# Patient Record
Sex: FEMALE | URBAN - METROPOLITAN AREA
[De-identification: names, ages, dates, MRNs, and addresses within clinical notes are randomized per-mention and may not be internally consistent; named-entity substitution may affect disease eponyms.]

---

## 2018-10-17 ENCOUNTER — APPOINTMENT (RX ONLY)
Dept: URBAN - METROPOLITAN AREA CLINIC 4 | Facility: CLINIC | Age: 78
Setting detail: DERMATOLOGY
End: 2018-10-17

## 2018-10-17 DIAGNOSIS — I87.2 VENOUS INSUFFICIENCY (CHRONIC) (PERIPHERAL): ICD-10-CM

## 2018-10-17 DIAGNOSIS — L81.4 OTHER MELANIN HYPERPIGMENTATION: ICD-10-CM

## 2018-10-17 DIAGNOSIS — Z71.89 OTHER SPECIFIED COUNSELING: ICD-10-CM

## 2018-10-17 DIAGNOSIS — L57.0 ACTINIC KERATOSIS: ICD-10-CM

## 2018-10-17 DIAGNOSIS — L82.1 OTHER SEBORRHEIC KERATOSIS: ICD-10-CM

## 2018-10-17 DIAGNOSIS — L30.0 NUMMULAR DERMATITIS: ICD-10-CM

## 2018-10-17 DIAGNOSIS — D18.0 HEMANGIOMA: ICD-10-CM

## 2018-10-17 DIAGNOSIS — D22 MELANOCYTIC NEVI: ICD-10-CM

## 2018-10-17 DIAGNOSIS — M79.3 PANNICULITIS, UNSPECIFIED: ICD-10-CM

## 2018-10-17 PROBLEM — D18.01 HEMANGIOMA OF SKIN AND SUBCUTANEOUS TISSUE: Status: ACTIVE | Noted: 2018-10-17

## 2018-10-17 PROBLEM — D48.5 NEOPLASM OF UNCERTAIN BEHAVIOR OF SKIN: Status: ACTIVE | Noted: 2018-10-17

## 2018-10-17 PROBLEM — E78.5 HYPERLIPIDEMIA, UNSPECIFIED: Status: ACTIVE | Noted: 2018-10-17

## 2018-10-17 PROBLEM — I83.10 VARICOSE VEINS OF UNSPECIFIED LOWER EXTREMITY WITH INFLAMMATION: Status: ACTIVE | Noted: 2018-10-17

## 2018-10-17 PROCEDURE — ? LIQUID NITROGEN

## 2018-10-17 PROCEDURE — 99203 OFFICE O/P NEW LOW 30 MIN: CPT | Mod: 25

## 2018-10-17 PROCEDURE — ? COUNSELING

## 2018-10-17 PROCEDURE — ? BIOPSY BY SHAVE METHOD

## 2018-10-17 PROCEDURE — 17000 DESTRUCT PREMALG LESION: CPT

## 2018-10-17 PROCEDURE — ? PRESCRIPTION

## 2018-10-17 PROCEDURE — 11100: CPT | Mod: 59

## 2018-10-17 RX ORDER — CLOBETASOL PROPIONATE 0.5 MG/G
OINTMENT TOPICAL
Qty: 1 | Refills: 0 | Status: ERX | COMMUNITY
Start: 2018-10-17

## 2018-10-17 RX ORDER — PENTOXIFYLLINE 400 MG/1
TABLET, FILM COATED, EXTENDED RELEASE ORAL
Qty: 100 | Refills: 0 | Status: ERX | COMMUNITY
Start: 2018-10-17

## 2018-10-17 RX ADMIN — CLOBETASOL PROPIONATE: 0.5 OINTMENT TOPICAL at 21:47

## 2018-10-17 RX ADMIN — PENTOXIFYLLINE: 400 TABLET, FILM COATED, EXTENDED RELEASE ORAL at 21:49

## 2018-10-17 ASSESSMENT — LOCATION DETAILED DESCRIPTION DERM
LOCATION DETAILED: LEFT RIB CAGE
LOCATION DETAILED: RIGHT DISTAL POSTERIOR THIGH
LOCATION DETAILED: RIGHT ANTERIOR DISTAL THIGH
LOCATION DETAILED: LEFT MEDIAL UPPER BACK
LOCATION DETAILED: LEFT INFERIOR UPPER BACK
LOCATION DETAILED: RIGHT DISTAL PRETIBIAL REGION
LOCATION DETAILED: NASAL DORSUM
LOCATION DETAILED: LEFT ANTERIOR DISTAL THIGH
LOCATION DETAILED: INFERIOR LUMBAR SPINE
LOCATION DETAILED: LEFT PROXIMAL PRETIBIAL REGION
LOCATION DETAILED: LEFT POPLITEAL SKIN
LOCATION DETAILED: LEFT SUPERIOR UPPER BACK

## 2018-10-17 ASSESSMENT — LOCATION SIMPLE DESCRIPTION DERM
LOCATION SIMPLE: LOWER BACK
LOCATION SIMPLE: RIGHT POSTERIOR THIGH
LOCATION SIMPLE: LEFT POPLITEAL SKIN
LOCATION SIMPLE: RIGHT PRETIBIAL REGION
LOCATION SIMPLE: LEFT PRETIBIAL REGION
LOCATION SIMPLE: RIGHT THIGH
LOCATION SIMPLE: LEFT UPPER BACK
LOCATION SIMPLE: LEFT THIGH
LOCATION SIMPLE: NOSE
LOCATION SIMPLE: ABDOMEN

## 2018-10-17 ASSESSMENT — LOCATION ZONE DERM
LOCATION ZONE: TRUNK
LOCATION ZONE: NOSE
LOCATION ZONE: LEG

## 2018-10-17 NOTE — PROCEDURE: BIOPSY BY SHAVE METHOD
Curettage Text: The wound bed was treated with curettage after the biopsy was performed.
Biopsy Type: H and E
Cryotherapy Text: The wound bed was treated with cryotherapy after the biopsy was performed.
Wound Care: Petrolatum
Was A Bandage Applied: Yes
Notification Instructions: Patient will be notified of biopsy results. However, patient instructed to call the office if not contacted within 2 weeks.
Depth Of Biopsy: dermis
Electrodesiccation And Curettage Text: The wound bed was treated with electrodesiccation and curettage after the biopsy was performed.
Hemostasis: Drysol
Type Of Destruction Used: Curettage
Additional Anesthesia Volume In Cc (Will Not Render If 0): 0
Destruction After The Procedure: No
Lab: [id number]
Post-Care Instructions: I reviewed with the patient in detail post-care instructions. Patient is to keep the biopsy site dry overnight. Gentle cleansing daily.  Apply petroleum ointment daily until healed. Patient may apply hydrogen peroxide soaks to remove any crusting.
Electrodesiccation Text: The wound bed was treated with electrodesiccation after the biopsy was performed.
Anesthesia Type: 1% lidocaine with 1:100,000 epinephrine and a 1:10 solution of 8.4% sodium bicarbonate
Dressing: pressure dressing with telfa
Consent: Written consent was obtained and risks were reviewed including but not limited to scarring, infection, bleeding, scabbing, incomplete removal, nerve damage and allergy to anesthesia.
Silver Nitrate Text: The wound bed was treated with silver nitrate after the biopsy was performed.
Anesthesia Volume In Cc: 2
Lab Facility: 
Biopsy Method: Personna blade
Detail Level: Detailed
Billing Type: Third-Party Bill

## 2018-10-17 NOTE — PROCEDURE: COUNSELING
Detail Level: Zone
Detail Level: Detailed
Patient Specific Counseling (Will Not Stick From Patient To Patient): Spoke with hematologist and can start pentoxyphyline
Detail Level: Simple
Patient Specific Counseling (Will Not Stick From Patient To Patient): Moisturize daily \\nClobetasol bid 2 weeks with flares

## 2018-11-29 ENCOUNTER — APPOINTMENT (RX ONLY)
Dept: URBAN - METROPOLITAN AREA CLINIC 22 | Facility: CLINIC | Age: 78
Setting detail: DERMATOLOGY
End: 2018-11-29

## 2018-11-29 DIAGNOSIS — I87.2 VENOUS INSUFFICIENCY (CHRONIC) (PERIPHERAL): ICD-10-CM | Status: IMPROVED

## 2018-11-29 DIAGNOSIS — M79.3 PANNICULITIS, UNSPECIFIED: ICD-10-CM | Status: STABLE

## 2018-11-29 DIAGNOSIS — L82.1 OTHER SEBORRHEIC KERATOSIS: ICD-10-CM

## 2018-11-29 PROBLEM — I83.10 VARICOSE VEINS OF UNSPECIFIED LOWER EXTREMITY WITH INFLAMMATION: Status: ACTIVE | Noted: 2018-11-29

## 2018-11-29 PROCEDURE — 99213 OFFICE O/P EST LOW 20 MIN: CPT

## 2018-11-29 PROCEDURE — ? LIQUID NITROGEN (COSMETIC)

## 2018-11-29 PROCEDURE — ? PRESCRIPTION

## 2018-11-29 PROCEDURE — ? ADDITIONAL NOTES

## 2018-11-29 PROCEDURE — ? COUNSELING

## 2018-11-29 RX ORDER — PENTOXIFYLLINE 400 MG/1
TABLET, FILM COATED, EXTENDED RELEASE ORAL
Qty: 100 | Refills: 0 | Status: ERX

## 2018-11-29 RX ORDER — CLOBETASOL PROPIONATE 0.5 MG/G
OINTMENT TOPICAL
Qty: 1 | Refills: 0 | Status: ERX

## 2018-11-29 ASSESSMENT — LOCATION DETAILED DESCRIPTION DERM
LOCATION DETAILED: LEFT PROXIMAL PRETIBIAL REGION
LOCATION DETAILED: RIGHT CLAVICULAR SKIN
LOCATION DETAILED: RIGHT DISTAL PRETIBIAL REGION

## 2018-11-29 ASSESSMENT — LOCATION SIMPLE DESCRIPTION DERM
LOCATION SIMPLE: RIGHT PRETIBIAL REGION
LOCATION SIMPLE: RIGHT CLAVICULAR SKIN
LOCATION SIMPLE: LEFT PRETIBIAL REGION

## 2018-11-29 ASSESSMENT — LOCATION ZONE DERM
LOCATION ZONE: TRUNK
LOCATION ZONE: LEG

## 2018-11-29 NOTE — PROCEDURE: COUNSELING
Detail Level: Zone
Patient Specific Counseling (Will Not Stick From Patient To Patient): Advised to keep her legs elevate and walk as much as possible.
Detail Level: Detailed

## 2018-11-29 NOTE — PROCEDURE: ADDITIONAL NOTES
Additional Notes: Patient would like to come back for destruction of several sks around the neck. Discussed cost. Will likely treat with light electrocautery
Detail Level: Simple

## 2018-11-29 NOTE — PROCEDURE: LIQUID NITROGEN (COSMETIC)
Price (Use Numbers Only, No Special Characters Or $): 0
Detail Level: Detailed
Post-Care Instructions: I reviewed with the patient in detail post-care instructions. Patient is to wear sunprotection, and avoid picking at any of the treated lesions. Pt may apply Vaseline to crusted or scabbing areas.
Render Post Care In The Note?: yes
Total Number Of Lesions Treated: 1
Consent: The patient's consent was obtained including but not limited to risks of crusting, scabbing, blistering, scarring, darker or lighter pigmentary change, recurrence, incomplete removal and infection.

## 2019-01-17 ENCOUNTER — APPOINTMENT (RX ONLY)
Dept: URBAN - METROPOLITAN AREA CLINIC 22 | Facility: CLINIC | Age: 79
Setting detail: DERMATOLOGY
End: 2019-01-17

## 2019-01-17 DIAGNOSIS — L82.1 OTHER SEBORRHEIC KERATOSIS: ICD-10-CM

## 2019-01-17 PROCEDURE — ? COUNSELING

## 2019-01-17 PROCEDURE — ? LIQUID NITROGEN (COSMETIC)

## 2019-01-17 ASSESSMENT — LOCATION DETAILED DESCRIPTION DERM
LOCATION DETAILED: LEFT CLAVICULAR NECK
LOCATION DETAILED: LEFT ANTERIOR SHOULDER
LOCATION DETAILED: LEFT MEDIAL ZYGOMA
LOCATION DETAILED: LEFT POSTERIOR SHOULDER
LOCATION DETAILED: LEFT LATERAL TRAPEZIAL NECK
LOCATION DETAILED: LEFT INFERIOR LATERAL NECK

## 2019-01-17 ASSESSMENT — LOCATION SIMPLE DESCRIPTION DERM
LOCATION SIMPLE: LEFT ANTERIOR NECK
LOCATION SIMPLE: POSTERIOR NECK
LOCATION SIMPLE: LEFT ZYGOMA
LOCATION SIMPLE: LEFT SHOULDER

## 2019-01-17 ASSESSMENT — LOCATION ZONE DERM
LOCATION ZONE: NECK
LOCATION ZONE: ARM
LOCATION ZONE: FACE

## 2019-01-17 NOTE — PROCEDURE: LIQUID NITROGEN (COSMETIC)
Total Number Of Lesions Treated: 15
Price (Use Numbers Only, No Special Characters Or $): 125.00
Duration Of Freeze Thaw-Cycle (Seconds): 0
Post-Care Instructions: I reviewed with the patient in detail post-care instructions. Patient is to wear sunprotection, and avoid picking at any of the treated lesions. Pt may apply Vaseline to crusted or scabbing areas.
Detail Level: Detailed
Pared With?: curette
Render Post Care In The Note?: yes
Consent: The patient's consent was obtained including but not limited to risks of crusting, scabbing, blistering, scarring, darker or lighter pigmentary change, recurrence, incomplete removal and infection.

## 2019-01-17 NOTE — HPI: SKIN LESION (SEBORRHEIC KERATOSES)
How Severe Are They?: moderate
Is This A New Presentation, Or A Follow-Up?: Skin Lesions
Additional History: Patient is here for cosmetic SK removal

## 2019-03-21 DIAGNOSIS — Z01.810 PRE-OPERATIVE CARDIOVASCULAR EXAMINATION: ICD-10-CM

## 2019-03-21 DIAGNOSIS — Z01.812 PRE-OPERATIVE LABORATORY EXAMINATION: ICD-10-CM

## 2019-03-21 LAB
ABO GROUP BLD: NORMAL
ABO GROUP BLD: NORMAL
ANION GAP SERPL CALC-SCNC: 3 MMOL/L (ref 0–11.9)
ANISOCYTOSIS BLD QL SMEAR: ABNORMAL
APPEARANCE UR: ABNORMAL
BACTERIA #/AREA URNS HPF: ABNORMAL /HPF
BASOPHILS # BLD AUTO: 1.8 % (ref 0–1.8)
BASOPHILS # BLD: 0.06 K/UL (ref 0–0.12)
BILIRUB UR QL STRIP.AUTO: NEGATIVE
BLD GP AB SCN SERPL QL: NORMAL
BUN SERPL-MCNC: 17 MG/DL (ref 8–22)
CALCIUM SERPL-MCNC: 9.3 MG/DL (ref 8.5–10.5)
CHLORIDE SERPL-SCNC: 106 MMOL/L (ref 96–112)
CO2 SERPL-SCNC: 29 MMOL/L (ref 20–33)
COLOR UR: YELLOW
CREAT SERPL-MCNC: 0.78 MG/DL (ref 0.5–1.4)
EKG IMPRESSION: NORMAL
EOSINOPHIL # BLD AUTO: 0.03 K/UL (ref 0–0.51)
EOSINOPHIL NFR BLD: 0.9 % (ref 0–6.9)
EPI CELLS #/AREA URNS HPF: ABNORMAL /HPF
ERYTHROCYTE [DISTWIDTH] IN BLOOD BY AUTOMATED COUNT: 64.4 FL (ref 35.9–50)
GLUCOSE SERPL-MCNC: 94 MG/DL (ref 65–99)
GLUCOSE UR STRIP.AUTO-MCNC: NEGATIVE MG/DL
HCT VFR BLD AUTO: 38.2 % (ref 37–47)
HGB BLD-MCNC: 12.1 G/DL (ref 12–16)
HYALINE CASTS #/AREA URNS LPF: ABNORMAL /LPF
KETONES UR STRIP.AUTO-MCNC: ABNORMAL MG/DL
LEUKOCYTE ESTERASE UR QL STRIP.AUTO: ABNORMAL
LG PLATELETS BLD QL SMEAR: NORMAL
LYMPHOCYTES # BLD AUTO: 0.87 K/UL (ref 1–4.8)
LYMPHOCYTES NFR BLD: 27.2 % (ref 22–41)
MACROCYTES BLD QL SMEAR: ABNORMAL
MANUAL DIFF BLD: NORMAL
MCH RBC QN AUTO: 36.2 PG (ref 27–33)
MCHC RBC AUTO-ENTMCNC: 31.7 G/DL (ref 33.6–35)
MCV RBC AUTO: 114.4 FL (ref 81.4–97.8)
MICRO URNS: ABNORMAL
MONOCYTES # BLD AUTO: 0.34 K/UL (ref 0–0.85)
MONOCYTES NFR BLD AUTO: 10.5 % (ref 0–13.4)
MORPHOLOGY BLD-IMP: NORMAL
NEUTROPHILS # BLD AUTO: 1.91 K/UL (ref 2–7.15)
NEUTROPHILS NFR BLD: 57.9 % (ref 44–72)
NEUTS BAND NFR BLD MANUAL: 1.8 % (ref 0–10)
NITRITE UR QL STRIP.AUTO: NEGATIVE
NRBC # BLD AUTO: 0 K/UL
NRBC BLD-RTO: 0 /100 WBC
OVALOCYTES BLD QL SMEAR: NORMAL
PH UR STRIP.AUTO: 6.5 [PH]
PLATELET # BLD AUTO: 586 K/UL (ref 164–446)
PLATELET BLD QL SMEAR: NORMAL
PMV BLD AUTO: 9.8 FL (ref 9–12.9)
POIKILOCYTOSIS BLD QL SMEAR: NORMAL
POTASSIUM SERPL-SCNC: 4.3 MMOL/L (ref 3.6–5.5)
PROT UR QL STRIP: NEGATIVE MG/DL
RBC # BLD AUTO: 3.34 M/UL (ref 4.2–5.4)
RBC # URNS HPF: ABNORMAL /HPF
RBC BLD AUTO: PRESENT
RBC UR QL AUTO: NEGATIVE
RH BLD: NORMAL
RH BLD: NORMAL
SCCMEC + MECA PNL NOSE NAA+PROBE: NEGATIVE
SCCMEC + MECA PNL NOSE NAA+PROBE: NEGATIVE
SODIUM SERPL-SCNC: 138 MMOL/L (ref 135–145)
SP GR UR STRIP.AUTO: 1.02
UROBILINOGEN UR STRIP.AUTO-MCNC: 0.2 MG/DL
WBC # BLD AUTO: 3.2 K/UL (ref 4.8–10.8)
WBC #/AREA URNS HPF: ABNORMAL /HPF

## 2019-03-21 PROCEDURE — 93010 ELECTROCARDIOGRAM REPORT: CPT | Performed by: INTERNAL MEDICINE

## 2019-03-21 PROCEDURE — 86900 BLOOD TYPING SEROLOGIC ABO: CPT

## 2019-03-21 PROCEDURE — 36415 COLL VENOUS BLD VENIPUNCTURE: CPT

## 2019-03-21 PROCEDURE — 80048 BASIC METABOLIC PNL TOTAL CA: CPT

## 2019-03-21 PROCEDURE — 86850 RBC ANTIBODY SCREEN: CPT

## 2019-03-21 PROCEDURE — 93005 ELECTROCARDIOGRAM TRACING: CPT

## 2019-03-21 PROCEDURE — 87640 STAPH A DNA AMP PROBE: CPT

## 2019-03-21 PROCEDURE — 81001 URINALYSIS AUTO W/SCOPE: CPT

## 2019-03-21 PROCEDURE — 85007 BL SMEAR W/DIFF WBC COUNT: CPT

## 2019-03-21 PROCEDURE — 86901 BLOOD TYPING SEROLOGIC RH(D): CPT

## 2019-03-21 PROCEDURE — 87641 MR-STAPH DNA AMP PROBE: CPT

## 2019-03-21 PROCEDURE — 85027 COMPLETE CBC AUTOMATED: CPT

## 2019-03-21 NOTE — OR NURSING
Patient here for pre-admit appointment. Labs/testing done as ordered. MRSA nasal swab completed; patient educated as to what to expect if result is positive.

## 2019-03-21 NOTE — DISCHARGE PLANNING
DISCHARGE PLANNING NOTE - TOTAL JOINT     Procedure: Procedure(s):  KNEE ARTHROPLASTY TOTAL  Procedure Date: 4/3/2019  Insurance:  Payor: MEDICARE / Plan: MEDICARE PART A & B  Equipment currently available at home? None  Steps into the home? 1  Steps within the home? 13 with railing  Toilet height? ADA  Type of shower? walk-in shower  Who will be with you during your recovery? friend  Is Outpatient Physical Therapy set up after surgery? Yes  Did you take the Total Joint Class and where? Yes, at [facility]     Plan: Reviewed Equipment Resource list and provided a copy to the patient. Recommended a raised toilet seat and shower chair. She will need a FWW and signed the choice form for [facility]. Please obtain an order for the walker. She discussed going home the same day as surgery if all goes well.

## 2019-04-03 ENCOUNTER — ANESTHESIA EVENT (OUTPATIENT)
Dept: SURGERY | Facility: MEDICAL CENTER | Age: 79
End: 2019-04-03
Payer: MEDICARE

## 2019-04-03 ENCOUNTER — ANESTHESIA (OUTPATIENT)
Dept: SURGERY | Facility: MEDICAL CENTER | Age: 79
End: 2019-04-03
Payer: MEDICARE

## 2019-04-03 ENCOUNTER — HOSPITAL ENCOUNTER (OUTPATIENT)
Facility: MEDICAL CENTER | Age: 79
End: 2019-04-04
Attending: ORTHOPAEDIC SURGERY | Admitting: ORTHOPAEDIC SURGERY
Payer: MEDICARE

## 2019-04-03 ENCOUNTER — APPOINTMENT (OUTPATIENT)
Dept: RADIOLOGY | Facility: MEDICAL CENTER | Age: 79
End: 2019-04-03
Attending: STUDENT IN AN ORGANIZED HEALTH CARE EDUCATION/TRAINING PROGRAM
Payer: MEDICARE

## 2019-04-03 DIAGNOSIS — Z96.652 STATUS POST LEFT KNEE REPLACEMENT: ICD-10-CM

## 2019-04-03 DIAGNOSIS — G89.18 ACUTE POST-OPERATIVE PAIN: ICD-10-CM

## 2019-04-03 DIAGNOSIS — M17.12 ARTHRITIS OF LEFT KNEE: ICD-10-CM

## 2019-04-03 PROCEDURE — 700101 HCHG RX REV CODE 250: Performed by: STUDENT IN AN ORGANIZED HEALTH CARE EDUCATION/TRAINING PROGRAM

## 2019-04-03 PROCEDURE — 160041 HCHG SURGERY MINUTES - EA ADDL 1 MIN LEVEL 4: Performed by: ORTHOPAEDIC SURGERY

## 2019-04-03 PROCEDURE — 700112 HCHG RX REV CODE 229: Performed by: STUDENT IN AN ORGANIZED HEALTH CARE EDUCATION/TRAINING PROGRAM

## 2019-04-03 PROCEDURE — G0378 HOSPITAL OBSERVATION PER HR: HCPCS

## 2019-04-03 PROCEDURE — 160036 HCHG PACU - EA ADDL 30 MINS PHASE I: Performed by: ORTHOPAEDIC SURGERY

## 2019-04-03 PROCEDURE — 73560 X-RAY EXAM OF KNEE 1 OR 2: CPT | Mod: LT

## 2019-04-03 PROCEDURE — 160035 HCHG PACU - 1ST 60 MINS PHASE I: Performed by: ORTHOPAEDIC SURGERY

## 2019-04-03 PROCEDURE — 700111 HCHG RX REV CODE 636 W/ 250 OVERRIDE (IP)

## 2019-04-03 PROCEDURE — 700105 HCHG RX REV CODE 258: Performed by: STUDENT IN AN ORGANIZED HEALTH CARE EDUCATION/TRAINING PROGRAM

## 2019-04-03 PROCEDURE — 502000 HCHG MISC OR IMPLANTS RC 0278: Performed by: ORTHOPAEDIC SURGERY

## 2019-04-03 PROCEDURE — A9270 NON-COVERED ITEM OR SERVICE: HCPCS | Performed by: ANESTHESIOLOGY

## 2019-04-03 PROCEDURE — 700102 HCHG RX REV CODE 250 W/ 637 OVERRIDE(OP): Performed by: ANESTHESIOLOGY

## 2019-04-03 PROCEDURE — 700111 HCHG RX REV CODE 636 W/ 250 OVERRIDE (IP): Performed by: ANESTHESIOLOGY

## 2019-04-03 PROCEDURE — 502579 HCHG PACK, TOTAL KNEE: Performed by: ORTHOPAEDIC SURGERY

## 2019-04-03 PROCEDURE — 160009 HCHG ANES TIME/MIN: Performed by: ORTHOPAEDIC SURGERY

## 2019-04-03 PROCEDURE — 96365 THER/PROPH/DIAG IV INF INIT: CPT | Mod: XU

## 2019-04-03 PROCEDURE — 700101 HCHG RX REV CODE 250: Performed by: ANESTHESIOLOGY

## 2019-04-03 PROCEDURE — 700101 HCHG RX REV CODE 250

## 2019-04-03 PROCEDURE — 160022 HCHG BLOCK: Performed by: ORTHOPAEDIC SURGERY

## 2019-04-03 PROCEDURE — 700102 HCHG RX REV CODE 250 W/ 637 OVERRIDE(OP): Performed by: STUDENT IN AN ORGANIZED HEALTH CARE EDUCATION/TRAINING PROGRAM

## 2019-04-03 PROCEDURE — 160048 HCHG OR STATISTICAL LEVEL 1-5: Performed by: ORTHOPAEDIC SURGERY

## 2019-04-03 PROCEDURE — 160002 HCHG RECOVERY MINUTES (STAT): Performed by: ORTHOPAEDIC SURGERY

## 2019-04-03 PROCEDURE — 501838 HCHG SUTURE GENERAL: Performed by: ORTHOPAEDIC SURGERY

## 2019-04-03 PROCEDURE — A9270 NON-COVERED ITEM OR SERVICE: HCPCS | Performed by: STUDENT IN AN ORGANIZED HEALTH CARE EDUCATION/TRAINING PROGRAM

## 2019-04-03 PROCEDURE — L8699 PROSTHETIC IMPLANT NOS: HCPCS | Performed by: ORTHOPAEDIC SURGERY

## 2019-04-03 PROCEDURE — 700111 HCHG RX REV CODE 636 W/ 250 OVERRIDE (IP): Performed by: STUDENT IN AN ORGANIZED HEALTH CARE EDUCATION/TRAINING PROGRAM

## 2019-04-03 PROCEDURE — 160029 HCHG SURGERY MINUTES - 1ST 30 MINS LEVEL 4: Performed by: ORTHOPAEDIC SURGERY

## 2019-04-03 PROCEDURE — 94760 N-INVAS EAR/PLS OXIMETRY 1: CPT

## 2019-04-03 DEVICE — IMPLANTABLE DEVICE: Type: IMPLANTABLE DEVICE | Site: KNEE | Status: FUNCTIONAL

## 2019-04-03 DEVICE — IMPLANT GNS II CMT TIB SIZE 5 LEFT: Type: IMPLANTABLE DEVICE | Site: KNEE | Status: FUNCTIONAL

## 2019-04-03 DEVICE — IMPLANT GII C/R ART INS SZ 5-6 9MM: Type: IMPLANTABLE DEVICE | Site: KNEE | Status: FUNCTIONAL

## 2019-04-03 DEVICE — BONE CEMENT SIMPLEX FULL DOSE - (10EA/PK): Type: IMPLANTABLE DEVICE | Site: KNEE | Status: FUNCTIONAL

## 2019-04-03 DEVICE — IMPLANT LEGION CR NP FEM SZ 6 LT: Type: IMPLANTABLE DEVICE | Site: KNEE | Status: FUNCTIONAL

## 2019-04-03 RX ORDER — CHLORPROMAZINE HYDROCHLORIDE 25 MG/ML
25 INJECTION INTRAMUSCULAR EVERY 6 HOURS PRN
Status: DISCONTINUED | OUTPATIENT
Start: 2019-04-03 | End: 2019-04-04 | Stop reason: HOSPADM

## 2019-04-03 RX ORDER — HYDRALAZINE HYDROCHLORIDE 20 MG/ML
5 INJECTION INTRAMUSCULAR; INTRAVENOUS
Status: DISCONTINUED | OUTPATIENT
Start: 2019-04-03 | End: 2019-04-03 | Stop reason: HOSPADM

## 2019-04-03 RX ORDER — ENEMA 19; 7 G/133ML; G/133ML
1 ENEMA RECTAL
Status: DISCONTINUED | OUTPATIENT
Start: 2019-04-03 | End: 2019-04-04 | Stop reason: HOSPADM

## 2019-04-03 RX ORDER — OXYCODONE HCL 5 MG/5 ML
5 SOLUTION, ORAL ORAL
Status: DISCONTINUED | OUTPATIENT
Start: 2019-04-03 | End: 2019-04-03 | Stop reason: HOSPADM

## 2019-04-03 RX ORDER — MAGNESIUM HYDROXIDE 1200 MG/15ML
LIQUID ORAL
Status: COMPLETED | OUTPATIENT
Start: 2019-04-03 | End: 2019-04-03

## 2019-04-03 RX ORDER — DIPHENHYDRAMINE HCL 25 MG
25 TABLET ORAL EVERY 6 HOURS PRN
Status: DISCONTINUED | OUTPATIENT
Start: 2019-04-03 | End: 2019-04-04 | Stop reason: HOSPADM

## 2019-04-03 RX ORDER — DEXTROSE, SODIUM CHLORIDE, SODIUM LACTATE, POTASSIUM CHLORIDE, AND CALCIUM CHLORIDE 5; .6; .31; .03; .02 G/100ML; G/100ML; G/100ML; G/100ML; G/100ML
INJECTION, SOLUTION INTRAVENOUS CONTINUOUS
Status: DISPENSED | OUTPATIENT
Start: 2019-04-03 | End: 2019-04-03

## 2019-04-03 RX ORDER — AMOXICILLIN 250 MG
1 CAPSULE ORAL
Status: DISCONTINUED | OUTPATIENT
Start: 2019-04-03 | End: 2019-04-04 | Stop reason: HOSPADM

## 2019-04-03 RX ORDER — TAMSULOSIN HYDROCHLORIDE 0.4 MG/1
0.4 CAPSULE ORAL
Status: DISCONTINUED | OUTPATIENT
Start: 2019-04-04 | End: 2019-04-04 | Stop reason: HOSPADM

## 2019-04-03 RX ORDER — ONDANSETRON 2 MG/ML
4 INJECTION INTRAMUSCULAR; INTRAVENOUS EVERY 4 HOURS PRN
Status: DISCONTINUED | OUTPATIENT
Start: 2019-04-03 | End: 2019-04-04 | Stop reason: HOSPADM

## 2019-04-03 RX ORDER — KETOROLAC TROMETHAMINE 30 MG/ML
15 INJECTION, SOLUTION INTRAMUSCULAR; INTRAVENOUS EVERY 6 HOURS
Status: DISPENSED | OUTPATIENT
Start: 2019-04-03 | End: 2019-04-04

## 2019-04-03 RX ORDER — MIDAZOLAM HYDROCHLORIDE 1 MG/ML
1 INJECTION INTRAMUSCULAR; INTRAVENOUS
Status: DISCONTINUED | OUTPATIENT
Start: 2019-04-03 | End: 2019-04-03 | Stop reason: HOSPADM

## 2019-04-03 RX ORDER — OXYCODONE HCL 5 MG/5 ML
10 SOLUTION, ORAL ORAL
Status: DISCONTINUED | OUTPATIENT
Start: 2019-04-03 | End: 2019-04-03 | Stop reason: HOSPADM

## 2019-04-03 RX ORDER — DIAZEPAM 5 MG/1
5 TABLET ORAL EVERY 6 HOURS PRN
Status: DISCONTINUED | OUTPATIENT
Start: 2019-04-03 | End: 2019-04-04 | Stop reason: HOSPADM

## 2019-04-03 RX ORDER — MEPERIDINE HYDROCHLORIDE 25 MG/ML
6.25 INJECTION INTRAMUSCULAR; INTRAVENOUS; SUBCUTANEOUS
Status: DISCONTINUED | OUTPATIENT
Start: 2019-04-03 | End: 2019-04-03 | Stop reason: HOSPADM

## 2019-04-03 RX ORDER — DEXAMETHASONE SODIUM PHOSPHATE 4 MG/ML
INJECTION, SOLUTION INTRA-ARTICULAR; INTRALESIONAL; INTRAMUSCULAR; INTRAVENOUS; SOFT TISSUE PRN
Status: DISCONTINUED | OUTPATIENT
Start: 2019-04-03 | End: 2019-04-03 | Stop reason: SURG

## 2019-04-03 RX ORDER — ZOLPIDEM TARTRATE 5 MG/1
5 TABLET ORAL NIGHTLY PRN
Status: DISCONTINUED | OUTPATIENT
Start: 2019-04-04 | End: 2019-04-04 | Stop reason: HOSPADM

## 2019-04-03 RX ORDER — SCOLOPAMINE TRANSDERMAL SYSTEM 1 MG/1
1 PATCH, EXTENDED RELEASE TRANSDERMAL
Status: DISCONTINUED | OUTPATIENT
Start: 2019-04-03 | End: 2019-04-04 | Stop reason: HOSPADM

## 2019-04-03 RX ORDER — BUPIVACAINE HYDROCHLORIDE AND EPINEPHRINE 2.5; 5 MG/ML; UG/ML
INJECTION, SOLUTION EPIDURAL; INFILTRATION; INTRACAUDAL; PERINEURAL PRN
Status: DISCONTINUED | OUTPATIENT
Start: 2019-04-03 | End: 2019-04-03 | Stop reason: SURG

## 2019-04-03 RX ORDER — HYDROXYUREA 500 MG/1
1000 CAPSULE ORAL DAILY
Status: DISCONTINUED | OUTPATIENT
Start: 2019-04-03 | End: 2019-04-03

## 2019-04-03 RX ORDER — CELECOXIB 200 MG/1
200 CAPSULE ORAL DAILY
Status: DISCONTINUED | OUTPATIENT
Start: 2019-04-05 | End: 2019-04-04 | Stop reason: HOSPADM

## 2019-04-03 RX ORDER — DOCUSATE SODIUM 100 MG/1
100 CAPSULE, LIQUID FILLED ORAL 2 TIMES DAILY
Status: DISCONTINUED | OUTPATIENT
Start: 2019-04-03 | End: 2019-04-04 | Stop reason: HOSPADM

## 2019-04-03 RX ORDER — OXYCODONE HCL 10 MG/1
10 TABLET, FILM COATED, EXTENDED RELEASE ORAL ONCE
Status: COMPLETED | OUTPATIENT
Start: 2019-04-03 | End: 2019-04-03

## 2019-04-03 RX ORDER — ONDANSETRON 2 MG/ML
4 INJECTION INTRAMUSCULAR; INTRAVENOUS
Status: DISCONTINUED | OUTPATIENT
Start: 2019-04-03 | End: 2019-04-03 | Stop reason: HOSPADM

## 2019-04-03 RX ORDER — CEFAZOLIN SODIUM 2 G/100ML
2 INJECTION, SOLUTION INTRAVENOUS EVERY 8 HOURS
Status: COMPLETED | OUTPATIENT
Start: 2019-04-03 | End: 2019-04-04

## 2019-04-03 RX ORDER — ACETAMINOPHEN 500 MG
1000 TABLET ORAL EVERY 6 HOURS
Status: DISCONTINUED | OUTPATIENT
Start: 2019-04-03 | End: 2019-04-04 | Stop reason: HOSPADM

## 2019-04-03 RX ORDER — TRANEXAMIC ACID 100 MG/ML
INJECTION, SOLUTION INTRAVENOUS PRN
Status: DISCONTINUED | OUTPATIENT
Start: 2019-04-03 | End: 2019-04-03 | Stop reason: SURG

## 2019-04-03 RX ORDER — OXYCODONE HYDROCHLORIDE 5 MG/1
5 TABLET ORAL EVERY 4 HOURS PRN
Status: DISCONTINUED | OUTPATIENT
Start: 2019-04-03 | End: 2019-04-04 | Stop reason: HOSPADM

## 2019-04-03 RX ORDER — GABAPENTIN 300 MG/1
300 CAPSULE ORAL ONCE
Status: COMPLETED | OUTPATIENT
Start: 2019-04-03 | End: 2019-04-03

## 2019-04-03 RX ORDER — DEXAMETHASONE SODIUM PHOSPHATE 4 MG/ML
4 INJECTION, SOLUTION INTRA-ARTICULAR; INTRALESIONAL; INTRAMUSCULAR; INTRAVENOUS; SOFT TISSUE ONCE
Status: COMPLETED | OUTPATIENT
Start: 2019-04-04 | End: 2019-04-04

## 2019-04-03 RX ORDER — BUPIVACAINE HYDROCHLORIDE AND EPINEPHRINE 2.5; 5 MG/ML; UG/ML
INJECTION, SOLUTION EPIDURAL; INFILTRATION; INTRACAUDAL; PERINEURAL
Status: DISCONTINUED | OUTPATIENT
Start: 2019-04-03 | End: 2019-04-03 | Stop reason: HOSPADM

## 2019-04-03 RX ORDER — BISACODYL 10 MG
10 SUPPOSITORY, RECTAL RECTAL
Status: DISCONTINUED | OUTPATIENT
Start: 2019-04-03 | End: 2019-04-04 | Stop reason: HOSPADM

## 2019-04-03 RX ORDER — ONDANSETRON 8 MG/1
8 TABLET, ORALLY DISINTEGRATING ORAL EVERY 8 HOURS PRN
Status: DISCONTINUED | OUTPATIENT
Start: 2019-04-03 | End: 2019-04-04 | Stop reason: HOSPADM

## 2019-04-03 RX ORDER — DEXAMETHASONE SODIUM PHOSPHATE 4 MG/ML
4 INJECTION, SOLUTION INTRA-ARTICULAR; INTRALESIONAL; INTRAMUSCULAR; INTRAVENOUS; SOFT TISSUE
Status: DISCONTINUED | OUTPATIENT
Start: 2019-04-03 | End: 2019-04-04 | Stop reason: HOSPADM

## 2019-04-03 RX ORDER — CEFAZOLIN SODIUM 2 G/100ML
2 INJECTION, SOLUTION INTRAVENOUS
Status: COMPLETED | OUTPATIENT
Start: 2019-04-03 | End: 2019-04-03

## 2019-04-03 RX ORDER — DIPHENHYDRAMINE HYDROCHLORIDE 50 MG/ML
25 INJECTION INTRAMUSCULAR; INTRAVENOUS EVERY 6 HOURS PRN
Status: DISCONTINUED | OUTPATIENT
Start: 2019-04-03 | End: 2019-04-04 | Stop reason: HOSPADM

## 2019-04-03 RX ORDER — SODIUM CHLORIDE, SODIUM LACTATE, POTASSIUM CHLORIDE, CALCIUM CHLORIDE 600; 310; 30; 20 MG/100ML; MG/100ML; MG/100ML; MG/100ML
INJECTION, SOLUTION INTRAVENOUS CONTINUOUS
Status: DISCONTINUED | OUTPATIENT
Start: 2019-04-03 | End: 2019-04-03 | Stop reason: HOSPADM

## 2019-04-03 RX ORDER — BUPIVACAINE HYDROCHLORIDE 7.5 MG/ML
INJECTION, SOLUTION INTRASPINAL PRN
Status: DISCONTINUED | OUTPATIENT
Start: 2019-04-03 | End: 2019-04-03 | Stop reason: SURG

## 2019-04-03 RX ORDER — ONDANSETRON 2 MG/ML
INJECTION INTRAMUSCULAR; INTRAVENOUS PRN
Status: DISCONTINUED | OUTPATIENT
Start: 2019-04-03 | End: 2019-04-03 | Stop reason: SURG

## 2019-04-03 RX ORDER — DIPHENHYDRAMINE HYDROCHLORIDE 50 MG/ML
12.5 INJECTION INTRAMUSCULAR; INTRAVENOUS
Status: DISCONTINUED | OUTPATIENT
Start: 2019-04-03 | End: 2019-04-03 | Stop reason: HOSPADM

## 2019-04-03 RX ORDER — ACETAMINOPHEN 500 MG
1000 TABLET ORAL ONCE
Status: COMPLETED | OUTPATIENT
Start: 2019-04-03 | End: 2019-04-03

## 2019-04-03 RX ORDER — AMOXICILLIN 250 MG
1 CAPSULE ORAL NIGHTLY
Status: DISCONTINUED | OUTPATIENT
Start: 2019-04-03 | End: 2019-04-04 | Stop reason: HOSPADM

## 2019-04-03 RX ORDER — HYDROMORPHONE HYDROCHLORIDE 1 MG/ML
0.5 INJECTION, SOLUTION INTRAMUSCULAR; INTRAVENOUS; SUBCUTANEOUS
Status: DISCONTINUED | OUTPATIENT
Start: 2019-04-03 | End: 2019-04-04 | Stop reason: HOSPADM

## 2019-04-03 RX ORDER — CELECOXIB 200 MG/1
400 CAPSULE ORAL ONCE
Status: COMPLETED | OUTPATIENT
Start: 2019-04-03 | End: 2019-04-03

## 2019-04-03 RX ORDER — HALOPERIDOL 5 MG/ML
1 INJECTION INTRAMUSCULAR
Status: DISCONTINUED | OUTPATIENT
Start: 2019-04-03 | End: 2019-04-03 | Stop reason: HOSPADM

## 2019-04-03 RX ORDER — CHLORPROMAZINE HYDROCHLORIDE 25 MG/1
25 TABLET, FILM COATED ORAL EVERY 6 HOURS PRN
Status: DISCONTINUED | OUTPATIENT
Start: 2019-04-03 | End: 2019-04-04 | Stop reason: HOSPADM

## 2019-04-03 RX ORDER — TRAMADOL HYDROCHLORIDE 50 MG/1
50 TABLET ORAL EVERY 4 HOURS PRN
Status: DISCONTINUED | OUTPATIENT
Start: 2019-04-03 | End: 2019-04-04 | Stop reason: HOSPADM

## 2019-04-03 RX ORDER — POLYETHYLENE GLYCOL 3350 17 G/17G
1 POWDER, FOR SOLUTION ORAL 2 TIMES DAILY PRN
Status: DISCONTINUED | OUTPATIENT
Start: 2019-04-03 | End: 2019-04-04 | Stop reason: HOSPADM

## 2019-04-03 RX ORDER — OXYCODONE HYDROCHLORIDE 5 MG/1
10 TABLET ORAL EVERY 4 HOURS PRN
Status: DISCONTINUED | OUTPATIENT
Start: 2019-04-03 | End: 2019-04-04 | Stop reason: HOSPADM

## 2019-04-03 RX ORDER — KETOROLAC TROMETHAMINE 30 MG/ML
INJECTION, SOLUTION INTRAMUSCULAR; INTRAVENOUS
Status: DISCONTINUED | OUTPATIENT
Start: 2019-04-03 | End: 2019-04-03 | Stop reason: HOSPADM

## 2019-04-03 RX ORDER — SODIUM CHLORIDE, SODIUM LACTATE, POTASSIUM CHLORIDE, CALCIUM CHLORIDE 600; 310; 30; 20 MG/100ML; MG/100ML; MG/100ML; MG/100ML
INJECTION, SOLUTION INTRAVENOUS CONTINUOUS
Status: ACTIVE | OUTPATIENT
Start: 2019-04-03 | End: 2019-04-03

## 2019-04-03 RX ORDER — HALOPERIDOL 5 MG/ML
1 INJECTION INTRAMUSCULAR EVERY 6 HOURS PRN
Status: DISCONTINUED | OUTPATIENT
Start: 2019-04-03 | End: 2019-04-04 | Stop reason: HOSPADM

## 2019-04-03 RX ADMIN — TRANEXAMIC ACID 1000 MG: 100 INJECTION, SOLUTION INTRAVENOUS at 13:22

## 2019-04-03 RX ADMIN — ACETAMINOPHEN 1000 MG: 500 TABLET, FILM COATED ORAL at 08:50

## 2019-04-03 RX ADMIN — OXYCODONE HYDROCHLORIDE 10 MG: 10 TABLET, FILM COATED, EXTENDED RELEASE ORAL at 08:50

## 2019-04-03 RX ADMIN — BUPIVACAINE HYDROCHLORIDE AND EPINEPHRINE 20 ML: 2.5; 5 INJECTION, SOLUTION EPIDURAL; INFILTRATION; INTRACAUDAL; PERINEURAL at 11:15

## 2019-04-03 RX ADMIN — DOCUSATE SODIUM 100 MG: 100 CAPSULE, LIQUID FILLED ORAL at 17:38

## 2019-04-03 RX ADMIN — ACETAMINOPHEN 1000 MG: 500 TABLET ORAL at 17:38

## 2019-04-03 RX ADMIN — SODIUM CHLORIDE, SODIUM LACTATE, POTASSIUM CHLORIDE, CALCIUM CHLORIDE: 600; 310; 30; 20 INJECTION, SOLUTION INTRAVENOUS at 09:17

## 2019-04-03 RX ADMIN — CEFAZOLIN SODIUM 2 G: 2 INJECTION, SOLUTION INTRAVENOUS at 17:38

## 2019-04-03 RX ADMIN — CELECOXIB 400 MG: 200 CAPSULE ORAL at 08:50

## 2019-04-03 RX ADMIN — SENNOSIDES, DOCUSATE SODIUM 1 TABLET: 50; 8.6 TABLET, FILM COATED ORAL at 23:35

## 2019-04-03 RX ADMIN — SODIUM CHLORIDE, SODIUM LACTATE, POTASSIUM CHLORIDE, CALCIUM CHLORIDE: 600; 310; 30; 20 INJECTION, SOLUTION INTRAVENOUS at 13:21

## 2019-04-03 RX ADMIN — GABAPENTIN 300 MG: 300 CAPSULE ORAL at 08:50

## 2019-04-03 RX ADMIN — ACETAMINOPHEN 1000 MG: 500 TABLET ORAL at 23:35

## 2019-04-03 RX ADMIN — DEXAMETHASONE SODIUM PHOSPHATE 4 MG: 4 INJECTION, SOLUTION INTRAMUSCULAR; INTRAVENOUS at 11:28

## 2019-04-03 RX ADMIN — TRANEXAMIC ACID 1 G: 100 INJECTION, SOLUTION INTRAVENOUS at 11:15

## 2019-04-03 RX ADMIN — ONDANSETRON 4 MG: 2 INJECTION INTRAMUSCULAR; INTRAVENOUS at 11:28

## 2019-04-03 RX ADMIN — BUPIVACAINE HYDROCHLORIDE IN DEXTROSE 1.4 ML: 7.5 INJECTION, SOLUTION SUBARACHNOID at 11:18

## 2019-04-03 RX ADMIN — SODIUM CHLORIDE, SODIUM LACTATE, POTASSIUM CHLORIDE, CALCIUM CHLORIDE AND DEXTROSE MONOHYDRATE: 5; 600; 310; 30; 20 INJECTION, SOLUTION INTRAVENOUS at 15:37

## 2019-04-03 RX ADMIN — CEFAZOLIN SODIUM 2 G: 2 INJECTION, SOLUTION INTRAVENOUS at 11:14

## 2019-04-03 RX ADMIN — FENTANYL CITRATE 25 MCG: 50 INJECTION, SOLUTION INTRAMUSCULAR; INTRAVENOUS at 11:18

## 2019-04-03 RX ADMIN — ASPIRIN 81 MG: 81 TABLET, DELAYED RELEASE ORAL at 23:35

## 2019-04-03 RX ADMIN — SODIUM CHLORIDE, SODIUM LACTATE, POTASSIUM CHLORIDE, CALCIUM CHLORIDE: 600; 310; 30; 20 INJECTION, SOLUTION INTRAVENOUS at 11:28

## 2019-04-03 RX ADMIN — EPHEDRINE SULFATE 10 MG: 50 INJECTION INTRAMUSCULAR; INTRAVENOUS; SUBCUTANEOUS at 11:55

## 2019-04-03 RX ADMIN — MIDAZOLAM 5 MG: 1 INJECTION INTRAMUSCULAR; INTRAVENOUS at 11:14

## 2019-04-03 ASSESSMENT — COGNITIVE AND FUNCTIONAL STATUS - GENERAL
DRESSING REGULAR UPPER BODY CLOTHING: A LITTLE
TURNING FROM BACK TO SIDE WHILE IN FLAT BAD: A LITTLE
MOVING FROM LYING ON BACK TO SITTING ON SIDE OF FLAT BED: A LITTLE
MOBILITY SCORE: 18
SUGGESTED CMS G CODE MODIFIER DAILY ACTIVITY: CJ
DRESSING REGULAR LOWER BODY CLOTHING: A LITTLE
STANDING UP FROM CHAIR USING ARMS: A LITTLE
SUGGESTED CMS G CODE MODIFIER MOBILITY: CK
CLIMB 3 TO 5 STEPS WITH RAILING: A LITTLE
MOVING TO AND FROM BED TO CHAIR: A LITTLE
DAILY ACTIVITIY SCORE: 22
WALKING IN HOSPITAL ROOM: A LITTLE

## 2019-04-03 ASSESSMENT — LIFESTYLE VARIABLES
EVER_SMOKED: NEVER
EVER_SMOKED: NEVER
ALCOHOL_USE: NO

## 2019-04-03 ASSESSMENT — PAIN SCALES - GENERAL: PAIN_LEVEL: 0

## 2019-04-03 ASSESSMENT — PATIENT HEALTH QUESTIONNAIRE - PHQ9
2. FEELING DOWN, DEPRESSED, IRRITABLE, OR HOPELESS: NOT AT ALL
SUM OF ALL RESPONSES TO PHQ9 QUESTIONS 1 AND 2: 0
1. LITTLE INTEREST OR PLEASURE IN DOING THINGS: NOT AT ALL

## 2019-04-03 ASSESSMENT — COPD QUESTIONNAIRES
DURING THE PAST 4 WEEKS HOW MUCH DID YOU FEEL SHORT OF BREATH: NONE/LITTLE OF THE TIME
HAVE YOU SMOKED AT LEAST 100 CIGARETTES IN YOUR ENTIRE LIFE: NO/DON'T KNOW
DO YOU EVER COUGH UP ANY MUCUS OR PHLEGM?: NO/ONLY WITH OCCASIONAL COLDS OR INFECTIONS
COPD SCREENING SCORE: 2

## 2019-04-03 NOTE — OR NURSING
Patient allergies and NPO status verified. Belongings secured. Patient verbalizes understanding of pain scale, expected course of stay and plan of care. Surgical site verified with patient. IV access established. Call light within reach. No further needs at this time. Hourly rounding in place.

## 2019-04-03 NOTE — ANESTHESIA QCDR
2019 [facility] Clinical Data Registry (for Quality Improvement)     Postoperative nausea/vomiting risk protocol (Adult = 18 yrs and Pediatric 3-17 yrs)- (430 and 463)  General inhalation anesthetic (NOT TIVA) with PONV risk factors: No  Provision of anti-emetic therapy with at least 2 different classes of agents: N/A  Patient DID NOT receive anti-emetic therapy and reason is documented in Medical Record: N/A    Multimodal Pain Management- (AQI59)  Patient undergoing Elective Surgery (i.e. Outpatient, or ASC, or Prescheduled Surgery prior to Hospital Admission): Yes  Use of Multimodal Pain Management, two or more drugs and/or interventions, NOT including systemic opioids: Yes   Exception: Documented allergy to multiple classes of analgesics:  N/A    PACU assessment of acute postoperative pain prior to Anesthesia Care End- Applies to Patients Age = 18- (ABG7)  Initial PACU pain score is which of the following: < 7/10  Patient unable to report pain score: N/A    Post-anesthetic transfer of care checklist/protocol to PACU/ICU- (426 and 427)  Upon conclusion of case, patient transferred to which of the following locations: PACU/Non-ICU  Use of transfer checklist/protocol: Yes  Exclusion: Service Performed in Patient Hospital Room (and thus did not require transfer): N/A    PACU Reintubation- (AQI31)  General anesthesia requiring endotracheal intubation (ETT) along with subsequent extubation in OR or PACU: No  Required reintubation in the PACU: N/A  Extubation was a planned trial documented in the medical record prior to removal of the original airway device: N/A    Unplanned admission to ICU related to anesthesia service up through end of PACU care- (MD51)  Unplanned admission to ICU (not initially anticipated at anesthesia start time): No

## 2019-04-03 NOTE — OR SURGEON
Immediate Post OP Note    PreOp Diagnosis: DJD L knee    PostOp Diagnosis: same    Procedure(s):  Left KNEE ARTHROPLASTY TOTAL - Wound Class: Clean    Surgeon(s):  RANGEL Lerma M.D.    Anesthesiologist/Type of Anesthesia:  Anesthesiologist: Gilbert Hernandez M.D./Spinal    Surgical Staff:  Circulator: Snehal Strong R.N.  Limb Jiménez: Nayeli George  Relief Circulator: Aisha Tejada R.N.  Relief Scrub: Phi Jones R.N.  Scrub Person: Leeann Johnson; Jeri Jay R.N.    Specimens removed if any:  * No specimens in log *    Estimated Blood Loss: 50     Findings: severe medial and PF disease    Complications: none        4/3/2019 2:16 PM Robert Vazquez M.D.

## 2019-04-03 NOTE — ANESTHESIA PREPROCEDURE EVALUATION
H/o severe hypothermia with prior surgery under GA. Pt prefers to try SAB. Patient advised of similar risk of hypothermia and active warming measures are always implemented. Consented for SAB, possible GA and PNB for post-op analgesia     Relevant Problems   (+) Neoplasm of bladder   (+) Thrombocytosis (HCC)   (+) Vitamin d deficiency       Physical Exam    Airway   Mallampati: II  TM distance: >3 FB  Neck ROM: full       Cardiovascular - normal exam  Rhythm: regular  Rate: normal  (-) murmur     Dental - normal exam         Pulmonary - normal exam  Breath sounds clear to auscultation     Abdominal   (+) obese  Abdomen: soft  Bowel sounds: normal   Neurological - normal exam                 Anesthesia Plan    ASA 2       Plan - spinal and peripheral nerve block     Peripheral nerve block will be post-op pain control            Postoperative Plan: Postoperative administration of opioids is intended.        Informed Consent:    Anesthetic plan and risks discussed with patient.    Use of blood products discussed with: patient whom.

## 2019-04-03 NOTE — OR NURSING
1242 Patient arrived to unit, awake and alert.  Dressing in place to left knee.  Patient denies pain or nausea at this time.  Updated on plan of care, verbalized understanding.  1330 Patient tolerating oral intake denies pain.  1350 Report called to Tamiko HOUSER.  1400 Patient transferred to [facility] via bed with transport.

## 2019-04-03 NOTE — ANESTHESIA TIME REPORT
Anesthesia Start and Stop Event Times     Date Time Event    4/3/2019 1114 Anesthesia Start     1242 Anesthesia Stop        Responsible Staff  04/03/19    Name Role Begin End    Gilbert Hernandez M.D. Anesth 1114 1242        Preop Diagnosis (Free Text):  Pre-op Diagnosis     UNILATERAL PRIMARY OSTEOARTHRITIS LEFT KNEE        Preop Diagnosis (Codes):  Diagnosis Information     Diagnosis Code(s):         Post op Diagnosis  Osteoarthritis of left knee, unspecified osteoarthritis type      Premium Reason  Non-Premium    Comments:

## 2019-04-03 NOTE — ANESTHESIA PROCEDURE NOTES
Peripheral Block  Performed by: KIMBERLEY LAFLEUR  Authorized by: KIMBERLEY LAFLEUR     Patient Location:  OR  Start Time:  4/3/2019 11:25 AM  End Time:  4/3/2019 11:27 AM  Reason for Block: at surgeon's request and post-op pain management    patient identified, IV checked, site marked, risks and benefits discussed, surgical consent, monitors and equipment checked, pre-op evaluation and timeout performed    Patient Position:  Supine  Prep: ChloraPrep    Monitoring:  Heart rate, cardiac monitor and continuous pulse ox  Block Region:  Lower Extremity  Lower Extremity - Block Type:  Adductor canal block  Laterality:  Left  Procedures: ultrasound guided  Image captured, interpreted and electronically stored.    Block Type:  Single-shot  Needle Localization:  Ultrasound guidance  Injection Assessment:  Negative aspiration for heme, no paresthesia on injection, incremental injection and local visualized surrounding nerve on ultrasound  Evidence of intravascular injection: No     US Guided Selective Femoral Nerve Block at Adductor Canal:   US probe placed at mid-thigh level on externally rotated leg and femur identified.  Probe directed medially until Sartorius Muscle (SM), Femoral Artery (FA) and Saphenous Nerve (SN) identified in Adductor Canal (AC).  Needle inserted anterolateral to probe in an in plane approach into a subsartorial perivascular perineural position.  After negative aspiration LA injected with ease and visualized spreading within the AC.

## 2019-04-03 NOTE — OP REPORT
DATE OF SERVICE:  04/03/2019    PREOPERATIVE DIAGNOSIS:  Degenerative arthritis, left knee.    POSTOPERATIVE DIAGNOSIS:  Degenerative arthritis, left knee.    PROCEDURE:  Left total knee arthroplasty.    SURGEON:  Robert Vazquez MD    ANESTHESIA:  Spinal with adductor canal block.    ANESTHESIOLOGIST:  Gilbert Hernandez MD    ASSISTANT:  Tomas Calderón MD    ESTIMATED BLOOD LOSS:  50 mL    COMPONENTS PLACED:  Cemented [employer] size 6 cobalt chrome   cruciate retaining femur, size 5 tibia with a 9 mm insert and 29 mm thin   patella.    FINDINGS:  Severe patellofemoral disease and severe medial compartment   disease.    COMPLICATIONS:  None.    INDICATIONS FOR SURGERY:  The patient is a 78-year-old female with   progressively worsening pain and debility secondary to degenerative arthritis   of the left knee.  She has tried to manage this with exercises, activity   modification, and anti-inflammatory medications as just now having significant   inability to get around day-to-day.  Her exam shows an antalgic limp with a   varus deformity of the left knee.  She has got tenderness medially and a   positive patellar grind test.  Her x-ray, standing views of the left knee,   show bone-on-bone medial disease and severe patellofemoral disease with   subchondral sclerosis and peripheral osteophytes.  She was therefore a   candidate for left total knee replacement.    SUMMARY:  Patient was brought to the operating room and anesthesia was   administered.  Antibiotics and tranexamic acid were given IV and left leg   prepped and draped in usual sterile manner.  Timeout was called.  Leg was   exsanguinated and tourniquet inflated.  I made an anterior incision centered   over the medial patella.  The standard medial parapatellar arthrotomy was   made, medial release was done distally.  The medial meniscus was excised.    Patella was everted.  It was quite thin.  It only measured about 20 mm, took   it down to  uniform 23 mm.  A 29 had good coverage.  We prepared it and   resected the excess bone.  The knee was flexed up.  Tourniquet was released.    We excised the ACL and menisci and used a 5-degree jig on the distal femur.    We took the standard amount off because there was no significant contracture.    It measured to a size 6.  We set rotation of Whitesides line and made all the   cuts for a size 6 cruciate retaining implant, peripheral osteophytes were   removed.    Tibia was then exposed, extramedullary jig utilized.  We aligned it off the   medial third tubercle, centered the ankle with very slight posterior slope and   we took the standard amount of the less involved lateral side.  We removed   medial and posteromedial osteophytes.  Posterior osteophytes were removed from   the femur and the posterior crucial ligament was visibly and palpably intact.    The size 5 tibia had the best coverage and in appropriate rotation, we   pinned it and trialed it.  We had nice full extension with the 9 and good   symmetrical mediolateral stability.  No further releases needed to be done.    The posterior cruciate was working well and the patella tracked nicely, so we   were comfortable at this point with our sizing, balancing, and tracking.    We made the appropriate punches.  The real components were cemented into   place.  Cement debris was removed.  Wound was soaked with dilute Betadine for   a few minutes and flushed with saline.  We did inject the posterior capsule   with a solution of analgesic and anesthetic.  The final 9 was then locked into   position.  The knee was placed in flexion and tendon closed with a running #2   Quill.  We then injected the knee intraarticularly with solution of analgesic   with remainder of the anesthetic and the skin was closed with layers of   Vicryl and running 3-0 Monocryl.  A silver impregnated dressing was placed   followed by a compression wrap and PolarCare unit.  Patient was stable  during   the procedure and went to recovery room in good condition.       ____________________________________     MD JONATAN CHASE / BETH    DD:  04/03/2019 14:22:03  DT:  04/03/2019 14:54:17    D#:  [id number]  Job#:  [id number]

## 2019-04-03 NOTE — ANESTHESIA POSTPROCEDURE EVALUATION
Patient: Dedra Lobo    Procedure Summary     Date:  04/03/19 Room / Location:  [place] [address] / SURGERY [facility]    Anesthesia Start:  1114 Anesthesia Stop:  1242    Procedure:  KNEE ARTHROPLASTY TOTAL (Left Knee) Diagnosis:  (UNILATERAL PRIMARY OSTEOARTHRITIS LEFT KNEE)    Surgeon:  Robert Vazquez M.D. Responsible Provider:  Gilbert Hernandez M.D.    Anesthesia Type:  spinal, peripheral nerve block ASA Status:  2          Final Anesthesia Type: spinal, peripheral nerve block  Last vitals  BP   Blood Pressure : 137/78    Temp   36.8 °C (98.2 °F)    Pulse   Heart Rate (Monitored): 86   Resp   18    SpO2   93 %      Anesthesia Post Evaluation    Patient location during evaluation: PACU  Patient participation: complete - patient participated  Level of consciousness: awake and alert  Pain score: 0    Airway patency: patent  Anesthetic complications: no  Cardiovascular status: hemodynamically stable  Respiratory status: acceptable  Hydration status: euvolemic    PONV: none    patient able to participate, but full recovery from regional anesthesia has not occurred and is not expected within the stipulated timeframe for the completion of the evaluation       Nurse Pain Score: 2 (NPRS)

## 2019-04-03 NOTE — ANESTHESIA PROCEDURE NOTES
Spinal Block  Performed by: KIMBERLEY LAFLEUR  Authorized by: KIMBERLEY LAFLEUR     Patient Location:  OR  Start Time:  4/3/2019 11:18 AM  Reason for Block: primary anesthetic    patient identified, IV checked, site marked, risks and benefits discussed, surgical consent, monitors and equipment checked, pre-op evaluation and timeout performed    Patient Position:  Sitting  Prep: Betadine    Monitoring:  Blood pressure, continuous pulse oximetry and heart rate  Approach:  Midline  Location:  L4-5  Injection Technique:  Single-shot  Skin infiltration:  Lidocaine  Strength:  1%  Dose:  3ml  Needle Type:  Pencan  Needle Gauge:  25 G  CSF flowing pre/post injection:  Yes  Sensory Level:  T6   Clear fluid pre-post injection all 4 quadrants. Turbidity on aspiration x2.

## 2019-04-04 VITALS
OXYGEN SATURATION: 95 % | DIASTOLIC BLOOD PRESSURE: 65 MMHG | SYSTOLIC BLOOD PRESSURE: 115 MMHG | HEART RATE: 76 BPM | RESPIRATION RATE: 18 BRPM | WEIGHT: 208.11 LBS | TEMPERATURE: 97.9 F | BODY MASS INDEX: 30.82 KG/M2 | HEIGHT: 69 IN

## 2019-04-04 LAB
ERYTHROCYTE [DISTWIDTH] IN BLOOD BY AUTOMATED COUNT: 63.6 FL (ref 35.9–50)
HCT VFR BLD AUTO: 33.5 % (ref 37–47)
HGB BLD-MCNC: 10.7 G/DL (ref 12–16)
MCH RBC QN AUTO: 36.1 PG (ref 27–33)
MCHC RBC AUTO-ENTMCNC: 31.9 G/DL (ref 33.6–35)
MCV RBC AUTO: 113.2 FL (ref 81.4–97.8)
PLATELET # BLD AUTO: 467 K/UL (ref 164–446)
PMV BLD AUTO: 10 FL (ref 9–12.9)
RBC # BLD AUTO: 2.96 M/UL (ref 4.2–5.4)
WBC # BLD AUTO: 8.9 K/UL (ref 4.8–10.8)

## 2019-04-04 PROCEDURE — 85027 COMPLETE CBC AUTOMATED: CPT

## 2019-04-04 PROCEDURE — 97161 PT EVAL LOW COMPLEX 20 MIN: CPT

## 2019-04-04 PROCEDURE — 700111 HCHG RX REV CODE 636 W/ 250 OVERRIDE (IP): Performed by: STUDENT IN AN ORGANIZED HEALTH CARE EDUCATION/TRAINING PROGRAM

## 2019-04-04 PROCEDURE — 700112 HCHG RX REV CODE 229: Performed by: STUDENT IN AN ORGANIZED HEALTH CARE EDUCATION/TRAINING PROGRAM

## 2019-04-04 PROCEDURE — 700102 HCHG RX REV CODE 250 W/ 637 OVERRIDE(OP): Performed by: STUDENT IN AN ORGANIZED HEALTH CARE EDUCATION/TRAINING PROGRAM

## 2019-04-04 PROCEDURE — G0378 HOSPITAL OBSERVATION PER HR: HCPCS

## 2019-04-04 PROCEDURE — 96376 TX/PRO/DX INJ SAME DRUG ADON: CPT

## 2019-04-04 PROCEDURE — 97165 OT EVAL LOW COMPLEX 30 MIN: CPT

## 2019-04-04 PROCEDURE — 36415 COLL VENOUS BLD VENIPUNCTURE: CPT

## 2019-04-04 PROCEDURE — A9270 NON-COVERED ITEM OR SERVICE: HCPCS | Performed by: STUDENT IN AN ORGANIZED HEALTH CARE EDUCATION/TRAINING PROGRAM

## 2019-04-04 PROCEDURE — 96375 TX/PRO/DX INJ NEW DRUG ADDON: CPT

## 2019-04-04 RX ORDER — ASPIRIN 81 MG/1
81 TABLET ORAL 2 TIMES DAILY
Qty: 90 TAB | Status: ON HOLD | COMMUNITY
Start: 2019-04-04 | End: 2022-10-04

## 2019-04-04 RX ORDER — ACETAMINOPHEN 500 MG
1000 TABLET ORAL EVERY 8 HOURS
COMMUNITY
Start: 2019-04-04 | End: 2021-10-22

## 2019-04-04 RX ORDER — OXYCODONE HYDROCHLORIDE 5 MG/1
5-10 TABLET ORAL EVERY 4 HOURS PRN
Qty: 50 TAB | Refills: 0 | Status: SHIPPED | OUTPATIENT
Start: 2019-04-04 | End: 2019-04-11

## 2019-04-04 RX ADMIN — TAMSULOSIN HYDROCHLORIDE 0.4 MG: 0.4 CAPSULE ORAL at 09:13

## 2019-04-04 RX ADMIN — ACETAMINOPHEN 1000 MG: 500 TABLET ORAL at 06:28

## 2019-04-04 RX ADMIN — DEXAMETHASONE SODIUM PHOSPHATE 4 MG: 4 INJECTION, SOLUTION INTRA-ARTICULAR; INTRALESIONAL; INTRAMUSCULAR; INTRAVENOUS; SOFT TISSUE at 06:28

## 2019-04-04 RX ADMIN — ASPIRIN 81 MG: 81 TABLET, DELAYED RELEASE ORAL at 06:28

## 2019-04-04 RX ADMIN — KETOROLAC TROMETHAMINE 15 MG: 30 INJECTION, SOLUTION INTRAMUSCULAR; INTRAVENOUS at 06:28

## 2019-04-04 RX ADMIN — ACETAMINOPHEN 1000 MG: 500 TABLET ORAL at 11:36

## 2019-04-04 RX ADMIN — OXYCODONE HYDROCHLORIDE 5 MG: 5 TABLET ORAL at 11:36

## 2019-04-04 RX ADMIN — DOCUSATE SODIUM 100 MG: 100 CAPSULE, LIQUID FILLED ORAL at 06:28

## 2019-04-04 RX ADMIN — CEFAZOLIN SODIUM 2 G: 2 INJECTION, SOLUTION INTRAVENOUS at 03:49

## 2019-04-04 ASSESSMENT — COGNITIVE AND FUNCTIONAL STATUS - GENERAL
STANDING UP FROM CHAIR USING ARMS: A LITTLE
DRESSING REGULAR LOWER BODY CLOTHING: A LITTLE
HELP NEEDED FOR BATHING: A LITTLE
SUGGESTED CMS G CODE MODIFIER MOBILITY: CK
MOBILITY SCORE: 18
DRESSING REGULAR UPPER BODY CLOTHING: A LITTLE
MOVING FROM LYING ON BACK TO SITTING ON SIDE OF FLAT BED: A LITTLE
TURNING FROM BACK TO SIDE WHILE IN FLAT BAD: A LITTLE
WALKING IN HOSPITAL ROOM: A LITTLE
TOILETING: A LITTLE
MOVING TO AND FROM BED TO CHAIR: A LITTLE
DAILY ACTIVITIY SCORE: 20
CLIMB 3 TO 5 STEPS WITH RAILING: A LITTLE
SUGGESTED CMS G CODE MODIFIER DAILY ACTIVITY: CJ

## 2019-04-04 ASSESSMENT — GAIT ASSESSMENTS
GAIT LEVEL OF ASSIST: SUPERVISED
DEVIATION: DECREASED HEEL STRIKE;DECREASED TOE OFF
DISTANCE (FEET): 135
ASSISTIVE DEVICE: FRONT WHEEL WALKER

## 2019-04-04 ASSESSMENT — ACTIVITIES OF DAILY LIVING (ADL): TOILETING: INDEPENDENT

## 2019-04-04 NOTE — DISCHARGE SUMMARY
Dedra Lobo was admitted on 4/3/2019 for UNILATERAL PRIMARY OSTEOARTHRITIS LEFT KNEE  Degenerative arthritis of left knee  Degenerative arthritis of left knee  Patient was diagnosed with severe degenerative arthritis in the left knee and underwent a left total knee arthroplasty by Dr. Robert Vazquez on the date of admission.    Hospital course:     The patient has done well, with no complications.  Patient denies chest pain, calf pain or shortness of breath.   Pain is well-controlled at present.  Patient is ambulating well with the use of an assistive device, and progressing in physical therapy.   Patient is neurologically and vascularly intact with palpable pedal pulses bilaterally.      Discharge date: 4/4/19    Patient is being discharged to home.     Allergies:  Patient has no known allergies.       Medication List      START taking these medications      Instructions   acetaminophen 500 MG Tabs  Commonly known as:  TYLENOL   Take 2 Tabs by mouth every 8 hours.  Dose:  1000 mg     oxyCODONE immediate-release 5 MG Tabs  Commonly known as:  ROXICODONE   Take 1-2 Tabs by mouth every four hours as needed for Severe Pain for up to 7 days.  Dose:  5-10 mg        CHANGE how you take these medications      Instructions   aspirin 81 MG EC tablet  What changed:  when to take this   Take 1 Tab by mouth 2 times a day.  Dose:  81 mg        CONTINUE taking these medications      Instructions   CALCIUM PO   Take 1 Tab by mouth every evening.  Dose:  1 Tab     hydroxyurea 500 MG Caps  Commonly known as:  HYDREA   Take 1,000 mg by mouth every day. Takes 2 caps po daily  Dose:  1000 mg     MULTIPLE VITAMINS PO   Take 1 Tab by mouth every day at 6 PM.  Dose:  1 Tab     vitamin D 1000 UNIT Tabs  Commonly known as:  cholecalciferol   Take 1,000 Units by mouth every day.  Dose:  1000 Units            Discharge Instructions:     Patient is instructed to ambulate and weight bear as tolerated with the use of an assistive device, and  to continue physical therapy exercises given during this hospital stay.   Patient is to ice and elevate the surgical leg regularly, with pillows under the ankle, nothing is to be placed under the knee.   Patient was given detailed wound care instructions, and will leave the silver dressing on until first post-op visit.   Aspirin twice daily for DVT prophylaxis.  Patient is to follow up with Dr. Vazquez's office in 1-2 weeks.

## 2019-04-04 NOTE — THERAPY
"Occupational Therapy Evaluation completed.   Functional Status:  Seated in chair on arrival. FB dressing with min A for bra and socks/shoes, reports will have assist at home. Functional ambulation with SPV and FWW. Toilet txf with CGA and GBs, reports has RTS. Oral care with SPV. Back to chair with SPV.  Plan of Care: Patient with no further skilled OT needs in the acute care setting at this time  Discharge Recommendations:  Equipment: Front-Wheel Walker. Post-acute therapy Currently anticipate no further skilled therapy needs once patient is discharged from the inpatient setting.    See \"Rehab Therapy-Acute\" Patient Summary Report for complete documentation.    Pt is a 77 yo female s/p L TKA with WBAT. Completed ADLs with min A - SPV, and pt reports has safe home setup and 2 friends who live with her and are able to assist PRN. Educated on safe home setup/equipment and adaptive techniques for ADLs. Recommend no further acute OT, nor OT transitional services prior/after d/c home       "

## 2019-04-04 NOTE — CARE PLAN
Problem: Safety  Goal: Free from accidental injury    Intervention: Initiate Fall Precautions  Call light and belongings within reach, bed down and lcoked, hourly rounding in progress. Treaded socks in use, no DME at bedside. Pt calls appropriately.         Problem: Pain  Goal: Alleviation of Pain or a reduction in pain to the patient's comfort goal    Intervention: Pain Management-Medications  Pt states pain 2/10 at this time, declines intervention. Polar ice in use.       Problem: Risk for Deep Vein Thrombosis/Venous Thromboembolism  Goal: DVT/VTE Prevention Measures in Place    Intervention: Intermittent sequential compression device/foot pumps/JEAN PIERRE hose  SCD's in use, pt performs ROM independently. Aspirin ordered.

## 2019-04-04 NOTE — CARE PLAN
Problem: Risk for Infection, Impaired Wound Healing  Goal: Remain free from signs and symptoms of infection  Outcome: MET Date Met: 04/04/19  Education provided regarding signs and symptoms of infection and when to notify MD post-discharge. Pt and friend verbalized understanding. All questions answered. No further questions or concerns.

## 2019-04-04 NOTE — CARE PLAN
Problem: Knowledge Deficit  Goal: Patient/Significant other demonstrates understanding of disease process, treatment plan, medications and discharge instructions  Outcome: MET Date Met: 04/04/19  Discharge instructions and home meds discussed with patient and friend present at the bedside. Patient verbalized understanding. Questions answered. No further questions or concerns.

## 2019-04-04 NOTE — DISCHARGE INSTRUCTIONS
Discharge Instructions    Discharged to home by car with friend. Discharged via wheelchair, hospital escort: Yes.  Special equipment needed: Walker    Be sure to schedule a follow-up appointment with your primary care doctor or any specialists as instructed.     Discharge Plan:   Diet Plan: Discussed  Activity Level: Discussed  Confirmed Follow up Appointment: Patient to Call and Schedule Appointment  Confirmed Symptoms Management: Discussed  Medication Reconciliation Updated: Yes  Influenza Vaccine Indication: Patient Refuses    I understand that a diet low in cholesterol, fat, and sodium is recommended for good health. Unless I have been given specific instructions below for another diet, I accept this instruction as my diet prescription.   Other diet: Regular    Special Instructions: Discharge instructions for the Orthopedic Patient    Follow up with Primary Care Physician within 2 weeks of discharge to home, regarding:  Review of medications and diagnostic testing.  Surveillance for medical complications.  Workup and treatment of osteoporosis, if appropriate.     -Is this a Joint Replacement patient? Yes Total Joint Knee Replacement Discharge Instructions    Pain  - The goal is to slowly wean off the prescription pain medicine.  - Ice can be used for pain control.  20 minutes at a time is recommended, and never directly against your skin or incision.  - Most patients are off the pain pills by 3 weeks; others may require a low level of pain medications for many months.  If your pain continues to be severe, follow up with your physician.  Infection    Knee joint infections; occur in fewer than 2% of patients. The most common causes of infection following total knee replacement surgery are from bacteria that enter the bloodstream during dental procedures, urinary tract infections, or skin infections. These bacteria can lodge around your knee replacement and cause an infection.  - Keep the incision as clean and dry  as possible.  - Always wash your hands before touching your incision.  - Skin infections tend to develop around 7-10 days after surgery; most can be treated with oral antibiotics.  - Dental Care should be delayed for 3 months after surgery, your surgeon recommends taking a dose of antibiotics 1 hour prior to any dental procedure. After 2 years, most surgeons recommend antibiotics only before an extensive procedure.  Ask your surgeon what he recommends.  - Signs and symptoms of infection can include:  low grade fever, redness, pain, swelling and drainage from your incision.  Notify your surgeon immediately if you develop any of these symptoms.  Other instructions  - Bowel habits - constipation is extremely common and is caused by a combination of anesthesia, lack of mobility and pain medicine.  Use stool softeners or laxatives if necessary. It is important not to ignore this problem, as bowel obstructions can be a serious complication after joint replacement surgery.  - Mood/Energy Level - Many patients experience a lack of energy and endurance for up to 2-3 months after surgery.  Some may also feel down and can even become depressed.  This is likely due to the postoperative anemia, change in activity level, lack of sleep, pain medicine and just the emotional reaction to the surgery itself that is a big disruption in a person’s life.  This usually passes.  If symptoms persist, follow up with your primary physician.  - Returning to work - Your surgeon will give you more specific instructions. Depending on the type of activities you perform, it may be 6 to 8 weeks before you return to work.   Generally, if you work a sedentary job requiring little standing or walking, most patients may return within 2-6 weeks.  Manual labor jobs involving walking, lifting and standing may take longer. Your surgeon’s office can provide a release to part-time or light duty work early on in your recovery and progress you to full duty as  able.    - Driving - If your left knee was replaced and you have an automatic transmission, you may be able to begin driving in a week or so, provided you are no longer taking narcotic pain medication. If your right knee was replaced, avoid driving for 6 to 8 weeks. Remember that your reflexes may not be as sharp as before your surgery. Ask your surgeon for specific instructions.   - Avoiding falls - A fall during the first few weeks after surgery can damage your new knee and may result in a need for further surgery.   throw rugs and tack down loose carpeting.  Be aware of floor hazards such as pets, small objects or uneven surfaces.    - [employer] Metal Detectors - The sensitivity of metal detectors varies and it is likely that your prosthesis will cause an alarm.  Inform the  of your artificial joint.  Diet  - Resume your normal diet as tolerated.  - It is important to achieve a healthy nutritional status by eating a well balanced diet on a regular basis.  - Your physician may recommend that you take iron and vitamin supplements.   - Continue to drink plenty of fluids.  Shower/Bathing  - You may shower as soon as you get home from the hospital unless otherwise instructed.  - Keep your incision out of water.  To keep the incision dry when showering, cover it with a plastic bag or plastic wrap.  - Pat incision dry if it gets wet.  Don’t rub.  - Do not submerge in a bath until staples are out and the incision is completely healed. (Approximately 6-8 weeks)  Dressing Change:  Procedure (if recommended by your physician)  - Wash hands.  - Open all new dressing change materials.  - Remove old dressing and discard.  - Inspect incision for redness, increase in clear drainage, yellow/green drainage, odor and surrounding skin hot to touch.  -  ABD (large gauze) pad or “island dressing” by one corner and lay over the incision.  Be careful not to touch the inside of the dressing that will lay over  the incision.  - Secure in place as instructed (Ace wrap or tape).    Swelling/Bruising    - Swelling can last from 3-6 months.  - Elevate your leg higher than your heart while reclining.   The first week you are home you should elevate your leg an equal amount of time, as you are active.    - Anti-inflammatory pills can be taken once you have stopped the blood thinners.  - The swelling is usually worse after you go home since you are upright for longer periods of time.  - Bruising is common and can involve the entire leg including the thigh, calf and even foot. Bruising often does not appear until after you arrive home and it can be quite dramatic- purple, black, and green.  The bruising you can see is not usually concerning and will subside without any treatment.      Blood Clot Prevention  Blood clots in the legs and the less common, but frightening, clots that travel to the lungs are a real focus of our preventative. Most patients are at standard risk for them, but those patients who are at higher risk include people who have had previous clots, a family history of clotting, smoking, diabetes, obesity, advanced age, use of estrogen and a sedentary lifestyle.    - Signs of blood clots in legs - Swelling in thigh, calf or ankle that does not go down with elevation.  Pain, heat and tenderness in calf, back of calf or groin area.  NOTE: blood clots can occur in either leg.  - You have been receiving anticoagulant therapy (blood thinners) in the hospital and you may be instructed to continue at home depending on your risk factors.  - Your risk for developing a clot continues for up to 2-3 months after surgery.  You should avoid prolonged sitting and dehydration during that time (long air trips and car trips).  If you do take a trip during this time, please get up and move around every 1- 1.5 hours.  - If you are prescribed blood-thinning medication for home, follow instructions as directed. (Handouts provided if  applicable).      Activity  Once home, you should continue to stay active. The key is to remember not to overdo it! While you can expect some good days and some bad days, you should notice a gradual improvement and a gradual increase in your endurance over the next 6 to 12 months. Exercise is a critical component of home care, particularly during the first few weeks after surgery.     - Normal activities of daily living You should be able to resume most within 3 to 6 weeks following surgery. Some pain with activity and at night is common for several weeks after surgery  -  Physical Therapy Exercises - Continue to do the exercises prescribed for at least two months after surgery. Riding a stationary bicycle can help maintain muscle tone and keep your knee flexible. Try to achieve the maximum degree of bending and extension possible. (handout provided by Therapist).  - Sexual Activity -. Your surgeon can tell you when it safe to resume sexual activity.    - Sleeping Positions - You can safely sleep on your back, on either side, or on your stomach.   - Other Activities - Walk as much as you like, but remember that walking is no substitute for the exercises your doctor and physical therapist will prescribe. Lower impact activities are preferred.  If you have specific questions, consult your Surgeon.    When to Call the Doctor   Call the physician if:   - Fever over 100.5? F  - Increased pain, drainage, redness, odor or heat around the incision area  - Shaking chills  - Increased knee pain with activity and rest  - Increased pain in calf, tenderness or redness above or below the knee  - Increased swelling of calf, ankle, foot  - Sudden increased shortness of breath, sudden onset of chest pain, localized chest pain with coughing  - Incision opening  Or, if there are any questions or concerns about medications or care.       -Is this patient being discharged with medication to prevent blood clots?  Yes, Aspirin Aspirin,  ASA oral tablets  What is this medicine?  ASPIRIN (AS pir in) is a pain reliever. It is used to treat mild pain and fever. This medicine is also used as directed by a doctor to prevent and to treat heart attacks, to prevent strokes, and to treat arthritis or inflammation.  This medicine may be used for other purposes; ask your health care provider or pharmacist if you have questions.  COMMON BRAND NAME(S): Aspir-Low, Aspir-Bebe, Aspirtab, Gabrielle Advanced Aspirin, Gabrielle Aspirin, Gabrielle Aspirin Extra Strength, Gabrielle Aspirin Plus, Gabrielle Extra Strength, Gabrielle Extra Strength Plus, Gabrielle Genuine Aspirin, Gabrielle Womens Aspirin, Bufferin, Bufferin Extra Strength, Bufferin Low Dose  What should I tell my health care provider before I take this medicine?  They need to know if you have any of these conditions:  -anemia  -asthma  -bleeding problems  -child with chickenpox, the flu, or other viral infection  -diabetes  -gout  -if you frequently drink alcohol containing drinks  -kidney disease  -liver disease  -low level of vitamin K  -lupus  -smoke tobacco  -stomach ulcers or other problems  -an unusual or allergic reaction to aspirin, tartrazine dye, other medicines, dyes, or preservatives  -pregnant or trying to get pregnant  -breast-feeding  How should I use this medicine?  Take this medicine by mouth with a glass of water. Follow the directions on the package or prescription label. You can take this medicine with or without food. If it upsets your stomach, take it with food. Do not take your medicine more often than directed.  Talk to your pediatrician regarding the use of this medicine in children. While this drug may be prescribed for children as young as 12 years of age for selected conditions, precautions do apply. Children and teenagers should not use this medicine to treat chicken pox or flu symptoms unless directed by a doctor.  Patients over 65 years old may have a stronger reaction and need a smaller dose.  Overdosage:  If you think you have taken too much of this medicine contact a poison control center or emergency room at once.  NOTE: This medicine is only for you. Do not share this medicine with others.  What if I miss a dose?  If you are taking this medicine on a regular schedule and miss a dose, take it as soon as you can. If it is almost time for your next dose, take only that dose. Do not take double or extra doses.  What may interact with this medicine?  Do not take this medicine with any of the following medications:  -cidofovir  -ketorolac  -probenecid  This medicine may also interact with the following medications:  -alcohol  -alendronate  -bismuth subsalicylate  -flavocoxid  -herbal supplements like feverfew, garlic, tomas, ginkgo biloba, horse chestnut  -medicines for diabetes or glaucoma like acetazolamide, methazolamide  -medicines for gout  -medicines that treat or prevent blood clots like enoxaparin, heparin, ticlopidine, warfarin  -other aspirin and aspirin-like medicines  -NSAIDs, medicines for pain and inflammation, like ibuprofen or naproxen  -pemetrexed  -sulfinpyrazone  -varicella live vaccine  This list may not describe all possible interactions. Give your health care provider a list of all the medicines, herbs, non-prescription drugs, or dietary supplements you use. Also tell them if you smoke, drink alcohol, or use illegal drugs. Some items may interact with your medicine.  What should I watch for while using this medicine?  If you are treating yourself for pain, tell your doctor or health care professional if the pain lasts more than 10 days, if it gets worse, or if there is a new or different kind of pain. Tell your doctor if you see redness or swelling. Also, check with your doctor if you have a fever that lasts for more than 3 days. Only take this medicine to prevent heart attacks or blood clotting if prescribed by your doctor or health care professional.  Do not take aspirin or aspirin-like  medicines with this medicine. Too much aspirin can be dangerous. Always read the labels carefully.  This medicine can irritate your stomach or cause bleeding problems. Do not smoke cigarettes or drink alcohol while taking this medicine. Do not lie down for 30 minutes after taking this medicine to prevent irritation to your throat.  If you are scheduled for any medical or dental procedure, tell your healthcare provider that you are taking this medicine. You may need to stop taking this medicine before the procedure.  This medicine may be used to treat migraines. If you take migraine medicines for 10 or more days a month, your migraines may get worse. Keep a diary of headache days and medicine use. Contact your healthcare professional if your migraine attacks occur more frequently.  What side effects may I notice from receiving this medicine?  Side effects that you should report to your doctor or health care professional as soon as possible:  -allergic reactions like skin rash, itching or hives, swelling of the face, lips, or tongue  -breathing problems  -changes in hearing, ringing in the ears  -confusion  -general ill feeling or flu-like symptoms  -pain on swallowing  -redness, blistering, peeling or loosening of the skin, including inside the mouth or nose  -signs and symptoms of bleeding such as bloody or black, tarry stools; red or dark-brown urine; spitting up blood or brown material that looks like coffee grounds; red spots on the skin; unusual bruising or bleeding from the eye, gums, or nose  -trouble passing urine or change in the amount of urine  -unusually weak or tired  -yellowing of the eyes or skin  Side effects that usually do not require medical attention (report to your doctor or health care professional if they continue or are bothersome):  -diarrhea or constipation  -headache  -nausea, vomiting  -stomach gas, heartburn  This list may not describe all possible side effects. Call your doctor for  medical advice about side effects. You may report side effects to FDA at 8-493-MRM-2885.  Where should I keep my medicine?  Keep out of the reach of children.  Store at room temperature between 15 and 30 degrees C (59 and 86 degrees F). Protect from heat and moisture. Do not use this medicine if it has a strong vinegar smell. Throw away any unused medicine after the expiration date.  NOTE: This sheet is a summary. It may not cover all possible information. If you have questions about this medicine, talk to your doctor, pharmacist, or health care provider.  © 2018 [employer] (2014-08-19 11:30:31)      · Is patient discharged on Warfarin / Coumadin?   No     Depression / Suicide Risk    As you are discharged from this [facility] facility, it is important to learn how to keep safe from harming yourself.    Recognize the warning signs:  · Abrupt changes in personality, positive or negative- including increase in energy   · Giving away possessions  · Change in eating patterns- significant weight changes-  positive or negative  · Change in sleeping patterns- unable to sleep or sleeping all the time   · Unwillingness or inability to communicate  · Depression  · Unusual sadness, discouragement and loneliness  · Talk of wanting to die  · Neglect of personal appearance   · Rebelliousness- reckless behavior  · Withdrawal from people/activities they love  · Confusion- inability to concentrate     If you or a loved one observes any of these behaviors or has concerns about self-harm, here's what you can do:  · Talk about it- your feelings and reasons for harming yourself  · Remove any means that you might use to hurt yourself (examples: pills, rope, extension cords, firearm)  · Get professional help from the community (Mental Health, Substance Abuse, psychological counseling)  · Do not be alone:Call your Safe Contact- someone whom you trust who will be there for you.  · Call your local CRISIS HOTLINE 556-1067 or  806.568.6284  · Call your local Children's Mobile Crisis Response Team [place] (701) 454-9986 or www.[employer]ak  · Call the toll free National Suicide Prevention Hotlines   · National Suicide Prevention Lifeline 193-108-ZPSR (9757)  · National Hope Line Network 800-SUICIDE (855-9457)        ACTIVITY: Rest and take it easy for the first 24 hours.  A responsible adult is recommended to remain with you during that time.  It is normal to feel sleepy.  We encourage you to not do anything that requires balance, judgment or coordination.    MILD FLU-LIKE SYMPTOMS ARE NORMAL. YOU MAY EXPERIENCE GENERALIZED MUSCLE ACHES, THROAT IRRITATION, HEADACHE AND/OR SOME NAUSEA.    FOR 24 HOURS DO NOT:  Drive, operate machinery or run household appliances.  Drink beer or alcoholic beverages.   Make important decisions or sign legal documents.    SPECIAL INSTRUCTIONS:     *Follow up with Dr. Vazquez at scheduled appointment  *Weight bearing as tolerated                      *Activity as tolerated  *Use assistive device for all activity  *Continue exercises provided by physical therapy and range of motion  *Elevate leg as needed; Ok to have pillow under the ankle NO pillow under knee  *Ice as needed (20 minutes every 1-2 hours)  *Keep silver dressing in place until follow up with doctor, ok to remove ace wrap  *Ok to shower with waterproof dressing in place  *No soaking of the incision; no baths, hot tubs, or swimming until cleared by doctor  * Aspirin 81 mg twice a day for blood clot prevention           *Take medications as prescribed by doctor  *Call doctor’s office with any questions or concerns     DIET: To avoid nausea, slowly advance diet as tolerated, avoiding spicy or greasy foods for the first day.  Add more substantial food to your diet according to your physician's instructions.  Babies can be fed formula or breast milk as soon as they are hungry.  INCREASE FLUIDS AND FIBER TO AVOID CONSTIPATION.        FOLLOW-UP  APPOINTMENT:  A follow-up appointment should be arranged with your doctor in 2 weeks; call to schedule.    You should CALL YOUR PHYSICIAN if you develop:  Fever greater than 101 degrees F.  Pain not relieved by medication, or persistent nausea or vomiting.  Excessive bleeding (blood soaking through dressing) or unexpected drainage from the wound.  Extreme redness or swelling around the incision site, drainage of pus or foul smelling drainage.  Inability to urinate or empty your bladder within 8 hours.  Problems with breathing or chest pain.    You should call 911 if you develop problems with breathing or chest pain.  If you are unable to contact your doctor or surgical center, you should go to the nearest emergency room or urgent care center.     If any questions arise, call your doctor. You can also call the [employer] HOTLINE open 24 hours/day, 7 days/week and speak to a nurse at [phone], or toll free at [phone].    A registered nurse may call you a few days after your surgery to see how you are doing after your procedure.    MEDICATIONS: Resume taking daily medication.  Take prescribed pain medication with food.  If no medication is prescribed, you may take non-aspirin pain medication if needed.  PAIN MEDICATION CAN BE VERY CONSTIPATING.  Take a stool softener or laxative such as senokot, pericolace, or milk of magnesia if needed.    Prescription given for Oxycodone, 5 mg 1-2 tab every 4 hours.    If your physician has prescribed pain medication that includes Acetaminophen (Tylenol), do not take additional Acetaminophen (Tylenol) while taking the prescribed medication.

## 2019-04-04 NOTE — CARE PLAN
Problem: Risk for Infection, Impaired Wound Healing  Goal: Promotion of Wound Healing    Intervention: Incision site care  Incision site care discussed with patient and friend present at bedside prior to discharge. All questions answered. Patient verbalized understanding. No further questions or concerns.

## 2019-04-04 NOTE — CARE PLAN
Problem: Communication  Goal: The ability to communicate needs accurately and effectively will improve  Outcome: PROGRESSING AS EXPECTED  Updated pt. About plan of care, allowed time for questions pt verbalized understanding.      Problem: Safety  Goal: Will remain free from falls  Outcome: PROGRESSING AS EXPECTED  Provided education about use of call light when needing to get OOB. Pt verbalized understanding. Bed locked and in lowest position, pt wearing threaded footwear.

## 2019-04-04 NOTE — THERAPY
"Physical Therapy Evaluation completed.   Bed Mobility:  Supine to Sit:  (up in chair)  Transfers: Sit to Stand: Supervised  Gait: Level Of Assist: Supervised with Front-Wheel Walker       Plan of Care: Patient with no further skilled PT needs in the acute care setting at this time  Discharge Recommendations: Equipment: Front-Wheel Walker. Post-acute therapy Recommend outpatient services for continued physical therapy services.    Ms. Lobo is a 77 y/o female who presents to acute secondary to L TKA. She presents with mild L knee ROM and strength deficits that result in altered gait pattern and dynamic balance deficits. FWW remedied balance deficits. Cues for heel toe steps normalized gait pattern. Stair sequencing training provided. Pt able to perform gait, transfers, and stairs without physical assist. Provided with TKA therex handout and reviewed, pt demonstrated good understanding. No additional acute physical therapy needs. Recommend outpatient PT to address ROM and strength deficits.     See \"Rehab Therapy-Acute\" Patient Summary Report for complete documentation.     "

## 2020-07-16 ENCOUNTER — HOSPITAL ENCOUNTER (OUTPATIENT)
Dept: LAB | Facility: MEDICAL CENTER | Age: 80
End: 2020-07-16
Attending: INTERNAL MEDICINE
Payer: MEDICARE

## 2020-07-16 LAB
ALBUMIN SERPL BCP-MCNC: 3.9 G/DL (ref 3.2–4.9)
ALBUMIN/GLOB SERPL: 1.6 G/DL
ALP SERPL-CCNC: 54 U/L (ref 30–99)
ALT SERPL-CCNC: 29 U/L (ref 2–50)
ANION GAP SERPL CALC-SCNC: 12 MMOL/L (ref 7–16)
ANISOCYTOSIS BLD QL SMEAR: ABNORMAL
AST SERPL-CCNC: 28 U/L (ref 12–45)
BASOPHILS # BLD AUTO: 0 % (ref 0–1.8)
BASOPHILS # BLD: 0 K/UL (ref 0–0.12)
BILIRUB SERPL-MCNC: 0.4 MG/DL (ref 0.1–1.5)
BUN SERPL-MCNC: 17 MG/DL (ref 8–22)
CALCIUM SERPL-MCNC: 9.3 MG/DL (ref 8.5–10.5)
CHLORIDE SERPL-SCNC: 105 MMOL/L (ref 96–112)
CO2 SERPL-SCNC: 24 MMOL/L (ref 20–33)
CREAT SERPL-MCNC: 0.83 MG/DL (ref 0.5–1.4)
EOSINOPHIL # BLD AUTO: 0 K/UL (ref 0–0.51)
EOSINOPHIL NFR BLD: 0 % (ref 0–6.9)
ERYTHROCYTE [DISTWIDTH] IN BLOOD BY AUTOMATED COUNT: 71.8 FL (ref 35.9–50)
FASTING STATUS PATIENT QL REPORTED: NORMAL
GLOBULIN SER CALC-MCNC: 2.4 G/DL (ref 1.9–3.5)
GLUCOSE SERPL-MCNC: 78 MG/DL (ref 65–99)
HCT VFR BLD AUTO: 37.3 % (ref 37–47)
HGB BLD-MCNC: 11.8 G/DL (ref 12–16)
LG PLATELETS BLD QL SMEAR: NORMAL
LYMPHOCYTES # BLD AUTO: 0.73 K/UL (ref 1–4.8)
LYMPHOCYTES NFR BLD: 30.4 % (ref 22–41)
MACROCYTES BLD QL SMEAR: ABNORMAL
MANUAL DIFF BLD: NORMAL
MCH RBC QN AUTO: 37.1 PG (ref 27–33)
MCHC RBC AUTO-ENTMCNC: 31.6 G/DL (ref 33.6–35)
MCV RBC AUTO: 117.3 FL (ref 81.4–97.8)
MONOCYTES # BLD AUTO: 0.65 K/UL (ref 0–0.85)
MONOCYTES NFR BLD AUTO: 27 % (ref 0–13.4)
MORPHOLOGY BLD-IMP: NORMAL
NEUTROPHILS # BLD AUTO: 1.02 K/UL (ref 2–7.15)
NEUTROPHILS NFR BLD: 42.6 % (ref 44–72)
NRBC # BLD AUTO: 0 K/UL
NRBC BLD-RTO: 0 /100 WBC
PLATELET # BLD AUTO: 619 K/UL (ref 164–446)
PLATELET BLD QL SMEAR: NORMAL
PMV BLD AUTO: 10.8 FL (ref 9–12.9)
POIKILOCYTOSIS BLD QL SMEAR: NORMAL
POTASSIUM SERPL-SCNC: 4.4 MMOL/L (ref 3.6–5.5)
PROT SERPL-MCNC: 6.3 G/DL (ref 6–8.2)
RBC # BLD AUTO: 3.18 M/UL (ref 4.2–5.4)
RBC BLD AUTO: PRESENT
SODIUM SERPL-SCNC: 141 MMOL/L (ref 135–145)
WBC # BLD AUTO: 2.4 K/UL (ref 4.8–10.8)

## 2020-07-16 PROCEDURE — 85027 COMPLETE CBC AUTOMATED: CPT

## 2020-07-16 PROCEDURE — 80053 COMPREHEN METABOLIC PANEL: CPT

## 2020-07-16 PROCEDURE — 36415 COLL VENOUS BLD VENIPUNCTURE: CPT

## 2020-07-16 PROCEDURE — 85007 BL SMEAR W/DIFF WBC COUNT: CPT

## 2020-08-14 ENCOUNTER — HOSPITAL ENCOUNTER (OUTPATIENT)
Dept: LAB | Facility: MEDICAL CENTER | Age: 80
End: 2020-08-14
Attending: INTERNAL MEDICINE
Payer: MEDICARE

## 2020-08-14 LAB
ALBUMIN SERPL BCP-MCNC: 3.9 G/DL (ref 3.2–4.9)
ALBUMIN/GLOB SERPL: 1.6 G/DL
ALP SERPL-CCNC: 63 U/L (ref 30–99)
ALT SERPL-CCNC: 18 U/L (ref 2–50)
ANION GAP SERPL CALC-SCNC: 9 MMOL/L (ref 7–16)
ANISOCYTOSIS BLD QL SMEAR: ABNORMAL
AST SERPL-CCNC: 24 U/L (ref 12–45)
BASOPHILS # BLD AUTO: 0 % (ref 0–1.8)
BASOPHILS # BLD: 0 K/UL (ref 0–0.12)
BILIRUB SERPL-MCNC: 0.6 MG/DL (ref 0.1–1.5)
BUN SERPL-MCNC: 17 MG/DL (ref 8–22)
CALCIUM SERPL-MCNC: 9.7 MG/DL (ref 8.5–10.5)
CHLORIDE SERPL-SCNC: 105 MMOL/L (ref 96–112)
CO2 SERPL-SCNC: 27 MMOL/L (ref 20–33)
CREAT SERPL-MCNC: 0.83 MG/DL (ref 0.5–1.4)
DACRYOCYTES BLD QL SMEAR: NORMAL
EOSINOPHIL # BLD AUTO: 0.03 K/UL (ref 0–0.51)
EOSINOPHIL NFR BLD: 0.9 % (ref 0–6.9)
ERYTHROCYTE [DISTWIDTH] IN BLOOD BY AUTOMATED COUNT: 73.1 FL (ref 35.9–50)
FASTING STATUS PATIENT QL REPORTED: NORMAL
GIANT PLATELETS BLD QL SMEAR: NORMAL
GLOBULIN SER CALC-MCNC: 2.4 G/DL (ref 1.9–3.5)
GLUCOSE SERPL-MCNC: 95 MG/DL (ref 65–99)
HCT VFR BLD AUTO: 36.6 % (ref 37–47)
HGB BLD-MCNC: 11.3 G/DL (ref 12–16)
LG PLATELETS BLD QL SMEAR: NORMAL
LYMPHOCYTES # BLD AUTO: 0.62 K/UL (ref 1–4.8)
LYMPHOCYTES NFR BLD: 18.3 % (ref 22–41)
MACROCYTES BLD QL SMEAR: ABNORMAL
MANUAL DIFF BLD: NORMAL
MCH RBC QN AUTO: 36.7 PG (ref 27–33)
MCHC RBC AUTO-ENTMCNC: 30.9 G/DL (ref 33.6–35)
MCV RBC AUTO: 118.8 FL (ref 81.4–97.8)
MICROCYTES BLD QL SMEAR: ABNORMAL
MONOCYTES # BLD AUTO: 0.53 K/UL (ref 0–0.85)
MONOCYTES NFR BLD AUTO: 15.7 % (ref 0–13.4)
MORPHOLOGY BLD-IMP: NORMAL
NEUTROPHILS # BLD AUTO: 2.22 K/UL (ref 2–7.15)
NEUTROPHILS NFR BLD: 60 % (ref 44–72)
NEUTS BAND NFR BLD MANUAL: 5.2 % (ref 0–10)
NRBC # BLD AUTO: 0 K/UL
NRBC BLD-RTO: 0 /100 WBC
OVALOCYTES BLD QL SMEAR: NORMAL
PLATELET # BLD AUTO: 463 K/UL (ref 164–446)
PLATELET BLD QL SMEAR: NORMAL
PMV BLD AUTO: 10.9 FL (ref 9–12.9)
POIKILOCYTOSIS BLD QL SMEAR: NORMAL
POLYCHROMASIA BLD QL SMEAR: NORMAL
POTASSIUM SERPL-SCNC: 4.6 MMOL/L (ref 3.6–5.5)
PROT SERPL-MCNC: 6.3 G/DL (ref 6–8.2)
RBC # BLD AUTO: 3.08 M/UL (ref 4.2–5.4)
RBC BLD AUTO: PRESENT
SODIUM SERPL-SCNC: 141 MMOL/L (ref 135–145)
WBC # BLD AUTO: 3.4 K/UL (ref 4.8–10.8)

## 2020-08-14 PROCEDURE — 85007 BL SMEAR W/DIFF WBC COUNT: CPT

## 2020-08-14 PROCEDURE — 85027 COMPLETE CBC AUTOMATED: CPT

## 2020-08-14 PROCEDURE — 36415 COLL VENOUS BLD VENIPUNCTURE: CPT

## 2020-08-14 PROCEDURE — 80053 COMPREHEN METABOLIC PANEL: CPT

## 2020-08-31 ENCOUNTER — HOSPITAL ENCOUNTER (OUTPATIENT)
Facility: MEDICAL CENTER | Age: 80
End: 2020-08-31
Attending: UROLOGY
Payer: MEDICARE

## 2020-08-31 LAB — PATHOLOGY CONSULT NOTE: NORMAL

## 2020-08-31 PROCEDURE — 88305 TISSUE EXAM BY PATHOLOGIST: CPT

## 2020-09-16 ENCOUNTER — HOSPITAL ENCOUNTER (OUTPATIENT)
Dept: LAB | Facility: MEDICAL CENTER | Age: 80
End: 2020-09-16
Attending: INTERNAL MEDICINE
Payer: MEDICARE

## 2020-09-16 LAB
ALBUMIN SERPL BCP-MCNC: 3.9 G/DL (ref 3.2–4.9)
ALBUMIN/GLOB SERPL: 1.7 G/DL
ALP SERPL-CCNC: 70 U/L (ref 30–99)
ALT SERPL-CCNC: 17 U/L (ref 2–50)
ANION GAP SERPL CALC-SCNC: 11 MMOL/L (ref 7–16)
ANISOCYTOSIS BLD QL SMEAR: ABNORMAL
AST SERPL-CCNC: 20 U/L (ref 12–45)
BASOPHILS # BLD AUTO: 1.7 % (ref 0–1.8)
BASOPHILS # BLD: 0.06 K/UL (ref 0–0.12)
BILIRUB SERPL-MCNC: 0.5 MG/DL (ref 0.1–1.5)
BUN SERPL-MCNC: 14 MG/DL (ref 8–22)
CALCIUM SERPL-MCNC: 9.4 MG/DL (ref 8.5–10.5)
CHLORIDE SERPL-SCNC: 105 MMOL/L (ref 96–112)
CO2 SERPL-SCNC: 26 MMOL/L (ref 20–33)
CREAT SERPL-MCNC: 0.7 MG/DL (ref 0.5–1.4)
DACRYOCYTES BLD QL SMEAR: NORMAL
EOSINOPHIL # BLD AUTO: 0 K/UL (ref 0–0.51)
EOSINOPHIL NFR BLD: 0 % (ref 0–6.9)
ERYTHROCYTE [DISTWIDTH] IN BLOOD BY AUTOMATED COUNT: 78 FL (ref 35.9–50)
GLOBULIN SER CALC-MCNC: 2.3 G/DL (ref 1.9–3.5)
GLUCOSE SERPL-MCNC: 77 MG/DL (ref 65–99)
HCT VFR BLD AUTO: 33.6 % (ref 37–47)
HGB BLD-MCNC: 10.7 G/DL (ref 12–16)
LYMPHOCYTES # BLD AUTO: 0.98 K/UL (ref 1–4.8)
LYMPHOCYTES NFR BLD: 29.6 % (ref 22–41)
MACROCYTES BLD QL SMEAR: ABNORMAL
MANUAL DIFF BLD: NORMAL
MCH RBC QN AUTO: 37.8 PG (ref 27–33)
MCHC RBC AUTO-ENTMCNC: 31.8 G/DL (ref 33.6–35)
MCV RBC AUTO: 118.7 FL (ref 81.4–97.8)
MONOCYTES # BLD AUTO: 0.4 K/UL (ref 0–0.85)
MONOCYTES NFR BLD AUTO: 12.2 % (ref 0–13.4)
MORPHOLOGY BLD-IMP: NORMAL
NEUTROPHILS # BLD AUTO: 1.86 K/UL (ref 2–7.15)
NEUTROPHILS NFR BLD: 56.5 % (ref 44–72)
NRBC # BLD AUTO: 0 K/UL
NRBC BLD-RTO: 0 /100 WBC
OVALOCYTES BLD QL SMEAR: NORMAL
PLATELET # BLD AUTO: 601 K/UL (ref 164–446)
PLATELET BLD QL SMEAR: NORMAL
PMV BLD AUTO: 10.6 FL (ref 9–12.9)
POIKILOCYTOSIS BLD QL SMEAR: NORMAL
POLYCHROMASIA BLD QL SMEAR: NORMAL
POTASSIUM SERPL-SCNC: 4.5 MMOL/L (ref 3.6–5.5)
PROT SERPL-MCNC: 6.2 G/DL (ref 6–8.2)
RBC # BLD AUTO: 2.83 M/UL (ref 4.2–5.4)
RBC BLD AUTO: PRESENT
SODIUM SERPL-SCNC: 142 MMOL/L (ref 135–145)
TARGETS BLD QL SMEAR: NORMAL
WBC # BLD AUTO: 3.3 K/UL (ref 4.8–10.8)

## 2020-09-16 PROCEDURE — 85007 BL SMEAR W/DIFF WBC COUNT: CPT

## 2020-09-16 PROCEDURE — 80053 COMPREHEN METABOLIC PANEL: CPT

## 2020-09-16 PROCEDURE — 85027 COMPLETE CBC AUTOMATED: CPT

## 2020-09-16 PROCEDURE — 36415 COLL VENOUS BLD VENIPUNCTURE: CPT

## 2020-10-16 ENCOUNTER — HOSPITAL ENCOUNTER (OUTPATIENT)
Dept: LAB | Facility: MEDICAL CENTER | Age: 80
End: 2020-10-16
Attending: INTERNAL MEDICINE
Payer: MEDICARE

## 2020-10-16 LAB
ALBUMIN SERPL BCP-MCNC: 3.8 G/DL (ref 3.2–4.9)
ALBUMIN/GLOB SERPL: 1.5 G/DL
ALP SERPL-CCNC: 67 U/L (ref 30–99)
ALT SERPL-CCNC: 21 U/L (ref 2–50)
ANION GAP SERPL CALC-SCNC: 6 MMOL/L (ref 7–16)
ANISOCYTOSIS BLD QL SMEAR: ABNORMAL
AST SERPL-CCNC: 18 U/L (ref 12–45)
BASOPHILS # BLD AUTO: 0 % (ref 0–1.8)
BASOPHILS # BLD: 0 K/UL (ref 0–0.12)
BILIRUB SERPL-MCNC: 0.5 MG/DL (ref 0.1–1.5)
BUN SERPL-MCNC: 17 MG/DL (ref 8–22)
CALCIUM SERPL-MCNC: 9.2 MG/DL (ref 8.5–10.5)
CHLORIDE SERPL-SCNC: 106 MMOL/L (ref 96–112)
CO2 SERPL-SCNC: 29 MMOL/L (ref 20–33)
CREAT SERPL-MCNC: 0.81 MG/DL (ref 0.5–1.4)
DACRYOCYTES BLD QL SMEAR: NORMAL
EOSINOPHIL # BLD AUTO: 0 K/UL (ref 0–0.51)
EOSINOPHIL NFR BLD: 0 % (ref 0–6.9)
ERYTHROCYTE [DISTWIDTH] IN BLOOD BY AUTOMATED COUNT: 79.7 FL (ref 35.9–50)
GLOBULIN SER CALC-MCNC: 2.6 G/DL (ref 1.9–3.5)
GLUCOSE SERPL-MCNC: 94 MG/DL (ref 65–99)
HCT VFR BLD AUTO: 35.7 % (ref 37–47)
HGB BLD-MCNC: 11.2 G/DL (ref 12–16)
HGB RETIC QN AUTO: 29.3 PG/CELL (ref 29–35)
IMM RETICS NFR: 24.6 % (ref 9.3–17.4)
LYMPHOCYTES # BLD AUTO: 0.7 K/UL (ref 1–4.8)
LYMPHOCYTES NFR BLD: 19.5 % (ref 22–41)
MACROCYTES BLD QL SMEAR: ABNORMAL
MANUAL DIFF BLD: NORMAL
MCH RBC QN AUTO: 37.6 PG (ref 27–33)
MCHC RBC AUTO-ENTMCNC: 31.4 G/DL (ref 33.6–35)
MCV RBC AUTO: 119.8 FL (ref 81.4–97.8)
MONOCYTES # BLD AUTO: 0.52 K/UL (ref 0–0.85)
MONOCYTES NFR BLD AUTO: 14.4 % (ref 0–13.4)
MORPHOLOGY BLD-IMP: NORMAL
NEUTROPHILS # BLD AUTO: 2.38 K/UL (ref 2–7.15)
NEUTROPHILS NFR BLD: 61.9 % (ref 44–72)
NEUTS BAND NFR BLD MANUAL: 4.2 % (ref 0–10)
NRBC # BLD AUTO: 0 K/UL
NRBC BLD-RTO: 0 /100 WBC
OVALOCYTES BLD QL SMEAR: NORMAL
PLATELET # BLD AUTO: 586 K/UL (ref 164–446)
PLATELET BLD QL SMEAR: NORMAL
PMV BLD AUTO: 10.8 FL (ref 9–12.9)
POIKILOCYTOSIS BLD QL SMEAR: NORMAL
POLYCHROMASIA BLD QL SMEAR: NORMAL
POTASSIUM SERPL-SCNC: 4.2 MMOL/L (ref 3.6–5.5)
PROT SERPL-MCNC: 6.4 G/DL (ref 6–8.2)
RBC # BLD AUTO: 2.98 M/UL (ref 4.2–5.4)
RBC BLD AUTO: PRESENT
RETICS # AUTO: 0.08 M/UL (ref 0.04–0.06)
RETICS/RBC NFR: 2.6 % (ref 0.8–2.1)
SODIUM SERPL-SCNC: 141 MMOL/L (ref 135–145)
WBC # BLD AUTO: 3.6 K/UL (ref 4.8–10.8)

## 2020-10-16 PROCEDURE — 85027 COMPLETE CBC AUTOMATED: CPT

## 2020-10-16 PROCEDURE — 36415 COLL VENOUS BLD VENIPUNCTURE: CPT

## 2020-10-16 PROCEDURE — 85007 BL SMEAR W/DIFF WBC COUNT: CPT

## 2020-10-16 PROCEDURE — 82728 ASSAY OF FERRITIN: CPT

## 2020-10-16 PROCEDURE — 85046 RETICYTE/HGB CONCENTRATE: CPT

## 2020-10-16 PROCEDURE — 80053 COMPREHEN METABOLIC PANEL: CPT

## 2020-10-17 LAB — FERRITIN SERPL-MCNC: 201 NG/ML (ref 10–291)

## 2020-10-27 ENCOUNTER — HOSPITAL ENCOUNTER (OUTPATIENT)
Facility: MEDICAL CENTER | Age: 80
End: 2020-10-27
Attending: INTERNAL MEDICINE
Payer: MEDICARE

## 2020-10-27 LAB — HEMOCCULT STL QL: NEGATIVE

## 2020-10-27 PROCEDURE — 82272 OCCULT BLD FECES 1-3 TESTS: CPT

## 2020-11-17 ENCOUNTER — HOSPITAL ENCOUNTER (OUTPATIENT)
Dept: LAB | Facility: MEDICAL CENTER | Age: 80
End: 2020-11-17
Attending: INTERNAL MEDICINE
Payer: MEDICARE

## 2020-11-17 LAB
ALBUMIN SERPL BCP-MCNC: 3.9 G/DL (ref 3.2–4.9)
ALBUMIN/GLOB SERPL: 1.6 G/DL
ALP SERPL-CCNC: 64 U/L (ref 30–99)
ALT SERPL-CCNC: 14 U/L (ref 2–50)
ANION GAP SERPL CALC-SCNC: 7 MMOL/L (ref 7–16)
ANISOCYTOSIS BLD QL SMEAR: ABNORMAL
AST SERPL-CCNC: 25 U/L (ref 12–45)
BASOPHILS # BLD AUTO: 0 % (ref 0–1.8)
BASOPHILS # BLD: 0 K/UL (ref 0–0.12)
BILIRUB SERPL-MCNC: 0.4 MG/DL (ref 0.1–1.5)
BUN SERPL-MCNC: 19 MG/DL (ref 8–22)
CALCIUM SERPL-MCNC: 9.3 MG/DL (ref 8.5–10.5)
CHLORIDE SERPL-SCNC: 106 MMOL/L (ref 96–112)
CO2 SERPL-SCNC: 27 MMOL/L (ref 20–33)
CREAT SERPL-MCNC: 0.81 MG/DL (ref 0.5–1.4)
EOSINOPHIL # BLD AUTO: 0.03 K/UL (ref 0–0.51)
EOSINOPHIL NFR BLD: 0.9 % (ref 0–6.9)
ERYTHROCYTE [DISTWIDTH] IN BLOOD BY AUTOMATED COUNT: 74.6 FL (ref 35.9–50)
GIANT PLATELETS BLD QL SMEAR: NORMAL
GLOBULIN SER CALC-MCNC: 2.4 G/DL (ref 1.9–3.5)
GLUCOSE SERPL-MCNC: 86 MG/DL (ref 65–99)
HCT VFR BLD AUTO: 34.8 % (ref 37–47)
HGB BLD-MCNC: 11.1 G/DL (ref 12–16)
LG PLATELETS BLD QL SMEAR: NORMAL
LYMPHOCYTES # BLD AUTO: 0.77 K/UL (ref 1–4.8)
LYMPHOCYTES NFR BLD: 20.9 % (ref 22–41)
MACROCYTES BLD QL SMEAR: ABNORMAL
MANUAL DIFF BLD: NORMAL
MCH RBC QN AUTO: 37.9 PG (ref 27–33)
MCHC RBC AUTO-ENTMCNC: 31.9 G/DL (ref 33.6–35)
MCV RBC AUTO: 118.8 FL (ref 81.4–97.8)
MICROCYTES BLD QL SMEAR: ABNORMAL
MONOCYTES # BLD AUTO: 0.42 K/UL (ref 0–0.85)
MONOCYTES NFR BLD AUTO: 11.3 % (ref 0–13.4)
MORPHOLOGY BLD-IMP: NORMAL
NEUTROPHILS # BLD AUTO: 2.48 K/UL (ref 2–7.15)
NEUTROPHILS NFR BLD: 67 % (ref 44–72)
NRBC # BLD AUTO: 0 K/UL
NRBC BLD-RTO: 0 /100 WBC
OVALOCYTES BLD QL SMEAR: NORMAL
PLATELET # BLD AUTO: 560 K/UL (ref 164–446)
PLATELET BLD QL SMEAR: NORMAL
PMV BLD AUTO: 10.6 FL (ref 9–12.9)
POIKILOCYTOSIS BLD QL SMEAR: NORMAL
POLYCHROMASIA BLD QL SMEAR: NORMAL
POTASSIUM SERPL-SCNC: 4.7 MMOL/L (ref 3.6–5.5)
PROT SERPL-MCNC: 6.3 G/DL (ref 6–8.2)
RBC # BLD AUTO: 2.93 M/UL (ref 4.2–5.4)
RBC BLD AUTO: PRESENT
SODIUM SERPL-SCNC: 140 MMOL/L (ref 135–145)
VARIANT LYMPHS BLD QL SMEAR: NORMAL
WBC # BLD AUTO: 3.7 K/UL (ref 4.8–10.8)

## 2020-11-17 PROCEDURE — 80053 COMPREHEN METABOLIC PANEL: CPT

## 2020-11-17 PROCEDURE — 85007 BL SMEAR W/DIFF WBC COUNT: CPT

## 2020-11-17 PROCEDURE — 85027 COMPLETE CBC AUTOMATED: CPT

## 2020-11-17 PROCEDURE — 36415 COLL VENOUS BLD VENIPUNCTURE: CPT

## 2021-01-14 DIAGNOSIS — Z23 NEED FOR VACCINATION: ICD-10-CM

## 2021-01-26 ENCOUNTER — HOSPITAL ENCOUNTER (OUTPATIENT)
Dept: LAB | Facility: MEDICAL CENTER | Age: 81
End: 2021-01-26
Attending: INTERNAL MEDICINE
Payer: MEDICARE

## 2021-01-26 LAB
ALBUMIN SERPL BCP-MCNC: 3.9 G/DL (ref 3.2–4.9)
ALBUMIN/GLOB SERPL: 1.3 G/DL
ALP SERPL-CCNC: 72 U/L (ref 30–99)
ALT SERPL-CCNC: 16 U/L (ref 2–50)
ANION GAP SERPL CALC-SCNC: 8 MMOL/L (ref 7–16)
ANISOCYTOSIS BLD QL SMEAR: ABNORMAL
AST SERPL-CCNC: 19 U/L (ref 12–45)
BASOPHILS # BLD AUTO: 0.2 % (ref 0–1.8)
BASOPHILS # BLD: 0.01 K/UL (ref 0–0.12)
BILIRUB SERPL-MCNC: 0.5 MG/DL (ref 0.1–1.5)
BUN SERPL-MCNC: 18 MG/DL (ref 8–22)
CALCIUM SERPL-MCNC: 9.8 MG/DL (ref 8.5–10.5)
CHLORIDE SERPL-SCNC: 104 MMOL/L (ref 96–112)
CO2 SERPL-SCNC: 27 MMOL/L (ref 20–33)
COMMENT 1642: NORMAL
CREAT SERPL-MCNC: 0.82 MG/DL (ref 0.5–1.4)
DACRYOCYTES BLD QL SMEAR: NORMAL
EOSINOPHIL # BLD AUTO: 0.01 K/UL (ref 0–0.51)
EOSINOPHIL NFR BLD: 0.2 % (ref 0–6.9)
ERYTHROCYTE [DISTWIDTH] IN BLOOD BY AUTOMATED COUNT: 71.8 FL (ref 35.9–50)
GLOBULIN SER CALC-MCNC: 2.9 G/DL (ref 1.9–3.5)
GLUCOSE SERPL-MCNC: 87 MG/DL (ref 65–99)
HCT VFR BLD AUTO: 35.3 % (ref 37–47)
HGB BLD-MCNC: 11.2 G/DL (ref 12–16)
IMM GRANULOCYTES # BLD AUTO: 0.04 K/UL (ref 0–0.11)
IMM GRANULOCYTES NFR BLD AUTO: 0.8 % (ref 0–0.9)
LG PLATELETS BLD QL SMEAR: NORMAL
LYMPHOCYTES # BLD AUTO: 0.86 K/UL (ref 1–4.8)
LYMPHOCYTES NFR BLD: 17.5 % (ref 22–41)
MACROCYTES BLD QL SMEAR: ABNORMAL
MCH RBC QN AUTO: 36.7 PG (ref 27–33)
MCHC RBC AUTO-ENTMCNC: 31.7 G/DL (ref 33.6–35)
MCV RBC AUTO: 115.7 FL (ref 81.4–97.8)
MICROCYTES BLD QL SMEAR: ABNORMAL
MONOCYTES # BLD AUTO: 0.93 K/UL (ref 0–0.85)
MONOCYTES NFR BLD AUTO: 18.9 % (ref 0–13.4)
MORPHOLOGY BLD-IMP: NORMAL
NEUTROPHILS # BLD AUTO: 3.06 K/UL (ref 2–7.15)
NEUTROPHILS NFR BLD: 62.4 % (ref 44–72)
NRBC # BLD AUTO: 0 K/UL
NRBC BLD-RTO: 0 /100 WBC
OVALOCYTES BLD QL SMEAR: NORMAL
PLATELET # BLD AUTO: 550 K/UL (ref 164–446)
PLATELET BLD QL SMEAR: NORMAL
PMV BLD AUTO: 11.1 FL (ref 9–12.9)
POIKILOCYTOSIS BLD QL SMEAR: NORMAL
POLYCHROMASIA BLD QL SMEAR: NORMAL
POTASSIUM SERPL-SCNC: 4.6 MMOL/L (ref 3.6–5.5)
PROT SERPL-MCNC: 6.8 G/DL (ref 6–8.2)
RBC # BLD AUTO: 3.05 M/UL (ref 4.2–5.4)
RBC BLD AUTO: PRESENT
SODIUM SERPL-SCNC: 139 MMOL/L (ref 135–145)
WBC # BLD AUTO: 4.9 K/UL (ref 4.8–10.8)

## 2021-01-26 PROCEDURE — 85025 COMPLETE CBC W/AUTO DIFF WBC: CPT

## 2021-01-26 PROCEDURE — 80053 COMPREHEN METABOLIC PANEL: CPT

## 2021-01-26 PROCEDURE — 36415 COLL VENOUS BLD VENIPUNCTURE: CPT

## 2021-03-18 ENCOUNTER — HOSPITAL ENCOUNTER (OUTPATIENT)
Dept: LAB | Facility: MEDICAL CENTER | Age: 81
End: 2021-03-18
Attending: INTERNAL MEDICINE
Payer: MEDICARE

## 2021-03-18 LAB
ALBUMIN SERPL BCP-MCNC: 3.9 G/DL (ref 3.2–4.9)
ALBUMIN/GLOB SERPL: 1.4 G/DL
ALP SERPL-CCNC: 74 U/L (ref 30–99)
ALT SERPL-CCNC: 18 U/L (ref 2–50)
ANION GAP SERPL CALC-SCNC: 9 MMOL/L (ref 7–16)
ANISOCYTOSIS BLD QL SMEAR: ABNORMAL
AST SERPL-CCNC: 20 U/L (ref 12–45)
BASOPHILS # BLD AUTO: 0.9 % (ref 0–1.8)
BASOPHILS # BLD: 0.03 K/UL (ref 0–0.12)
BILIRUB SERPL-MCNC: 0.5 MG/DL (ref 0.1–1.5)
BUN SERPL-MCNC: 15 MG/DL (ref 8–22)
BURR CELLS BLD QL SMEAR: NORMAL
CALCIUM SERPL-MCNC: 9.6 MG/DL (ref 8.5–10.5)
CHLORIDE SERPL-SCNC: 106 MMOL/L (ref 96–112)
CO2 SERPL-SCNC: 26 MMOL/L (ref 20–33)
CREAT SERPL-MCNC: 0.74 MG/DL (ref 0.5–1.4)
DACRYOCYTES BLD QL SMEAR: NORMAL
EOSINOPHIL # BLD AUTO: 0.03 K/UL (ref 0–0.51)
EOSINOPHIL NFR BLD: 0.9 % (ref 0–6.9)
ERYTHROCYTE [DISTWIDTH] IN BLOOD BY AUTOMATED COUNT: 80 FL (ref 35.9–50)
FASTING STATUS PATIENT QL REPORTED: NORMAL
GLOBULIN SER CALC-MCNC: 2.8 G/DL (ref 1.9–3.5)
GLUCOSE SERPL-MCNC: 78 MG/DL (ref 65–99)
HCT VFR BLD AUTO: 36.9 % (ref 37–47)
HGB BLD-MCNC: 11.4 G/DL (ref 12–16)
LYMPHOCYTES # BLD AUTO: 0.68 K/UL (ref 1–4.8)
LYMPHOCYTES NFR BLD: 18.4 % (ref 22–41)
MACROCYTES BLD QL SMEAR: ABNORMAL
MANUAL DIFF BLD: NORMAL
MCH RBC QN AUTO: 36.5 PG (ref 27–33)
MCHC RBC AUTO-ENTMCNC: 30.9 G/DL (ref 33.6–35)
MCV RBC AUTO: 118.3 FL (ref 81.4–97.8)
MICROCYTES BLD QL SMEAR: ABNORMAL
MONOCYTES # BLD AUTO: 0.78 K/UL (ref 0–0.85)
MONOCYTES NFR BLD AUTO: 21 % (ref 0–13.4)
MORPHOLOGY BLD-IMP: NORMAL
NEUTROPHILS # BLD AUTO: 2.18 K/UL (ref 2–7.15)
NEUTROPHILS NFR BLD: 58.8 % (ref 44–72)
NRBC # BLD AUTO: 0 K/UL
NRBC BLD-RTO: 0 /100 WBC
OVALOCYTES BLD QL SMEAR: NORMAL
PLATELET # BLD AUTO: 689 K/UL (ref 164–446)
PLATELET BLD QL SMEAR: NORMAL
PMV BLD AUTO: 10.9 FL (ref 9–12.9)
POIKILOCYTOSIS BLD QL SMEAR: NORMAL
POLYCHROMASIA BLD QL SMEAR: NORMAL
POTASSIUM SERPL-SCNC: 4.3 MMOL/L (ref 3.6–5.5)
PROT SERPL-MCNC: 6.7 G/DL (ref 6–8.2)
RBC # BLD AUTO: 3.12 M/UL (ref 4.2–5.4)
RBC BLD AUTO: PRESENT
SODIUM SERPL-SCNC: 141 MMOL/L (ref 135–145)
WBC # BLD AUTO: 3.7 K/UL (ref 4.8–10.8)

## 2021-03-18 PROCEDURE — 85007 BL SMEAR W/DIFF WBC COUNT: CPT

## 2021-03-18 PROCEDURE — 85027 COMPLETE CBC AUTOMATED: CPT

## 2021-03-18 PROCEDURE — 80053 COMPREHEN METABOLIC PANEL: CPT

## 2021-03-18 PROCEDURE — 36415 COLL VENOUS BLD VENIPUNCTURE: CPT

## 2021-04-29 ENCOUNTER — HOSPITAL ENCOUNTER (OUTPATIENT)
Dept: LAB | Facility: MEDICAL CENTER | Age: 81
End: 2021-04-29
Attending: INTERNAL MEDICINE
Payer: MEDICARE

## 2021-04-29 LAB
ALBUMIN SERPL BCP-MCNC: 3.8 G/DL (ref 3.2–4.9)
ALBUMIN/GLOB SERPL: 1.4 G/DL
ALP SERPL-CCNC: 78 U/L (ref 30–99)
ALT SERPL-CCNC: 17 U/L (ref 2–50)
ANION GAP SERPL CALC-SCNC: 7 MMOL/L (ref 7–16)
AST SERPL-CCNC: 19 U/L (ref 12–45)
BILIRUB SERPL-MCNC: 0.4 MG/DL (ref 0.1–1.5)
BUN SERPL-MCNC: 18 MG/DL (ref 8–22)
CALCIUM SERPL-MCNC: 8.8 MG/DL (ref 8.5–10.5)
CHLORIDE SERPL-SCNC: 108 MMOL/L (ref 96–112)
CO2 SERPL-SCNC: 27 MMOL/L (ref 20–33)
CREAT SERPL-MCNC: 1.05 MG/DL (ref 0.5–1.4)
GLOBULIN SER CALC-MCNC: 2.8 G/DL (ref 1.9–3.5)
GLUCOSE SERPL-MCNC: 102 MG/DL (ref 65–99)
POTASSIUM SERPL-SCNC: 4.2 MMOL/L (ref 3.6–5.5)
PROT SERPL-MCNC: 6.6 G/DL (ref 6–8.2)
SODIUM SERPL-SCNC: 142 MMOL/L (ref 135–145)

## 2021-04-29 PROCEDURE — 36415 COLL VENOUS BLD VENIPUNCTURE: CPT

## 2021-04-29 PROCEDURE — 80053 COMPREHEN METABOLIC PANEL: CPT

## 2021-06-08 ENCOUNTER — HOSPITAL ENCOUNTER (OUTPATIENT)
Dept: LAB | Facility: MEDICAL CENTER | Age: 81
End: 2021-06-08
Attending: INTERNAL MEDICINE
Payer: MEDICARE

## 2021-06-08 LAB
ANISOCYTOSIS BLD QL SMEAR: ABNORMAL
BASOPHILS # BLD AUTO: 0 % (ref 0–1.8)
BASOPHILS # BLD: 0 K/UL (ref 0–0.12)
BURR CELLS BLD QL SMEAR: NORMAL
EOSINOPHIL # BLD AUTO: 0.02 K/UL (ref 0–0.51)
EOSINOPHIL NFR BLD: 0.8 % (ref 0–6.9)
ERYTHROCYTE [DISTWIDTH] IN BLOOD BY AUTOMATED COUNT: 78 FL (ref 35.9–50)
HCT VFR BLD AUTO: 34.4 % (ref 37–47)
HGB BLD-MCNC: 10.5 G/DL (ref 12–16)
LYMPHOCYTES # BLD AUTO: 0.58 K/UL (ref 1–4.8)
LYMPHOCYTES NFR BLD: 20.7 % (ref 22–41)
MACROCYTES BLD QL SMEAR: ABNORMAL
MANUAL DIFF BLD: NORMAL
MCH RBC QN AUTO: 36.1 PG (ref 27–33)
MCHC RBC AUTO-ENTMCNC: 30.5 G/DL (ref 33.6–35)
MCV RBC AUTO: 118.2 FL (ref 81.4–97.8)
MICROCYTES BLD QL SMEAR: ABNORMAL
MONOCYTES # BLD AUTO: 0.39 K/UL (ref 0–0.85)
MONOCYTES NFR BLD AUTO: 13.8 % (ref 0–13.4)
MORPHOLOGY BLD-IMP: NORMAL
NEUTROPHILS # BLD AUTO: 1.81 K/UL (ref 2–7.15)
NEUTROPHILS NFR BLD: 59.5 % (ref 44–72)
NEUTS BAND NFR BLD MANUAL: 5.2 % (ref 0–10)
NRBC # BLD AUTO: 0 K/UL
NRBC BLD-RTO: 0 /100 WBC
PLATELET # BLD AUTO: 530 K/UL (ref 164–446)
PLATELET BLD QL SMEAR: NORMAL
PMV BLD AUTO: 11.5 FL (ref 9–12.9)
POIKILOCYTOSIS BLD QL SMEAR: NORMAL
POLYCHROMASIA BLD QL SMEAR: NORMAL
RBC # BLD AUTO: 2.91 M/UL (ref 4.2–5.4)
RBC BLD AUTO: PRESENT
WBC # BLD AUTO: 2.8 K/UL (ref 4.8–10.8)

## 2021-06-08 PROCEDURE — 85007 BL SMEAR W/DIFF WBC COUNT: CPT

## 2021-06-08 PROCEDURE — 80053 COMPREHEN METABOLIC PANEL: CPT

## 2021-06-08 PROCEDURE — 36415 COLL VENOUS BLD VENIPUNCTURE: CPT

## 2021-06-08 PROCEDURE — 85027 COMPLETE CBC AUTOMATED: CPT

## 2021-06-09 LAB
ALBUMIN SERPL BCP-MCNC: 3.8 G/DL (ref 3.2–4.9)
ALBUMIN/GLOB SERPL: 1.5 G/DL
ALP SERPL-CCNC: 68 U/L (ref 30–99)
ALT SERPL-CCNC: 22 U/L (ref 2–50)
ANION GAP SERPL CALC-SCNC: 9 MMOL/L (ref 7–16)
AST SERPL-CCNC: 22 U/L (ref 12–45)
BILIRUB SERPL-MCNC: 0.5 MG/DL (ref 0.1–1.5)
BUN SERPL-MCNC: 18 MG/DL (ref 8–22)
CALCIUM SERPL-MCNC: 9 MG/DL (ref 8.5–10.5)
CHLORIDE SERPL-SCNC: 107 MMOL/L (ref 96–112)
CO2 SERPL-SCNC: 25 MMOL/L (ref 20–33)
CREAT SERPL-MCNC: 0.82 MG/DL (ref 0.5–1.4)
GLOBULIN SER CALC-MCNC: 2.5 G/DL (ref 1.9–3.5)
GLUCOSE SERPL-MCNC: 90 MG/DL (ref 65–99)
POTASSIUM SERPL-SCNC: 4.3 MMOL/L (ref 3.6–5.5)
PROT SERPL-MCNC: 6.3 G/DL (ref 6–8.2)
SODIUM SERPL-SCNC: 141 MMOL/L (ref 135–145)

## 2021-06-18 ENCOUNTER — HOSPITAL ENCOUNTER (OUTPATIENT)
Dept: LAB | Facility: MEDICAL CENTER | Age: 81
End: 2021-06-18
Attending: INTERNAL MEDICINE
Payer: MEDICARE

## 2021-06-18 LAB
ALBUMIN SERPL BCP-MCNC: 3.9 G/DL (ref 3.2–4.9)
ALBUMIN/GLOB SERPL: 1.6 G/DL
ALP SERPL-CCNC: 73 U/L (ref 30–99)
ALT SERPL-CCNC: 14 U/L (ref 2–50)
ANION GAP SERPL CALC-SCNC: 9 MMOL/L (ref 7–16)
ANISOCYTOSIS BLD QL SMEAR: ABNORMAL
AST SERPL-CCNC: 24 U/L (ref 12–45)
BASOPHILS # BLD AUTO: 0 % (ref 0–1.8)
BASOPHILS # BLD: 0 K/UL (ref 0–0.12)
BILIRUB SERPL-MCNC: 0.7 MG/DL (ref 0.1–1.5)
BUN SERPL-MCNC: 16 MG/DL (ref 8–22)
CALCIUM SERPL-MCNC: 9 MG/DL (ref 8.5–10.5)
CHLORIDE SERPL-SCNC: 107 MMOL/L (ref 96–112)
CO2 SERPL-SCNC: 28 MMOL/L (ref 20–33)
CREAT SERPL-MCNC: 0.85 MG/DL (ref 0.5–1.4)
DACRYOCYTES BLD QL SMEAR: NORMAL
EOSINOPHIL # BLD AUTO: 0 K/UL (ref 0–0.51)
EOSINOPHIL NFR BLD: 0 % (ref 0–6.9)
ERYTHROCYTE [DISTWIDTH] IN BLOOD BY AUTOMATED COUNT: 77 FL (ref 35.9–50)
FERRITIN SERPL-MCNC: 193 NG/ML (ref 10–291)
FOLATE SERPL-MCNC: 22 NG/ML
GIANT PLATELETS BLD QL SMEAR: NORMAL
GLOBULIN SER CALC-MCNC: 2.5 G/DL (ref 1.9–3.5)
GLUCOSE SERPL-MCNC: 102 MG/DL (ref 65–99)
HCT VFR BLD AUTO: 32.6 % (ref 37–47)
HGB BLD-MCNC: 10.3 G/DL (ref 12–16)
HGB RETIC QN AUTO: 33 PG/CELL (ref 29–35)
IMM RETICS NFR: 27.2 % (ref 9.3–17.4)
LDH SERPL L TO P-CCNC: 253 U/L (ref 107–266)
LG PLATELETS BLD QL SMEAR: NORMAL
LYMPHOCYTES # BLD AUTO: 0.49 K/UL (ref 1–4.8)
LYMPHOCYTES NFR BLD: 16.8 % (ref 22–41)
MACROCYTES BLD QL SMEAR: ABNORMAL
MANUAL DIFF BLD: NORMAL
MCH RBC QN AUTO: 36.8 PG (ref 27–33)
MCHC RBC AUTO-ENTMCNC: 31.6 G/DL (ref 33.6–35)
MCV RBC AUTO: 116.4 FL (ref 81.4–97.8)
MONOCYTES # BLD AUTO: 0.31 K/UL (ref 0–0.85)
MONOCYTES NFR BLD AUTO: 10.6 % (ref 0–13.4)
MORPHOLOGY BLD-IMP: NORMAL
NEUTROPHILS # BLD AUTO: 2.11 K/UL (ref 2–7.15)
NEUTROPHILS NFR BLD: 72.6 % (ref 44–72)
NRBC # BLD AUTO: 0 K/UL
NRBC BLD-RTO: 0 /100 WBC
OVALOCYTES BLD QL SMEAR: NORMAL
PLATELET # BLD AUTO: 521 K/UL (ref 164–446)
PLATELET BLD QL SMEAR: NORMAL
PMV BLD AUTO: 11.4 FL (ref 9–12.9)
POIKILOCYTOSIS BLD QL SMEAR: NORMAL
POTASSIUM SERPL-SCNC: 4.1 MMOL/L (ref 3.6–5.5)
PROT SERPL-MCNC: 6.4 G/DL (ref 6–8.2)
RBC # BLD AUTO: 2.8 M/UL (ref 4.2–5.4)
RBC BLD AUTO: PRESENT
RETICS # AUTO: 0.07 M/UL (ref 0.04–0.06)
RETICS/RBC NFR: 2.5 % (ref 0.8–2.1)
SODIUM SERPL-SCNC: 144 MMOL/L (ref 135–145)
VIT B12 SERPL-MCNC: 455 PG/ML (ref 211–911)
WBC # BLD AUTO: 2.9 K/UL (ref 4.8–10.8)

## 2021-06-18 PROCEDURE — 85027 COMPLETE CBC AUTOMATED: CPT

## 2021-06-18 PROCEDURE — 82746 ASSAY OF FOLIC ACID SERUM: CPT

## 2021-06-18 PROCEDURE — 82728 ASSAY OF FERRITIN: CPT

## 2021-06-18 PROCEDURE — 36415 COLL VENOUS BLD VENIPUNCTURE: CPT

## 2021-06-18 PROCEDURE — 83615 LACTATE (LD) (LDH) ENZYME: CPT

## 2021-06-18 PROCEDURE — 85046 RETICYTE/HGB CONCENTRATE: CPT

## 2021-06-18 PROCEDURE — 85007 BL SMEAR W/DIFF WBC COUNT: CPT

## 2021-06-18 PROCEDURE — 82607 VITAMIN B-12: CPT

## 2021-06-18 PROCEDURE — 80053 COMPREHEN METABOLIC PANEL: CPT

## 2021-08-05 ENCOUNTER — HOSPITAL ENCOUNTER (OUTPATIENT)
Dept: LAB | Facility: MEDICAL CENTER | Age: 81
End: 2021-08-05
Attending: INTERNAL MEDICINE
Payer: MEDICARE

## 2021-08-05 LAB
25(OH)D3 SERPL-MCNC: 38 NG/ML (ref 30–100)
ALBUMIN SERPL BCP-MCNC: 4.2 G/DL (ref 3.2–4.9)
ALBUMIN/GLOB SERPL: 1.6 G/DL
ALP SERPL-CCNC: 70 U/L (ref 30–99)
ALT SERPL-CCNC: 13 U/L (ref 2–50)
ANION GAP SERPL CALC-SCNC: 12 MMOL/L (ref 7–16)
ANISOCYTOSIS BLD QL SMEAR: ABNORMAL
AST SERPL-CCNC: 18 U/L (ref 12–45)
BASOPHILS # BLD AUTO: 0 % (ref 0–1.8)
BASOPHILS # BLD: 0 K/UL (ref 0–0.12)
BILIRUB SERPL-MCNC: 0.7 MG/DL (ref 0.1–1.5)
BUN SERPL-MCNC: 17 MG/DL (ref 8–22)
CALCIUM SERPL-MCNC: 9.7 MG/DL (ref 8.5–10.5)
CHLORIDE SERPL-SCNC: 104 MMOL/L (ref 96–112)
CO2 SERPL-SCNC: 25 MMOL/L (ref 20–33)
CREAT SERPL-MCNC: 0.72 MG/DL (ref 0.5–1.4)
EOSINOPHIL # BLD AUTO: 0.03 K/UL (ref 0–0.51)
EOSINOPHIL NFR BLD: 0.9 % (ref 0–6.9)
ERYTHROCYTE [DISTWIDTH] IN BLOOD BY AUTOMATED COUNT: 84.4 FL (ref 35.9–50)
GIANT PLATELETS BLD QL SMEAR: NORMAL
GLOBULIN SER CALC-MCNC: 2.6 G/DL (ref 1.9–3.5)
GLUCOSE SERPL-MCNC: 73 MG/DL (ref 65–99)
HCT VFR BLD AUTO: 34 % (ref 37–47)
HGB BLD-MCNC: 10.3 G/DL (ref 12–16)
LG PLATELETS BLD QL SMEAR: NORMAL
LYMPHOCYTES # BLD AUTO: 0.59 K/UL (ref 1–4.8)
LYMPHOCYTES NFR BLD: 20.2 % (ref 22–41)
MACROCYTES BLD QL SMEAR: ABNORMAL
MANUAL DIFF BLD: NORMAL
MCH RBC QN AUTO: 36.7 PG (ref 27–33)
MCHC RBC AUTO-ENTMCNC: 30.3 G/DL (ref 33.6–35)
MCV RBC AUTO: 121 FL (ref 81.4–97.8)
MICROCYTES BLD QL SMEAR: ABNORMAL
MONOCYTES # BLD AUTO: 0.33 K/UL (ref 0–0.85)
MONOCYTES NFR BLD AUTO: 11.4 % (ref 0–13.4)
MORPHOLOGY BLD-IMP: NORMAL
NEUTROPHILS # BLD AUTO: 1.96 K/UL (ref 2–7.15)
NEUTROPHILS NFR BLD: 67.5 % (ref 44–72)
NRBC # BLD AUTO: 0 K/UL
NRBC BLD-RTO: 0 /100 WBC
OVALOCYTES BLD QL SMEAR: NORMAL
PLATELET # BLD AUTO: 541 K/UL (ref 164–446)
PLATELET BLD QL SMEAR: NORMAL
PMV BLD AUTO: 11.2 FL (ref 9–12.9)
POIKILOCYTOSIS BLD QL SMEAR: NORMAL
POTASSIUM SERPL-SCNC: 4.3 MMOL/L (ref 3.6–5.5)
PROT SERPL-MCNC: 6.8 G/DL (ref 6–8.2)
RBC # BLD AUTO: 2.81 M/UL (ref 4.2–5.4)
RBC BLD AUTO: PRESENT
SODIUM SERPL-SCNC: 141 MMOL/L (ref 135–145)
WBC # BLD AUTO: 2.9 K/UL (ref 4.8–10.8)

## 2021-08-05 PROCEDURE — 85027 COMPLETE CBC AUTOMATED: CPT

## 2021-08-05 PROCEDURE — 80053 COMPREHEN METABOLIC PANEL: CPT

## 2021-08-05 PROCEDURE — 85007 BL SMEAR W/DIFF WBC COUNT: CPT

## 2021-08-05 PROCEDURE — 82306 VITAMIN D 25 HYDROXY: CPT

## 2021-08-05 PROCEDURE — 36415 COLL VENOUS BLD VENIPUNCTURE: CPT

## 2021-09-09 ENCOUNTER — HOSPITAL ENCOUNTER (OUTPATIENT)
Dept: LAB | Facility: MEDICAL CENTER | Age: 81
End: 2021-09-09
Attending: INTERNAL MEDICINE
Payer: MEDICARE

## 2021-09-09 LAB
ALBUMIN SERPL BCP-MCNC: 4 G/DL (ref 3.2–4.9)
ALBUMIN/GLOB SERPL: 1.7 G/DL
ALP SERPL-CCNC: 73 U/L (ref 30–99)
ALT SERPL-CCNC: 15 U/L (ref 2–50)
ANION GAP SERPL CALC-SCNC: 10 MMOL/L (ref 7–16)
ANISOCYTOSIS BLD QL SMEAR: ABNORMAL
AST SERPL-CCNC: 21 U/L (ref 12–45)
BASOPHILS # BLD AUTO: 0.4 % (ref 0–1.8)
BASOPHILS # BLD: 0.01 K/UL (ref 0–0.12)
BILIRUB SERPL-MCNC: 0.5 MG/DL (ref 0.1–1.5)
BUN SERPL-MCNC: 18 MG/DL (ref 8–22)
CALCIUM SERPL-MCNC: 9.4 MG/DL (ref 8.5–10.5)
CHLORIDE SERPL-SCNC: 104 MMOL/L (ref 96–112)
CO2 SERPL-SCNC: 27 MMOL/L (ref 20–33)
COMMENT 1642: NORMAL
CREAT SERPL-MCNC: 0.79 MG/DL (ref 0.5–1.4)
EOSINOPHIL # BLD AUTO: 0.01 K/UL (ref 0–0.51)
EOSINOPHIL NFR BLD: 0.4 % (ref 0–6.9)
ERYTHROCYTE [DISTWIDTH] IN BLOOD BY AUTOMATED COUNT: 83.8 FL (ref 35.9–50)
GLOBULIN SER CALC-MCNC: 2.4 G/DL (ref 1.9–3.5)
GLUCOSE SERPL-MCNC: 91 MG/DL (ref 65–99)
HCT VFR BLD AUTO: 31.6 % (ref 37–47)
HGB BLD-MCNC: 9.9 G/DL (ref 12–16)
IMM GRANULOCYTES # BLD AUTO: 0.03 K/UL (ref 0–0.11)
IMM GRANULOCYTES NFR BLD AUTO: 1.1 % (ref 0–0.9)
LYMPHOCYTES # BLD AUTO: 0.57 K/UL (ref 1–4.8)
LYMPHOCYTES NFR BLD: 21.3 % (ref 22–41)
MACROCYTES BLD QL SMEAR: ABNORMAL
MCH RBC QN AUTO: 37.2 PG (ref 27–33)
MCHC RBC AUTO-ENTMCNC: 31.3 G/DL (ref 33.6–35)
MCV RBC AUTO: 118.8 FL (ref 81.4–97.8)
MONOCYTES # BLD AUTO: 0.57 K/UL (ref 0–0.85)
MONOCYTES NFR BLD AUTO: 21.3 % (ref 0–13.4)
MORPHOLOGY BLD-IMP: NORMAL
NEUTROPHILS # BLD AUTO: 1.49 K/UL (ref 2–7.15)
NEUTROPHILS NFR BLD: 55.5 % (ref 44–72)
NRBC # BLD AUTO: 0 K/UL
NRBC BLD-RTO: 0 /100 WBC
OVALOCYTES BLD QL SMEAR: NORMAL
PLATELET # BLD AUTO: 484 K/UL (ref 164–446)
PLATELET BLD QL SMEAR: NORMAL
PMV BLD AUTO: 11.4 FL (ref 9–12.9)
POIKILOCYTOSIS BLD QL SMEAR: NORMAL
POTASSIUM SERPL-SCNC: 4.4 MMOL/L (ref 3.6–5.5)
PROT SERPL-MCNC: 6.4 G/DL (ref 6–8.2)
RBC # BLD AUTO: 2.66 M/UL (ref 4.2–5.4)
RBC BLD AUTO: PRESENT
SODIUM SERPL-SCNC: 141 MMOL/L (ref 135–145)
WBC # BLD AUTO: 2.7 K/UL (ref 4.8–10.8)

## 2021-09-09 PROCEDURE — 85025 COMPLETE CBC W/AUTO DIFF WBC: CPT

## 2021-09-09 PROCEDURE — 80053 COMPREHEN METABOLIC PANEL: CPT

## 2021-09-09 PROCEDURE — 36415 COLL VENOUS BLD VENIPUNCTURE: CPT

## 2021-10-22 ENCOUNTER — OFFICE VISIT (OUTPATIENT)
Dept: URGENT CARE | Facility: PHYSICIAN GROUP | Age: 81
End: 2021-10-22
Payer: MEDICARE

## 2021-10-22 ENCOUNTER — APPOINTMENT (OUTPATIENT)
Dept: RADIOLOGY | Facility: IMAGING CENTER | Age: 81
End: 2021-10-22
Attending: NURSE PRACTITIONER
Payer: MEDICARE

## 2021-10-22 VITALS
BODY MASS INDEX: 29.18 KG/M2 | SYSTOLIC BLOOD PRESSURE: 138 MMHG | HEIGHT: 69 IN | RESPIRATION RATE: 16 BRPM | DIASTOLIC BLOOD PRESSURE: 78 MMHG | WEIGHT: 197 LBS | TEMPERATURE: 98.3 F | OXYGEN SATURATION: 94 % | HEART RATE: 84 BPM

## 2021-10-22 DIAGNOSIS — R10.84 GENERALIZED ABDOMINAL PAIN: Primary | ICD-10-CM

## 2021-10-22 DIAGNOSIS — R10.84 GENERALIZED ABDOMINAL PAIN: ICD-10-CM

## 2021-10-22 DIAGNOSIS — K59.00 CONSTIPATION, UNSPECIFIED CONSTIPATION TYPE: ICD-10-CM

## 2021-10-22 PROCEDURE — 74022 RADEX COMPL AQT ABD SERIES: CPT | Mod: TC | Performed by: NURSE PRACTITIONER

## 2021-10-22 PROCEDURE — 99203 OFFICE O/P NEW LOW 30 MIN: CPT | Performed by: NURSE PRACTITIONER

## 2021-10-22 RX ORDER — OFLOXACIN 3 MG/ML
SOLUTION/ DROPS OPHTHALMIC
COMMUNITY
Start: 2021-09-27 | End: 2021-10-22

## 2021-10-22 RX ORDER — BROMFENAC 0.76 MG/ML
SOLUTION/ DROPS OPHTHALMIC
COMMUNITY
Start: 2021-09-27 | End: 2022-08-23

## 2021-10-22 RX ORDER — LOTEPREDNOL ETABONATE 10 MG/ML
SUSPENSION TOPICAL
COMMUNITY
Start: 2021-09-27 | End: 2022-09-30

## 2021-10-22 ASSESSMENT — ENCOUNTER SYMPTOMS
HEARTBURN: 0
CONSTIPATION: 1
DIARRHEA: 0
VOMITING: 0
NAUSEA: 1
ROS GI COMMENTS: 1
ABDOMINAL PAIN: 1
BLOOD IN STOOL: 0
CHILLS: 0
FEVER: 0

## 2021-10-22 ASSESSMENT — FIBROSIS 4 INDEX: FIB4 SCORE: 0.89

## 2021-10-22 NOTE — PROGRESS NOTES
"Subjective:     Dedra Lobo is a 81 y.o. female who presents for GI Problem (started monday consitpatated , took miralax and nothing is working , unable to sleep and unable to eat )      GI Problem  Associated symptoms include abdominal pain and nausea. Pertinent negatives include no chills, fever or vomiting.     Pt presents for evaluation of a new problem.  Dedra is a pleasant 81-year-old female presents to urgent care today with complaints of abdominal pain, distention, decreased bowel movements and constipation.  Her symptoms began 3 days ago and have progressively worsened.  She now has pain radiating up her chest and into her back.  She notes 1-2 small bowel movements within the last couple days which were only small hard lori.  She does use fiber in her diet frequently to prevent constipation and since her symptoms began she has also been using MiraLAX and Colace.  She notes this is not improving her symptoms.  She is not afraid to eat as she is concerned that this will worsen her symptoms.  Her pain is rated as an 8/10.    Review of Systems   Constitutional: Negative for chills and fever.   Gastrointestinal: Positive for abdominal pain, constipation and nausea. Negative for blood in stool, diarrhea, heartburn, melena and vomiting.        1       PMH:   Past Medical History:   Diagnosis Date   • Adverse effect of anesthesia     core body temp drops per hx.   • Anemia    • Anesthesia     \"core temperature drops\"   • Arrhythmia    • Arthritis     oa, neck and hips   • Cancer (HCC) 2015    bladder   • Cataract 03/21/2019    bilateral no surgery   • GERD (gastroesophageal reflux disease)    • Glaucoma 2/2015    \"beginning\"   • Osteoporosis, unspecified    • Pain     neck   • Pneumonia 10/2001   • Thrombocytosis 9/29/2010   • Ulcer     gastric ulcers   • Unspecified disorder of thyroid     LOW THYROID LEVELS- TOOK  MEDS   • Urinary bladder disorder 2015    bladder cancer   • Urinary incontinence " 03/21/2019   • Vascular insufficiency of limb 2005    right lower extrematy   • Vitamin d deficiency 9/29/2010     ALLERGIES: No Known Allergies  SURGHX:   Past Surgical History:   Procedure Laterality Date   • PB TOTAL KNEE ARTHROPLASTY Left 4/3/2019    Procedure: KNEE ARTHROPLASTY TOTAL;  Surgeon: Robert Vazquez M.D.;  Location: SURGERY [facility];  Service: Orthopedics   • CERVICAL DISK AND FUSION ANTERIOR  2/9/2016    Procedure: CERVICAL DISK AND FUSION ANTERIOR  C3-7;  Surgeon: Dusty Rao M.D.;  Location: SURGERY [facility];  Service:    • CYSTOSCOPY  3/3/2015    Performed by Walt Olson M.D. at SURGERY [facility]   • TRANS URETHRAL RESECTION BLADDER  3/3/2015    Performed by Walt Olson M.D. at [facility]   • HIP REPLACEMENT, TOTAL  2006    Dr. Vazquez   • CHOLECYSTECTOMY  2003   • LUMPECTOMY  1982    cyst   • ARTHROPLASTY      bi lateral-Dr. Vazquez     SOCHX:   Social History     Socioeconomic History   • Marital status: Single     Spouse name: Not on file   • Number of children: Not on file   • Years of education: Not on file   • Highest education level: Not on file   Occupational History   • Not on file   Tobacco Use   • Smoking status: Never Smoker   • Smokeless tobacco: Never Used   Vaping Use   • Vaping Use: Never used   Substance and Sexual Activity   • Alcohol use: No     Alcohol/week: 0.0 oz   • Drug use: No   • Sexual activity: Never   Other Topics Concern   • Not on file   Social History Narrative   • Not on file     Social Determinants of Health     Financial Resource Strain:    • Difficulty of Paying Living Expenses:    Food Insecurity:    • Worried About Running Out of Food in the Last Year:    • Ran Out of Food in the Last Year:    Transportation Needs:    • Lack of Transportation (Medical):    • Lack of Transportation (Non-Medical):    Physical Activity:    • Days of Exercise per Week:    • Minutes of Exercise per Session:    Stress:    • Feeling of Stress  ":    Social Connections:    • Frequency of Communication with Friends and Family:    • Frequency of Social Gatherings with Friends and Family:    • Attends Hindu Services:    • Active Member of Clubs or Organizations:    • Attends Club or Organization Meetings:    • Marital Status:    Intimate Partner Violence:    • Fear of Current or Ex-Partner:    • Emotionally Abused:    • Physically Abused:    • Sexually Abused:      FH:   Family History   Problem Relation Age of Onset   • Cancer Mother         brain   • Lung Disease Mother         smoker   • Alcohol/Drug Father         etoh   • Lung Disease Sister         COPD smokers   • Alcohol/Drug Sister         etoh   • Lung Disease Sister         smoker-copd   • Alcohol/Drug Sister         etoh         Objective:   /78 (BP Location: Right arm, Patient Position: Sitting, BP Cuff Size: Adult)   Pulse 84   Temp 36.8 °C (98.3 °F) (Temporal)   Resp 16   Ht 1.753 m (5' 9\")   Wt 89.4 kg (197 lb)   SpO2 94%   BMI 29.09 kg/m²     Physical Exam  Vitals and nursing note reviewed.   Constitutional:       General: She is not in acute distress.     Appearance: Normal appearance. She is not ill-appearing.   HENT:      Head: Normocephalic and atraumatic.      Right Ear: External ear normal.      Left Ear: External ear normal.      Nose: No congestion or rhinorrhea.      Mouth/Throat:      Mouth: Mucous membranes are moist.   Eyes:      Extraocular Movements: Extraocular movements intact.      Pupils: Pupils are equal, round, and reactive to light.   Cardiovascular:      Rate and Rhythm: Normal rate and regular rhythm.      Pulses: Normal pulses.      Heart sounds: Normal heart sounds.   Pulmonary:      Effort: Pulmonary effort is normal.      Breath sounds: Normal breath sounds.   Abdominal:      General: Abdomen is flat. Bowel sounds are normal. There is distension.      Palpations: Abdomen is soft. There is no mass.      Tenderness: There is abdominal tenderness. There " is no right CVA tenderness, left CVA tenderness, guarding or rebound.      Hernia: No hernia is present.       Musculoskeletal:         General: Normal range of motion.      Cervical back: Normal range of motion and neck supple.   Skin:     General: Skin is warm and dry.      Capillary Refill: Capillary refill takes less than 2 seconds.   Neurological:      General: No focal deficit present.      Mental Status: She is alert and oriented to person, place, and time. Mental status is at baseline.   Psychiatric:         Mood and Affect: Mood normal.         Behavior: Behavior normal.         Thought Content: Thought content normal.         Judgment: Judgment normal.       DX abd: IMPRESSION:     1.  No pneumonia or pneumothorax.  2.  Moderate increased colonic stool, possibly indicating constipation.  3.  No evidence of bowel obstruction or perforation.  Assessment/Plan:   Assessment      1. Generalized abdominal pain  DX-ABDOMEN COMPLETE WITH AP OR PA CXR   2. Constipation, unspecified constipation type  DX-ABDOMEN COMPLETE WITH AP OR PA CXR   X-ray results were discussed with patient.  She was encouraged to add to Dulcolax as well as glycerin suppository to stimulate bowel movement.  Encouraged to exercise, increase fiber in diet and increase fluid intake.  Follow-up in clinic for worsening or persistent symptoms.  She verbalized understanding and agrees to plan of care.  AVS handout given and reviewed with patient. Pt educated on red flags and when to seek treatment back in ER or UC.

## 2021-12-07 ENCOUNTER — HOSPITAL ENCOUNTER (OUTPATIENT)
Dept: LAB | Facility: MEDICAL CENTER | Age: 81
End: 2021-12-07
Attending: INTERNAL MEDICINE
Payer: MEDICARE

## 2021-12-07 LAB
APPEARANCE UR: CLEAR
BACTERIA #/AREA URNS HPF: ABNORMAL /HPF
BILIRUB UR QL STRIP.AUTO: NEGATIVE
COLOR UR: YELLOW
EPI CELLS #/AREA URNS HPF: ABNORMAL /HPF
FERRITIN SERPL-MCNC: 74.8 NG/ML (ref 10–291)
FOLATE SERPL-MCNC: 19.7 NG/ML
GLUCOSE UR STRIP.AUTO-MCNC: NEGATIVE MG/DL
HGB RETIC QN AUTO: 28.5 PG/CELL (ref 29–35)
HYALINE CASTS #/AREA URNS LPF: ABNORMAL /LPF
IMM RETICS NFR: 31.8 % (ref 9.3–17.4)
IRON SATN MFR SERPL: 15 % (ref 15–55)
IRON SERPL-MCNC: 45 UG/DL (ref 40–170)
KETONES UR STRIP.AUTO-MCNC: NEGATIVE MG/DL
LEUKOCYTE ESTERASE UR QL STRIP.AUTO: ABNORMAL
MICRO URNS: ABNORMAL
NITRITE UR QL STRIP.AUTO: NEGATIVE
PH UR STRIP.AUTO: 6 [PH] (ref 5–8)
PROT UR QL STRIP: NEGATIVE MG/DL
RBC # URNS HPF: ABNORMAL /HPF
RBC UR QL AUTO: NEGATIVE
RETICS # AUTO: 0.07 M/UL (ref 0.04–0.06)
RETICS/RBC NFR: 3.2 % (ref 0.8–2.1)
SP GR UR STRIP.AUTO: 1.02
TIBC SERPL-MCNC: 297 UG/DL (ref 250–450)
UIBC SERPL-MCNC: 252 UG/DL (ref 110–370)
UROBILINOGEN UR STRIP.AUTO-MCNC: 0.2 MG/DL
WBC #/AREA URNS HPF: ABNORMAL /HPF

## 2021-12-07 PROCEDURE — 82746 ASSAY OF FOLIC ACID SERUM: CPT

## 2021-12-07 PROCEDURE — 36415 COLL VENOUS BLD VENIPUNCTURE: CPT

## 2021-12-07 PROCEDURE — 85046 RETICYTE/HGB CONCENTRATE: CPT

## 2021-12-07 PROCEDURE — 83550 IRON BINDING TEST: CPT

## 2021-12-07 PROCEDURE — 82728 ASSAY OF FERRITIN: CPT

## 2021-12-07 PROCEDURE — 83540 ASSAY OF IRON: CPT

## 2021-12-07 PROCEDURE — 81001 URINALYSIS AUTO W/SCOPE: CPT

## 2021-12-07 PROCEDURE — 87086 URINE CULTURE/COLONY COUNT: CPT

## 2021-12-10 LAB
BACTERIA UR CULT: NORMAL
SIGNIFICANT IND 70042: NORMAL
SITE SITE: NORMAL
SOURCE SOURCE: NORMAL

## 2021-12-17 ENCOUNTER — HOSPITAL ENCOUNTER (OUTPATIENT)
Facility: MEDICAL CENTER | Age: 81
End: 2021-12-17
Attending: INTERNAL MEDICINE
Payer: MEDICARE

## 2021-12-17 LAB — HEMOCCULT STL QL: NEGATIVE

## 2021-12-17 PROCEDURE — 82272 OCCULT BLD FECES 1-3 TESTS: CPT

## 2022-01-13 ENCOUNTER — HOSPITAL ENCOUNTER (OUTPATIENT)
Dept: LAB | Facility: MEDICAL CENTER | Age: 82
End: 2022-01-13
Attending: INTERNAL MEDICINE
Payer: MEDICARE

## 2022-01-13 LAB
ALBUMIN SERPL BCP-MCNC: 4.1 G/DL (ref 3.2–4.9)
ALBUMIN/GLOB SERPL: 1.7 G/DL
ALP SERPL-CCNC: 85 U/L (ref 30–99)
ALT SERPL-CCNC: 11 U/L (ref 2–50)
ANION GAP SERPL CALC-SCNC: 11 MMOL/L (ref 7–16)
ANISOCYTOSIS BLD QL SMEAR: ABNORMAL
AST SERPL-CCNC: 13 U/L (ref 12–45)
BASOPHILS # BLD AUTO: 0 % (ref 0–1.8)
BASOPHILS # BLD: 0 K/UL (ref 0–0.12)
BILIRUB SERPL-MCNC: 0.5 MG/DL (ref 0.1–1.5)
BUN SERPL-MCNC: 13 MG/DL (ref 8–22)
BURR CELLS BLD QL SMEAR: NORMAL
CALCIUM SERPL-MCNC: 9.4 MG/DL (ref 8.5–10.5)
CHLORIDE SERPL-SCNC: 106 MMOL/L (ref 96–112)
CO2 SERPL-SCNC: 23 MMOL/L (ref 20–33)
CREAT SERPL-MCNC: 0.68 MG/DL (ref 0.5–1.4)
DACRYOCYTES BLD QL SMEAR: NORMAL
DAT C3D-SP REAG RBC QL: NORMAL
DAT IGG-SP REAG RBC QL: NORMAL
EOSINOPHIL # BLD AUTO: 0 K/UL (ref 0–0.51)
EOSINOPHIL NFR BLD: 0 % (ref 0–6.9)
ERYTHROCYTE [DISTWIDTH] IN BLOOD BY AUTOMATED COUNT: 78.7 FL (ref 35.9–50)
GIANT PLATELETS BLD QL SMEAR: NORMAL
GLOBULIN SER CALC-MCNC: 2.4 G/DL (ref 1.9–3.5)
GLUCOSE SERPL-MCNC: 80 MG/DL (ref 65–99)
HCT VFR BLD AUTO: 32.3 % (ref 37–47)
HGB BLD-MCNC: 9.6 G/DL (ref 12–16)
HYPOCHROMIA BLD QL SMEAR: ABNORMAL
LDH SERPL L TO P-CCNC: 316 U/L (ref 107–266)
LG PLATELETS BLD QL SMEAR: NORMAL
LYMPHOCYTES # BLD AUTO: 0.71 K/UL (ref 1–4.8)
LYMPHOCYTES NFR BLD: 19.8 % (ref 22–41)
MACROCYTES BLD QL SMEAR: ABNORMAL
MANUAL DIFF BLD: NORMAL
MCH RBC QN AUTO: 36.2 PG (ref 27–33)
MCHC RBC AUTO-ENTMCNC: 29.7 G/DL (ref 33.6–35)
MCV RBC AUTO: 121.9 FL (ref 81.4–97.8)
MICROCYTES BLD QL SMEAR: ABNORMAL
MONOCYTES # BLD AUTO: 0.26 K/UL (ref 0–0.85)
MONOCYTES NFR BLD AUTO: 7.2 % (ref 0–13.4)
MORPHOLOGY BLD-IMP: NORMAL
NEUTROPHILS # BLD AUTO: 2.63 K/UL (ref 2–7.15)
NEUTROPHILS NFR BLD: 73 % (ref 44–72)
NRBC # BLD AUTO: 0.02 K/UL
NRBC BLD-RTO: 0.6 /100 WBC
OVALOCYTES BLD QL SMEAR: NORMAL
PLATELET # BLD AUTO: 552 K/UL (ref 164–446)
PMV BLD AUTO: 11.6 FL (ref 9–12.9)
POIKILOCYTOSIS BLD QL SMEAR: NORMAL
POLYCHROMASIA BLD QL SMEAR: NORMAL
POTASSIUM SERPL-SCNC: 4.2 MMOL/L (ref 3.6–5.5)
PROT SERPL-MCNC: 6.5 G/DL (ref 6–8.2)
RBC # BLD AUTO: 2.65 M/UL (ref 4.2–5.4)
RBC BLD AUTO: PRESENT
SCHISTOCYTES BLD QL SMEAR: NORMAL
SODIUM SERPL-SCNC: 140 MMOL/L (ref 135–145)
VARIANT LYMPHS BLD QL SMEAR: NORMAL
WBC # BLD AUTO: 3.6 K/UL (ref 4.8–10.8)

## 2022-01-13 PROCEDURE — 85027 COMPLETE CBC AUTOMATED: CPT

## 2022-01-13 PROCEDURE — 86880 COOMBS TEST DIRECT: CPT

## 2022-01-13 PROCEDURE — 83010 ASSAY OF HAPTOGLOBIN QUANT: CPT

## 2022-01-13 PROCEDURE — 85007 BL SMEAR W/DIFF WBC COUNT: CPT

## 2022-01-13 PROCEDURE — 80053 COMPREHEN METABOLIC PANEL: CPT

## 2022-01-13 PROCEDURE — 83615 LACTATE (LD) (LDH) ENZYME: CPT

## 2022-01-13 PROCEDURE — 36415 COLL VENOUS BLD VENIPUNCTURE: CPT

## 2022-01-16 LAB — HAPTOGLOB SERPL-MCNC: 28 MG/DL (ref 30–200)

## 2022-02-22 ENCOUNTER — HOSPITAL ENCOUNTER (OUTPATIENT)
Dept: LAB | Facility: MEDICAL CENTER | Age: 82
End: 2022-02-22
Attending: INTERNAL MEDICINE
Payer: MEDICARE

## 2022-02-22 LAB
25(OH)D3 SERPL-MCNC: 27 NG/ML (ref 30–100)
ALBUMIN SERPL BCP-MCNC: 4.2 G/DL (ref 3.2–4.9)
ALBUMIN/GLOB SERPL: 1.6 G/DL
ALP SERPL-CCNC: 97 U/L (ref 30–99)
ALT SERPL-CCNC: 15 U/L (ref 2–50)
ANION GAP SERPL CALC-SCNC: 10 MMOL/L (ref 7–16)
ANISOCYTOSIS BLD QL SMEAR: ABNORMAL
AST SERPL-CCNC: 18 U/L (ref 12–45)
BASOPHILS # BLD AUTO: 0.8 % (ref 0–1.8)
BASOPHILS # BLD: 0.03 K/UL (ref 0–0.12)
BILIRUB SERPL-MCNC: 0.5 MG/DL (ref 0.1–1.5)
BUN SERPL-MCNC: 17 MG/DL (ref 8–22)
CALCIUM SERPL-MCNC: 9.6 MG/DL (ref 8.5–10.5)
CHLORIDE SERPL-SCNC: 106 MMOL/L (ref 96–112)
CO2 SERPL-SCNC: 28 MMOL/L (ref 20–33)
COMMENT 1642: NORMAL
CREAT SERPL-MCNC: 0.67 MG/DL (ref 0.5–1.4)
EOSINOPHIL # BLD AUTO: 0.01 K/UL (ref 0–0.51)
EOSINOPHIL NFR BLD: 0.3 % (ref 0–6.9)
ERYTHROCYTE [DISTWIDTH] IN BLOOD BY AUTOMATED COUNT: 76.2 FL (ref 35.9–50)
GLOBULIN SER CALC-MCNC: 2.6 G/DL (ref 1.9–3.5)
GLUCOSE SERPL-MCNC: 90 MG/DL (ref 65–99)
HCT VFR BLD AUTO: 33.3 % (ref 37–47)
HGB BLD-MCNC: 10.2 G/DL (ref 12–16)
HYPOCHROMIA BLD QL SMEAR: ABNORMAL
IMM GRANULOCYTES # BLD AUTO: 0.08 K/UL (ref 0–0.11)
IMM GRANULOCYTES NFR BLD AUTO: 2.2 % (ref 0–0.9)
LYMPHOCYTES # BLD AUTO: 0.51 K/UL (ref 1–4.8)
LYMPHOCYTES NFR BLD: 13.8 % (ref 22–41)
MACROCYTES BLD QL SMEAR: ABNORMAL
MCH RBC QN AUTO: 34.5 PG (ref 27–33)
MCHC RBC AUTO-ENTMCNC: 30.6 G/DL (ref 33.6–35)
MCV RBC AUTO: 112.5 FL (ref 81.4–97.8)
MICROCYTES BLD QL SMEAR: ABNORMAL
MONOCYTES # BLD AUTO: 0.68 K/UL (ref 0–0.85)
MONOCYTES NFR BLD AUTO: 18.4 % (ref 0–13.4)
MORPHOLOGY BLD-IMP: NORMAL
NEUTROPHILS # BLD AUTO: 2.39 K/UL (ref 2–7.15)
NEUTROPHILS NFR BLD: 64.5 % (ref 44–72)
NRBC # BLD AUTO: 0.05 K/UL
NRBC BLD-RTO: 1.4 /100 WBC
OVALOCYTES BLD QL SMEAR: NORMAL
PLATELET # BLD AUTO: 594 K/UL (ref 164–446)
PLATELET BLD QL SMEAR: NORMAL
PMV BLD AUTO: 12.2 FL (ref 9–12.9)
POIKILOCYTOSIS BLD QL SMEAR: NORMAL
POTASSIUM SERPL-SCNC: 4.1 MMOL/L (ref 3.6–5.5)
PROT SERPL-MCNC: 6.8 G/DL (ref 6–8.2)
RBC # BLD AUTO: 2.96 M/UL (ref 4.2–5.4)
RBC BLD AUTO: PRESENT
SODIUM SERPL-SCNC: 144 MMOL/L (ref 135–145)
WBC # BLD AUTO: 3.7 K/UL (ref 4.8–10.8)

## 2022-02-22 PROCEDURE — 85025 COMPLETE CBC W/AUTO DIFF WBC: CPT

## 2022-02-22 PROCEDURE — 36415 COLL VENOUS BLD VENIPUNCTURE: CPT

## 2022-02-22 PROCEDURE — 82306 VITAMIN D 25 HYDROXY: CPT

## 2022-02-22 PROCEDURE — 80053 COMPREHEN METABOLIC PANEL: CPT

## 2022-03-22 ENCOUNTER — HOSPITAL ENCOUNTER (OUTPATIENT)
Dept: LAB | Facility: MEDICAL CENTER | Age: 82
End: 2022-03-22
Attending: INTERNAL MEDICINE
Payer: MEDICARE

## 2022-03-22 LAB
ALBUMIN SERPL BCP-MCNC: 3.9 G/DL (ref 3.2–4.9)
ALBUMIN/GLOB SERPL: 2 G/DL
ALP SERPL-CCNC: 92 U/L (ref 30–99)
ALT SERPL-CCNC: 11 U/L (ref 2–50)
ANION GAP SERPL CALC-SCNC: 8 MMOL/L (ref 7–16)
ANISOCYTOSIS BLD QL SMEAR: ABNORMAL
AST SERPL-CCNC: 12 U/L (ref 12–45)
BASOPHILS # BLD AUTO: 0.2 % (ref 0–1.8)
BASOPHILS # BLD: 0.01 K/UL (ref 0–0.12)
BILIRUB SERPL-MCNC: 0.5 MG/DL (ref 0.1–1.5)
BUN SERPL-MCNC: 15 MG/DL (ref 8–22)
CALCIUM SERPL-MCNC: 9.1 MG/DL (ref 8.5–10.5)
CHLORIDE SERPL-SCNC: 107 MMOL/L (ref 96–112)
CO2 SERPL-SCNC: 26 MMOL/L (ref 20–33)
COMMENT 1642: NORMAL
CREAT SERPL-MCNC: 0.64 MG/DL (ref 0.5–1.4)
DACRYOCYTES BLD QL SMEAR: NORMAL
EOSINOPHIL # BLD AUTO: 0.01 K/UL (ref 0–0.51)
EOSINOPHIL NFR BLD: 0.2 % (ref 0–6.9)
ERYTHROCYTE [DISTWIDTH] IN BLOOD BY AUTOMATED COUNT: 81.9 FL (ref 35.9–50)
GFR SERPLBLD CREATININE-BSD FMLA CKD-EPI: 89 ML/MIN/1.73 M 2
GLOBULIN SER CALC-MCNC: 2 G/DL (ref 1.9–3.5)
GLUCOSE SERPL-MCNC: 91 MG/DL (ref 65–99)
HCT VFR BLD AUTO: 31.2 % (ref 37–47)
HGB BLD-MCNC: 9.4 G/DL (ref 12–16)
HGB RETIC QN AUTO: 28.5 PG/CELL (ref 29–35)
IMM GRANULOCYTES # BLD AUTO: 0.19 K/UL (ref 0–0.11)
IMM GRANULOCYTES NFR BLD AUTO: 4.6 % (ref 0–0.9)
IMM RETICS NFR: 27.2 % (ref 9.3–17.4)
LDH SERPL L TO P-CCNC: 273 U/L (ref 107–266)
LG PLATELETS BLD QL SMEAR: NORMAL
LYMPHOCYTES # BLD AUTO: 0.59 K/UL (ref 1–4.8)
LYMPHOCYTES NFR BLD: 14.3 % (ref 22–41)
MACROCYTES BLD QL SMEAR: ABNORMAL
MCH RBC QN AUTO: 33.9 PG (ref 27–33)
MCHC RBC AUTO-ENTMCNC: 30.1 G/DL (ref 33.6–35)
MCV RBC AUTO: 112.6 FL (ref 81.4–97.8)
MICROCYTES BLD QL SMEAR: ABNORMAL
MONOCYTES # BLD AUTO: 0.89 K/UL (ref 0–0.85)
MONOCYTES NFR BLD AUTO: 21.5 % (ref 0–13.4)
MORPHOLOGY BLD-IMP: NORMAL
NEUTROPHILS # BLD AUTO: 2.45 K/UL (ref 2–7.15)
NEUTROPHILS NFR BLD: 59.2 % (ref 44–72)
NRBC # BLD AUTO: 0.03 K/UL
NRBC BLD-RTO: 0.7 /100 WBC
OVALOCYTES BLD QL SMEAR: NORMAL
PLATELET # BLD AUTO: 603 K/UL (ref 164–446)
PLATELET BLD QL SMEAR: NORMAL
PMV BLD AUTO: 11.6 FL (ref 9–12.9)
POIKILOCYTOSIS BLD QL SMEAR: NORMAL
POLYCHROMASIA BLD QL SMEAR: NORMAL
POTASSIUM SERPL-SCNC: 4.4 MMOL/L (ref 3.6–5.5)
PROT SERPL-MCNC: 5.9 G/DL (ref 6–8.2)
RBC # BLD AUTO: 2.77 M/UL (ref 4.2–5.4)
RBC BLD AUTO: PRESENT
RETICS # AUTO: 0.07 M/UL (ref 0.04–0.06)
RETICS/RBC NFR: 2.5 % (ref 0.8–2.1)
SCHISTOCYTES BLD QL SMEAR: NORMAL
SODIUM SERPL-SCNC: 141 MMOL/L (ref 135–145)
WBC # BLD AUTO: 4.1 K/UL (ref 4.8–10.8)

## 2022-03-22 PROCEDURE — 85046 RETICYTE/HGB CONCENTRATE: CPT

## 2022-03-22 PROCEDURE — 36415 COLL VENOUS BLD VENIPUNCTURE: CPT

## 2022-03-22 PROCEDURE — 83010 ASSAY OF HAPTOGLOBIN QUANT: CPT

## 2022-03-22 PROCEDURE — 83615 LACTATE (LD) (LDH) ENZYME: CPT

## 2022-03-22 PROCEDURE — 85025 COMPLETE CBC W/AUTO DIFF WBC: CPT

## 2022-03-22 PROCEDURE — 80053 COMPREHEN METABOLIC PANEL: CPT

## 2022-03-24 LAB — HAPTOGLOB SERPL-MCNC: 27 MG/DL (ref 30–200)

## 2022-04-19 ENCOUNTER — HOSPITAL ENCOUNTER (OUTPATIENT)
Dept: LAB | Facility: MEDICAL CENTER | Age: 82
End: 2022-04-19
Attending: INTERNAL MEDICINE
Payer: MEDICARE

## 2022-04-19 PROCEDURE — 86880 COOMBS TEST DIRECT: CPT

## 2022-04-20 LAB
DAT C3D-SP REAG RBC QL: NORMAL
DAT IGG-SP REAG RBC QL: NORMAL

## 2022-05-10 ENCOUNTER — HOSPITAL ENCOUNTER (OUTPATIENT)
Facility: MEDICAL CENTER | Age: 82
End: 2022-05-10
Attending: INTERNAL MEDICINE
Payer: MEDICARE

## 2022-05-10 PROCEDURE — 86880 COOMBS TEST DIRECT: CPT

## 2022-05-11 LAB
DAT C3D-SP REAG RBC QL: NORMAL
DAT IGG-SP REAG RBC QL: NORMAL

## 2022-06-08 DIAGNOSIS — C80.1 ANEMIA ASSOCIATED WITH MALIGNANT NEOPLASTIC DISEASE (HCC): ICD-10-CM

## 2022-06-08 DIAGNOSIS — D63.0 ANEMIA ASSOCIATED WITH MALIGNANT NEOPLASTIC DISEASE (HCC): ICD-10-CM

## 2022-06-08 RX ORDER — ACETAMINOPHEN 325 MG/1
650 TABLET ORAL PRN
Status: CANCELLED | OUTPATIENT
Start: 2022-06-08

## 2022-06-08 RX ORDER — ACETAMINOPHEN 325 MG/1
650 TABLET ORAL ONCE
Status: CANCELLED | OUTPATIENT
Start: 2022-06-08

## 2022-06-08 RX ORDER — DIPHENHYDRAMINE HYDROCHLORIDE 50 MG/ML
25 INJECTION INTRAMUSCULAR; INTRAVENOUS PRN
Status: CANCELLED | OUTPATIENT
Start: 2022-06-08

## 2022-06-08 RX ORDER — DIPHENHYDRAMINE HCL 25 MG
25 TABLET ORAL ONCE
Status: CANCELLED | OUTPATIENT
Start: 2022-06-08 | End: 2022-06-08

## 2022-06-09 ENCOUNTER — HOSPITAL ENCOUNTER (OUTPATIENT)
Dept: LAB | Facility: MEDICAL CENTER | Age: 82
End: 2022-06-09
Attending: INTERNAL MEDICINE
Payer: MEDICARE

## 2022-06-09 LAB
ALBUMIN SERPL BCP-MCNC: 3.8 G/DL (ref 3.2–4.9)
ALBUMIN/GLOB SERPL: 1.5 G/DL
ALP SERPL-CCNC: 98 U/L (ref 30–99)
ALT SERPL-CCNC: 14 U/L (ref 2–50)
ANION GAP SERPL CALC-SCNC: 10 MMOL/L (ref 7–16)
ANISOCYTOSIS BLD QL SMEAR: ABNORMAL
APPEARANCE UR: ABNORMAL
AST SERPL-CCNC: 18 U/L (ref 12–45)
BACTERIA #/AREA URNS HPF: ABNORMAL /HPF
BASOPHILS # BLD AUTO: 0 % (ref 0–1.8)
BASOPHILS # BLD: 0 K/UL (ref 0–0.12)
BILIRUB SERPL-MCNC: 0.8 MG/DL (ref 0.1–1.5)
BILIRUB UR QL STRIP.AUTO: NEGATIVE
BUN SERPL-MCNC: 14 MG/DL (ref 8–22)
CALCIUM SERPL-MCNC: 9 MG/DL (ref 8.5–10.5)
CHLORIDE SERPL-SCNC: 107 MMOL/L (ref 96–112)
CO2 SERPL-SCNC: 25 MMOL/L (ref 20–33)
COLOR UR: YELLOW
CREAT SERPL-MCNC: 0.85 MG/DL (ref 0.5–1.4)
DACRYOCYTES BLD QL SMEAR: NORMAL
EOSINOPHIL # BLD AUTO: 0 K/UL (ref 0–0.51)
EOSINOPHIL NFR BLD: 0 % (ref 0–6.9)
EPI CELLS #/AREA URNS HPF: ABNORMAL /HPF
ERYTHROCYTE [DISTWIDTH] IN BLOOD BY AUTOMATED COUNT: 80.2 FL (ref 35.9–50)
FERRITIN SERPL-MCNC: 144 NG/ML (ref 10–291)
GFR SERPLBLD CREATININE-BSD FMLA CKD-EPI: 69 ML/MIN/1.73 M 2
GIANT PLATELETS BLD QL SMEAR: NORMAL
GLOBULIN SER CALC-MCNC: 2.5 G/DL (ref 1.9–3.5)
GLUCOSE SERPL-MCNC: 99 MG/DL (ref 65–99)
GLUCOSE UR STRIP.AUTO-MCNC: NEGATIVE MG/DL
HCT VFR BLD AUTO: 26.1 % (ref 37–47)
HGB BLD-MCNC: 8 G/DL (ref 12–16)
HGB RETIC QN AUTO: 27.2 PG/CELL (ref 29–35)
HYALINE CASTS #/AREA URNS LPF: ABNORMAL /LPF
IMM RETICS NFR: 17.9 % (ref 9.3–17.4)
IRON SATN MFR SERPL: 18 % (ref 15–55)
IRON SERPL-MCNC: 45 UG/DL (ref 40–170)
KETONES UR STRIP.AUTO-MCNC: ABNORMAL MG/DL
LDH SERPL L TO P-CCNC: 350 U/L (ref 107–266)
LEUKOCYTE ESTERASE UR QL STRIP.AUTO: ABNORMAL
LYMPHOCYTES # BLD AUTO: 0.56 K/UL (ref 1–4.8)
LYMPHOCYTES NFR BLD: 14.4 % (ref 22–41)
MACROCYTES BLD QL SMEAR: ABNORMAL
MANUAL DIFF BLD: NORMAL
MCH RBC QN AUTO: 32.5 PG (ref 27–33)
MCHC RBC AUTO-ENTMCNC: 30.7 G/DL (ref 33.6–35)
MCV RBC AUTO: 106.1 FL (ref 81.4–97.8)
METAMYELOCYTES NFR BLD MANUAL: 1.8 %
MICRO URNS: ABNORMAL
MICROCYTES BLD QL SMEAR: ABNORMAL
MONOCYTES # BLD AUTO: 0.35 K/UL (ref 0–0.85)
MONOCYTES NFR BLD AUTO: 9 % (ref 0–13.4)
MORPHOLOGY BLD-IMP: NORMAL
MYELOCYTES NFR BLD MANUAL: 0.9 %
NEUTROPHILS # BLD AUTO: 2.88 K/UL (ref 2–7.15)
NEUTROPHILS NFR BLD: 73.9 % (ref 44–72)
NITRITE UR QL STRIP.AUTO: NEGATIVE
NRBC # BLD AUTO: 0.04 K/UL
NRBC BLD-RTO: 1 /100 WBC
OVALOCYTES BLD QL SMEAR: NORMAL
PH UR STRIP.AUTO: 7 [PH] (ref 5–8)
PLATELET # BLD AUTO: 298 K/UL (ref 164–446)
PLATELET BLD QL SMEAR: NORMAL
PMV BLD AUTO: 13.1 FL (ref 9–12.9)
POIKILOCYTOSIS BLD QL SMEAR: NORMAL
POTASSIUM SERPL-SCNC: 4.3 MMOL/L (ref 3.6–5.5)
PROT SERPL-MCNC: 6.3 G/DL (ref 6–8.2)
PROT UR QL STRIP: 30 MG/DL
RBC # BLD AUTO: 2.46 M/UL (ref 4.2–5.4)
RBC # URNS HPF: ABNORMAL /HPF
RBC BLD AUTO: PRESENT
RBC UR QL AUTO: NEGATIVE
RETICS # AUTO: 0.04 M/UL (ref 0.04–0.06)
RETICS/RBC NFR: 1.6 % (ref 0.8–2.1)
SCHISTOCYTES BLD QL SMEAR: NORMAL
SODIUM SERPL-SCNC: 142 MMOL/L (ref 135–145)
SP GR UR STRIP.AUTO: 1.02
TIBC SERPL-MCNC: 251 UG/DL (ref 250–450)
UIBC SERPL-MCNC: 206 UG/DL (ref 110–370)
UROBILINOGEN UR STRIP.AUTO-MCNC: 1 MG/DL
WBC # BLD AUTO: 3.9 K/UL (ref 4.8–10.8)
WBC #/AREA URNS HPF: ABNORMAL /HPF

## 2022-06-09 PROCEDURE — 83550 IRON BINDING TEST: CPT

## 2022-06-09 PROCEDURE — 36415 COLL VENOUS BLD VENIPUNCTURE: CPT

## 2022-06-09 PROCEDURE — 83540 ASSAY OF IRON: CPT

## 2022-06-09 PROCEDURE — 82728 ASSAY OF FERRITIN: CPT

## 2022-06-09 PROCEDURE — 85007 BL SMEAR W/DIFF WBC COUNT: CPT

## 2022-06-09 PROCEDURE — 83010 ASSAY OF HAPTOGLOBIN QUANT: CPT

## 2022-06-09 PROCEDURE — 80053 COMPREHEN METABOLIC PANEL: CPT

## 2022-06-09 PROCEDURE — 81001 URINALYSIS AUTO W/SCOPE: CPT

## 2022-06-09 PROCEDURE — 87086 URINE CULTURE/COLONY COUNT: CPT

## 2022-06-09 PROCEDURE — 83615 LACTATE (LD) (LDH) ENZYME: CPT

## 2022-06-09 PROCEDURE — 85025 COMPLETE CBC W/AUTO DIFF WBC: CPT

## 2022-06-09 PROCEDURE — 85046 RETICYTE/HGB CONCENTRATE: CPT

## 2022-06-11 LAB
BACTERIA UR CULT: NORMAL
HAPTOGLOB SERPL-MCNC: 48 MG/DL (ref 30–200)
SIGNIFICANT IND 70042: NORMAL
SITE SITE: NORMAL
SOURCE SOURCE: NORMAL

## 2022-06-12 ENCOUNTER — OUTPATIENT INFUSION SERVICES (OUTPATIENT)
Dept: ONCOLOGY | Facility: MEDICAL CENTER | Age: 82
End: 2022-06-12
Attending: INTERNAL MEDICINE
Payer: MEDICARE

## 2022-06-12 VITALS
SYSTOLIC BLOOD PRESSURE: 132 MMHG | OXYGEN SATURATION: 97 % | WEIGHT: 194.45 LBS | BODY MASS INDEX: 27.84 KG/M2 | DIASTOLIC BLOOD PRESSURE: 66 MMHG | TEMPERATURE: 97.2 F | RESPIRATION RATE: 18 BRPM | HEART RATE: 80 BPM | HEIGHT: 70 IN

## 2022-06-12 DIAGNOSIS — D63.0 ANEMIA ASSOCIATED WITH MALIGNANT NEOPLASTIC DISEASE (HCC): ICD-10-CM

## 2022-06-12 DIAGNOSIS — C80.1 ANEMIA ASSOCIATED WITH MALIGNANT NEOPLASTIC DISEASE (HCC): ICD-10-CM

## 2022-06-12 DIAGNOSIS — D49.4 NEOPLASM OF BLADDER: ICD-10-CM

## 2022-06-12 LAB
ABO GROUP BLD: NORMAL
BARCODED ABORH UBTYP: 6200
BARCODED PRD CODE UBPRD: NORMAL
BARCODED UNIT NUM UBUNT: NORMAL
BLD GP AB SCN SERPL QL: NORMAL
COMPONENT R 8504R: NORMAL
PRODUCT TYPE UPROD: NORMAL
RH BLD: NORMAL
UNIT STATUS USTAT: NORMAL

## 2022-06-12 PROCEDURE — 86850 RBC ANTIBODY SCREEN: CPT

## 2022-06-12 PROCEDURE — 700102 HCHG RX REV CODE 250 W/ 637 OVERRIDE(OP): Performed by: NURSE PRACTITIONER

## 2022-06-12 PROCEDURE — 36430 TRANSFUSION BLD/BLD COMPNT: CPT

## 2022-06-12 PROCEDURE — 86923 COMPATIBILITY TEST ELECTRIC: CPT

## 2022-06-12 PROCEDURE — 306780 HCHG STAT FOR TRANSFUSION PER CASE

## 2022-06-12 PROCEDURE — A9270 NON-COVERED ITEM OR SERVICE: HCPCS | Performed by: NURSE PRACTITIONER

## 2022-06-12 PROCEDURE — 36415 COLL VENOUS BLD VENIPUNCTURE: CPT

## 2022-06-12 PROCEDURE — 86900 BLOOD TYPING SEROLOGIC ABO: CPT

## 2022-06-12 PROCEDURE — 86901 BLOOD TYPING SEROLOGIC RH(D): CPT

## 2022-06-12 PROCEDURE — P9016 RBC LEUKOCYTES REDUCED: HCPCS

## 2022-06-12 RX ORDER — ACETAMINOPHEN 325 MG/1
650 TABLET ORAL PRN
Status: CANCELLED | OUTPATIENT
Start: 2022-06-12

## 2022-06-12 RX ORDER — DIPHENHYDRAMINE HCL 25 MG
25 TABLET ORAL ONCE
Status: CANCELLED | OUTPATIENT
Start: 2022-06-12 | End: 2022-06-12

## 2022-06-12 RX ORDER — ACETAMINOPHEN 325 MG/1
650 TABLET ORAL ONCE
Status: COMPLETED | OUTPATIENT
Start: 2022-06-12 | End: 2022-06-12

## 2022-06-12 RX ORDER — DIPHENHYDRAMINE HCL 25 MG
25 TABLET ORAL ONCE
Status: COMPLETED | OUTPATIENT
Start: 2022-06-12 | End: 2022-06-12

## 2022-06-12 RX ORDER — ACETAMINOPHEN 325 MG/1
650 TABLET ORAL ONCE
Status: CANCELLED | OUTPATIENT
Start: 2022-06-12

## 2022-06-12 RX ORDER — DIPHENHYDRAMINE HYDROCHLORIDE 50 MG/ML
25 INJECTION INTRAMUSCULAR; INTRAVENOUS PRN
Status: CANCELLED | OUTPATIENT
Start: 2022-06-12

## 2022-06-12 RX ADMIN — DIPHENHYDRAMINE HCL 25 MG: 25 TABLET ORAL at 10:56

## 2022-06-12 RX ADMIN — ACETAMINOPHEN 650 MG: 325 TABLET ORAL at 10:56

## 2022-06-12 ASSESSMENT — FIBROSIS 4 INDEX: FIB4 SCORE: 1.28

## 2022-06-12 NOTE — PROGRESS NOTES
Ms Lobo is here today for one unit of PRBC's. Consents signed today. She voices no new concerns or complaints. PIV 20 g to right wrist. Labs were drawn per orders. Pre meds were give per MAR. One unit of PRBC's A+ transfused without complication. IV was flushed and removed intact. Ms Lobo was discharged home in stable condition, will see MD next week.

## 2022-06-14 ENCOUNTER — HOSPITAL ENCOUNTER (OUTPATIENT)
Facility: MEDICAL CENTER | Age: 82
End: 2022-06-14
Attending: INTERNAL MEDICINE
Payer: MEDICARE

## 2022-06-14 LAB — HEMOCCULT STL QL: NEGATIVE

## 2022-06-14 PROCEDURE — 82272 OCCULT BLD FECES 1-3 TESTS: CPT

## 2022-07-06 NOTE — PROGRESS NOTES
"S: Doing well    O: Walking well, bending knee well    Blood pressure 120/62, pulse 70, temperature 36.3 °C (97.3 °F), temperature source Temporal, resp. rate 17, height 1.753 m (5' 9\"), weight 94.4 kg (208 lb 1.8 oz), SpO2 94 %, not currently breastfeeding.    Recent Labs      04/04/19   0422   WBC  8.9   RBC  2.96*   HEMOGLOBIN  10.7*   HEMATOCRIT  33.5*   MCV  113.2*   MCH  36.1*   MCHC  31.9*   RDW  63.6*   PLATELETCT  467*   MPV  10.0         Intake/Output Summary (Last 24 hours) at 04/04/19 0646  Last data filed at 04/03/19 1242   Gross per 24 hour   Intake              800 ml   Output               30 ml   Net              770 ml         A:  Patient Active Problem List    Diagnosis Date Noted   • DDD (degenerative disc disease), cervical 02/09/2016   • Neoplasm of bladder 03/03/2015   • Thrombocytosis (HCC) 09/29/2010   • Vitamin d deficiency 09/29/2010       Stable after tKA    PLAN: Home today  "
Fww was delivered and adjusted to pt height.   
Med rec updated and complete  Allergies reviewed  Interviewed pt with friend at bedside with permission from pt.  Pt reports no antibiotics in the last 30 days.        
Patient discharged to home. Reviewed discharge instructions and prescriptions. Patient verbalized understanding. Escorted by MARILYN Pradhan via wheelchair.  
Pt arrived on unit via bed. Assumed pt care at 1412. Vital signs stable, pt denies pain at the moment. Hourly rounds in place.   
Pt is AO x 4  Came up to the floor with 2L of O2 via NC.   
Received bedside shift report at 1900.     Pt up in the chair at this time.  Dressing in place to left knee, CDI. Pedal pulse 2+, cap refill <3 sec.   Polar ice in place.     Call light and belongings within reach, bed down and locked, hourly rounding in progress.     
· 2 RN skin check complete with MIK Kebede.  · Devices in place SCD's. Polar ice.  · Skin assessed under devices intact.   · Surgical incision to left knee, dressing in place ANNIKA. No other skin issues.   · The following interventions in place pt turns side to side, no tubing underneath pt, moisturizer.     
10:21

## 2022-07-21 ENCOUNTER — HOSPITAL ENCOUNTER (OUTPATIENT)
Dept: RADIOLOGY | Facility: MEDICAL CENTER | Age: 82
End: 2022-07-21
Attending: INTERNAL MEDICINE
Payer: MEDICARE

## 2022-07-21 DIAGNOSIS — D47.3 THROMBOCYTHEMIA, ESSENTIAL (HCC): ICD-10-CM

## 2022-07-21 PROCEDURE — 76700 US EXAM ABDOM COMPLETE: CPT

## 2022-07-28 ENCOUNTER — HOSPITAL ENCOUNTER (OUTPATIENT)
Facility: MEDICAL CENTER | Age: 82
End: 2022-07-28
Attending: INTERNAL MEDICINE
Payer: MEDICARE

## 2022-07-28 PROCEDURE — 83615 LACTATE (LD) (LDH) ENZYME: CPT

## 2022-07-28 PROCEDURE — 82728 ASSAY OF FERRITIN: CPT

## 2022-07-28 PROCEDURE — 80053 COMPREHEN METABOLIC PANEL: CPT

## 2022-07-28 PROCEDURE — 82668 ASSAY OF ERYTHROPOIETIN: CPT

## 2022-07-29 LAB
ALBUMIN SERPL BCP-MCNC: 3.8 G/DL (ref 3.2–4.9)
ALBUMIN/GLOB SERPL: 1.8 G/DL
ALP SERPL-CCNC: 90 U/L (ref 30–99)
ALT SERPL-CCNC: 13 U/L (ref 2–50)
ANION GAP SERPL CALC-SCNC: 10 MMOL/L (ref 7–16)
AST SERPL-CCNC: 14 U/L (ref 12–45)
BILIRUB SERPL-MCNC: 0.9 MG/DL (ref 0.1–1.5)
BUN SERPL-MCNC: 13 MG/DL (ref 8–22)
CALCIUM SERPL-MCNC: 8.9 MG/DL (ref 8.5–10.5)
CHLORIDE SERPL-SCNC: 107 MMOL/L (ref 96–112)
CO2 SERPL-SCNC: 24 MMOL/L (ref 20–33)
CREAT SERPL-MCNC: 0.69 MG/DL (ref 0.5–1.4)
FERRITIN SERPL-MCNC: 173 NG/ML (ref 10–291)
GFR SERPLBLD CREATININE-BSD FMLA CKD-EPI: 88 ML/MIN/1.73 M 2
GLOBULIN SER CALC-MCNC: 2.1 G/DL (ref 1.9–3.5)
GLUCOSE SERPL-MCNC: 97 MG/DL (ref 65–99)
LDH SERPL L TO P-CCNC: 372 U/L (ref 107–266)
POTASSIUM SERPL-SCNC: 4.2 MMOL/L (ref 3.6–5.5)
PROT SERPL-MCNC: 5.9 G/DL (ref 6–8.2)
SODIUM SERPL-SCNC: 141 MMOL/L (ref 135–145)

## 2022-07-29 RX ORDER — DIPHENHYDRAMINE HYDROCHLORIDE 50 MG/ML
25 INJECTION INTRAMUSCULAR; INTRAVENOUS PRN
Status: CANCELLED | OUTPATIENT
Start: 2022-07-30

## 2022-07-29 RX ORDER — SODIUM CHLORIDE 9 MG/ML
INJECTION, SOLUTION INTRAVENOUS CONTINUOUS
Status: CANCELLED | OUTPATIENT
Start: 2022-07-30

## 2022-07-29 RX ORDER — ACETAMINOPHEN 325 MG/1
650 TABLET ORAL PRN
Status: CANCELLED | OUTPATIENT
Start: 2022-07-30

## 2022-07-29 RX ORDER — 0.9 % SODIUM CHLORIDE 0.9 %
10 VIAL (ML) INJECTION PRN
Status: CANCELLED | OUTPATIENT
Start: 2022-07-30

## 2022-07-29 RX ORDER — DIPHENHYDRAMINE HYDROCHLORIDE 50 MG/ML
25 INJECTION INTRAMUSCULAR; INTRAVENOUS PRN
Status: CANCELLED | OUTPATIENT
Start: 2022-07-29

## 2022-07-29 RX ORDER — 0.9 % SODIUM CHLORIDE 0.9 %
VIAL (ML) INJECTION PRN
Status: CANCELLED | OUTPATIENT
Start: 2022-07-30

## 2022-07-29 RX ORDER — ACETAMINOPHEN 325 MG/1
650 TABLET ORAL ONCE
Status: CANCELLED | OUTPATIENT
Start: 2022-07-30

## 2022-07-29 RX ORDER — HEPARIN SODIUM (PORCINE) LOCK FLUSH IV SOLN 100 UNIT/ML 100 UNIT/ML
500 SOLUTION INTRAVENOUS PRN
Status: CANCELLED | OUTPATIENT
Start: 2022-07-30

## 2022-07-29 RX ORDER — 0.9 % SODIUM CHLORIDE 0.9 %
3 VIAL (ML) INJECTION PRN
Status: CANCELLED | OUTPATIENT
Start: 2022-07-30

## 2022-07-29 RX ORDER — DIPHENHYDRAMINE HCL 25 MG
25 TABLET ORAL ONCE
Status: CANCELLED | OUTPATIENT
Start: 2022-07-30 | End: 2022-07-30

## 2022-07-29 RX ORDER — ACETAMINOPHEN 325 MG/1
650 TABLET ORAL PRN
Status: CANCELLED | OUTPATIENT
Start: 2022-07-29

## 2022-07-30 ENCOUNTER — OUTPATIENT INFUSION SERVICES (OUTPATIENT)
Dept: ONCOLOGY | Facility: MEDICAL CENTER | Age: 82
End: 2022-07-30
Attending: INTERNAL MEDICINE
Payer: MEDICARE

## 2022-07-30 VITALS
HEART RATE: 88 BPM | RESPIRATION RATE: 18 BRPM | DIASTOLIC BLOOD PRESSURE: 77 MMHG | SYSTOLIC BLOOD PRESSURE: 146 MMHG | OXYGEN SATURATION: 97 % | TEMPERATURE: 97.7 F

## 2022-07-30 DIAGNOSIS — D49.4 NEOPLASM OF BLADDER: ICD-10-CM

## 2022-07-30 DIAGNOSIS — D63.0 ANEMIA ASSOCIATED WITH MALIGNANT NEOPLASTIC DISEASE (HCC): ICD-10-CM

## 2022-07-30 DIAGNOSIS — C80.1 ANEMIA ASSOCIATED WITH MALIGNANT NEOPLASTIC DISEASE (HCC): ICD-10-CM

## 2022-07-30 LAB
ABO GROUP BLD: NORMAL
BARCODED ABORH UBTYP: 6200
BARCODED PRD CODE UBPRD: NORMAL
BARCODED UNIT NUM UBUNT: NORMAL
BLD GP AB SCN SERPL QL: NORMAL
COMPONENT R 8504R: NORMAL
EPO SERPL-ACNC: 494 MU/ML (ref 4–27)
PRODUCT TYPE UPROD: NORMAL
RH BLD: NORMAL
UNIT STATUS USTAT: NORMAL

## 2022-07-30 PROCEDURE — A9270 NON-COVERED ITEM OR SERVICE: HCPCS | Performed by: INTERNAL MEDICINE

## 2022-07-30 PROCEDURE — 36415 COLL VENOUS BLD VENIPUNCTURE: CPT

## 2022-07-30 PROCEDURE — 700102 HCHG RX REV CODE 250 W/ 637 OVERRIDE(OP): Performed by: INTERNAL MEDICINE

## 2022-07-30 PROCEDURE — 86901 BLOOD TYPING SEROLOGIC RH(D): CPT

## 2022-07-30 PROCEDURE — 36430 TRANSFUSION BLD/BLD COMPNT: CPT

## 2022-07-30 PROCEDURE — 306780 HCHG STAT FOR TRANSFUSION PER CASE

## 2022-07-30 PROCEDURE — 86850 RBC ANTIBODY SCREEN: CPT

## 2022-07-30 PROCEDURE — P9016 RBC LEUKOCYTES REDUCED: HCPCS

## 2022-07-30 PROCEDURE — 86900 BLOOD TYPING SEROLOGIC ABO: CPT

## 2022-07-30 PROCEDURE — 86923 COMPATIBILITY TEST ELECTRIC: CPT

## 2022-07-30 RX ORDER — 0.9 % SODIUM CHLORIDE 0.9 %
3 VIAL (ML) INJECTION PRN
Status: CANCELLED | OUTPATIENT
Start: 2022-07-30

## 2022-07-30 RX ORDER — HEPARIN SODIUM (PORCINE) LOCK FLUSH IV SOLN 100 UNIT/ML 100 UNIT/ML
500 SOLUTION INTRAVENOUS PRN
Status: CANCELLED | OUTPATIENT
Start: 2022-07-30

## 2022-07-30 RX ORDER — 0.9 % SODIUM CHLORIDE 0.9 %
VIAL (ML) INJECTION PRN
Status: CANCELLED | OUTPATIENT
Start: 2022-07-30

## 2022-07-30 RX ORDER — DIPHENHYDRAMINE HYDROCHLORIDE 50 MG/ML
25 INJECTION INTRAMUSCULAR; INTRAVENOUS PRN
Status: CANCELLED | OUTPATIENT
Start: 2022-07-30

## 2022-07-30 RX ORDER — SODIUM CHLORIDE 9 MG/ML
INJECTION, SOLUTION INTRAVENOUS CONTINUOUS
Status: CANCELLED | OUTPATIENT
Start: 2022-07-30

## 2022-07-30 RX ORDER — ACETAMINOPHEN 325 MG/1
650 TABLET ORAL ONCE
Status: COMPLETED | OUTPATIENT
Start: 2022-07-30 | End: 2022-07-30

## 2022-07-30 RX ORDER — ACETAMINOPHEN 325 MG/1
650 TABLET ORAL PRN
Status: CANCELLED | OUTPATIENT
Start: 2022-07-30

## 2022-07-30 RX ORDER — DIPHENHYDRAMINE HCL 25 MG
25 TABLET ORAL ONCE
Status: COMPLETED | OUTPATIENT
Start: 2022-07-30 | End: 2022-07-30

## 2022-07-30 RX ORDER — ACETAMINOPHEN 325 MG/1
650 TABLET ORAL ONCE
Status: CANCELLED | OUTPATIENT
Start: 2022-07-30

## 2022-07-30 RX ORDER — DIPHENHYDRAMINE HCL 25 MG
25 TABLET ORAL ONCE
Status: CANCELLED | OUTPATIENT
Start: 2022-07-30 | End: 2022-07-30

## 2022-07-30 RX ORDER — 0.9 % SODIUM CHLORIDE 0.9 %
10 VIAL (ML) INJECTION PRN
Status: CANCELLED | OUTPATIENT
Start: 2022-07-30

## 2022-07-30 RX ADMIN — ACETAMINOPHEN 650 MG: 325 TABLET ORAL at 13:11

## 2022-07-30 RX ADMIN — DIPHENHYDRAMINE HYDROCHLORIDE 25 MG: 25 TABLET ORAL at 13:11

## 2022-07-30 NOTE — PROGRESS NOTES
Patient to [facility] for 1 unit of PRBC's. PIV inserted into left wrist, flushed with + blood return, labs drawn. PIV infiltrated. PIV removed and a new one was placed in the left AC, flushed with + blood return. Premeds given. 1 unit of PRBC's infused with no s/s of infusion reaction . VS taken per protocol. BP (!) 146/77   Pulse 88   Temp 36.5 °C (97.7 °F) (Temporal)   Resp 18   SpO2 97%   PIV flushed with + blood return and removed. Gauze and coban applied as a dressing. Patient does not need appointment at this time. Patient to home in care of self.

## 2022-08-22 DIAGNOSIS — D75.81 MYELOFIBROSIS (HCC): ICD-10-CM

## 2022-08-22 RX ORDER — 0.9 % SODIUM CHLORIDE 0.9 %
3 VIAL (ML) INJECTION PRN
Status: CANCELLED | OUTPATIENT
Start: 2022-08-22

## 2022-08-22 RX ORDER — 0.9 % SODIUM CHLORIDE 0.9 %
VIAL (ML) INJECTION PRN
Status: CANCELLED | OUTPATIENT
Start: 2022-08-22

## 2022-08-22 RX ORDER — HEPARIN SODIUM (PORCINE) LOCK FLUSH IV SOLN 100 UNIT/ML 100 UNIT/ML
500 SOLUTION INTRAVENOUS PRN
Status: CANCELLED | OUTPATIENT
Start: 2022-08-22

## 2022-08-22 RX ORDER — 0.9 % SODIUM CHLORIDE 0.9 %
10 VIAL (ML) INJECTION PRN
Status: CANCELLED | OUTPATIENT
Start: 2022-08-22

## 2022-08-22 RX ORDER — 0.9 % SODIUM CHLORIDE 0.9 %
10 VIAL (ML) INJECTION PRN
Status: CANCELLED | OUTPATIENT
Start: 2022-08-30

## 2022-08-22 RX ORDER — 0.9 % SODIUM CHLORIDE 0.9 %
3 VIAL (ML) INJECTION PRN
Status: CANCELLED | OUTPATIENT
Start: 2022-08-30

## 2022-08-22 RX ORDER — HEPARIN SODIUM (PORCINE) LOCK FLUSH IV SOLN 100 UNIT/ML 100 UNIT/ML
500 SOLUTION INTRAVENOUS PRN
Status: CANCELLED | OUTPATIENT
Start: 2022-08-30

## 2022-08-22 RX ORDER — 0.9 % SODIUM CHLORIDE 0.9 %
VIAL (ML) INJECTION PRN
Status: CANCELLED | OUTPATIENT
Start: 2022-08-30

## 2022-08-23 ENCOUNTER — OUTPATIENT INFUSION SERVICES (OUTPATIENT)
Dept: ONCOLOGY | Facility: MEDICAL CENTER | Age: 82
End: 2022-08-23
Attending: INTERNAL MEDICINE
Payer: MEDICARE

## 2022-08-23 VITALS
SYSTOLIC BLOOD PRESSURE: 153 MMHG | HEART RATE: 102 BPM | OXYGEN SATURATION: 91 % | BODY MASS INDEX: 27.27 KG/M2 | WEIGHT: 190.48 LBS | HEIGHT: 70 IN | DIASTOLIC BLOOD PRESSURE: 73 MMHG | RESPIRATION RATE: 18 BRPM | TEMPERATURE: 98.6 F

## 2022-08-23 DIAGNOSIS — C80.1 ANEMIA ASSOCIATED WITH MALIGNANT NEOPLASTIC DISEASE (HCC): ICD-10-CM

## 2022-08-23 DIAGNOSIS — D63.0 ANEMIA ASSOCIATED WITH MALIGNANT NEOPLASTIC DISEASE (HCC): ICD-10-CM

## 2022-08-23 DIAGNOSIS — D75.81 MYELOFIBROSIS (HCC): ICD-10-CM

## 2022-08-23 LAB
HCT VFR BLD AUTO: 25.4 % (ref 37–47)
HGB BLD-MCNC: 7.9 G/DL (ref 12–16)

## 2022-08-23 PROCEDURE — 36415 COLL VENOUS BLD VENIPUNCTURE: CPT

## 2022-08-23 PROCEDURE — 85018 HEMOGLOBIN: CPT

## 2022-08-23 PROCEDURE — 96372 THER/PROPH/DIAG INJ SC/IM: CPT

## 2022-08-23 PROCEDURE — 700111 HCHG RX REV CODE 636 W/ 250 OVERRIDE (IP): Mod: JG | Performed by: INTERNAL MEDICINE

## 2022-08-23 PROCEDURE — 85014 HEMATOCRIT: CPT

## 2022-08-23 RX ORDER — ANAGRELIDE 0.5 MG/1
0.5 CAPSULE ORAL 2 TIMES DAILY
COMMUNITY
Start: 2022-06-30

## 2022-08-23 RX ADMIN — ERYTHROPOIETIN 40000 UNITS: 40000 INJECTION, SOLUTION INTRAVENOUS; SUBCUTANEOUS at 17:45

## 2022-08-23 ASSESSMENT — FIBROSIS 4 INDEX: FIB4 SCORE: 1.04

## 2022-08-24 NOTE — PROGRESS NOTES
Pt arrives to [facility] for first dose of Retacrit injection.  Discussed plan of care and Retacrit medication guide was given to pt.  Labs drawn via 23g butterfly needle to R-AC.  Hemoglobin is 7.9 today.  Results meet MD parameters for Retacrit.  Retacrit given SC to JELANIE.  Pt was observed for 15 minutes post Retacrit without adverse reaction noted.  Gave pt a copy of schedule.  Pt dc home to self care.

## 2022-08-30 ENCOUNTER — OUTPATIENT INFUSION SERVICES (OUTPATIENT)
Dept: ONCOLOGY | Facility: MEDICAL CENTER | Age: 82
End: 2022-08-30
Attending: INTERNAL MEDICINE
Payer: MEDICARE

## 2022-08-30 VITALS
HEIGHT: 70 IN | HEART RATE: 101 BPM | BODY MASS INDEX: 27.43 KG/M2 | RESPIRATION RATE: 18 BRPM | SYSTOLIC BLOOD PRESSURE: 139 MMHG | OXYGEN SATURATION: 91 % | WEIGHT: 191.58 LBS | TEMPERATURE: 98 F | DIASTOLIC BLOOD PRESSURE: 73 MMHG

## 2022-08-30 DIAGNOSIS — D63.0 ANEMIA ASSOCIATED WITH MALIGNANT NEOPLASTIC DISEASE (HCC): ICD-10-CM

## 2022-08-30 DIAGNOSIS — C80.1 ANEMIA ASSOCIATED WITH MALIGNANT NEOPLASTIC DISEASE (HCC): ICD-10-CM

## 2022-08-30 DIAGNOSIS — D75.81 MYELOFIBROSIS (HCC): ICD-10-CM

## 2022-08-30 LAB
HCT VFR BLD AUTO: 25.4 % (ref 37–47)
HGB BLD-MCNC: 7.7 G/DL (ref 12–16)

## 2022-08-30 PROCEDURE — 36415 COLL VENOUS BLD VENIPUNCTURE: CPT

## 2022-08-30 PROCEDURE — 85014 HEMATOCRIT: CPT

## 2022-08-30 PROCEDURE — 85018 HEMOGLOBIN: CPT

## 2022-08-30 PROCEDURE — 700111 HCHG RX REV CODE 636 W/ 250 OVERRIDE (IP): Mod: JG | Performed by: INTERNAL MEDICINE

## 2022-08-30 PROCEDURE — 96372 THER/PROPH/DIAG INJ SC/IM: CPT

## 2022-08-30 RX ORDER — 0.9 % SODIUM CHLORIDE 0.9 %
VIAL (ML) INJECTION PRN
Status: CANCELLED | OUTPATIENT
Start: 2022-09-13

## 2022-08-30 RX ORDER — HEPARIN SODIUM (PORCINE) LOCK FLUSH IV SOLN 100 UNIT/ML 100 UNIT/ML
500 SOLUTION INTRAVENOUS PRN
Status: CANCELLED | OUTPATIENT
Start: 2022-09-06

## 2022-08-30 RX ORDER — 0.9 % SODIUM CHLORIDE 0.9 %
VIAL (ML) INJECTION PRN
Status: CANCELLED | OUTPATIENT
Start: 2022-09-27

## 2022-08-30 RX ORDER — 0.9 % SODIUM CHLORIDE 0.9 %
3 VIAL (ML) INJECTION PRN
Status: CANCELLED | OUTPATIENT
Start: 2022-10-18

## 2022-08-30 RX ORDER — 0.9 % SODIUM CHLORIDE 0.9 %
VIAL (ML) INJECTION PRN
Status: CANCELLED | OUTPATIENT
Start: 2022-10-25

## 2022-08-30 RX ORDER — 0.9 % SODIUM CHLORIDE 0.9 %
10 VIAL (ML) INJECTION PRN
Status: CANCELLED | OUTPATIENT
Start: 2022-09-20

## 2022-08-30 RX ORDER — 0.9 % SODIUM CHLORIDE 0.9 %
3 VIAL (ML) INJECTION PRN
Status: CANCELLED | OUTPATIENT
Start: 2022-09-06

## 2022-08-30 RX ORDER — 0.9 % SODIUM CHLORIDE 0.9 %
3 VIAL (ML) INJECTION PRN
Status: CANCELLED | OUTPATIENT
Start: 2022-09-20

## 2022-08-30 RX ORDER — HEPARIN SODIUM (PORCINE) LOCK FLUSH IV SOLN 100 UNIT/ML 100 UNIT/ML
500 SOLUTION INTRAVENOUS PRN
Status: CANCELLED | OUTPATIENT
Start: 2022-09-13

## 2022-08-30 RX ORDER — 0.9 % SODIUM CHLORIDE 0.9 %
10 VIAL (ML) INJECTION PRN
Status: CANCELLED | OUTPATIENT
Start: 2022-10-25

## 2022-08-30 RX ORDER — 0.9 % SODIUM CHLORIDE 0.9 %
3 VIAL (ML) INJECTION PRN
Status: CANCELLED | OUTPATIENT
Start: 2022-10-25

## 2022-08-30 RX ORDER — 0.9 % SODIUM CHLORIDE 0.9 %
10 VIAL (ML) INJECTION PRN
Status: CANCELLED | OUTPATIENT
Start: 2022-09-13

## 2022-08-30 RX ORDER — 0.9 % SODIUM CHLORIDE 0.9 %
10 VIAL (ML) INJECTION PRN
Status: CANCELLED | OUTPATIENT
Start: 2022-10-18

## 2022-08-30 RX ORDER — 0.9 % SODIUM CHLORIDE 0.9 %
10 VIAL (ML) INJECTION PRN
Status: CANCELLED | OUTPATIENT
Start: 2022-09-27

## 2022-08-30 RX ORDER — HEPARIN SODIUM (PORCINE) LOCK FLUSH IV SOLN 100 UNIT/ML 100 UNIT/ML
500 SOLUTION INTRAVENOUS PRN
Status: CANCELLED | OUTPATIENT
Start: 2022-10-18

## 2022-08-30 RX ORDER — 0.9 % SODIUM CHLORIDE 0.9 %
10 VIAL (ML) INJECTION PRN
Status: CANCELLED | OUTPATIENT
Start: 2022-09-06

## 2022-08-30 RX ORDER — 0.9 % SODIUM CHLORIDE 0.9 %
3 VIAL (ML) INJECTION PRN
Status: CANCELLED | OUTPATIENT
Start: 2022-09-13

## 2022-08-30 RX ORDER — 0.9 % SODIUM CHLORIDE 0.9 %
VIAL (ML) INJECTION PRN
Status: CANCELLED | OUTPATIENT
Start: 2022-09-20

## 2022-08-30 RX ORDER — 0.9 % SODIUM CHLORIDE 0.9 %
VIAL (ML) INJECTION PRN
Status: CANCELLED | OUTPATIENT
Start: 2022-09-06

## 2022-08-30 RX ORDER — 0.9 % SODIUM CHLORIDE 0.9 %
VIAL (ML) INJECTION PRN
Status: CANCELLED | OUTPATIENT
Start: 2022-10-18

## 2022-08-30 RX ORDER — HEPARIN SODIUM (PORCINE) LOCK FLUSH IV SOLN 100 UNIT/ML 100 UNIT/ML
500 SOLUTION INTRAVENOUS PRN
Status: CANCELLED | OUTPATIENT
Start: 2022-10-25

## 2022-08-30 RX ORDER — HEPARIN SODIUM (PORCINE) LOCK FLUSH IV SOLN 100 UNIT/ML 100 UNIT/ML
500 SOLUTION INTRAVENOUS PRN
Status: CANCELLED | OUTPATIENT
Start: 2022-09-20

## 2022-08-30 RX ORDER — HEPARIN SODIUM (PORCINE) LOCK FLUSH IV SOLN 100 UNIT/ML 100 UNIT/ML
500 SOLUTION INTRAVENOUS PRN
Status: CANCELLED | OUTPATIENT
Start: 2022-09-27

## 2022-08-30 RX ORDER — 0.9 % SODIUM CHLORIDE 0.9 %
3 VIAL (ML) INJECTION PRN
Status: CANCELLED | OUTPATIENT
Start: 2022-09-27

## 2022-08-30 RX ADMIN — ERYTHROPOIETIN 40000 UNITS: 40000 INJECTION, SOLUTION INTRAVENOUS; SUBCUTANEOUS at 15:52

## 2022-08-30 ASSESSMENT — FIBROSIS 4 INDEX: FIB4 SCORE: 1.04

## 2022-08-30 NOTE — PROGRESS NOTES
Pt presents to [facility] for epoetin. Butterfly needle used in LAC; brisk blood return observed and pt tolerated well. Labs drawn and within treatable parameters. Epoetin injected into L back arm with no s/s of adverse reactions. Next appointment confirmed and education provided. Pt discharged to self care with all personal belongings and in NAD.

## 2022-09-06 ENCOUNTER — OUTPATIENT INFUSION SERVICES (OUTPATIENT)
Dept: ONCOLOGY | Facility: MEDICAL CENTER | Age: 82
End: 2022-09-06
Attending: INTERNAL MEDICINE
Payer: MEDICARE

## 2022-09-06 VITALS
RESPIRATION RATE: 18 BRPM | DIASTOLIC BLOOD PRESSURE: 78 MMHG | HEART RATE: 94 BPM | OXYGEN SATURATION: 92 % | SYSTOLIC BLOOD PRESSURE: 154 MMHG | HEIGHT: 70 IN | TEMPERATURE: 98 F | BODY MASS INDEX: 27.14 KG/M2 | WEIGHT: 189.6 LBS

## 2022-09-06 DIAGNOSIS — C80.1 ANEMIA ASSOCIATED WITH MALIGNANT NEOPLASTIC DISEASE (HCC): ICD-10-CM

## 2022-09-06 DIAGNOSIS — D63.0 ANEMIA ASSOCIATED WITH MALIGNANT NEOPLASTIC DISEASE (HCC): ICD-10-CM

## 2022-09-06 DIAGNOSIS — D75.81 MYELOFIBROSIS (HCC): ICD-10-CM

## 2022-09-06 LAB
HCT VFR BLD AUTO: 26.6 % (ref 37–47)
HGB BLD-MCNC: 8.2 G/DL (ref 12–16)

## 2022-09-06 PROCEDURE — 85018 HEMOGLOBIN: CPT

## 2022-09-06 PROCEDURE — 36415 COLL VENOUS BLD VENIPUNCTURE: CPT

## 2022-09-06 PROCEDURE — 85014 HEMATOCRIT: CPT

## 2022-09-06 PROCEDURE — 96372 THER/PROPH/DIAG INJ SC/IM: CPT

## 2022-09-06 PROCEDURE — 700111 HCHG RX REV CODE 636 W/ 250 OVERRIDE (IP): Mod: JG | Performed by: INTERNAL MEDICINE

## 2022-09-06 RX ADMIN — ERYTHROPOIETIN 40000 UNITS: 40000 INJECTION, SOLUTION INTRAVENOUS; SUBCUTANEOUS at 14:39

## 2022-09-06 ASSESSMENT — FIBROSIS 4 INDEX: FIB4 SCORE: 1.04

## 2022-09-06 NOTE — PROGRESS NOTES
Pt arrives to IS for first dose of Retacrit injection.  Discussed plan of care and Retacrit medication guide was given to pt.  Labs drawn via 23g butterfly needle to R-AC.  Hemoglobin is 8.2 today.  Results meet MD parameters for Retacrit.  Retacrit given SC to back of [facility].  Confirmed next appt time with pt.   Pt dc home to self care.

## 2022-09-13 ENCOUNTER — OUTPATIENT INFUSION SERVICES (OUTPATIENT)
Dept: ONCOLOGY | Facility: MEDICAL CENTER | Age: 82
End: 2022-09-13
Attending: INTERNAL MEDICINE
Payer: MEDICARE

## 2022-09-13 VITALS
SYSTOLIC BLOOD PRESSURE: 143 MMHG | BODY MASS INDEX: 26.42 KG/M2 | RESPIRATION RATE: 18 BRPM | OXYGEN SATURATION: 98 % | DIASTOLIC BLOOD PRESSURE: 77 MMHG | HEART RATE: 93 BPM | TEMPERATURE: 97 F | HEIGHT: 70 IN | WEIGHT: 184.53 LBS

## 2022-09-13 DIAGNOSIS — D63.0 ANEMIA ASSOCIATED WITH MALIGNANT NEOPLASTIC DISEASE (HCC): ICD-10-CM

## 2022-09-13 DIAGNOSIS — C80.1 ANEMIA ASSOCIATED WITH MALIGNANT NEOPLASTIC DISEASE (HCC): ICD-10-CM

## 2022-09-13 DIAGNOSIS — D75.81 MYELOFIBROSIS (HCC): ICD-10-CM

## 2022-09-13 LAB
HCT VFR BLD AUTO: 27.8 % (ref 37–47)
HGB BLD-MCNC: 8.6 G/DL (ref 12–16)

## 2022-09-13 PROCEDURE — 700111 HCHG RX REV CODE 636 W/ 250 OVERRIDE (IP): Mod: JG | Performed by: INTERNAL MEDICINE

## 2022-09-13 PROCEDURE — 85014 HEMATOCRIT: CPT

## 2022-09-13 PROCEDURE — 36415 COLL VENOUS BLD VENIPUNCTURE: CPT

## 2022-09-13 PROCEDURE — 96372 THER/PROPH/DIAG INJ SC/IM: CPT

## 2022-09-13 PROCEDURE — 85018 HEMOGLOBIN: CPT

## 2022-09-13 RX ADMIN — ERYTHROPOIETIN 40000 UNITS: 40000 INJECTION, SOLUTION INTRAVENOUS; SUBCUTANEOUS at 15:16

## 2022-09-13 ASSESSMENT — FIBROSIS 4 INDEX: FIB4 SCORE: 1.04

## 2022-09-13 NOTE — PROGRESS NOTES
Ms Lobo is here today for Retacrit injection. She has no new concerns to report. Venipuncture to the left arm using a butterfly. Labs drawn per orders. HGB 8.6, she is within parameters for treatment today. Retacrit injection given to the back of her left arm. Tolerated well. Discharged home in stable condition. Confirmed next appointment.

## 2022-09-14 ENCOUNTER — HOSPITAL ENCOUNTER (OUTPATIENT)
Facility: MEDICAL CENTER | Age: 82
End: 2022-09-14
Attending: INTERNAL MEDICINE
Payer: MEDICARE

## 2022-09-14 PROCEDURE — 83615 LACTATE (LD) (LDH) ENZYME: CPT

## 2022-09-15 LAB — LDH SERPL L TO P-CCNC: 377 U/L (ref 107–266)

## 2022-09-20 ENCOUNTER — OUTPATIENT INFUSION SERVICES (OUTPATIENT)
Dept: ONCOLOGY | Facility: MEDICAL CENTER | Age: 82
End: 2022-09-20
Attending: INTERNAL MEDICINE
Payer: MEDICARE

## 2022-09-20 VITALS
OXYGEN SATURATION: 93 % | SYSTOLIC BLOOD PRESSURE: 136 MMHG | DIASTOLIC BLOOD PRESSURE: 63 MMHG | HEART RATE: 100 BPM | HEIGHT: 70 IN | RESPIRATION RATE: 18 BRPM | BODY MASS INDEX: 26.57 KG/M2 | WEIGHT: 185.63 LBS | TEMPERATURE: 97.4 F

## 2022-09-20 DIAGNOSIS — D75.81 MYELOFIBROSIS (HCC): ICD-10-CM

## 2022-09-20 DIAGNOSIS — C80.1 ANEMIA ASSOCIATED WITH MALIGNANT NEOPLASTIC DISEASE (HCC): ICD-10-CM

## 2022-09-20 DIAGNOSIS — D63.0 ANEMIA ASSOCIATED WITH MALIGNANT NEOPLASTIC DISEASE (HCC): ICD-10-CM

## 2022-09-20 LAB
HCT VFR BLD AUTO: 29.4 % (ref 37–47)
HGB BLD-MCNC: 8.9 G/DL (ref 12–16)

## 2022-09-20 PROCEDURE — 85018 HEMOGLOBIN: CPT

## 2022-09-20 PROCEDURE — 96372 THER/PROPH/DIAG INJ SC/IM: CPT

## 2022-09-20 PROCEDURE — 36415 COLL VENOUS BLD VENIPUNCTURE: CPT

## 2022-09-20 PROCEDURE — 85014 HEMATOCRIT: CPT

## 2022-09-20 PROCEDURE — 700111 HCHG RX REV CODE 636 W/ 250 OVERRIDE (IP): Mod: JG | Performed by: INTERNAL MEDICINE

## 2022-09-20 RX ADMIN — ERYTHROPOIETIN 40000 UNITS: 40000 INJECTION, SOLUTION INTRAVENOUS; SUBCUTANEOUS at 15:15

## 2022-09-20 ASSESSMENT — FIBROSIS 4 INDEX: FIB4 SCORE: 1.04

## 2022-09-20 NOTE — PROGRESS NOTES
Pt arrived to IS, ambulatory, for epoetin cosmo. Pt voices no complaints. Labs drawn and reviewed, pt within parameters to treat today. Epoetin cosmo injected into the R-back arm with no complications. Pt left IS with no s/sx of distress. Follow up appointment confirmed.

## 2022-09-27 ENCOUNTER — OUTPATIENT INFUSION SERVICES (OUTPATIENT)
Dept: ONCOLOGY | Facility: MEDICAL CENTER | Age: 82
End: 2022-09-27
Attending: INTERNAL MEDICINE
Payer: MEDICARE

## 2022-09-27 VITALS
TEMPERATURE: 97.7 F | SYSTOLIC BLOOD PRESSURE: 136 MMHG | HEART RATE: 99 BPM | BODY MASS INDEX: 26.04 KG/M2 | DIASTOLIC BLOOD PRESSURE: 60 MMHG | HEIGHT: 70 IN | RESPIRATION RATE: 18 BRPM | WEIGHT: 181.88 LBS | OXYGEN SATURATION: 93 %

## 2022-09-27 DIAGNOSIS — C80.1 ANEMIA ASSOCIATED WITH MALIGNANT NEOPLASTIC DISEASE (HCC): ICD-10-CM

## 2022-09-27 DIAGNOSIS — D75.81 MYELOFIBROSIS (HCC): ICD-10-CM

## 2022-09-27 DIAGNOSIS — D63.0 ANEMIA ASSOCIATED WITH MALIGNANT NEOPLASTIC DISEASE (HCC): ICD-10-CM

## 2022-09-27 LAB
HCT VFR BLD AUTO: 29.5 % (ref 37–47)
HGB BLD-MCNC: 8.9 G/DL (ref 12–16)

## 2022-09-27 PROCEDURE — 85014 HEMATOCRIT: CPT

## 2022-09-27 PROCEDURE — 96372 THER/PROPH/DIAG INJ SC/IM: CPT

## 2022-09-27 PROCEDURE — 700111 HCHG RX REV CODE 636 W/ 250 OVERRIDE (IP): Mod: JG

## 2022-09-27 PROCEDURE — 700111 HCHG RX REV CODE 636 W/ 250 OVERRIDE (IP): Mod: JG | Performed by: INTERNAL MEDICINE

## 2022-09-27 PROCEDURE — 85018 HEMOGLOBIN: CPT

## 2022-09-27 PROCEDURE — 36415 COLL VENOUS BLD VENIPUNCTURE: CPT

## 2022-09-27 RX ORDER — AMOXICILLIN 500 MG/1
TABLET, FILM COATED ORAL PRN
COMMUNITY
End: 2022-09-30

## 2022-09-27 RX ADMIN — ERYTHROPOIETIN 40000 UNITS: 40000 INJECTION, SOLUTION INTRAVENOUS; SUBCUTANEOUS at 14:33

## 2022-09-27 ASSESSMENT — FIBROSIS 4 INDEX: FIB4 SCORE: 1.04

## 2022-09-27 NOTE — PROGRESS NOTES
Pt ambulatory to [facility] for Q week H&H/possible Retacrit injection. Pt has no s/s of infection or complaints at this time. Blood drawn from L AC using 22G butterfly, gauze and coban dressing applied.  Pt hgb of 8.9 meets parameters for tx today.  Retacrit injected into back of L arm, band-aid applied. Pt left on foot in NAD.  Confirmed pt's next appt.

## 2022-09-30 ENCOUNTER — HOSPITAL ENCOUNTER (INPATIENT)
Facility: MEDICAL CENTER | Age: 82
LOS: 4 days | DRG: 378 | End: 2022-10-04
Attending: EMERGENCY MEDICINE | Admitting: STUDENT IN AN ORGANIZED HEALTH CARE EDUCATION/TRAINING PROGRAM
Payer: MEDICARE

## 2022-09-30 ENCOUNTER — APPOINTMENT (OUTPATIENT)
Dept: RADIOLOGY | Facility: MEDICAL CENTER | Age: 82
DRG: 378 | End: 2022-09-30
Attending: EMERGENCY MEDICINE
Payer: MEDICARE

## 2022-09-30 DIAGNOSIS — K92.0 HEMATEMESIS WITH NAUSEA: Primary | ICD-10-CM

## 2022-09-30 DIAGNOSIS — K92.2 GASTROINTESTINAL HEMORRHAGE, UNSPECIFIED GASTROINTESTINAL HEMORRHAGE TYPE: ICD-10-CM

## 2022-09-30 LAB
ABO GROUP BLD: NORMAL
ALBUMIN SERPL BCP-MCNC: 3.3 G/DL (ref 3.2–4.9)
ALBUMIN/GLOB SERPL: 1.7 G/DL
ALP SERPL-CCNC: 97 U/L (ref 30–99)
ALT SERPL-CCNC: 10 U/L (ref 2–50)
ANION GAP SERPL CALC-SCNC: 10 MMOL/L (ref 7–16)
ANISOCYTOSIS BLD QL SMEAR: ABNORMAL
AST SERPL-CCNC: 13 U/L (ref 12–45)
BARCODED ABORH UBTYP: 6200
BARCODED ABORH UBTYP: 6200
BARCODED PRD CODE UBPRD: NORMAL
BARCODED PRD CODE UBPRD: NORMAL
BARCODED UNIT NUM UBUNT: NORMAL
BARCODED UNIT NUM UBUNT: NORMAL
BASOPHILS # BLD AUTO: 0 % (ref 0–1.8)
BASOPHILS # BLD: 0 K/UL (ref 0–0.12)
BILIRUB SERPL-MCNC: 1 MG/DL (ref 0.1–1.5)
BLD GP AB SCN SERPL QL: NORMAL
BUN SERPL-MCNC: 45 MG/DL (ref 8–22)
CALCIUM SERPL-MCNC: 8.7 MG/DL (ref 8.5–10.5)
CHLORIDE SERPL-SCNC: 110 MMOL/L (ref 96–112)
CO2 SERPL-SCNC: 23 MMOL/L (ref 20–33)
COMPONENT R 8504R: NORMAL
COMPONENT R 8504R: NORMAL
CREAT SERPL-MCNC: 0.63 MG/DL (ref 0.5–1.4)
EKG IMPRESSION: NORMAL
EOSINOPHIL # BLD AUTO: 0 K/UL (ref 0–0.51)
EOSINOPHIL NFR BLD: 0 % (ref 0–6.9)
ERYTHROCYTE [DISTWIDTH] IN BLOOD BY AUTOMATED COUNT: 107.4 FL (ref 35.9–50)
ERYTHROCYTE [DISTWIDTH] IN BLOOD BY AUTOMATED COUNT: 108 FL (ref 35.9–50)
GFR SERPLBLD CREATININE-BSD FMLA CKD-EPI: 89 ML/MIN/1.73 M 2
GLOBULIN SER CALC-MCNC: 1.9 G/DL (ref 1.9–3.5)
GLUCOSE SERPL-MCNC: 125 MG/DL (ref 65–99)
HCT VFR BLD AUTO: 22 % (ref 37–47)
HCT VFR BLD AUTO: 22.2 % (ref 37–47)
HGB BLD-MCNC: 6.6 G/DL (ref 12–16)
HGB BLD-MCNC: 6.6 G/DL (ref 12–16)
HYPOCHROMIA BLD QL SMEAR: ABNORMAL
INR PPP: 1.47 (ref 0.87–1.13)
LIPASE SERPL-CCNC: 13 U/L (ref 11–82)
LYMPHOCYTES # BLD AUTO: 0.75 K/UL (ref 1–4.8)
LYMPHOCYTES NFR BLD: 11.2 % (ref 22–41)
MACROCYTES BLD QL SMEAR: ABNORMAL
MANUAL DIFF BLD: NORMAL
MCH RBC QN AUTO: 30.6 PG (ref 27–33)
MCH RBC QN AUTO: 31.1 PG (ref 27–33)
MCHC RBC AUTO-ENTMCNC: 29.7 G/DL (ref 33.6–35)
MCHC RBC AUTO-ENTMCNC: 30 G/DL (ref 33.6–35)
MCV RBC AUTO: 102.8 FL (ref 81.4–97.8)
MCV RBC AUTO: 103.8 FL (ref 81.4–97.8)
METAMYELOCYTES NFR BLD MANUAL: 1.7 %
MONOCYTES # BLD AUTO: 0.52 K/UL (ref 0–0.85)
MONOCYTES NFR BLD AUTO: 7.8 % (ref 0–13.4)
MORPHOLOGY BLD-IMP: NORMAL
MYELOCYTES NFR BLD MANUAL: 1.7 %
NEUTROPHILS # BLD AUTO: 5.2 K/UL (ref 2–7.15)
NEUTROPHILS NFR BLD: 77.6 % (ref 44–72)
NRBC # BLD AUTO: 0.11 K/UL
NRBC BLD-RTO: 1.7 /100 WBC
OVALOCYTES BLD QL SMEAR: NORMAL
PLATELET # BLD AUTO: 298 K/UL (ref 164–446)
PLATELET # BLD AUTO: 340 K/UL (ref 164–446)
PLATELET BLD QL SMEAR: NORMAL
PMV BLD AUTO: 12.7 FL (ref 9–12.9)
POIKILOCYTOSIS BLD QL SMEAR: NORMAL
POLYCHROMASIA BLD QL SMEAR: NORMAL
POTASSIUM SERPL-SCNC: 4.2 MMOL/L (ref 3.6–5.5)
PRODUCT TYPE UPROD: NORMAL
PRODUCT TYPE UPROD: NORMAL
PROT SERPL-MCNC: 5.2 G/DL (ref 6–8.2)
PROTHROMBIN TIME: 17.5 SEC (ref 12–14.6)
RBC # BLD AUTO: 2.12 M/UL (ref 4.2–5.4)
RBC # BLD AUTO: 2.16 M/UL (ref 4.2–5.4)
RBC BLD AUTO: PRESENT
RH BLD: NORMAL
SODIUM SERPL-SCNC: 143 MMOL/L (ref 135–145)
TROPONIN T SERPL-MCNC: 28 NG/L (ref 6–19)
UNIT STATUS USTAT: NORMAL
UNIT STATUS USTAT: NORMAL
WBC # BLD AUTO: 6.3 K/UL (ref 4.8–10.8)
WBC # BLD AUTO: 6.7 K/UL (ref 4.8–10.8)

## 2022-09-30 PROCEDURE — 85610 PROTHROMBIN TIME: CPT

## 2022-09-30 PROCEDURE — 770001 HCHG ROOM/CARE - MED/SURG/GYN PRIV*

## 2022-09-30 PROCEDURE — 36430 TRANSFUSION BLD/BLD COMPNT: CPT

## 2022-09-30 PROCEDURE — 86850 RBC ANTIBODY SCREEN: CPT

## 2022-09-30 PROCEDURE — 83690 ASSAY OF LIPASE: CPT

## 2022-09-30 PROCEDURE — 71046 X-RAY EXAM CHEST 2 VIEWS: CPT

## 2022-09-30 PROCEDURE — 86923 COMPATIBILITY TEST ELECTRIC: CPT

## 2022-09-30 PROCEDURE — 84484 ASSAY OF TROPONIN QUANT: CPT

## 2022-09-30 PROCEDURE — C9113 INJ PANTOPRAZOLE SODIUM, VIA: HCPCS | Performed by: EMERGENCY MEDICINE

## 2022-09-30 PROCEDURE — 700111 HCHG RX REV CODE 636 W/ 250 OVERRIDE (IP): Performed by: EMERGENCY MEDICINE

## 2022-09-30 PROCEDURE — 96375 TX/PRO/DX INJ NEW DRUG ADDON: CPT

## 2022-09-30 PROCEDURE — 86900 BLOOD TYPING SEROLOGIC ABO: CPT

## 2022-09-30 PROCEDURE — 96366 THER/PROPH/DIAG IV INF ADDON: CPT

## 2022-09-30 PROCEDURE — 80053 COMPREHEN METABOLIC PANEL: CPT

## 2022-09-30 PROCEDURE — 96365 THER/PROPH/DIAG IV INF INIT: CPT

## 2022-09-30 PROCEDURE — 700105 HCHG RX REV CODE 258: Performed by: EMERGENCY MEDICINE

## 2022-09-30 PROCEDURE — 83735 ASSAY OF MAGNESIUM: CPT

## 2022-09-30 PROCEDURE — 85007 BL SMEAR W/DIFF WBC COUNT: CPT

## 2022-09-30 PROCEDURE — 86901 BLOOD TYPING SEROLOGIC RH(D): CPT

## 2022-09-30 PROCEDURE — 85025 COMPLETE CBC W/AUTO DIFF WBC: CPT

## 2022-09-30 PROCEDURE — 36415 COLL VENOUS BLD VENIPUNCTURE: CPT

## 2022-09-30 PROCEDURE — 85027 COMPLETE CBC AUTOMATED: CPT

## 2022-09-30 PROCEDURE — 30233N1 TRANSFUSION OF NONAUTOLOGOUS RED BLOOD CELLS INTO PERIPHERAL VEIN, PERCUTANEOUS APPROACH: ICD-10-PCS | Performed by: EMERGENCY MEDICINE

## 2022-09-30 PROCEDURE — 99285 EMERGENCY DEPT VISIT HI MDM: CPT

## 2022-09-30 PROCEDURE — 99223 1ST HOSP IP/OBS HIGH 75: CPT | Mod: AI,GC | Performed by: STUDENT IN AN ORGANIZED HEALTH CARE EDUCATION/TRAINING PROGRAM

## 2022-09-30 PROCEDURE — P9016 RBC LEUKOCYTES REDUCED: HCPCS

## 2022-09-30 PROCEDURE — 93005 ELECTROCARDIOGRAM TRACING: CPT | Performed by: EMERGENCY MEDICINE

## 2022-09-30 RX ORDER — ACETAMINOPHEN 325 MG/1
650 TABLET ORAL EVERY 6 HOURS PRN
Status: DISCONTINUED | OUTPATIENT
Start: 2022-09-30 | End: 2022-10-04 | Stop reason: HOSPADM

## 2022-09-30 RX ORDER — BISACODYL 10 MG
10 SUPPOSITORY, RECTAL RECTAL
Status: DISCONTINUED | OUTPATIENT
Start: 2022-09-30 | End: 2022-10-04 | Stop reason: HOSPADM

## 2022-09-30 RX ORDER — ONDANSETRON 4 MG/1
4 TABLET, ORALLY DISINTEGRATING ORAL EVERY 4 HOURS PRN
Status: DISCONTINUED | OUTPATIENT
Start: 2022-09-30 | End: 2022-10-04 | Stop reason: HOSPADM

## 2022-09-30 RX ORDER — POLYETHYLENE GLYCOL 3350 17 G/17G
1 POWDER, FOR SOLUTION ORAL
Status: DISCONTINUED | OUTPATIENT
Start: 2022-09-30 | End: 2022-10-04 | Stop reason: HOSPADM

## 2022-09-30 RX ORDER — AMOXICILLIN 250 MG
2 CAPSULE ORAL 2 TIMES DAILY
Status: DISCONTINUED | OUTPATIENT
Start: 2022-09-30 | End: 2022-10-04 | Stop reason: HOSPADM

## 2022-09-30 RX ORDER — PANTOPRAZOLE SODIUM 40 MG/10ML
80 INJECTION, POWDER, LYOPHILIZED, FOR SOLUTION INTRAVENOUS ONCE
Status: COMPLETED | OUTPATIENT
Start: 2022-09-30 | End: 2022-09-30

## 2022-09-30 RX ORDER — CARBOXYMETHYLCELLULOSE SODIUM 5 MG/ML
1 SOLUTION/ DROPS OPHTHALMIC EVERY EVENING
COMMUNITY

## 2022-09-30 RX ORDER — ONDANSETRON 2 MG/ML
4 INJECTION INTRAMUSCULAR; INTRAVENOUS ONCE
Status: COMPLETED | OUTPATIENT
Start: 2022-09-30 | End: 2022-09-30

## 2022-09-30 RX ORDER — SODIUM CHLORIDE 9 MG/ML
INJECTION, SOLUTION INTRAVENOUS CONTINUOUS
Status: ACTIVE | OUTPATIENT
Start: 2022-09-30 | End: 2022-10-01

## 2022-09-30 RX ORDER — ACETAMINOPHEN 500 MG
500-1000 TABLET ORAL
COMMUNITY

## 2022-09-30 RX ORDER — SODIUM CHLORIDE, SODIUM LACTATE, POTASSIUM CHLORIDE, CALCIUM CHLORIDE 600; 310; 30; 20 MG/100ML; MG/100ML; MG/100ML; MG/100ML
INJECTION, SOLUTION INTRAVENOUS CONTINUOUS
Status: DISCONTINUED | OUTPATIENT
Start: 2022-09-30 | End: 2022-10-03

## 2022-09-30 RX ORDER — COVID-19 ANTIGEN TEST
440 KIT MISCELLANEOUS
Status: ON HOLD | COMMUNITY
End: 2022-10-04

## 2022-09-30 RX ORDER — LABETALOL HYDROCHLORIDE 5 MG/ML
10 INJECTION, SOLUTION INTRAVENOUS EVERY 4 HOURS PRN
Status: DISCONTINUED | OUTPATIENT
Start: 2022-09-30 | End: 2022-10-04 | Stop reason: HOSPADM

## 2022-09-30 RX ORDER — ONDANSETRON 2 MG/ML
4 INJECTION INTRAMUSCULAR; INTRAVENOUS EVERY 4 HOURS PRN
Status: DISCONTINUED | OUTPATIENT
Start: 2022-09-30 | End: 2022-10-04 | Stop reason: HOSPADM

## 2022-09-30 RX ADMIN — ONDANSETRON 4 MG: 2 INJECTION INTRAMUSCULAR; INTRAVENOUS at 20:38

## 2022-09-30 RX ADMIN — PANTOPRAZOLE SODIUM 8 MG/HR: 40 INJECTION, POWDER, FOR SOLUTION INTRAVENOUS at 22:29

## 2022-09-30 RX ADMIN — PANTOPRAZOLE SODIUM 80 MG: 40 INJECTION, POWDER, LYOPHILIZED, FOR SOLUTION INTRAVENOUS at 20:38

## 2022-09-30 RX ADMIN — SODIUM CHLORIDE: 9 INJECTION, SOLUTION INTRAVENOUS at 23:10

## 2022-09-30 ASSESSMENT — ENCOUNTER SYMPTOMS
VOMITING: 1
ABDOMINAL PAIN: 0

## 2022-09-30 ASSESSMENT — FIBROSIS 4 INDEX: FIB4 SCORE: 1.04

## 2022-10-01 ENCOUNTER — ANESTHESIA (OUTPATIENT)
Dept: SURGERY | Facility: MEDICAL CENTER | Age: 82
DRG: 378 | End: 2022-10-01
Payer: MEDICARE

## 2022-10-01 ENCOUNTER — ANESTHESIA EVENT (OUTPATIENT)
Dept: SURGERY | Facility: MEDICAL CENTER | Age: 82
DRG: 378 | End: 2022-10-01
Payer: MEDICARE

## 2022-10-01 LAB
ERYTHROCYTE [DISTWIDTH] IN BLOOD BY AUTOMATED COUNT: 104.6 FL (ref 35.9–50)
ERYTHROCYTE [DISTWIDTH] IN BLOOD BY AUTOMATED COUNT: 104.7 FL (ref 35.9–50)
HCT VFR BLD AUTO: 22.2 % (ref 37–47)
HCT VFR BLD AUTO: 23.7 % (ref 37–47)
HGB BLD-MCNC: 6.8 G/DL (ref 12–16)
HGB BLD-MCNC: 7.3 G/DL (ref 12–16)
MAGNESIUM SERPL-MCNC: 1.9 MG/DL (ref 1.5–2.5)
MAGNESIUM SERPL-MCNC: 1.9 MG/DL (ref 1.5–2.5)
MCH RBC QN AUTO: 30.7 PG (ref 27–33)
MCH RBC QN AUTO: 30.8 PG (ref 27–33)
MCHC RBC AUTO-ENTMCNC: 30.6 G/DL (ref 33.6–35)
MCHC RBC AUTO-ENTMCNC: 30.8 G/DL (ref 33.6–35)
MCV RBC AUTO: 100.5 FL (ref 81.4–97.8)
MCV RBC AUTO: 99.6 FL (ref 81.4–97.8)
MORPHOLOGY BLD-IMP: NORMAL
PLATELET # BLD AUTO: 331 K/UL (ref 164–446)
PLATELET # BLD AUTO: 360 K/UL (ref 164–446)
PMV BLD AUTO: 12.3 FL (ref 9–12.9)
PMV BLD AUTO: 12.4 FL (ref 9–12.9)
RBC # BLD AUTO: 2.21 M/UL (ref 4.2–5.4)
RBC # BLD AUTO: 2.38 M/UL (ref 4.2–5.4)
WBC # BLD AUTO: 4.8 K/UL (ref 4.8–10.8)
WBC # BLD AUTO: 6.3 K/UL (ref 4.8–10.8)

## 2022-10-01 PROCEDURE — A9270 NON-COVERED ITEM OR SERVICE: HCPCS | Performed by: STUDENT IN AN ORGANIZED HEALTH CARE EDUCATION/TRAINING PROGRAM

## 2022-10-01 PROCEDURE — 700111 HCHG RX REV CODE 636 W/ 250 OVERRIDE (IP): Performed by: EMERGENCY MEDICINE

## 2022-10-01 PROCEDURE — C9113 INJ PANTOPRAZOLE SODIUM, VIA: HCPCS | Performed by: EMERGENCY MEDICINE

## 2022-10-01 PROCEDURE — 160035 HCHG PACU - 1ST 60 MINS PHASE I: Performed by: INTERNAL MEDICINE

## 2022-10-01 PROCEDURE — 0W3P8ZZ CONTROL BLEEDING IN GASTROINTESTINAL TRACT, VIA NATURAL OR ARTIFICIAL OPENING ENDOSCOPIC: ICD-10-PCS | Performed by: INTERNAL MEDICINE

## 2022-10-01 PROCEDURE — 700111 HCHG RX REV CODE 636 W/ 250 OVERRIDE (IP): Performed by: ANESTHESIOLOGY

## 2022-10-01 PROCEDURE — 700105 HCHG RX REV CODE 258: Performed by: EMERGENCY MEDICINE

## 2022-10-01 PROCEDURE — 160202 HCHG ENDO MINUTES - 1ST 30 MINS LEVEL 3: Performed by: INTERNAL MEDICINE

## 2022-10-01 PROCEDURE — 770001 HCHG ROOM/CARE - MED/SURG/GYN PRIV*

## 2022-10-01 PROCEDURE — 83735 ASSAY OF MAGNESIUM: CPT

## 2022-10-01 PROCEDURE — C1889 IMPLANT/INSERT DEVICE, NOC: HCPCS | Performed by: INTERNAL MEDICINE

## 2022-10-01 PROCEDURE — 00731 ANES UPR GI NDSC PX NOS: CPT | Performed by: ANESTHESIOLOGY

## 2022-10-01 PROCEDURE — 99233 SBSQ HOSP IP/OBS HIGH 50: CPT | Performed by: INTERNAL MEDICINE

## 2022-10-01 PROCEDURE — 160048 HCHG OR STATISTICAL LEVEL 1-5: Performed by: INTERNAL MEDICINE

## 2022-10-01 PROCEDURE — 85027 COMPLETE CBC AUTOMATED: CPT

## 2022-10-01 PROCEDURE — 36415 COLL VENOUS BLD VENIPUNCTURE: CPT

## 2022-10-01 PROCEDURE — 160002 HCHG RECOVERY MINUTES (STAT): Performed by: INTERNAL MEDICINE

## 2022-10-01 PROCEDURE — 700111 HCHG RX REV CODE 636 W/ 250 OVERRIDE (IP): Performed by: INTERNAL MEDICINE

## 2022-10-01 PROCEDURE — 700105 HCHG RX REV CODE 258: Performed by: STUDENT IN AN ORGANIZED HEALTH CARE EDUCATION/TRAINING PROGRAM

## 2022-10-01 PROCEDURE — 700102 HCHG RX REV CODE 250 W/ 637 OVERRIDE(OP): Performed by: STUDENT IN AN ORGANIZED HEALTH CARE EDUCATION/TRAINING PROGRAM

## 2022-10-01 PROCEDURE — 99100 ANES PT EXTEME AGE<1 YR&>70: CPT | Performed by: ANESTHESIOLOGY

## 2022-10-01 PROCEDURE — 160009 HCHG ANES TIME/MIN: Performed by: INTERNAL MEDICINE

## 2022-10-01 PROCEDURE — 700101 HCHG RX REV CODE 250: Performed by: ANESTHESIOLOGY

## 2022-10-01 PROCEDURE — 700105 HCHG RX REV CODE 258: Performed by: ANESTHESIOLOGY

## 2022-10-01 RX ORDER — MEPERIDINE HYDROCHLORIDE 25 MG/ML
12.5 INJECTION INTRAMUSCULAR; INTRAVENOUS; SUBCUTANEOUS
Status: DISCONTINUED | OUTPATIENT
Start: 2022-10-01 | End: 2022-10-01 | Stop reason: HOSPADM

## 2022-10-01 RX ORDER — OXYCODONE HCL 5 MG/5 ML
10 SOLUTION, ORAL ORAL
Status: DISCONTINUED | OUTPATIENT
Start: 2022-10-01 | End: 2022-10-01 | Stop reason: HOSPADM

## 2022-10-01 RX ORDER — ONDANSETRON 2 MG/ML
4 INJECTION INTRAMUSCULAR; INTRAVENOUS
Status: DISCONTINUED | OUTPATIENT
Start: 2022-10-01 | End: 2022-10-01 | Stop reason: HOSPADM

## 2022-10-01 RX ORDER — LABETALOL HYDROCHLORIDE 5 MG/ML
5 INJECTION, SOLUTION INTRAVENOUS
Status: DISCONTINUED | OUTPATIENT
Start: 2022-10-01 | End: 2022-10-01 | Stop reason: HOSPADM

## 2022-10-01 RX ORDER — HYDROMORPHONE HYDROCHLORIDE 1 MG/ML
0.2 INJECTION, SOLUTION INTRAMUSCULAR; INTRAVENOUS; SUBCUTANEOUS
Status: DISCONTINUED | OUTPATIENT
Start: 2022-10-01 | End: 2022-10-01 | Stop reason: HOSPADM

## 2022-10-01 RX ORDER — MORPHINE SULFATE 4 MG/ML
1 INJECTION INTRAVENOUS
Status: DISCONTINUED | OUTPATIENT
Start: 2022-10-01 | End: 2022-10-04 | Stop reason: HOSPADM

## 2022-10-01 RX ORDER — SODIUM CHLORIDE, SODIUM LACTATE, POTASSIUM CHLORIDE, CALCIUM CHLORIDE 600; 310; 30; 20 MG/100ML; MG/100ML; MG/100ML; MG/100ML
INJECTION, SOLUTION INTRAVENOUS
Status: DISCONTINUED | OUTPATIENT
Start: 2022-10-01 | End: 2022-10-01 | Stop reason: HOSPADM

## 2022-10-01 RX ORDER — HYDROMORPHONE HYDROCHLORIDE 1 MG/ML
0.4 INJECTION, SOLUTION INTRAMUSCULAR; INTRAVENOUS; SUBCUTANEOUS
Status: DISCONTINUED | OUTPATIENT
Start: 2022-10-01 | End: 2022-10-01 | Stop reason: HOSPADM

## 2022-10-01 RX ORDER — OXYCODONE HCL 5 MG/5 ML
5 SOLUTION, ORAL ORAL
Status: DISCONTINUED | OUTPATIENT
Start: 2022-10-01 | End: 2022-10-01 | Stop reason: HOSPADM

## 2022-10-01 RX ORDER — LIDOCAINE HYDROCHLORIDE 20 MG/ML
INJECTION, SOLUTION EPIDURAL; INFILTRATION; INTRACAUDAL; PERINEURAL PRN
Status: DISCONTINUED | OUTPATIENT
Start: 2022-10-01 | End: 2022-10-01 | Stop reason: SURG

## 2022-10-01 RX ORDER — HYDROMORPHONE HYDROCHLORIDE 1 MG/ML
0.1 INJECTION, SOLUTION INTRAMUSCULAR; INTRAVENOUS; SUBCUTANEOUS
Status: DISCONTINUED | OUTPATIENT
Start: 2022-10-01 | End: 2022-10-01 | Stop reason: HOSPADM

## 2022-10-01 RX ORDER — SODIUM CHLORIDE, SODIUM LACTATE, POTASSIUM CHLORIDE, CALCIUM CHLORIDE 600; 310; 30; 20 MG/100ML; MG/100ML; MG/100ML; MG/100ML
INJECTION, SOLUTION INTRAVENOUS CONTINUOUS
Status: DISCONTINUED | OUTPATIENT
Start: 2022-10-01 | End: 2022-10-01 | Stop reason: HOSPADM

## 2022-10-01 RX ORDER — HALOPERIDOL 5 MG/ML
1 INJECTION INTRAMUSCULAR
Status: DISCONTINUED | OUTPATIENT
Start: 2022-10-01 | End: 2022-10-01 | Stop reason: HOSPADM

## 2022-10-01 RX ORDER — EPINEPHRINE 0.1 MG/ML
SYRINGE (ML) INJECTION
Status: DISCONTINUED | OUTPATIENT
Start: 2022-10-01 | End: 2022-10-01 | Stop reason: HOSPADM

## 2022-10-01 RX ORDER — ALBUTEROL SULFATE 2.5 MG/3ML
2.5 SOLUTION RESPIRATORY (INHALATION)
Status: DISCONTINUED | OUTPATIENT
Start: 2022-10-01 | End: 2022-10-01 | Stop reason: HOSPADM

## 2022-10-01 RX ORDER — HYDRALAZINE HYDROCHLORIDE 20 MG/ML
5 INJECTION INTRAMUSCULAR; INTRAVENOUS
Status: DISCONTINUED | OUTPATIENT
Start: 2022-10-01 | End: 2022-10-01 | Stop reason: HOSPADM

## 2022-10-01 RX ADMIN — PANTOPRAZOLE SODIUM 8 MG/HR: 40 INJECTION, POWDER, FOR SOLUTION INTRAVENOUS at 11:24

## 2022-10-01 RX ADMIN — LIDOCAINE HYDROCHLORIDE 100 MG: 20 INJECTION, SOLUTION EPIDURAL; INFILTRATION; INTRACAUDAL at 10:26

## 2022-10-01 RX ADMIN — PANTOPRAZOLE SODIUM 8 MG/HR: 40 INJECTION, POWDER, FOR SOLUTION INTRAVENOUS at 21:55

## 2022-10-01 RX ADMIN — SODIUM CHLORIDE, POTASSIUM CHLORIDE, SODIUM LACTATE AND CALCIUM CHLORIDE: 600; 310; 30; 20 INJECTION, SOLUTION INTRAVENOUS at 10:21

## 2022-10-01 RX ADMIN — PROPOFOL 100 MG: 10 INJECTION, EMULSION INTRAVENOUS at 10:26

## 2022-10-01 RX ADMIN — SODIUM CHLORIDE, POTASSIUM CHLORIDE, SODIUM LACTATE AND CALCIUM CHLORIDE: 600; 310; 30; 20 INJECTION, SOLUTION INTRAVENOUS at 20:32

## 2022-10-01 RX ADMIN — SODIUM CHLORIDE, POTASSIUM CHLORIDE, SODIUM LACTATE AND CALCIUM CHLORIDE: 600; 310; 30; 20 INJECTION, SOLUTION INTRAVENOUS at 01:02

## 2022-10-01 ASSESSMENT — LIFESTYLE VARIABLES
DOES PATIENT WANT TO STOP DRINKING: NO
EVER HAD A DRINK FIRST THING IN THE MORNING TO STEADY YOUR NERVES TO GET RID OF A HANGOVER: NO
HOW MANY TIMES IN THE PAST YEAR HAVE YOU HAD 5 OR MORE DRINKS IN A DAY: 0
HAVE PEOPLE ANNOYED YOU BY CRITICIZING YOUR DRINKING: NO
CONSUMPTION TOTAL: NEGATIVE
EVER FELT BAD OR GUILTY ABOUT YOUR DRINKING: NO
AVERAGE NUMBER OF DAYS PER WEEK YOU HAVE A DRINK CONTAINING ALCOHOL: 0
TOTAL SCORE: 0
ALCOHOL_USE: NO
HAVE PEOPLE ANNOYED YOU BY CRITICIZING YOUR DRINKING: NO
ON A TYPICAL DAY WHEN YOU DRINK ALCOHOL HOW MANY DRINKS DO YOU HAVE: 0
CONSUMPTION TOTAL: INCOMPLETE
TOTAL SCORE: 0
EVER HAD A DRINK FIRST THING IN THE MORNING TO STEADY YOUR NERVES TO GET RID OF A HANGOVER: NO
EVER FELT BAD OR GUILTY ABOUT YOUR DRINKING: NO
HAVE YOU EVER FELT YOU SHOULD CUT DOWN ON YOUR DRINKING: NO
ALCOHOL_USE: NO
HAVE YOU EVER FELT YOU SHOULD CUT DOWN ON YOUR DRINKING: NO
TOTAL SCORE: 0

## 2022-10-01 ASSESSMENT — ENCOUNTER SYMPTOMS
FLANK PAIN: 0
FOCAL WEAKNESS: 0
HEADACHES: 0
DIZZINESS: 0
BLURRED VISION: 0
BLOOD IN STOOL: 0
FEVER: 0
DIAPHORESIS: 0
WEAKNESS: 1
DEPRESSION: 0
PALPITATIONS: 0
CONSTIPATION: 0
SPEECH CHANGE: 0
MYALGIAS: 0
HEARTBURN: 0
COUGH: 0
SHORTNESS OF BREATH: 0
SENSORY CHANGE: 0
MEMORY LOSS: 0
NAUSEA: 0
NERVOUS/ANXIOUS: 0
NAUSEA: 1
CHILLS: 0
LOSS OF CONSCIOUSNESS: 0
ABDOMINAL PAIN: 0
VOMITING: 0
DOUBLE VISION: 0
DIARRHEA: 0
BACK PAIN: 0

## 2022-10-01 ASSESSMENT — PATIENT HEALTH QUESTIONNAIRE - PHQ9
SUM OF ALL RESPONSES TO PHQ9 QUESTIONS 1 AND 2: 0
1. LITTLE INTEREST OR PLEASURE IN DOING THINGS: NOT AT ALL
2. FEELING DOWN, DEPRESSED, IRRITABLE, OR HOPELESS: NOT AT ALL

## 2022-10-01 ASSESSMENT — COGNITIVE AND FUNCTIONAL STATUS - GENERAL
DAILY ACTIVITIY SCORE: 24
SUGGESTED CMS G CODE MODIFIER MOBILITY: CI
SUGGESTED CMS G CODE MODIFIER DAILY ACTIVITY: CH
CLIMB 3 TO 5 STEPS WITH RAILING: A LITTLE
MOBILITY SCORE: 23

## 2022-10-01 ASSESSMENT — PAIN DESCRIPTION - PAIN TYPE: TYPE: ACUTE PAIN

## 2022-10-01 ASSESSMENT — FIBROSIS 4 INDEX: FIB4 SCORE: 1.1

## 2022-10-01 NOTE — PROGRESS NOTES
Hospital Medicine Daily Progress Note    Date of Service  10/1/2022    Chief Complaint  Dedra Lobo is a 82 y.o. female admitted 9/30/2022 with GI bleed    Hospital Course    82-year-old female who presented to the emergency department on 09/30/2022 with hematemesis.  PMHx significant for myelofibrosis (undergoes weekly H&H, currently being treated with Retacrit), history of malignant urinary tumor (followed by Dr. Oseguera, [facility]).  Lung cancer is documented on chart but patient says this was worked up and denies that it was ever a diagnosis.  Friend Anna was present and provides additional HPI details.     Patient reports vomiting started yesterday afternoon and with small quantity.  States that she had 2-3 episodes of larger quantity of vomiting today.  Anna put vomitus on paper towel and noticed red with mucus streaking.  This prompted visit to hospital.  She denies any previous episode like this.  Reports some shortness of breath.  Otherwise denies headache, nausea, fever, chills, chest pain.  Denies melena, hematochezia.  Takes MiraLAX somewhat regularly.     GI was consulted in the ED and recommended Protonix with AM scope.  Patient was transfused 1 unit PRBCs after H&H returned 6.6/22.0.  CMP stable.  Low total protein (5.2).  Troponin 28.  INR 1.47.  Chest x-ray showing cardiomegaly and subtle left lower infiltrate.  EKG sinus rhythm with .     Patient is admitted to medical service with plan for morning gastroenterology assessment for GI bleed.    Interval Problem Update    S/p EGD, gastic ulcer with bleeding vessel s/p endoclip  S/p 1 u prbc, hgb 7.3, f/u labs, transfuse as needed  Reports melena this am  Bp stable, denies pain, dizziness, blurry vision or sob    I have discussed this patient's plan of care and discharge plan at IDT rounds today with Case Management, Nursing, Nursing leadership, and other members of the IDT team.    Consultants/Specialty  GI    Code Status  Full  Code    Disposition  Patient is not medically cleared for discharge.   Anticipate discharge to to home with close outpatient follow-up.  I have placed the appropriate orders for post-discharge needs.    Review of Systems  Review of Systems   Constitutional:  Negative for chills, diaphoresis, fever and malaise/fatigue.   HENT:  Negative for congestion and hearing loss.    Eyes:  Negative for blurred vision and double vision.   Respiratory:  Negative for cough and shortness of breath.    Cardiovascular:  Negative for chest pain, palpitations and leg swelling.   Gastrointestinal:  Positive for melena. Negative for abdominal pain, blood in stool, constipation, diarrhea, heartburn, nausea and vomiting.   Genitourinary:  Negative for dysuria and flank pain.   Musculoskeletal:  Negative for back pain, joint pain and myalgias.   Neurological:  Positive for weakness. Negative for dizziness, sensory change, speech change, focal weakness and headaches.   Psychiatric/Behavioral:  Negative for depression and memory loss. The patient is not nervous/anxious.       Physical Exam  Temp:  [36 °C (96.8 °F)-36.7 °C (98.1 °F)] 36.7 °C (98.1 °F)  Pulse:  [] 86  Resp:  [16-21] 20  BP: ()/(46-59) 114/53  SpO2:  [87 %-100 %] 98 %    Physical Exam  Vitals and nursing note reviewed.   Constitutional:       General: She is not in acute distress.     Appearance: She is not ill-appearing, toxic-appearing or diaphoretic.   HENT:      Head: Normocephalic and atraumatic.      Nose: Nose normal.      Mouth/Throat:      Mouth: Mucous membranes are dry.   Eyes:      General: No scleral icterus.        Right eye: No discharge.         Left eye: No discharge.      Extraocular Movements: Extraocular movements intact.      Pupils: Pupils are equal, round, and reactive to light.   Neck:      Thyroid: No thyromegaly.      Vascular: No JVD.   Cardiovascular:      Rate and Rhythm: Normal rate.      Heart sounds: No murmur heard.  Pulmonary:       Effort: Pulmonary effort is normal. No respiratory distress.      Breath sounds: Normal breath sounds. No wheezing.   Abdominal:      General: Bowel sounds are normal. There is no distension.      Palpations: Abdomen is soft.      Tenderness: There is no abdominal tenderness.   Musculoskeletal:         General: No swelling or tenderness.      Cervical back: Neck supple.      Right lower leg: Edema present.      Left lower leg: Edema present.   Skin:     General: Skin is warm and dry.      Coloration: Skin is pale.      Findings: No erythema or rash.   Neurological:      Mental Status: She is alert and oriented to person, place, and time.      Cranial Nerves: No cranial nerve deficit.      Sensory: No sensory deficit.      Motor: Weakness present.      Coordination: Coordination normal.   Psychiatric:         Behavior: Behavior normal.         Thought Content: Thought content normal.       Fluids    Intake/Output Summary (Last 24 hours) at 10/1/2022 1439  Last data filed at 10/1/2022 1042  Gross per 24 hour   Intake 780 ml   Output --   Net 780 ml       Laboratory  Recent Labs     09/30/22 1957 09/30/22 2118 10/01/22  0932   WBC 6.7 6.3 6.3   RBC 2.16* 2.12* 2.38*   HEMOGLOBIN 6.6* 6.6* 7.3*   HEMATOCRIT 22.2* 22.0* 23.7*   .8* 103.8* 99.6*   MCH 30.6 31.1 30.7   MCHC 29.7* 30.0* 30.8*   .4* 108.0* 104.6*   PLATELETCT 340 298 360   MPV 12.7  --  12.3     Recent Labs     09/30/22 1957   SODIUM 143   POTASSIUM 4.2   CHLORIDE 110   CO2 23   GLUCOSE 125*   BUN 45*   CREATININE 0.63   CALCIUM 8.7     Recent Labs     09/30/22 1957   INR 1.47*               Imaging  DX-CHEST-2 VIEWS   Final Result         1.  Slight hazy left lung base opacities suggests subtle infiltrate.   2.  Cardiomegaly   3.  Atherosclerosis           Assessment/Plan  * Hematemesis without nausea  Assessment & Plan  4-5 episodes over previous 24 hours.  Patient notes small amounts of clotting, mucus in appearance.  GI consulted in ED,  plan for morning scope.  IV Protonix drip started in ED.    10/1 GI following, Dr. Gaston  S/p EGD with gastric ulcer and bleeding vessel s/p clip x 4  Monitor h/h q12, if hgb < 7, transfuse  F/u am labs  Ice chips x 24 hours d/t high risk of rebleed  Cont ppi gtt x 72 hours per GI recs      Myelofibrosis (HCC)- (present on admission)  Assessment & Plan  History of.  Undergoes weekly H&H screening outpatient and Retacrit injections as needed.    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  Undergoes weekly H&H screening outpatient.  Undergoes Retacrit injections as needed, most recently on 09/27, earlier this week.     6.6 at the time of admission, was 8.9 when evaluated as outpatient 3 days ago.  Received 1 unit PRBC transfusion in the emergency department.  Will monitor with serial CBCs pending GI evaluation for hematemesis.    10/1 status post EGD, H&H stable  Monitor    DDD (degenerative disc disease), cervical- (present on admission)  Assessment & Plan  Noted on history.  Takes Tylenol and naproxen outpatient.  No NSAIDs at this time in setting of active bleeding.      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  Patient reports she was diagnosed 8 years ago.  Follows with Dr. Oseguera of [facility].    Thrombocytosis- (present on admission)  Assessment & Plan  Takes daily anagrelide for thrombocythemia.  Platelets 298 at the time of admission.  Will hold anagrelide at this time in the setting of active bleeding.  Restart after stabilization and GI evaluation.       VTE prophylaxis: SCDs/TEDs    I have performed a physical exam and reviewed and updated ROS and Plan today (10/1/2022). In review of yesterday's note (9/30/2022), there are no changes except as documented above.

## 2022-10-01 NOTE — ASSESSMENT & PLAN NOTE
Noted on history.  Takes Tylenol and naproxen outpatient.  No NSAIDs at this time in setting of active bleeding.     I have personally performed a face to face diagnostic evaluation on this patient. I have reviewed the ACP note and agree with the history, exam and plan of care, except as noted.

## 2022-10-01 NOTE — H&P
[facility] Internal Medicine History & Physical Note    Date of Service  10/1/2022    [facility] Team: BEAU   Attending: Kirit Almanza M.d.  Senior Resident: Dr. Fredy Douglass  Contact Number: [phone]    Primary Care Physician  Refugio Munguia M.D.    Consultants  GI    Specialist Names: Dr. Gaston    Code Status  Full Code    Chief Complaint  Chief Complaint   Patient presents with    Throwing Up Blood       History of Presenting Illness (HPI):   82-year-old female who presented to the emergency department on 09/30/2022 with hematemesis.  PMHx significant for myelofibrosis (undergoes weekly H&H, currently being treated with Retacrit), history of malignant urinary tumor (followed by Dr. Oseguera, [facility]).  Lung cancer is documented on chart but patient says this was worked up and denies that it was ever a diagnosis.  Friend Anna was present and provides additional HPI details.    Patient reports vomiting started yesterday afternoon and with small quantity.  States that she had 2-3 episodes of larger quantity of vomiting today.  Anna put vomitus on paper towel and noticed red with mucus streaking.  This prompted visit to hospital.  She denies any previous episode like this.  Reports some shortness of breath.  Otherwise denies headache, nausea, fever, chills, chest pain.  Denies melena, hematochezia.  Takes MiraLAX somewhat regularly.    GI was consulted in the ED and recommended Protonix with AM scope.  Patient was transfused 1 unit PRBCs after H&H returned 6.6/22.0.  CMP stable.  Low total protein (5.2).  Troponin 28.  INR 1.47.  Chest x-ray showing cardiomegaly and subtle left lower infiltrate.  EKG sinus rhythm with .    Patient is admitted to medical service with plan for morning gastroenterology assessment for GI bleed.    I discussed the plan of care with patient and Dr. Kirit Almanza .    Review of Systems  ROS  As above in HPI.  All other systems reviewed and negative.    Past Medical History    has a past medical history of Adverse effect of anesthesia, Anemia, Anesthesia, Arrhythmia, Arthritis, Cancer (HCC) (2015), Cataract (03/21/2019), GERD (gastroesophageal reflux disease), Glaucoma (2/2015), Osteoporosis, unspecified, Pain, Pneumonia (10/2001), Thrombocytosis (9/29/2010), Ulcer, Unspecified disorder of thyroid, Urinary bladder disorder (2015), Urinary incontinence (03/21/2019), Vascular insufficiency of limb (2005), and Vitamin d deficiency (9/29/2010).    Surgical History   has a past surgical history that includes hip replacement, total (2006); cholecystectomy (2003); lumpectomy (1982); arthroplasty; cystoscopy (3/3/2015); trans urethral resection bladder (3/3/2015); cervical disk and fusion anterior (2/9/2016); and pr total knee arthroplasty (Left, 4/3/2019).     Family History  family history includes Alcohol/Drug in her father, sister, and sister; Cancer in her mother; Lung Disease in her mother, sister, and sister.   Family history reviewed with patient.     Social History  Tobacco: Never smoker.  Alcohol: Does not drink.    Recreational drugs (illegal or prescription): Never drug user.    Allergies  No Known Allergies    Medications  Prior to Admission Medications   Prescriptions Last Dose Informant Patient Reported? Taking?   Carboxymethylcellulose Sod PF 0.5 % Solution 9/29/2022 at PM Patient Yes Yes   Sig: Administer 1 Drop into both eyes every evening.   Naproxen Sodium 220 MG Cap 9/29/2022 at PM Patient Yes Yes   Sig: Take 440 mg by mouth 1 time a day as needed (pain).   acetaminophen (TYLENOL) 500 MG Tab 9/30/2022 at AM Patient Yes Yes   Sig: Take 500-1,000 mg by mouth 1 time a day as needed for Mild Pain.   anagrelide (AGRYLIN) 0.5 MG Cap 9/29/2022 at PM Patient Yes No   Sig: Take 0.5 mg by mouth 2 times a day.   aspirin EC 81 MG EC tablet 9/28/2022 at PM Patient Yes No   Sig: Take 81 mg by mouth 2 times a day.      Facility-Administered Medications: None       Physical Exam  Temp:   [36.2 °C (97.1 °F)-36.4 °C (97.5 °F)] 36.4 °C (97.5 °F)  Pulse:  [79-93] 81  Resp:  [16-21] 18  BP: ()/(50-59) 106/57  SpO2:  [87 %-99 %] 96 %  Blood Pressure : 106/55   Temperature: 36.4 °C (97.5 °F)   Pulse: 85   Respiration: 18   Pulse Oximetry: 98 %       Physical Exam  Constitutional:       General: She is not in acute distress.     Appearance: Normal appearance. She is not ill-appearing.   HENT:      Head: Normocephalic and atraumatic.      Nose: Nose normal.   Eyes:      General:         Right eye: No discharge.         Left eye: No discharge.      Extraocular Movements: Extraocular movements intact.   Cardiovascular:      Rate and Rhythm: Normal rate and regular rhythm.      Pulses: Normal pulses.      Heart sounds: No murmur heard.    No friction rub. No gallop.   Pulmonary:      Effort: Pulmonary effort is normal. No respiratory distress.      Breath sounds: Normal breath sounds. No wheezing.   Abdominal:      General: Abdomen is flat. There is no distension.      Palpations: Abdomen is soft.      Tenderness: There is no abdominal tenderness.   Musculoskeletal:         General: No swelling. Normal range of motion.      Cervical back: Normal range of motion. No rigidity.      Right lower leg: No edema.      Left lower leg: No edema.   Skin:     General: Skin is warm and dry.      Coloration: Skin is not jaundiced.   Neurological:      General: No focal deficit present.      Mental Status: She is alert and oriented to person, place, and time.   Psychiatric:         Mood and Affect: Mood normal.         Thought Content: Thought content normal.         Judgment: Judgment normal.       Laboratory:  Recent Labs     09/30/22 1957 09/30/22 2118   WBC 6.7 6.3   RBC 2.16* 2.12*   HEMOGLOBIN 6.6* 6.6*   HEMATOCRIT 22.2* 22.0*   .8* 103.8*   MCH 30.6 31.1   MCHC 29.7* 30.0*   .4* 108.0*   PLATELETCT 340 298   MPV 12.7  --      Recent Labs     09/30/22 1957   SODIUM 143   POTASSIUM 4.2   CHLORIDE  110   CO2 23   GLUCOSE 125*   BUN 45*   CREATININE 0.63   CALCIUM 8.7     Recent Labs     09/30/22 1957   ALTSGPT 10   ASTSGOT 13   ALKPHOSPHAT 97   TBILIRUBIN 1.0   LIPASE 13   GLUCOSE 125*     Recent Labs     09/30/22 1957   INR 1.47*     No results for input(s): NTPROBNP in the last 72 hours.      Recent Labs     09/30/22 1957   TROPONINT 28*       Imaging:  [id number] VIEWS   Final Result         1.  Slight hazy left lung base opacities suggests subtle infiltrate.   2.  Cardiomegaly   3.  Atherosclerosis          X-Ray:  I have personally reviewed the images and compared with prior images.  EKG:  I have personally reviewed the images and compared with prior images.    Assessment/Plan:    * Hematemesis without nausea  Assessment & Plan  4-5 episodes over previous 24 hours.  Patient notes small amounts of clotting, mucus in appearance.  GI consulted in ED, plan for morning scope.  IV Protonix drip started in ED.  NPO, water sips with medications okay.    Myelofibrosis (HCC)- (present on admission)  Assessment & Plan  History of.  Undergoes weekly H&H screening outpatient and Retacrit injections as needed.    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  Undergoes weekly H&H screening outpatient.  Undergoes Retacrit injections as needed, most recently on 09/27, earlier this week.     6.6 at the time of admission, was 8.9 when evaluated as outpatient 3 days ago.  Received 1 unit PRBC transfusion in the emergency department.  Will monitor with serial CBCs pending GI evaluation for hematemesis.    DDD (degenerative disc disease), cervical- (present on admission)  Assessment & Plan  Noted on history.  Takes Tylenol and naproxen outpatient.  No NSAIDs at this time in setting of active bleeding.      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  Patient reports she was diagnosed 8 years  ago.  Follows with Dr. Oseguera of [facility].    Thrombocytosis- (present on admission)  Assessment & Plan  Takes daily anagrelide for thrombocythemia.  Platelets 298 at the time of admission.  Will hold anagrelide at this time in the setting of active bleeding.  Restart after stabilization and GI evaluation.    I anticipate this patient will require at least two midnights for appropriate medical management, necessitating inpatient admission because suspicion for GI bleed in the setting of Hgb 6.6.    Patient will need a Med/Surg bed on MEDICAL service .  The need is secondary to suspicion for bleed.    VTE prophylaxis: SCDs/TEDs

## 2022-10-01 NOTE — PROGRESS NOTES
4 Eyes Skin Assessment Completed by MIK Sanchez and MIK Bar.    Head WDL  Ears WDL  Nose WDL  Mouth WDL  Neck WDL  Breast/Chest WDL  Shoulder Blades WDL  Spine WDL  (R) Arm/Elbow/Hand WDL  (L) Arm/Elbow/Hand WDL  Abdomen WDL  Groin WDL  Scrotum/Coccyx/Buttocks WDL  (R) Leg Edema  (L) Leg Edema  (R) Heel/Foot/Toe Edema  (L) Heel/Foot/Toe Edema          Devices In Places Pulse Ox and Nasal Cannula      Interventions In Place Gray Ear Foams and Pressure Redistribution Mattress    Possible Skin Injury No    Pictures Uploaded Into Epic N/A  Wound Consult Placed N/A  RN Wound Prevention Protocol Ordered No

## 2022-10-01 NOTE — CARE PLAN
The patient is Watcher - Medium risk of patient condition declining or worsening    Shift Goals  Clinical Goals: transfusion, h&H monitoring  Patient Goals: sleep  Family Goals: na      Problem: Knowledge Deficit - Standard  Goal: Patient and family/care givers will demonstrate understanding of plan of care, disease process/condition, diagnostic tests and medications  Outcome: Progressing     Problem: Psychosocial  Goal: Patient's ability to verbalize feelings about condition will improve  Outcome: Progressing       Progress made toward(s) clinical / shift goals:  Patient resting comfortably in bed, no reaction noted from transfusion.  All needs addressed.    Patient is not progressing towards the following goals:

## 2022-10-01 NOTE — ANESTHESIA TIME REPORT
Anesthesia Start and Stop Event Times     Date Time Event    10/1/2022 0940 Ready for Procedure     1021 Anesthesia Start     1042 Anesthesia Stop        Responsible Staff  10/01/22    Name Role Begin End    Casey Gross M.D. Anesth 1021 1042        Overtime Reason:  no overtime (within assigned shift)    Comments:

## 2022-10-01 NOTE — ASSESSMENT & PLAN NOTE
Noted per history and chart.  Patient reports she was diagnosed 8 years ago.  Follows with Dr. Oseguera of [facility].

## 2022-10-01 NOTE — PROCEDURES
Procedure Performed: Esophagogastroduodenoscopy (EGD) with bleeding therapy (4cc epinephrine diluted 1:10,000 and endoclips x2)     Indication for Procedure: Hematemesis     Procedure Date: 10/1/2022    Performing Physician: Ana Gaston D.O.    Informed Consent: Before the procedure was performed, the risks, benefits, and complications of the procedure were explained in layman's terms to the patient and understood by the patient. The risks included, but were not limited to the risks of anesthesia/sedation, bleeding, pneumothorax, perforation, adverse reaction to medication, acute respiratory failure and possible death. The patient fully understood the risks and benefits and requested the procedure be performed.       Consent Obtained: From the patient.            Anesthesia: MAC by Dr. Gross. Please see anesthesia records.     Findings:   1. Esophagus:   -Small hiatal hernia.  -Short segment salmon-colored mucosa in distal esophagus, concerning for Vazquez's esophagus.  Biopsies were not taken.  2. Stomach:   -Clean-based pyloric channel ulcer approximately 5 mm.  -Ulcer with spurting vessel at the lesser curvature of the stomach in the antrum. Bleeding they performed with 4 cc epinephrine diluted 1:10,000 and endoclips x2).  3. Duodenum:   -Diffuse duodenopathy in D1.  -Visualized portions of D2 appeared normal.    Procedure Summary: Timeout was performed. The patient was placed in the left lateral decubitus position. An Olympus adult gastroscope was gently passed into the oropharynx and esophagus and advanced into the 2nd part of duodenum. The scope was then drawn back into the stomach.  A clean-based pyloric channel ulcer was noted which required no bleeding therapy.  A new ulcer was spurting vessel at the lesser curvature of the stomach was treated with 4 cc epinephrine diluted 1:10,000 and endoclips x 2. Retroflexion was done to visualize the fundus and cardia of the stomach. The gastroscope was then withdrawn  while carefully inspecting the esophageal mucosa. Continuous monitoring of pulse oximetry, heart rhythm and blood pressure were done before, during and after the procedure. The patient left the endoscopy lab in stable condition.    Estimated Blood Loss, if any: Minimal    Unusual Events or Complications: None     Specimen(s), if any: None     Disposition:   1.  Upper GI bleed due to gastric ulcer with spurting vessel s/p bleeding therapy.  2.  No NSAIDs or anticoagulants.  3.  Continue pantoprazole gtt x 72 hours (end date 10/3) due to risk of rebleed.  4.  After completion of PPI ggt, transition to pantoprazole 40 mg PO BID x 3 months.  5.  Outpatient follow with [facility] for repeat EGD in 3 months to document healing of ulcer and to evaluate for Vazquez's esophagus.  6.  Ice chips today.  Advance in 24 hours if no further bleeding.  7.  Discussed plan with the patient recovery.

## 2022-10-01 NOTE — PROGRESS NOTES
Assumed care upon arrival to [place] [facility].  Patient with blood transfusion running, no reaction noted.  Patient educated on reactions to blood transfusion to watch for.  Vitals stable, denies pain.  Ambulating to bathroom with x1/standby assist.  Fall precautions in place, bed alarm on, education provided, bed locked and in low position, call light within reach.

## 2022-10-01 NOTE — CONSULTS
"                                           Gastroenterology Consult Note     Date of Consult: 10/1/2022    Requesting Physician: ED Physician     Reason for consult: Hematemesis     History of Present Illness: This is an 82-year-old female with bladder cancer who presented to the ED for 2 days of hematemesis.  Patient had multiple episodes of red and mucus-like emesis.  The patient denies any abdominal pain, dark or bloody stool.  She does have history of chronic anemia per records and had a blood transfusion in 7/30/2022 and 6/12/2022 previously.  Previous baseline hemoglobin was approximately 8ish.  Hemoglobin on arrival in the ED was noted to be at 6.6 which was a decline from 8.9 three days prior.  She takes Aleve as needed.  Her last colonoscopy was in 2010 with DHA and was found to have diverticulosis in the sigmoid and internal hemorrhoids.  She had no previous EGD.     Past Medical History:  Past Medical History:   Diagnosis Date    Adverse effect of anesthesia     core body temp drops per hx.    Anemia     Anesthesia     \"core temperature drops\"    Arrhythmia     Arthritis     oa, neck and hips    Cancer (HCC) 2015    bladder    Cataract 03/21/2019    bilateral no surgery    GERD (gastroesophageal reflux disease)     Glaucoma 2/2015    \"beginning\"    Osteoporosis, unspecified     Pain     neck    Pneumonia 10/2001    Thrombocytosis 9/29/2010    Ulcer     gastric ulcers    Unspecified disorder of thyroid     LOW THYROID LEVELS- TOOK  MEDS    Urinary bladder disorder 2015    bladder cancer    Urinary incontinence 03/21/2019    Vascular insufficiency of limb 2005    right lower extrematy    Vitamin d deficiency 9/29/2010        Past Surgical History:   Past Surgical History:   Procedure Laterality Date    PB TOTAL KNEE ARTHROPLASTY Left 4/3/2019    Procedure: KNEE ARTHROPLASTY TOTAL;  Surgeon: Robert Vazquez M.D.;  Location: SURGERY [facility];  Service: Orthopedics    CERVICAL DISK AND FUSION ANTERIOR  " 2/9/2016    Procedure: CERVICAL DISK AND FUSION ANTERIOR  C3-7;  Surgeon: Dusty Rao M.D.;  Location: SURGERY [facility];  Service:     CYSTOSCOPY  3/3/2015    Performed by Walt Olson M.D. at SURGERY [facility]    TRANS URETHRAL RESECTION BLADDER  3/3/2015    Performed by Walt Olson M.D. at SURGERY [facility]    HIP REPLACEMENT, TOTAL  2006    Dr. Vazquez    CHOLECYSTECTOMY  2003    LUMPECTOMY  1982    cyst    ARTHROPLASTY      bi lateral-Dr. Vazquez        Allergies  Patient has no known allergies.     Social History:   Social History     Socioeconomic History    Marital status: Single     Spouse name: Not on file    Number of children: Not on file    Years of education: Not on file    Highest education level: Not on file   Occupational History    Not on file   Tobacco Use    Smoking status: Never    Smokeless tobacco: Never   Vaping Use    Vaping Use: Never used   Substance and Sexual Activity    Alcohol use: No     Alcohol/week: 0.0 oz    Drug use: No    Sexual activity: Never   Other Topics Concern    Not on file   Social History Narrative    Not on file     Social Determinants of Health     Financial Resource Strain: Not on file   Food Insecurity: Not on file   Transportation Needs: Not on file   Physical Activity: Not on file   Stress: Not on file   Social Connections: Not on file   Intimate Partner Violence: Not on file   Housing Stability: Not on file        Family History:   Family History   Problem Relation Age of Onset    Cancer Mother         brain    Lung Disease Mother         smoker    Alcohol/Drug Father         etoh    Lung Disease Sister         COPD smokers    Alcohol/Drug Sister         etoh    Lung Disease Sister         smoker-copd    Alcohol/Drug Sister         etoh       Home Medications:  Home Medications    Medication Sig Taking? Last Dose Authorizing Provider   Naproxen Sodium 220 MG Cap Take 440 mg by mouth 1 time a day as needed (pain). Yes 9/29/2022 at PM  Physician Outpatient   acetaminophen (TYLENOL) 500 MG Tab Take 500-1,000 mg by mouth 1 time a day as needed for Mild Pain. Yes 9/30/2022 at AM Physician Outpatient   Carboxymethylcellulose Sod PF 0.5 % Solution Administer 1 Drop into both eyes every evening. Yes 9/29/2022 at PM Physician Outpatient   anagrelide (AGRYLIN) 0.5 MG Cap Take 0.5 mg by mouth 2 times a day.  9/29/2022 at PM Physician Outpatient   aspirin EC 81 MG EC tablet Take 81 mg by mouth 2 times a day.  9/28/2022 at PM SANJUANITA Roche         Review of Systems:  General: No fevers, chills, unintentional, weight loss.  HEENT: No scleral icterus, gum bleed, dysphagia, odynophagia.  Cardiology: No chest pain, palpitations, orthopnea.  Respiratory: No dyspnea, cough, wheezing.  Gastrointestinal: No abdominal pain, changes in bowel habits. (+) Hematemesis.  Genitourinary: No hematuria, dysuria, urgency.  Neurologic: No changes in memory, confusion, gait instability.  Skin: No ecchymosis, jaundice, telangiectasia.    Physical Exam:  Vitals:    09/30/22 2330 10/01/22 0000 10/01/22 0100 10/01/22 0456   BP: 106/57 115/58 106/46 114/49   Pulse: 81 89 86 82   Resp:  20 19 19   Temp:  36.3 °C (97.3 °F) 36.3 °C (97.4 °F) 36.7 °C (98 °F)   TempSrc:  Temporal Temporal Temporal   SpO2: 96% 98% 100% 99%   Weight:    81.6 kg (179 lb 14.3 oz)   Height:         General: No acute cardiopulmonary distress.  Head: Normocephalic.  EENT: Scleral anicterus. Mucosa moist.   Respiratory: Breath sounds present. Symmetrical rise of anterior thorax.  Cardiovascular: Normal S1, S2.  Gastrointestinal: Soft, nontender, nondistended. Normoactive bowel sounds. No guarding.  Neurologic: Alert and oriented.  Skin: Warm and dry.      LABS:  Lab Results   Component Value Date/Time    SODIUM 143 09/30/2022 07:57 PM    POTASSIUM 4.2 09/30/2022 07:57 PM    CHLORIDE 110 09/30/2022 07:57 PM    CO2 23 09/30/2022 07:57 PM    GLUCOSE 125 (H) 09/30/2022 07:57 PM    BUN 45 (H) 09/30/2022 07:57  PM    CREATININE 0.63 09/30/2022 07:57 PM    CREATININE 0.90 10/08/2010 11:32 AM    BUNCREATRAT 18 10/08/2010 11:32 AM    GLOMRATE >59 10/08/2010 11:32 AM      Lab Results   Component Value Date/Time    WBC 6.3 09/30/2022 09:18 PM    RBC 2.12 (L) 09/30/2022 09:18 PM    HEMOGLOBIN 6.6 (L) 09/30/2022 09:18 PM    HEMATOCRIT 22.0 (L) 09/30/2022 09:18 PM    .8 (H) 09/30/2022 09:18 PM    MCH 31.1 09/30/2022 09:18 PM    MCHC 30.0 (L) 09/30/2022 09:18 PM    MPV 12.7 09/30/2022 07:57 PM    NEUTSPOLYS 77.60 (H) 09/30/2022 07:57 PM    LYMPHOCYTES 11.20 (L) 09/30/2022 07:57 PM    MONOCYTES 7.80 09/30/2022 07:57 PM    EOSINOPHILS 0.00 09/30/2022 07:57 PM    BASOPHILS 0.00 09/30/2022 07:57 PM    HYPOCHROMIA 1+ 09/30/2022 07:57 PM    ANISOCYTOSIS 3+ (A) 09/30/2022 07:57 PM        Lab Results   Component Value Date/Time    PROTHROMBTM 17.5 (H) 09/30/2022 07:57 PM    INR 1.47 (H) 09/30/2022 07:57 PM      Recent Labs     09/30/22 1957   ASTSGOT 13   ALTSGPT 10   TBILIRUBIN 1.0   GLOBULIN 1.9   INR 1.47*       IMAGING: Reviewed personally and summarized in HPI.        ASSESSMENT:   -Hematemesis   -Acute on chronic blood loss anemia   -NSAIDs use  -Bladder cancer        PLAN:     Plan:   This is an 82-year-old female with bladder cancer presents with 2 days of hematemesis.  Upper GI bleed possibly secondary to esophagitis, peptic ulcer disease, cannot rule out malignancy.   -Per review of records,the patient has chronic anemia likely secondary to anemia of chronic disease and medication induced.  -Continue to monitor H&H closely and transfuse as needed for hemoglobin less than 7.0   -Continue IV Protonix.  -EGD today for further evaluation. Risks and benefits of EGD were discussed, including anesthesia risks, infection or bowel perforation.  Patient verbalized understanding and agreed to proceed.        Thank you for the consultation. Please call with any questions or concerns.    Ana Gaston,  KALEIGH  Gastroenterology  [facility]  [phone]

## 2022-10-01 NOTE — ASSESSMENT & PLAN NOTE
Secondary to myelofibrosis.  Undergoes weekly H&H screening outpatient.  Undergoes Retacrit injections as needed, most recently on 09/27, earlier this week.     6.6 at the time of admission, was 8.9 when evaluated as outpatient 3 days ago.  Received 1 unit PRBC transfusion in the emergency department.  Will monitor with serial CBCs pending GI evaluation for hematemesis.    10/1 status post EGD, H&H stable  Monitor    10/2 acute bleed, hgb  6.1, d/w GI, monitor h/h, may be residual bleed  Ok for trial of CLD, may need rescope if signs of bleed, f/u with GI Dr. Gaston    10/3 CBC every 8, requiring 1 unit transfusion yesterday, hemoglobin of 7.0 today  May need repeat EGD if symptomatic or signs of bleed  No bowel movement today  Advance diet per GI, n.p.o. if signs of bleed and ongoing anemia continue IV PPI

## 2022-10-01 NOTE — ED TRIAGE NOTES
Pt arrived via EMS for coffee ground emesis x 4-5 episodes since yesterday. Denies bloody stool or abdominal pain but does state she has been on a 12 week cycle of CA tx and got her H&H checked Tuesday with hemoglobin of 8.9.    Pt noted to be slightly pale but VSS and alert/oriented to all questions.     Pt received 4 mg PIV Zofran and 750 mL NS en route. 18 G R AC; blood drawn and sent. Pt placed on CCM, pulse oximetry, and continuous BP

## 2022-10-01 NOTE — ED NOTES
RBC's started for patient; pt educated on S&S of blood reaction and verified understanding. Pt has received transfusions before without issue. VSS with lungs CTA.    Hospitalist at bedside performing his H&P

## 2022-10-01 NOTE — ASSESSMENT & PLAN NOTE
Takes daily anagrelide for thrombocythemia.  Platelets 298 at the time of admission.  Will hold anagrelide at this time in the setting of active bleeding.  Restart after stabilization and GI evaluation.

## 2022-10-01 NOTE — ED PROVIDER NOTES
ED Provider Note     Scribed for Anamaria Arthur M.D. by Naina Moran on 9/30/2022 at 8:11 PM.       Means of Arrival: EMS  History obtained from: Patient and family    CHIEF COMPLAINT  Chief Complaint   Patient presents with    Throwing Up Blood       HPI  Dedra Lobo is a 82 y.o. female with myofibrosis and chronic anemia requiring Procrit who presents for vomiting onset yesterday. Per patient, she had 2 small episodes of vomiting yesterday of just food and multiple larger episodes of vomiting today with dark brown appearance that made her and her partner concerned for blood. Patient describes her emesis as dark and mucous-like. Per family, they put some of the emesis on a paper towel and it appeared red. She denies any abdominal pain or chest pain. She reports chronic shortness of breath on exertion. She is not typically on oxygen at home. She denies any blood thinners. She reports recent bowel movements that are normal and brown, denies dark tarry stools or blood in her stool or urine. She notes that recently she has been taking laxatives following a bowel obstruction. She denies any history stomach ulcers. The patient reports a history of ruptured appendicitis and bladder cancer. She kaila any history of liver failure or thinners.  Denies h/o GI bleed or ulcers.     Reviewed patient's old medical records which showed she was seen in July 2022 and had an ultrasound abdomen which showed normal appearing liver. She has a history of cholecystectomy and gastritis. She had a blood transfusion on 7/30/22 outpatient for chronic anemia. Last H/H 9 /28.9 on 9/27/2022.    REVIEW OF SYSTEMS  Review of Systems   Constitutional:  Positive for malaise/fatigue. Negative for chills and fever.   HENT:  Negative for nosebleeds.    Eyes:  Negative for blurred vision.   Respiratory:  Negative for cough and shortness of breath.    Cardiovascular:  Negative for chest pain and leg swelling.   Gastrointestinal:  Positive for  "nausea and vomiting (hematemsis). Negative for abdominal pain, blood in stool and melena.        Negative for coffee-ground emesis   Genitourinary:  Negative for hematuria.   Musculoskeletal:  Negative for back pain.   Neurological:  Negative for loss of consciousness and headaches.   All other systems reviewed and are negative.  See HPI for further details.   All other systems are negative.     PAST MEDICAL HISTORY  Past Medical History:   Diagnosis Date    Adverse effect of anesthesia     core body temp drops per hx.    Anemia     Anesthesia     \"core temperature drops\"    Arrhythmia     Arthritis     oa, neck and hips    Cancer (HCC) 2015    bladder    Cataract 03/21/2019    bilateral no surgery    GERD (gastroesophageal reflux disease)     Glaucoma 2/2015    \"beginning\"    Osteoporosis, unspecified     Pain     neck    Pneumonia 10/2001    Thrombocytosis 9/29/2010    Ulcer     gastric ulcers    Unspecified disorder of thyroid     LOW THYROID LEVELS- TOOK  MEDS    Urinary bladder disorder 2015    bladder cancer    Urinary incontinence 03/21/2019    Vascular insufficiency of limb 2005    right lower extrematy    Vitamin d deficiency 9/29/2010       FAMILY HISTORY  Family History   Problem Relation Age of Onset    Cancer Mother         brain    Lung Disease Mother         smoker    Alcohol/Drug Father         etoh    Lung Disease Sister         COPD smokers    Alcohol/Drug Sister         etoh    Lung Disease Sister         smoker-copd    Alcohol/Drug Sister         etoh       SOCIAL HISTORY   reports that she has never smoked. She has never used smokeless tobacco. She reports that she does not drink alcohol and does not use drugs.    SURGICAL HISTORY  Past Surgical History:   Procedure Laterality Date    PB TOTAL KNEE ARTHROPLASTY Left 4/3/2019    Procedure: KNEE ARTHROPLASTY TOTAL;  Surgeon: Robert Vazquez M.D.;  Location: SURGERY [facility];  Service: Orthopedics    CERVICAL DISK AND FUSION ANTERIOR  " "2/9/2016    Procedure: CERVICAL DISK AND FUSION ANTERIOR  C3-7;  Surgeon: Dusty Rao M.D.;  Location: SURGERY [facility];  Service:     CYSTOSCOPY  3/3/2015    Performed by Walt Olson M.D. at SURGERY [facility]    TRANS URETHRAL RESECTION BLADDER  3/3/2015    Performed by Walt Olson M.D. at SURGERY [facility]    HIP REPLACEMENT, TOTAL  2006    Dr. Vazquez    CHOLECYSTECTOMY  2003    LUMPECTOMY  1982    cyst    ARTHROPLASTY      bi lateral-Dr. Vazquez       CURRENT MEDICATIONS  Home Medications       Reviewed by Yocasta Alexandre (Pharmacy Tech) on 09/30/22 at 2210  Med List Status: Complete     Medication Last Dose Status   acetaminophen (TYLENOL) 500 MG Tab 9/30/2022 Active   anagrelide (AGRYLIN) 0.5 MG Cap 9/29/2022 Active   aspirin EC 81 MG EC tablet 9/28/2022 Active   Carboxymethylcellulose Sod PF 0.5 % Solution 9/29/2022 Active   Naproxen Sodium 220 MG Cap 9/29/2022 Active                    ALLERGIES  No Known Allergies    PHYSICAL EXAM  VITAL SIGNS: BP (!) 96/50   Pulse 79   Temp 36.2 °C (97.1 °F) (Temporal)   Resp 16   Ht 1.75 m (5' 8.9\")   Wt 81.6 kg (180 lb)   SpO2 99%   BMI 26.66 kg/m²    Physical Exam  Vitals and nursing note reviewed.   Constitutional:       General: She is not in acute distress.     Appearance: Normal appearance. She is not toxic-appearing.   HENT:      Head: Normocephalic and atraumatic.   Eyes:      Extraocular Movements: Extraocular movements intact.      Conjunctiva/sclera: Conjunctivae normal.      Pupils: Pupils are equal, round, and reactive to light.   Cardiovascular:      Rate and Rhythm: Normal rate and regular rhythm.      Heart sounds: No murmur heard.    No friction rub. No gallop.   Pulmonary:      Effort: Pulmonary effort is normal. No respiratory distress.      Breath sounds: Normal breath sounds. No stridor. No wheezing, rhonchi or rales.   Abdominal:      General: Abdomen is flat. Bowel sounds are normal.      Palpations: Abdomen is " soft.      Tenderness: There is no abdominal tenderness. There is no guarding or rebound.      Comments: Emesis bag with coffee ground emesis, no bright red blood or clots   Musculoskeletal:         General: No swelling, tenderness or deformity. Normal range of motion.      Cervical back: Normal range of motion and neck supple.   Skin:     General: Skin is warm and dry.      Coloration: Skin is pale.   Neurological:      General: No focal deficit present.      Mental Status: She is alert and oriented to person, place, and time.   Psychiatric:         Mood and Affect: Mood normal.     RADIOLOGY/PROCEDURES  DX-CHEST-2 VIEWS   Final Result         1.  Slight hazy left lung base opacities suggests subtle infiltrate.   2.  Cardiomegaly   3.  Atherosclerosis          LABS  Labs Reviewed   CBC WITH DIFFERENTIAL - Abnormal; Notable for the following components:       Result Value    RBC 2.16 (*)     Hemoglobin 6.6 (*)     Hematocrit 22.2 (*)     .8 (*)     MCHC 29.7 (*)     .4 (*)     Neutrophils-Polys 77.60 (*)     Lymphocytes 11.20 (*)     Lymphs (Absolute) 0.75 (*)     Anisocytosis 3+ (*)     Macrocytosis 3+ (*)     All other components within normal limits    Narrative:     Indicate which anticoagulants the patient is on:->UNKNOWN   COMP METABOLIC PANEL - Abnormal; Notable for the following components:    Glucose 125 (*)     Bun 45 (*)     Total Protein 5.2 (*)     All other components within normal limits    Narrative:     Indicate which anticoagulants the patient is on:->UNKNOWN   PROTHROMBIN TIME - Abnormal; Notable for the following components:    PT 17.5 (*)     INR 1.47 (*)     All other components within normal limits    Narrative:     Indicate which anticoagulants the patient is on:->UNKNOWN   TROPONIN - Abnormal; Notable for the following components:    Troponin T 28 (*)     All other components within normal limits    Narrative:     Biotin intake of greater than 5 mg per day may interfere  with  troponin levels, causing false low values.   CBC WITHOUT DIFFERENTIAL - Abnormal; Notable for the following components:    RBC 2.12 (*)     Hemoglobin 6.6 (*)     Hematocrit 22.0 (*)     .8 (*)     MCHC 30.0 (*)     .0 (*)     All other components within normal limits   CBC WITHOUT DIFFERENTIAL - Abnormal; Notable for the following components:    RBC 2.38 (*)     Hemoglobin 7.3 (*)     Hematocrit 23.7 (*)     MCV 99.6 (*)     MCHC 30.8 (*)     .6 (*)     All other components within normal limits    Narrative:     SPECIMEN IS A RECOLLECT   LIPASE    Narrative:     Indicate which anticoagulants the patient is on:->UNKNOWN   ESTIMATED GFR    Narrative:     Indicate which anticoagulants the patient is on:->UNKNOWN   DIFFERENTIAL MANUAL    Narrative:     Indicate which anticoagulants the patient is on:->UNKNOWN   PERIPHERAL SMEAR REVIEW    Narrative:     Indicate which anticoagulants the patient is on:->UNKNOWN   PLATELET ESTIMATE    Narrative:     Indicate which anticoagulants the patient is on:->UNKNOWN   MORPHOLOGY    Narrative:     Indicate which anticoagulants the patient is on:->UNKNOWN   COD (ADULT)   MAGNESIUM   MAGNESIUM   PERIPHERAL SMEAR REVIEW    Narrative:     SPECIMEN IS A RECOLLECT   URINALYSIS,CULTURE IF INDICATED   CBC WITHOUT DIFFERENTIAL   RELEASE RED BLOOD CELLS   TRANSFUSE RED BLOOD CELLS-NURSING COMMUNICATION (UNITS)        EKG  Results for orders placed or performed during the hospital encounter of 22   EKG   Result Value Ref Range    Report       [facility] Emergency Dept.    Test Date:  2022  Pt Name:    TREVER CONWAY                Department: ER  MRN:        [id number]                      Room:       [facility]  Gender:     Female                       Technician: [id number]  :        [id number]                   Requested By:TROY SANDHU  Order #:    [id number]                    Baldo MD:    Measurements  Intervals                                 Axis  Rate:       90                           P:          51  MT:         151                          QRS:        58  QRSD:       92                           T:          -35  QT:         400  QTc:        490    Interpretive Statements  Sinus rhythm  Probable left atrial enlargement  Nonspecific T abnormalities, diffuse leads  Borderline prolonged QT interval  Compared to ECG 03/21/2019 12:53:46  T-wave abnormality now present  Atrial premature complex(es) no longer present          COURSE  Pertinent Labs & Imaging studies reviewed. (See chart for details)    8:11 PM Patient presents to the ED with hematemesis. Patient will be treated with Protonix 80 mg and Zofran 4 mg. Ordered for Dx-chest, COD, UA Culture, Troponin, CBC with differential, CMP, Lipase, PT/INR, Differential Manual, Peripheral Smear Review, Platelet Estimate, Morphology and EKG to evaluate her symptoms. The patient is hemoccult gastric positive. Differential diagnoses include but not limited to: upper GI bleed, esophageal varices, PUD, malignancy, liver failure, anemia, hemorrhage, electrolyte abnormality, dehydration, ACS,    9:42 PM - Paged GI and hospitalist. 6.6 hemoglobin which was 9 about 2 days ago.    9:56 PM I discussed the patient's case and the above findings with Dr. Gaston (GI) who will consult.     9:58 PM - Paged hospitalist.     10:00 PM - Patient will be treated with Protonix 80 mg.    10:10 PM - I discussed the patient's case and the above findings with Dr. Douglass (Hospitalist) who will accept the patient for hospitalization.     11:28 PM - Ordered for COD.    I have explained to the patient the risks and benefits of transfusion of blood products.  This includes, as appropriate, the risk of mild allergic reaction, hemolytic reaction, transfusion-associated lung injury, febrile reactions, circulatory or iron overload, and infection.    We discussed possible alternatives and their risks, including directed donation,  autologous transfusion, and no transfusion, including IV or oral iron supplementation, as appropriate.  I believe the patient understands the risks and benefits and was able to express understanding.    HYDRATION: Based on the patient's presentation of Acute Vomiting the patient was given IV fluids. IV Hydration was used because oral hydration was not adequate alone. Upon recheck following hydration, the patient was improved.    Appropriate PPE were worn at this encounter.     MEDICAL DECISION MAKING  Dedra Lobo is a 82 y.o. female with myofibrosis and chronic anemia requiring Procrit who presents for vomiting onset yesterday. Afebrile, vitals reassuring, exam as above with soft and benign abdomen. No episodes of emesis in the ER. Emesis bag from initial arrival with coffee ground emesis. Acute on chronic anemia with Hbg 6 down from 9. Blood has been ordered. Protonix bolus and gtt started and patient NPO per GI request. Patient informed of results and agreeable to plan. Vitals have remained stable. Upper GI bleed likely 2/2 patients gastritis. Patient hospitalized to hospitalist service in stable condition.     FINAL IMPRESSION  1. Hematemesis with nausea Acute   2. Gastrointestinal hemorrhage, unspecified gastrointestinal hemorrhage type Acute      DISPOSITION:  Patient will be hospitalized by Dr. Douglass in gaurded condition.    This dictation was created using voice recognition software. The accuracy of the dictation is limited to the abilities of the software. I expect there may be some errors of grammar and possibly content. The nursing notes were reviewed and certain aspects of this information were incorporated into this note.     Naina MENDEZ (Wendy), am scribing for, and in the presence of, Anamaria Arthur M.D..    Electronically signed by: Naina Rajput), 9/29/2022    Anamaria MENDEZ M.D. personally performed the services described in this documentation, as scribed by Naina Moran in  my presence, and it is both accurate and complete.    The note accurately reflects work and decisions made by me.  Anamaria Arthur M.D.  10/1/2022  4:43 PM

## 2022-10-01 NOTE — ANESTHESIA PREPROCEDURE EVALUATION
Case: [id number] Date/Time: 10/01/22 1059    Procedure: GASTROSCOPY (Esophagus)    Anesthesia type: MAC    Pre-op diagnosis: Hematemesis, anemia    Location: [place] [address] / SURGERY [facility]    Surgeons: Ana Gaston D.O.      Anemia secondary to acute GI bleed    Relevant Problems   Other   (positive) Anemia associated with malignant neoplastic disease (HCC)   (positive) GI bleed   (positive) Hematemesis without nausea   (positive) Neoplasm of bladder       Physical Exam    Airway   Mallampati: II  TM distance: >3 FB  Neck ROM: full       Cardiovascular - normal exam  Rhythm: regular  Rate: normal  (-) murmur     Dental - normal exam           Pulmonary - normal exam  Breath sounds clear to auscultation     Abdominal    Neurological - normal exam                 Anesthesia Plan    ASA 3 (Acute anemia secondary to acute GI bleed)   ASA physical status 3 criteria: other (comment)    Plan - MAC               Induction: intravenous    Postoperative Plan: Postoperative administration of opioids is intended.    Pertinent diagnostic labs and testing reviewed    Informed Consent:    Anesthetic plan and risks discussed with patient.    Use of blood products discussed with: patient whom.

## 2022-10-01 NOTE — CARE PLAN
The patient is Stable - Low risk of patient condition declining or worsening    Shift Goals  Clinical Goals: H&H  Patient Goals: rest  Family Goals: na    Progress made toward(s) clinical / shift goals:  H&H is improving.    Patient is not progressing towards the following goals:

## 2022-10-01 NOTE — PROGRESS NOTES
0303 - Contacted phlebotomy regarding timed/scheduled CBC, was informed it was collected. Was also informed they are short staffed.    0317 - received call from lab stating purple top had clotted and needed a redraw.  Lab stated that they would send phlebotomist.    0417 - Confirmed with phlebotomist on floor that redraw would occur for purple top.    0550 -  Called lab regarding no result from CBC, lab not recollected yet.  Was informed by lab they are short staffed and someone will be by.  Messaged phlebotomist regarding coming to redraw purple top.

## 2022-10-01 NOTE — ED NOTES
RN called blood bank and was told approximately 30 minutes for the blood to be ready. Consent at bedside for MD and patient to sign

## 2022-10-01 NOTE — ASSESSMENT & PLAN NOTE
Noted on history.  Takes Tylenol and naproxen outpatient.  No NSAIDs at this time in setting of active bleeding.

## 2022-10-01 NOTE — ANESTHESIA POSTPROCEDURE EVALUATION
Patient: Dedra Lobo    Procedure Summary     Date: 10/01/22 Room / Location: [facility] OR 06 / SURGERY [facility]    Anesthesia Start: 1021 Anesthesia Stop: 1042    Procedures:       GASTROSCOPY (Esophagus)      GASTROSCOPY, WITH SCLEROTHERAPY (Esophagus)      EGD, WITH CLIP PLACEMENT (Esophagus) Diagnosis: (Pyloric channel ulcer, diffuse gastropathy, gastric ulcer)    Surgeons: Ana Gaston D.O. Responsible Provider: Casey Gross M.D.    Anesthesia Type: MAC ASA Status: 3          Final Anesthesia Type: MAC  Last vitals  BP   Blood Pressure : 114/53, NIBP: 114/55    Temp   36.7 °C (98.1 °F)    Pulse   92   Resp   18    SpO2   100 %      Anesthesia Post Evaluation    Patient location during evaluation: PACU  Level of consciousness: awake and alert    Airway patency: patent  Anesthetic complications: no  Cardiovascular status: hemodynamically stable  Respiratory status: acceptable  Hydration status: euvolemic    PONV: none          No notable events documented.     Nurse Pain Score: 0 (NPRS)

## 2022-10-01 NOTE — ED NOTES
Rosemarie from Lab called with critical result of hemoglobin 6.6 and hematocrit 22.2 at 2103. Critical lab result read back to Rosemarie.   Dr. Arthur notified of critical lab result at 2105.

## 2022-10-01 NOTE — ASSESSMENT & PLAN NOTE
Secondary to myelofibrosis.  Undergoes weekly H&H screening outpatient.  Undergoes Retacrit injections as needed, most recently on 09/27, earlier this week.     6.6 at the time of admission, was 8.9 when evaluated as outpatient 3 days ago.  Received 1 unit PRBC transfusion in the emergency department.  Will monitor with serial CBCs pending GI evaluation for hematemesis.

## 2022-10-01 NOTE — ASSESSMENT & PLAN NOTE
Noted per history and chart.  Patient reports she was diagnosed 8 years ago.  Follows with Dr. Oseguera of [facility].     Solaraze Counseling:  I discussed with the patient the risks of Solaraze including but not limited to erythema, scaling, itching, weeping, crusting, and pain.

## 2022-10-01 NOTE — ASSESSMENT & PLAN NOTE
4-5 episodes over previous 24 hours.  Patient notes small amounts of clotting, mucus in appearance.  GI consulted in ED, plan for morning scope.  IV Protonix drip started in ED.    10/1 GI following, Dr. Gaston  S/p EGD with gastric ulcer and bleeding vessel s/p clip x 4  Monitor h/h q12, if hgb < 7, transfuse  F/u am labs  Ice chips x 24 hours d/t high risk of rebleed  Cont ppi gtt x 72 hours per GI recs

## 2022-10-01 NOTE — ASSESSMENT & PLAN NOTE
4-5 episodes over previous 24 hours.  Patient notes small amounts of clotting, mucus in appearance.  GI consulted in ED, plan for morning scope.  IV Protonix drip started in ED.  NPO, water sips with medications okay.

## 2022-10-02 LAB
ANION GAP SERPL CALC-SCNC: 9 MMOL/L (ref 7–16)
BUN SERPL-MCNC: 24 MG/DL (ref 8–22)
CALCIUM SERPL-MCNC: 8.5 MG/DL (ref 8.5–10.5)
CHLORIDE SERPL-SCNC: 111 MMOL/L (ref 96–112)
CO2 SERPL-SCNC: 23 MMOL/L (ref 20–33)
CREAT SERPL-MCNC: 0.81 MG/DL (ref 0.5–1.4)
ERYTHROCYTE [DISTWIDTH] IN BLOOD BY AUTOMATED COUNT: 105.5 FL (ref 35.9–50)
ERYTHROCYTE [DISTWIDTH] IN BLOOD BY AUTOMATED COUNT: 90.4 FL (ref 35.9–50)
GFR SERPLBLD CREATININE-BSD FMLA CKD-EPI: 73 ML/MIN/1.73 M 2
GLUCOSE SERPL-MCNC: 90 MG/DL (ref 65–99)
HCT VFR BLD AUTO: 20.6 % (ref 37–47)
HCT VFR BLD AUTO: 25.5 % (ref 37–47)
HGB BLD-MCNC: 6.1 G/DL (ref 12–16)
HGB BLD-MCNC: 7.8 G/DL (ref 12–16)
MCH RBC QN AUTO: 30.3 PG (ref 27–33)
MCH RBC QN AUTO: 30.5 PG (ref 27–33)
MCHC RBC AUTO-ENTMCNC: 29.6 G/DL (ref 33.6–35)
MCHC RBC AUTO-ENTMCNC: 30.6 G/DL (ref 33.6–35)
MCV RBC AUTO: 102.5 FL (ref 81.4–97.8)
MCV RBC AUTO: 99.6 FL (ref 81.4–97.8)
PLATELET # BLD AUTO: 271 K/UL (ref 164–446)
PLATELET # BLD AUTO: 323 K/UL (ref 164–446)
PMV BLD AUTO: 11.4 FL (ref 9–12.9)
PMV BLD AUTO: 12.5 FL (ref 9–12.9)
POTASSIUM SERPL-SCNC: 3.9 MMOL/L (ref 3.6–5.5)
RBC # BLD AUTO: 2.01 M/UL (ref 4.2–5.4)
RBC # BLD AUTO: 2.56 M/UL (ref 4.2–5.4)
SODIUM SERPL-SCNC: 143 MMOL/L (ref 135–145)
WBC # BLD AUTO: 3.6 K/UL (ref 4.8–10.8)
WBC # BLD AUTO: 4.1 K/UL (ref 4.8–10.8)

## 2022-10-02 PROCEDURE — 80048 BASIC METABOLIC PNL TOTAL CA: CPT

## 2022-10-02 PROCEDURE — 86923 COMPATIBILITY TEST ELECTRIC: CPT

## 2022-10-02 PROCEDURE — 36415 COLL VENOUS BLD VENIPUNCTURE: CPT

## 2022-10-02 PROCEDURE — 700105 HCHG RX REV CODE 258: Performed by: STUDENT IN AN ORGANIZED HEALTH CARE EDUCATION/TRAINING PROGRAM

## 2022-10-02 PROCEDURE — 36430 TRANSFUSION BLD/BLD COMPNT: CPT

## 2022-10-02 PROCEDURE — 700102 HCHG RX REV CODE 250 W/ 637 OVERRIDE(OP): Performed by: STUDENT IN AN ORGANIZED HEALTH CARE EDUCATION/TRAINING PROGRAM

## 2022-10-02 PROCEDURE — 770001 HCHG ROOM/CARE - MED/SURG/GYN PRIV*

## 2022-10-02 PROCEDURE — C9113 INJ PANTOPRAZOLE SODIUM, VIA: HCPCS | Performed by: EMERGENCY MEDICINE

## 2022-10-02 PROCEDURE — 99233 SBSQ HOSP IP/OBS HIGH 50: CPT | Performed by: INTERNAL MEDICINE

## 2022-10-02 PROCEDURE — 700105 HCHG RX REV CODE 258: Performed by: EMERGENCY MEDICINE

## 2022-10-02 PROCEDURE — P9016 RBC LEUKOCYTES REDUCED: HCPCS

## 2022-10-02 PROCEDURE — 700111 HCHG RX REV CODE 636 W/ 250 OVERRIDE (IP): Performed by: EMERGENCY MEDICINE

## 2022-10-02 PROCEDURE — 85027 COMPLETE CBC AUTOMATED: CPT

## 2022-10-02 PROCEDURE — A9270 NON-COVERED ITEM OR SERVICE: HCPCS | Performed by: STUDENT IN AN ORGANIZED HEALTH CARE EDUCATION/TRAINING PROGRAM

## 2022-10-02 RX ADMIN — PANTOPRAZOLE SODIUM 8 MG/HR: 40 INJECTION, POWDER, FOR SOLUTION INTRAVENOUS at 22:48

## 2022-10-02 RX ADMIN — SODIUM CHLORIDE, POTASSIUM CHLORIDE, SODIUM LACTATE AND CALCIUM CHLORIDE: 600; 310; 30; 20 INJECTION, SOLUTION INTRAVENOUS at 17:37

## 2022-10-02 RX ADMIN — PANTOPRAZOLE SODIUM 8 MG/HR: 40 INJECTION, POWDER, FOR SOLUTION INTRAVENOUS at 12:44

## 2022-10-02 ASSESSMENT — ENCOUNTER SYMPTOMS
FEVER: 0
PALPITATIONS: 0
BLOOD IN STOOL: 0
NERVOUS/ANXIOUS: 0
VOMITING: 0
MEMORY LOSS: 0
HEADACHES: 0
FOCAL WEAKNESS: 0
HEARTBURN: 0
SHORTNESS OF BREATH: 0
BLURRED VISION: 0
CONSTIPATION: 0
FLANK PAIN: 0
NAUSEA: 0
MYALGIAS: 0
DIZZINESS: 0
BACK PAIN: 0
DIARRHEA: 0
DEPRESSION: 0
WEAKNESS: 1
ABDOMINAL PAIN: 0
CHILLS: 0

## 2022-10-02 ASSESSMENT — PAIN DESCRIPTION - PAIN TYPE: TYPE: ACUTE PAIN

## 2022-10-02 NOTE — CARE PLAN
The patient is Stable - Low risk of patient condition declining or worsening    Shift Goals  Clinical Goals: blood trufuison  Patient Goals: Eat  Family Goals: na    Progress made toward(s) clinical / shift goals:  Patient started on a clear diet.    Patient is not progressing towards the following goals:

## 2022-10-02 NOTE — PROGRESS NOTES
Hospital Medicine Daily Progress Note    Date of Service  10/2/2022    Chief Complaint  Dedra Lobo is a 82 y.o. female admitted 9/30/2022 with GI bleed    Hospital Course    82-year-old female who presented to the emergency department on 09/30/2022 with hematemesis.  PMHx significant for myelofibrosis (undergoes weekly H&H, currently being treated with Retacrit), history of malignant urinary tumor (followed by Dr. Oseguera, [facility]).  Lung cancer is documented on chart but patient says this was worked up and denies that it was ever a diagnosis.  Friend Anna was present and provides additional HPI details.     Patient reports vomiting started yesterday afternoon and with small quantity.  States that she had 2-3 episodes of larger quantity of vomiting today.  Anna put vomitus on paper towel and noticed red with mucus streaking.  This prompted visit to hospital.  She denies any previous episode like this.  Reports some shortness of breath.  Otherwise denies headache, nausea, fever, chills, chest pain.  Denies melena, hematochezia.  Takes MiraLAX somewhat regularly.     GI was consulted in the ED and recommended Protonix with AM scope.  Patient was transfused 1 unit PRBCs after H&H returned 6.6/22.0.  CMP stable.  Low total protein (5.2).  Troponin 28.  INR 1.47.  Chest x-ray showing cardiomegaly and subtle left lower infiltrate.  EKG sinus rhythm with .     Patient is admitted to medical service with plan for morning gastroenterology assessment for GI bleed.    Interval Problem Update    10/1 S/p EGD, gastic ulcer with bleeding vessel s/p endoclip  S/p 1 u prbc, hgb 7.3, f/u labs, transfuse as needed  Reports melena this am  Bp stable, denies pain, dizziness, blurry vision or sob\    10/2 patient does report 2 additional bowel movements with melanotic stool, asymptomatic  Feels better  Ongoing pallor  Hemoglobin of 6.1  Denies any dizziness, headache or chest pain  Plan for PRBC transfusion  today  Discussed with GI, might be residual bleeding, monitor for recurrent acute bleed  Okay for clear liquid diet    I have discussed this patient's plan of care and discharge plan at IDT rounds today with Case Management, Nursing, Nursing leadership, and other members of the IDT team.    Consultants/Specialty  GI    Code Status  Full Code    Disposition  Patient is not medically cleared for discharge.   Anticipate discharge to to home with close outpatient follow-up.  I have placed the appropriate orders for post-discharge needs.    Review of Systems  Review of Systems   Constitutional:  Negative for chills and fever.   HENT:  Negative for congestion and hearing loss.    Eyes:  Negative for blurred vision.   Respiratory:  Negative for shortness of breath.    Cardiovascular:  Negative for chest pain, palpitations and leg swelling.   Gastrointestinal:  Positive for melena. Negative for abdominal pain, blood in stool, constipation, diarrhea, heartburn, nausea and vomiting.   Genitourinary:  Negative for dysuria and flank pain.   Musculoskeletal:  Negative for back pain and myalgias.   Neurological:  Positive for weakness. Negative for dizziness, focal weakness and headaches.   Psychiatric/Behavioral:  Negative for depression and memory loss. The patient is not nervous/anxious.       Physical Exam  Temp:  [35.9 °C (96.7 °F)-36.9 °C (98.4 °F)] 36.8 °C (98.2 °F)  Pulse:  [76-97] 93  Resp:  [16-20] 16  BP: (103-115)/(39-75) 115/58  SpO2:  [90 %-100 %] 90 %    Physical Exam  Vitals and nursing note reviewed.   Constitutional:       General: She is not in acute distress.     Appearance: She is not ill-appearing.   HENT:      Head: Normocephalic and atraumatic.      Nose: Nose normal.      Mouth/Throat:      Mouth: Mucous membranes are dry.   Eyes:      General:         Right eye: No discharge.         Left eye: No discharge.      Extraocular Movements: Extraocular movements intact.      Conjunctiva/sclera: Conjunctivae  normal.      Pupils: Pupils are equal, round, and reactive to light.   Neck:      Thyroid: No thyromegaly.      Vascular: No JVD.   Cardiovascular:      Rate and Rhythm: Normal rate.      Heart sounds: No murmur heard.  Pulmonary:      Effort: Pulmonary effort is normal. No respiratory distress.      Breath sounds: Normal breath sounds. No wheezing.   Abdominal:      General: Bowel sounds are normal. There is no distension.      Palpations: Abdomen is soft.      Tenderness: There is no abdominal tenderness.   Musculoskeletal:         General: No swelling or tenderness.      Cervical back: Neck supple.      Right lower leg: Edema present.      Left lower leg: Edema present.   Skin:     General: Skin is warm and dry.      Coloration: Skin is pale.   Neurological:      Mental Status: She is alert and oriented to person, place, and time.      Cranial Nerves: No cranial nerve deficit.      Sensory: No sensory deficit.      Motor: Weakness present.   Psychiatric:         Behavior: Behavior normal.         Thought Content: Thought content normal.       Fluids    Intake/Output Summary (Last 24 hours) at 10/2/2022 1419  Last data filed at 10/2/2022 0400  Gross per 24 hour   Intake 667.92 ml   Output --   Net 667.92 ml       Laboratory  Recent Labs     10/01/22  0932 10/01/22  1956 10/02/22  0744   WBC 6.3 4.8 3.6*   RBC 2.38* 2.21* 2.01*   HEMOGLOBIN 7.3* 6.8* 6.1*   HEMATOCRIT 23.7* 22.2* 20.6*   MCV 99.6* 100.5* 102.5*   MCH 30.7 30.8 30.3   MCHC 30.8* 30.6* 29.6*   .6* 104.7* 105.5*   PLATELETCT 360 331 271   MPV 12.3 12.4 11.4     Recent Labs     09/30/22  1957 10/02/22  0147   SODIUM 143 143   POTASSIUM 4.2 3.9   CHLORIDE 110 111   CO2 23 23   GLUCOSE 125* 90   BUN 45* 24*   CREATININE 0.63 0.81   CALCIUM 8.7 8.5     Recent Labs     09/30/22 1957   INR 1.47*               Imaging  DX-CHEST-2 VIEWS   Final Result         1.  Slight hazy left lung base opacities suggests subtle infiltrate.   2.  Cardiomegaly   3.   Atherosclerosis           Assessment/Plan  * Hematemesis without nausea  Assessment & Plan  4-5 episodes over previous 24 hours.  Patient notes small amounts of clotting, mucus in appearance.  GI consulted in ED, plan for morning scope.  IV Protonix drip started in ED.    10/1 GI following, Dr. Gaston  S/p EGD with gastric ulcer and bleeding vessel s/p clip x 4  Monitor h/h q12, if hgb < 7, transfuse  F/u am labs  Ice chips x 24 hours d/t high risk of rebleed  Cont ppi gtt x 72 hours per GI recs      Myelofibrosis (HCC)- (present on admission)  Assessment & Plan  History of.  Undergoes weekly H&H screening outpatient and Retacrit injections as needed.    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  Undergoes weekly H&H screening outpatient.  Undergoes Retacrit injections as needed, most recently on 09/27, earlier this week.     6.6 at the time of admission, was 8.9 when evaluated as outpatient 3 days ago.  Received 1 unit PRBC transfusion in the emergency department.  Will monitor with serial CBCs pending GI evaluation for hematemesis.    10/1 status post EGD, H&H stable  Monitor    10/2 acute bleed, hgb  6.1, d/w GI, monitor h/h, may be residual bleed  Ok for trial of CLD, may need rescope if signs of bleed, f/u with GI Dr. Gaston    DDD (degenerative disc disease), cervical- (present on admission)  Assessment & Plan  Noted on history.  Takes Tylenol and naproxen outpatient.  No NSAIDs at this time in setting of active bleeding.      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  Patient reports she was diagnosed 8 years ago.  Follows with Dr. Oseguera of [facility].    Thrombocytosis- (present on admission)  Assessment & Plan  Takes daily anagrelide for thrombocythemia.  Platelets 298 at the time of admission.  Will hold anagrelide at this time in the setting of active bleeding.   Restart after stabilization and GI evaluation.       VTE prophylaxis: SCDs/TEDs    I have performed a physical exam and reviewed and updated ROS and Plan today (10/2/2022). In review of yesterday's note (10/1/2022), there are no changes except as documented above.

## 2022-10-02 NOTE — CARE PLAN
The patient is Stable - Low risk of patient condition declining or worsening    Shift Goals  Clinical Goals: Monitor H&H, safety, rest  Patient Goals: Rest  Family Goals: na    Progress Note    Patient is Aox4, moves all extremities. Neurologically intact. Vital signs stable. On room air, Clear lung sounds, bowel sounds present, last BM 10/1, voiding. Has a PIV, infusing protonix and LR.     Other: Patient denies pain, NPO as of now, plan to advance diet tomorrow.

## 2022-10-02 NOTE — PROGRESS NOTES
Gastroenterology Progress Note         Author: Ana Gaston D.O.                                 Date & Time Created: 10/2/2022 1:49 PM         Chief Complaint: Hematemesis    Interval History: This is an 82-year-old female with bladder cancer who presented to the ED for 2 days of hematemesis.  Patient underwent an EGD on 10/1/2022 with bleeding therapy.  She has no further hematemesis a day but reports that she has melenic appearing stool.  No abdominal pain, nausea or vomiting.  Patient is hungry wants to eat.    Review of Systems:  General: No fevers, chills, unintentional, weight loss.  HEENT: No scleral icterus, gum bleed, dysphagia, odynophagia.  Cardiology: No chest pain, palpitations, orthopnea.  Respiratory: No dyspnea, cough, wheezing.  Gastrointestinal: No abdominal pain, nausea, vomiting, changes in bowel habits.  Positive hematemesis (resolved), melena.  Genitourinary: No hematuria, dysuria, urgency.  Neurologic: No changes in memory, confusion, gait instability.  Skin: No ecchymosis, jaundice, telangiectasia.    Physical Exam:  Vitals:    10/02/22 0400 10/02/22 0740 10/02/22 1233 10/02/22 1300   BP: 106/39 108/53 108/75 111/56   Pulse: 78 76 91 88   Resp: 16 17 16 16   Temp: 36.9 °C (98.4 °F) 36.6 °C (97.8 °F) 36.6 °C (97.8 °F) 35.9 °C (96.7 °F)   TempSrc: Temporal Temporal Temporal Temporal   SpO2: 99% 99% 93% 91%   Weight:       Height:         General: No acute cardiopulmonary distress.  Respiratory: Breath sounds present. Symmetrical rise of anterior thorax.  Cardiovascular: Normal S1, S2.  Gastrointestinal: Soft, nontender, nondistended. Normoactive bowel sounds. No guarding.  Neurologic: Alert and oriented.  Skin: Warm and dry.    Labs:              Recent Labs     09/30/22  1957 10/01/22  0932 10/02/22  0147   SODIUM 143  --  143   POTASSIUM 4.2  --  3.9   CHLORIDE 110  --  111   CO2 23  --  23   BUN 45*  --  24*   CREATININE 0.63  --  0.81   MAGNESIUM 1.9 1.9  --    CALCIUM 8.7  --  8.5        Recent Labs     09/30/22  1957 10/02/22  0147   ALTSGPT 10  --    ASTSGOT 13  --    ALKPHOSPHAT 97  --    TBILIRUBIN 1.0  --    LIPASE 13  --    GLUCOSE 125* 90       Recent Labs     09/30/22  1957 09/30/22  2118 10/01/22  0932 10/01/22  1956 10/02/22  0744   RBC 2.16*   < > 2.38* 2.21* 2.01*   HEMOGLOBIN 6.6*   < > 7.3* 6.8* 6.1*   HEMATOCRIT 22.2*   < > 23.7* 22.2* 20.6*   PLATELETCT 340   < > 360 331 271   PROTHROMBTM 17.5*  --   --   --   --    INR 1.47*  --   --   --   --     < > = values in this interval not displayed.       Recent Labs     09/30/22  1957 09/30/22  2118 10/01/22  0932 10/01/22  1956 10/02/22  0744   WBC 6.7   < > 6.3 4.8 3.6*   NEUTSPOLYS 77.60*  --   --   --   --    LYMPHOCYTES 11.20*  --   --   --   --    MONOCYTES 7.80  --   --   --   --    EOSINOPHILS 0.00  --   --   --   --    BASOPHILS 0.00  --   --   --   --    ASTSGOT 13  --   --   --   --    ALTSGPT 10  --   --   --   --    ALKPHOSPHAT 97  --   --   --   --    TBILIRUBIN 1.0  --   --   --   --     < > = values in this interval not displayed.       Hemodynamics:    Temp (24hrs), Av.5 °C (97.7 °F), Min:35.9 °C (96.7 °F), Max:36.9 °C (98.4 °F)  Temperature: 35.9 °C (96.7 °F)    Pulse  Av.7  Min: 76  Max: 101     Blood Pressure : 111/56       Respiratory:      Respiration: 16, Pulse Oximetry: 91 %                   Fluids:      Intake/Output Summary (Last 24 hours) at 10/2/2022 1349  Last data filed at 10/2/2022 0400  Gross per 24 hour   Intake 667.92 ml   Output --   Net 667.92 ml            GI/Nutrition:    Orders Placed This Encounter   Procedures    Diet Order Diet: Clear Liquid     Standing Status:   Standing     Number of Occurrences:   1     Order Specific Question:   Diet:     Answer:   Clear Liquid [10]       Medical Decision Making, by Problem:    Active Hospital Problems    Diagnosis     *Hematemesis without nausea [K92.0]     Myelofibrosis (HCC) [D75.81]     Anemia associated with malignant neoplastic disease  (HCC) [C80.1, D63.0]     DDD (degenerative disc disease), cervical [M50.30]     Neoplasm of bladder [D49.4]     Thrombocytosis [D75.839]             Assessment:  -Melena  -Hematemesis (resolved)  -Acute blood anemia  -Gastric ulcer with spurting vessel s/p EGD with bleeding therapy 10/1/2022  -History of bladder cancer      Plan:  Patient is status post EGD with bleeding therapy.  Melenic appearing stool and decrease in hemoglobin likely due to residual blood loss and may take time to equilibrate.  Her vitals are stable.  I recommend close monitoring H&H and follow symptomatically.  If patient's hemoglobin continues to downtrend and/or continue GI bleed, will consider a repeat EGD for second look.    Recommendations:  -Clears today.  -Continue pantoprazole ggt for at least 72 hours.  -If patient remains stable, de-escalate to pantoprazole 40 mg PO BID.  -Outpatient EGD in 3 months to evaluate healing of ulcers.  This will be arranged by [facility].    Discussed with Dr. Lizama.       Quality-Core Measures    Ana Gaston D.O.  Gastroenterology  [facility]  [phone]

## 2022-10-03 LAB
ALBUMIN SERPL BCP-MCNC: 3.1 G/DL (ref 3.2–4.9)
ALBUMIN/GLOB SERPL: 2.1 G/DL
ALP SERPL-CCNC: 89 U/L (ref 30–99)
ALT SERPL-CCNC: 9 U/L (ref 2–50)
ANION GAP SERPL CALC-SCNC: 10 MMOL/L (ref 7–16)
APPEARANCE UR: CLEAR
AST SERPL-CCNC: 12 U/L (ref 12–45)
BACTERIA #/AREA URNS HPF: ABNORMAL /HPF
BILIRUB SERPL-MCNC: 2 MG/DL (ref 0.1–1.5)
BILIRUB UR QL STRIP.AUTO: ABNORMAL
BUN SERPL-MCNC: 18 MG/DL (ref 8–22)
CALCIUM SERPL-MCNC: 8.2 MG/DL (ref 8.5–10.5)
CAOX CRY #/AREA URNS HPF: ABNORMAL /HPF
CHLORIDE SERPL-SCNC: 108 MMOL/L (ref 96–112)
CO2 SERPL-SCNC: 23 MMOL/L (ref 20–33)
COLOR UR: ABNORMAL
CREAT SERPL-MCNC: 0.71 MG/DL (ref 0.5–1.4)
EPI CELLS #/AREA URNS HPF: ABNORMAL /HPF
ERYTHROCYTE [DISTWIDTH] IN BLOOD BY AUTOMATED COUNT: 88.3 FL (ref 35.9–50)
ERYTHROCYTE [DISTWIDTH] IN BLOOD BY AUTOMATED COUNT: 90.5 FL (ref 35.9–50)
ERYTHROCYTE [DISTWIDTH] IN BLOOD BY AUTOMATED COUNT: 92 FL (ref 35.9–50)
GFR SERPLBLD CREATININE-BSD FMLA CKD-EPI: 86 ML/MIN/1.73 M 2
GLOBULIN SER CALC-MCNC: 1.5 G/DL (ref 1.9–3.5)
GLUCOSE SERPL-MCNC: 96 MG/DL (ref 65–99)
GLUCOSE UR STRIP.AUTO-MCNC: NEGATIVE MG/DL
HCT VFR BLD AUTO: 22.9 % (ref 37–47)
HCT VFR BLD AUTO: 24.8 % (ref 37–47)
HCT VFR BLD AUTO: 24.9 % (ref 37–47)
HGB BLD-MCNC: 7 G/DL (ref 12–16)
HGB BLD-MCNC: 7.6 G/DL (ref 12–16)
HGB BLD-MCNC: 7.7 G/DL (ref 12–16)
HYALINE CASTS #/AREA URNS LPF: ABNORMAL /LPF
KETONES UR STRIP.AUTO-MCNC: NEGATIVE MG/DL
LEUKOCYTE ESTERASE UR QL STRIP.AUTO: ABNORMAL
MCH RBC QN AUTO: 30.2 PG (ref 27–33)
MCH RBC QN AUTO: 30.3 PG (ref 27–33)
MCH RBC QN AUTO: 30.7 PG (ref 27–33)
MCHC RBC AUTO-ENTMCNC: 30.5 G/DL (ref 33.6–35)
MCHC RBC AUTO-ENTMCNC: 30.6 G/DL (ref 33.6–35)
MCHC RBC AUTO-ENTMCNC: 31 G/DL (ref 33.6–35)
MCV RBC AUTO: 98.8 FL (ref 81.4–97.8)
MCV RBC AUTO: 98.8 FL (ref 81.4–97.8)
MCV RBC AUTO: 99.1 FL (ref 81.4–97.8)
MICRO URNS: ABNORMAL
NITRITE UR QL STRIP.AUTO: NEGATIVE
PH UR STRIP.AUTO: 5 [PH] (ref 5–8)
PLATELET # BLD AUTO: 281 K/UL (ref 164–446)
PLATELET # BLD AUTO: 321 K/UL (ref 164–446)
PLATELET # BLD AUTO: 327 K/UL (ref 164–446)
PMV BLD AUTO: 12.2 FL (ref 9–12.9)
PMV BLD AUTO: 12.6 FL (ref 9–12.9)
PMV BLD AUTO: 12.9 FL (ref 9–12.9)
POTASSIUM SERPL-SCNC: 3.7 MMOL/L (ref 3.6–5.5)
PROT SERPL-MCNC: 4.6 G/DL (ref 6–8.2)
PROT UR QL STRIP: NEGATIVE MG/DL
RBC # BLD AUTO: 2.31 M/UL (ref 4.2–5.4)
RBC # BLD AUTO: 2.51 M/UL (ref 4.2–5.4)
RBC # BLD AUTO: 2.52 M/UL (ref 4.2–5.4)
RBC # URNS HPF: ABNORMAL /HPF
RBC UR QL AUTO: NEGATIVE
SODIUM SERPL-SCNC: 141 MMOL/L (ref 135–145)
SP GR UR STRIP.AUTO: 1.02
UROBILINOGEN UR STRIP.AUTO-MCNC: 1 MG/DL
WBC # BLD AUTO: 4 K/UL (ref 4.8–10.8)
WBC # BLD AUTO: 4.1 K/UL (ref 4.8–10.8)
WBC # BLD AUTO: 4.2 K/UL (ref 4.8–10.8)
WBC #/AREA URNS HPF: ABNORMAL /HPF

## 2022-10-03 PROCEDURE — 99233 SBSQ HOSP IP/OBS HIGH 50: CPT | Performed by: INTERNAL MEDICINE

## 2022-10-03 PROCEDURE — C9113 INJ PANTOPRAZOLE SODIUM, VIA: HCPCS | Performed by: INTERNAL MEDICINE

## 2022-10-03 PROCEDURE — 700111 HCHG RX REV CODE 636 W/ 250 OVERRIDE (IP): Performed by: INTERNAL MEDICINE

## 2022-10-03 PROCEDURE — 80053 COMPREHEN METABOLIC PANEL: CPT

## 2022-10-03 PROCEDURE — 700105 HCHG RX REV CODE 258: Performed by: STUDENT IN AN ORGANIZED HEALTH CARE EDUCATION/TRAINING PROGRAM

## 2022-10-03 PROCEDURE — 81001 URINALYSIS AUTO W/SCOPE: CPT

## 2022-10-03 PROCEDURE — 85027 COMPLETE CBC AUTOMATED: CPT

## 2022-10-03 PROCEDURE — 700102 HCHG RX REV CODE 250 W/ 637 OVERRIDE(OP): Performed by: STUDENT IN AN ORGANIZED HEALTH CARE EDUCATION/TRAINING PROGRAM

## 2022-10-03 PROCEDURE — 770006 HCHG ROOM/CARE - MED/SURG/GYN SEMI*

## 2022-10-03 PROCEDURE — 700105 HCHG RX REV CODE 258: Performed by: EMERGENCY MEDICINE

## 2022-10-03 PROCEDURE — 700111 HCHG RX REV CODE 636 W/ 250 OVERRIDE (IP): Performed by: EMERGENCY MEDICINE

## 2022-10-03 PROCEDURE — C9113 INJ PANTOPRAZOLE SODIUM, VIA: HCPCS | Performed by: EMERGENCY MEDICINE

## 2022-10-03 PROCEDURE — A9270 NON-COVERED ITEM OR SERVICE: HCPCS | Performed by: STUDENT IN AN ORGANIZED HEALTH CARE EDUCATION/TRAINING PROGRAM

## 2022-10-03 PROCEDURE — 94760 N-INVAS EAR/PLS OXIMETRY 1: CPT

## 2022-10-03 RX ORDER — HEPARIN SODIUM (PORCINE) LOCK FLUSH IV SOLN 100 UNIT/ML 100 UNIT/ML
500 SOLUTION INTRAVENOUS PRN
Status: CANCELLED | OUTPATIENT
Start: 2022-10-04

## 2022-10-03 RX ORDER — PANTOPRAZOLE SODIUM 40 MG/10ML
40 INJECTION, POWDER, LYOPHILIZED, FOR SOLUTION INTRAVENOUS 2 TIMES DAILY
Status: DISCONTINUED | OUTPATIENT
Start: 2022-10-03 | End: 2022-10-04

## 2022-10-03 RX ORDER — 0.9 % SODIUM CHLORIDE 0.9 %
10 VIAL (ML) INJECTION PRN
Status: CANCELLED | OUTPATIENT
Start: 2022-10-04

## 2022-10-03 RX ORDER — 0.9 % SODIUM CHLORIDE 0.9 %
3 VIAL (ML) INJECTION PRN
Status: CANCELLED | OUTPATIENT
Start: 2022-10-04

## 2022-10-03 RX ORDER — 0.9 % SODIUM CHLORIDE 0.9 %
VIAL (ML) INJECTION PRN
Status: CANCELLED | OUTPATIENT
Start: 2022-10-04

## 2022-10-03 RX ADMIN — PANTOPRAZOLE SODIUM 8 MG/HR: 40 INJECTION, POWDER, FOR SOLUTION INTRAVENOUS at 09:07

## 2022-10-03 RX ADMIN — PANTOPRAZOLE SODIUM 40 MG: 40 INJECTION, POWDER, FOR SOLUTION INTRAVENOUS at 17:21

## 2022-10-03 RX ADMIN — SODIUM CHLORIDE, POTASSIUM CHLORIDE, SODIUM LACTATE AND CALCIUM CHLORIDE: 600; 310; 30; 20 INJECTION, SOLUTION INTRAVENOUS at 04:53

## 2022-10-03 RX ADMIN — SENNOSIDES AND DOCUSATE SODIUM 2 TABLET: 50; 8.6 TABLET ORAL at 04:53

## 2022-10-03 ASSESSMENT — ENCOUNTER SYMPTOMS
DEPRESSION: 0
BLURRED VISION: 0
DIARRHEA: 0
ABDOMINAL PAIN: 0
SHORTNESS OF BREATH: 0
CHILLS: 0
PALPITATIONS: 0
RESPIRATORY NEGATIVE: 1
FLANK PAIN: 0
MYALGIAS: 0
DOUBLE VISION: 0
CONSTIPATION: 0
BACK PAIN: 0
HEARTBURN: 0
CARDIOVASCULAR NEGATIVE: 1
DIZZINESS: 0
WEAKNESS: 1
COUGH: 0
PSYCHIATRIC NEGATIVE: 1
VOMITING: 0
NAUSEA: 0
DIAPHORESIS: 0
FEVER: 0
EYES NEGATIVE: 1
FOCAL WEAKNESS: 0
NERVOUS/ANXIOUS: 0
HEADACHES: 0
NEUROLOGICAL NEGATIVE: 1
BLOOD IN STOOL: 0

## 2022-10-03 NOTE — PROGRESS NOTES
Assumed pt care at 0700 . Pt is A&Ox 4 . Pt denies pain.  Pt's belongings are nearby, bed is in the lowest position and locked. Hourly rounding in place.

## 2022-10-03 NOTE — PROGRESS NOTES
Gastroenterology Progress Note     Author: Aleksandr Sanford M.D.   Date & Time Created: 10/3/2022 8:51 AM    Chief Complaint:  GIB    Interval History:  Stool amount decreased and now dark black.  Patient is hungry.   Otherwise no compliants.     Review of Systems:  Review of Systems   Constitutional:  Positive for malaise/fatigue.   HENT: Negative.     Eyes: Negative.    Respiratory: Negative.     Cardiovascular: Negative.    Gastrointestinal:  Positive for melena.   Skin: Negative.    Neurological: Negative.    Psychiatric/Behavioral: Negative.       Physical Exam:  Physical Exam  HENT:      Head: Normocephalic.      Nose: Nose normal.   Cardiovascular:      Rate and Rhythm: Normal rate.   Pulmonary:      Effort: Pulmonary effort is normal.   Abdominal:      General: Abdomen is flat. Bowel sounds are normal.      Palpations: Abdomen is soft.   Skin:     General: Skin is warm.   Neurological:      Mental Status: She is alert.   Psychiatric:         Mood and Affect: Mood normal.         Thought Content: Thought content normal.         Judgment: Judgment normal.       Labs:          Recent Labs     09/30/22  1957 10/01/22  0932 10/02/22  0147 10/03/22  0041   SODIUM 143  --  143 141   POTASSIUM 4.2  --  3.9 3.7   CHLORIDE 110  --  111 108   CO2 23  --  23 23   BUN 45*  --  24* 18   CREATININE 0.63  --  0.81 0.71   MAGNESIUM 1.9 1.9  --   --    CALCIUM 8.7  --  8.5 8.2*     Recent Labs     09/30/22  1957 10/02/22  0147 10/03/22  0041   ALTSGPT 10  --  9   ASTSGOT 13  --  12   ALKPHOSPHAT 97  --  89   TBILIRUBIN 1.0  --  2.0*   LIPASE 13  --   --    GLUCOSE 125* 90 96     Recent Labs     09/30/22  1957 09/30/22  2118 10/01/22  1956 10/02/22  0744 10/02/22  1947   RBC 2.16*   < > 2.21* 2.01* 2.56*   HEMOGLOBIN 6.6*   < > 6.8* 6.1* 7.8*   HEMATOCRIT 22.2*   < > 22.2* 20.6* 25.5*   PLATELETCT 340   < > 331 271 323   PROTHROMBTM 17.5*  --   --   --   --    INR 1.47*  --   --   --   --     < > = values in this interval not  displayed.     Recent Labs     09/30/22  1957 09/30/22  2118 10/01/22  1956 10/02/22  0744 10/02/22  1947 10/03/22  0041   WBC 6.7   < > 4.8 3.6* 4.1*  --    NEUTSPOLYS 77.60*  --   --   --   --   --    LYMPHOCYTES 11.20*  --   --   --   --   --    MONOCYTES 7.80  --   --   --   --   --    EOSINOPHILS 0.00  --   --   --   --   --    BASOPHILS 0.00  --   --   --   --   --    ASTSGOT 13  --   --   --   --  12   ALTSGPT 10  --   --   --   --  9   ALKPHOSPHAT 97  --   --   --   --  89   TBILIRUBIN 1.0  --   --   --   --  2.0*    < > = values in this interval not displayed.     Hemodynamics:  Temp (24hrs), Av.6 °C (97.8 °F), Min:35.9 °C (96.7 °F), Max:36.9 °C (98.5 °F)  Temperature: 36.1 °C (97 °F)  Pulse  Av.4  Min: 71  Max: 101   Blood Pressure : 123/59     Respiratory:    Respiration: 16, Pulse Oximetry: 98 %           Fluids:    Intake/Output Summary (Last 24 hours) at 10/3/2022 0851  Last data filed at 10/2/2022 1630  Gross per 24 hour   Intake 395 ml   Output --   Net 395 ml        GI/Nutrition:  Orders Placed This Encounter   Procedures    Diet Order Diet: Clear Liquid     Standing Status:   Standing     Number of Occurrences:   1     Order Specific Question:   Diet:     Answer:   Clear Liquid [10]     Medical Decision Making, by Problem:  Active Hospital Problems    Diagnosis     *Hematemesis without nausea [K92.0]     Myelofibrosis (HCC) [D75.81]     Anemia associated with malignant neoplastic disease (HCC) [C80.1, D63.0]     DDD (degenerative disc disease), cervical [M50.30]     Neoplasm of bladder [D49.4]     Thrombocytosis [D75.839]        Plan:    GIB from high risk gastric ulcer treated with EGD and therapeutic intervention.   Signs of old blood appreciated in setting of rising Hgb. Likely resolved bleeding. Continue PPI GGT until tomorrow. Then PPI BID PO for 1 month. Email sent to [facility] for scheduling of outpatient EGD for follow up.   2. Diet: Advance to cardiac. Order in EMR.     Please call with  any questions.  Signed off.     Quality-Core Measures

## 2022-10-03 NOTE — CARE PLAN
The patient is Stable - Low risk of patient condition declining or worsening    Shift Goals  Clinical Goals: bleeding precautions; comfort  Patient Goals: comfort  Family Goals: N/A    Progress made toward(s) clinical / shift goals:    Problem: Knowledge Deficit - Standard  Goal: Patient and family/care givers will demonstrate understanding of plan of care, disease process/condition, diagnostic tests and medications  Outcome: Progressing       Patient is not progressing towards the following goals:

## 2022-10-03 NOTE — PROGRESS NOTES
Hospital Medicine Daily Progress Note    Date of Service  10/3/2022    Chief Complaint  Dedra Lobo is a 82 y.o. female admitted 9/30/2022 with GI bleed    Hospital Course    82-year-old female who presented to the emergency department on 09/30/2022 with hematemesis.  PMHx significant for myelofibrosis (undergoes weekly H&H, currently being treated with Retacrit), history of malignant urinary tumor (followed by Dr. Oseguera, [facility]).  Lung cancer is documented on chart but patient says this was worked up and denies that it was ever a diagnosis.  Friend Anna was present and provides additional HPI details.     Patient reports vomiting started yesterday afternoon and with small quantity.  States that she had 2-3 episodes of larger quantity of vomiting today.  Anna put vomitus on paper towel and noticed red with mucus streaking.  This prompted visit to hospital.  She denies any previous episode like this.  Reports some shortness of breath.  Otherwise denies headache, nausea, fever, chills, chest pain.  Denies melena, hematochezia.  Takes MiraLAX somewhat regularly.     GI was consulted in the ED and recommended Protonix with AM scope.  Patient was transfused 1 unit PRBCs after H&H returned 6.6/22.0.  CMP stable.  Low total protein (5.2).  Troponin 28.  INR 1.47.  Chest x-ray showing cardiomegaly and subtle left lower infiltrate.  EKG sinus rhythm with .     Patient is admitted to medical service with plan for morning gastroenterology assessment for GI bleed.    Interval Problem Update    10/1 S/p EGD, gastic ulcer with bleeding vessel s/p endoclip  S/p 1 u prbc, hgb 7.3, f/u labs, transfuse as needed  Reports melena this am  Bp stable, denies pain, dizziness, blurry vision or sob\    10/2 patient does report 2 additional bowel movements with melanotic stool, asymptomatic  Feels better  Ongoing pallor  Hemoglobin of 6.1  Denies any dizziness, headache or chest pain  Plan for PRBC transfusion  today  Discussed with GI, might be residual bleeding, monitor for recurrent acute bleed  Okay for clear liquid diet    10/3 no bowel movement today, continues to feel better  Minimal abdominal soreness  Requiring 1 unit PRBC transfusion with hemoglobin of 7.0 this a.m. with repeat hemoglobin of 7.7  Okay to advance diet per GI    I have discussed this patient's plan of care and discharge plan at IDT rounds today with Case Management, Nursing, Nursing leadership, and other members of the IDT team.    Consultants/Specialty  GI    Code Status  Full Code    Disposition  Patient is not medically cleared for discharge.   Anticipate discharge to to home with close outpatient follow-up.  I have placed the appropriate orders for post-discharge needs.    Review of Systems  Review of Systems   Constitutional:  Negative for chills, diaphoresis, fever and malaise/fatigue.   HENT:  Negative for congestion and hearing loss.    Eyes:  Negative for blurred vision and double vision.   Respiratory:  Negative for cough and shortness of breath.    Cardiovascular:  Negative for chest pain, palpitations and leg swelling.   Gastrointestinal:  Negative for abdominal pain, blood in stool, constipation, diarrhea, heartburn, melena, nausea and vomiting.   Genitourinary:  Negative for dysuria and flank pain.   Musculoskeletal:  Negative for back pain and myalgias.   Neurological:  Positive for weakness. Negative for dizziness, focal weakness and headaches.   Psychiatric/Behavioral:  Negative for depression. The patient is not nervous/anxious.       Physical Exam  Temp:  [36.1 °C (97 °F)-36.9 °C (98.5 °F)] 36.1 °C (97 °F)  Pulse:  [71-98] 71  Resp:  [16-18] 16  BP: (103-125)/(51-61) 123/59  SpO2:  [90 %-99 %] 98 %    Physical Exam  Vitals and nursing note reviewed.   Constitutional:       General: She is not in acute distress.     Appearance: She is not ill-appearing or diaphoretic.   HENT:      Head: Normocephalic and atraumatic.      Nose:  Nose normal.      Mouth/Throat:      Mouth: Mucous membranes are dry.   Eyes:      General:         Right eye: No discharge.         Left eye: No discharge.      Extraocular Movements: Extraocular movements intact.      Pupils: Pupils are equal, round, and reactive to light.   Neck:      Thyroid: No thyromegaly.      Vascular: No JVD.   Cardiovascular:      Rate and Rhythm: Normal rate.      Heart sounds: No murmur heard.  Pulmonary:      Effort: Pulmonary effort is normal. No respiratory distress.      Breath sounds: Normal breath sounds. No stridor. No wheezing or rhonchi.   Abdominal:      General: Bowel sounds are normal. There is no distension.      Palpations: Abdomen is soft. There is no mass.      Tenderness: There is no abdominal tenderness.   Musculoskeletal:         General: No swelling or tenderness.      Cervical back: Neck supple.      Right lower leg: No edema.      Left lower leg: No edema.   Skin:     General: Skin is warm and dry.      Coloration: Skin is pale.      Findings: No erythema or rash.   Neurological:      Mental Status: She is alert and oriented to person, place, and time.      Cranial Nerves: No cranial nerve deficit.      Sensory: No sensory deficit.      Motor: Weakness present.   Psychiatric:         Behavior: Behavior normal.         Thought Content: Thought content normal.       Fluids    Intake/Output Summary (Last 24 hours) at 10/3/2022 1313  Last data filed at 10/3/2022 1000  Gross per 24 hour   Intake 875 ml   Output --   Net 875 ml       Laboratory  Recent Labs     10/02/22  1947 10/03/22  0041 10/03/22  1228   WBC 4.1* 4.0* 4.2*   RBC 2.56* 2.31* 2.51*   HEMOGLOBIN 7.8* 7.0* 7.7*   HEMATOCRIT 25.5* 22.9* 24.8*   MCV 99.6* 99.1* 98.8*   MCH 30.5 30.3 30.7   MCHC 30.6* 30.6* 31.0*   RDW 90.4* 88.3* 92.0*   PLATELETCT 323 281 321   MPV 12.5 12.9 12.6     Recent Labs     09/30/22  1957 10/02/22  0147 10/03/22  0041   SODIUM 143 143 141   POTASSIUM 4.2 3.9 3.7   CHLORIDE 110 111  108   CO2 23 23 23   GLUCOSE 125* 90 96   BUN 45* 24* 18   CREATININE 0.63 0.81 0.71   CALCIUM 8.7 8.5 8.2*     Recent Labs     09/30/22 1957   INR 1.47*               Imaging  DX-CHEST-2 VIEWS   Final Result         1.  Slight hazy left lung base opacities suggests subtle infiltrate.   2.  Cardiomegaly   3.  Atherosclerosis           Assessment/Plan  * Hematemesis without nausea  Assessment & Plan  4-5 episodes over previous 24 hours.  Patient notes small amounts of clotting, mucus in appearance.  GI consulted in ED, plan for morning scope.  IV Protonix drip started in ED.    10/1 GI following, Dr. Gaston  S/p EGD with gastric ulcer and bleeding vessel s/p clip x 4  Monitor h/h q12, if hgb < 7, transfuse  F/u am labs  Ice chips x 24 hours d/t high risk of rebleed  Cont ppi gtt x 72 hours per GI recs      Myelofibrosis (HCC)- (present on admission)  Assessment & Plan  History of.  Undergoes weekly H&H screening outpatient and Retacrit injections as needed.    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  Undergoes weekly H&H screening outpatient.  Undergoes Retacrit injections as needed, most recently on 09/27, earlier this week.     6.6 at the time of admission, was 8.9 when evaluated as outpatient 3 days ago.  Received 1 unit PRBC transfusion in the emergency department.  Will monitor with serial CBCs pending GI evaluation for hematemesis.    10/1 status post EGD, H&H stable  Monitor    10/2 acute bleed, hgb  6.1, d/w GI, monitor h/h, may be residual bleed  Ok for trial of CLD, may need rescope if signs of bleed, f/u with GI Dr. Gaston    10/3 CBC every 8, requiring 1 unit transfusion yesterday, hemoglobin of 7.0 today  May need repeat EGD if symptomatic or signs of bleed  No bowel movement today  Advance diet per GI, n.p.o. if signs of bleed and ongoing anemia continue IV PPI      DDD (degenerative disc disease), cervical-  (present on admission)  Assessment & Plan  Noted on history.  Takes Tylenol and naproxen outpatient.  No NSAIDs at this time in setting of active bleeding.      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  Patient reports she was diagnosed 8 years ago.  Follows with Dr. Oseguera of [facility].    Thrombocytosis- (present on admission)  Assessment & Plan  Takes daily anagrelide for thrombocythemia.  Platelets 298 at the time of admission.  Will hold anagrelide at this time in the setting of active bleeding.  Restart after stabilization and GI evaluation.       VTE prophylaxis: SCDs/TEDs    I have performed a physical exam and reviewed and updated ROS and Plan today (10/3/2022). In review of yesterday's note (10/2/2022), there are no changes except as documented above.

## 2022-10-03 NOTE — CARE PLAN
The patient is Stable - Low risk of patient condition declining or worsening    Shift Goals  Clinical Goals: bleeding precautions; comfort  Patient Goals: comfort  Family Goals: N/A    Progress made toward(s) clinical / shift goals:    Problem: Risk for Fluid Volume Deficit Related to Bleeding  Goal: Fluid volume balance will be maintained  Outcome: Progressing  Goal: Patient will show no signs and symptoms of excessive bleeding  Outcome: Progressing     Problem: Risk for Bleeding  Goal: Patient will take measures to prevent bleeding and recognizes signs of bleeding that need to be reported immediately to a health care professional  Outcome: Progressing       Patient is not progressing towards the following goals:

## 2022-10-04 ENCOUNTER — PHARMACY VISIT (OUTPATIENT)
Dept: PHARMACY | Facility: MEDICAL CENTER | Age: 82
End: 2022-10-04
Payer: COMMERCIAL

## 2022-10-04 ENCOUNTER — TELEPHONE (OUTPATIENT)
Dept: ONCOLOGY | Facility: MEDICAL CENTER | Age: 82
End: 2022-10-04
Payer: MEDICARE

## 2022-10-04 VITALS
RESPIRATION RATE: 18 BRPM | DIASTOLIC BLOOD PRESSURE: 70 MMHG | WEIGHT: 179.9 LBS | OXYGEN SATURATION: 94 % | TEMPERATURE: 99 F | HEIGHT: 69 IN | SYSTOLIC BLOOD PRESSURE: 131 MMHG | HEART RATE: 81 BPM | BODY MASS INDEX: 26.64 KG/M2

## 2022-10-04 LAB
ABO GROUP BLD: NORMAL
ANION GAP SERPL CALC-SCNC: 9 MMOL/L (ref 7–16)
ANISOCYTOSIS BLD QL SMEAR: ABNORMAL
BARCODED ABORH UBTYP: 6200
BARCODED PRD CODE UBPRD: NORMAL
BARCODED UNIT NUM UBUNT: NORMAL
BASOPHILS # BLD AUTO: 0 % (ref 0–1.8)
BASOPHILS # BLD: 0 K/UL (ref 0–0.12)
BLD GP AB SCN SERPL QL: NORMAL
BUN SERPL-MCNC: 12 MG/DL (ref 8–22)
CALCIUM SERPL-MCNC: 8.5 MG/DL (ref 8.5–10.5)
CHLORIDE SERPL-SCNC: 109 MMOL/L (ref 96–112)
CO2 SERPL-SCNC: 23 MMOL/L (ref 20–33)
COMPONENT R 8504R: NORMAL
CREAT SERPL-MCNC: 0.79 MG/DL (ref 0.5–1.4)
EOSINOPHIL # BLD AUTO: 0 K/UL (ref 0–0.51)
EOSINOPHIL NFR BLD: 0 % (ref 0–6.9)
ERYTHROCYTE [DISTWIDTH] IN BLOOD BY AUTOMATED COUNT: 80.1 FL (ref 35.9–50)
ERYTHROCYTE [DISTWIDTH] IN BLOOD BY AUTOMATED COUNT: 90.3 FL (ref 35.9–50)
GFR SERPLBLD CREATININE-BSD FMLA CKD-EPI: 75 ML/MIN/1.73 M 2
GLUCOSE SERPL-MCNC: 104 MG/DL (ref 65–99)
HCT VFR BLD AUTO: 24.4 % (ref 37–47)
HCT VFR BLD AUTO: 27.3 % (ref 37–47)
HGB BLD-MCNC: 7.6 G/DL (ref 12–16)
HGB BLD-MCNC: 8.4 G/DL (ref 12–16)
LYMPHOCYTES # BLD AUTO: 0.63 K/UL (ref 1–4.8)
LYMPHOCYTES NFR BLD: 14.6 % (ref 22–41)
MACROCYTES BLD QL SMEAR: ABNORMAL
MANUAL DIFF BLD: NORMAL
MCH RBC QN AUTO: 30.1 PG (ref 27–33)
MCH RBC QN AUTO: 30.6 PG (ref 27–33)
MCHC RBC AUTO-ENTMCNC: 30.8 G/DL (ref 33.6–35)
MCHC RBC AUTO-ENTMCNC: 31.1 G/DL (ref 33.6–35)
MCV RBC AUTO: 97.8 FL (ref 81.4–97.8)
MCV RBC AUTO: 98.4 FL (ref 81.4–97.8)
METAMYELOCYTES NFR BLD MANUAL: 0.9 %
MONOCYTES # BLD AUTO: 0.18 K/UL (ref 0–0.85)
MONOCYTES NFR BLD AUTO: 4.3 % (ref 0–13.4)
MORPHOLOGY BLD-IMP: NORMAL
MYELOCYTES NFR BLD MANUAL: 4.3 %
NEUTROPHILS # BLD AUTO: 3.26 K/UL (ref 2–7.15)
NEUTROPHILS NFR BLD: 75.9 % (ref 44–72)
NRBC # BLD AUTO: 0.11 K/UL
NRBC BLD-RTO: 2.6 /100 WBC
OVALOCYTES BLD QL SMEAR: NORMAL
PLATELET # BLD AUTO: 336 K/UL (ref 164–446)
PLATELET # BLD AUTO: 342 K/UL (ref 164–446)
PLATELET BLD QL SMEAR: NORMAL
PMV BLD AUTO: 12.2 FL (ref 9–12.9)
PMV BLD AUTO: 12.3 FL (ref 9–12.9)
POIKILOCYTOSIS BLD QL SMEAR: NORMAL
POTASSIUM SERPL-SCNC: 4 MMOL/L (ref 3.6–5.5)
PRODUCT TYPE UPROD: NORMAL
RBC # BLD AUTO: 2.48 M/UL (ref 4.2–5.4)
RBC # BLD AUTO: 2.79 M/UL (ref 4.2–5.4)
RBC BLD AUTO: PRESENT
RH BLD: NORMAL
SODIUM SERPL-SCNC: 141 MMOL/L (ref 135–145)
UNIT STATUS USTAT: NORMAL
WBC # BLD AUTO: 4 K/UL (ref 4.8–10.8)
WBC # BLD AUTO: 4.3 K/UL (ref 4.8–10.8)

## 2022-10-04 PROCEDURE — 85027 COMPLETE CBC AUTOMATED: CPT

## 2022-10-04 PROCEDURE — 85025 COMPLETE CBC W/AUTO DIFF WBC: CPT

## 2022-10-04 PROCEDURE — A9270 NON-COVERED ITEM OR SERVICE: HCPCS | Performed by: INTERNAL MEDICINE

## 2022-10-04 PROCEDURE — 86900 BLOOD TYPING SEROLOGIC ABO: CPT

## 2022-10-04 PROCEDURE — RXMED WILLOW AMBULATORY MEDICATION CHARGE: Performed by: INTERNAL MEDICINE

## 2022-10-04 PROCEDURE — 3E02340 INTRODUCTION OF INFLUENZA VACCINE INTO MUSCLE, PERCUTANEOUS APPROACH: ICD-10-PCS | Performed by: INTERNAL MEDICINE

## 2022-10-04 PROCEDURE — 80048 BASIC METABOLIC PNL TOTAL CA: CPT

## 2022-10-04 PROCEDURE — C9113 INJ PANTOPRAZOLE SODIUM, VIA: HCPCS | Performed by: INTERNAL MEDICINE

## 2022-10-04 PROCEDURE — 86850 RBC ANTIBODY SCREEN: CPT

## 2022-10-04 PROCEDURE — 99239 HOSP IP/OBS DSCHRG MGMT >30: CPT | Performed by: INTERNAL MEDICINE

## 2022-10-04 PROCEDURE — 86901 BLOOD TYPING SEROLOGIC RH(D): CPT

## 2022-10-04 PROCEDURE — 86923 COMPATIBILITY TEST ELECTRIC: CPT

## 2022-10-04 PROCEDURE — 90471 IMMUNIZATION ADMIN: CPT

## 2022-10-04 PROCEDURE — P9016 RBC LEUKOCYTES REDUCED: HCPCS

## 2022-10-04 PROCEDURE — 700111 HCHG RX REV CODE 636 W/ 250 OVERRIDE (IP): Mod: JG | Performed by: INTERNAL MEDICINE

## 2022-10-04 PROCEDURE — 36430 TRANSFUSION BLD/BLD COMPNT: CPT

## 2022-10-04 PROCEDURE — 85007 BL SMEAR W/DIFF WBC COUNT: CPT

## 2022-10-04 PROCEDURE — 700102 HCHG RX REV CODE 250 W/ 637 OVERRIDE(OP): Performed by: INTERNAL MEDICINE

## 2022-10-04 PROCEDURE — 90662 IIV NO PRSV INCREASED AG IM: CPT | Performed by: INTERNAL MEDICINE

## 2022-10-04 RX ORDER — PANTOPRAZOLE SODIUM 40 MG/1
40 TABLET, DELAYED RELEASE ORAL 2 TIMES DAILY
Qty: 60 TABLET | Refills: 3 | Status: SHIPPED | OUTPATIENT
Start: 2022-10-04 | End: 2022-12-01 | Stop reason: SDUPTHER

## 2022-10-04 RX ORDER — OMEPRAZOLE 20 MG/1
20 CAPSULE, DELAYED RELEASE ORAL 2 TIMES DAILY
Status: DISCONTINUED | OUTPATIENT
Start: 2022-10-04 | End: 2022-10-04 | Stop reason: HOSPADM

## 2022-10-04 RX ADMIN — INFLUENZA A VIRUS A/VICTORIA/2570/2019 IVR-215 (H1N1) ANTIGEN (FORMALDEHYDE INACTIVATED), INFLUENZA A VIRUS A/DARWIN/9/2021 SAN-010 (H3N2) ANTIGEN (FORMALDEHYDE INACTIVATED), INFLUENZA B VIRUS B/PHUKET/3073/2013 ANTIGEN (FORMALDEHYDE INACTIVATED), AND INFLUENZA B VIRUS B/MICHIGAN/01/2021 ANTIGEN (FORMALDEHYDE INACTIVATED) 0.7 ML: 60; 60; 60; 60 INJECTION, SUSPENSION INTRAMUSCULAR at 15:33

## 2022-10-04 RX ADMIN — EPOETIN ALFA-EPBX 40000 UNITS: 40000 INJECTION, SOLUTION INTRAVENOUS; SUBCUTANEOUS at 15:04

## 2022-10-04 RX ADMIN — OMEPRAZOLE 20 MG: 20 CAPSULE, DELAYED RELEASE ORAL at 09:38

## 2022-10-04 RX ADMIN — PANTOPRAZOLE SODIUM 40 MG: 40 INJECTION, POWDER, FOR SOLUTION INTRAVENOUS at 05:36

## 2022-10-04 ASSESSMENT — PAIN DESCRIPTION - PAIN TYPE
TYPE: ACUTE PAIN
TYPE: ACUTE PAIN

## 2022-10-04 NOTE — CARE PLAN
The patient is Stable - Low risk of patient condition declining or worsening    Shift Goals  Clinical Goals: monitore bleeding; comfort  Patient Goals: comfort  Family Goals: N/A    Progress made toward(s) clinical / shift goals:    Problem: Knowledge Deficit - Standard  Goal: Patient and family/care givers will demonstrate understanding of plan of care, disease process/condition, diagnostic tests and medications  10/4/2022 0228 by Sandra Smith R.N.  Outcome: Progressing  10/3/2022 1945 by CONNER Cruz.N.  Outcome: Progressing     Problem: Risk for Fluid Volume Deficit Related to Bleeding  Goal: Fluid volume balance will be maintained  10/4/2022 0228 by Sandra Smith R.N.  Outcome: Progressing  10/3/2022 1945 by Sandra Smith, R.N.  Outcome: Progressing  Goal: Patient will show no signs and symptoms of excessive bleeding  10/4/2022 0228 by Sandra Smith R.N.  Outcome: Progressing  10/3/2022 1945 by Sandra Smith R.N.  Outcome: Progressing       Patient is not progressing towards the following goals:

## 2022-10-04 NOTE — DISCHARGE SUMMARY
Discharge Summary    CHIEF COMPLAINT ON ADMISSION  Chief Complaint   Patient presents with    Throwing Up Blood       Reason for Admission  EMS     Admission Date  9/30/2022    CODE STATUS  Full Code    HPI & HOSPITAL COURSE    82-year-old female who presented to the emergency department on 09/30/2022 with hematemesis.  PMHx significant for myelofibrosis (undergoes weekly H&H, currently being treated with Retacrit), history of malignant urinary tumor (followed by Dr. Oseguera, [facility]).  Lung cancer is documented on chart but patient says this was worked up and denies that it was ever a diagnosis.  Friend Anna was present and provides additional HPI details.     Patient reports vomiting started yesterday afternoon and with small quantity.  States that she had 2-3 episodes of larger quantity of vomiting today.  Anna put vomitus on paper towel and noticed red with mucus streaking.  This prompted visit to hospital.  She denies any previous episode like this.  Reports some shortness of breath.  Otherwise denies headache, nausea, fever, chills, chest pain.  Denies melena, hematochezia.  Takes MiraLAX somewhat regularly.     GI was consulted in the ED and recommended Protonix with AM scope.  Patient was transfused 1 unit PRBCs after H&H returned 6.6/22.0.  CMP stable.  Low total protein (5.2).  Troponin 28.  INR 1.47.  Chest x-ray showing cardiomegaly and subtle left lower infiltrate.  EKG sinus rhythm with .     Patient is admitted to medical service with plan for morning gastroenterology assessment for GI bleed.    Patient had an EGD completed by Dr. Gaston, 10/1, upper GI bleed due to gastric ulcer with spurting vessel status post bleeding therapy with endoclips and epinephrine.  Patient is advised against use of NSAIDs or anticoagulants.  She was kept on Protonix drip for 72 hours then transition to oral PPI twice daily, recommended 3-month course.  She is advised to follow-up with [facility] for repeat EGD in  3 months to monitor ulcer and evaluate for Vazquez's esophagus.    Patient H&H has remained stable.  She is tolerating her diet.  She does have a history of anemia and thrombocytosis and is currently on treatment including transfusions as needed.  She received 1 additional unit of PRBC on discharge, blood level, hemoglobin of 7.6.  Per patient  she normally receives transfusion for hemoglobin less than 8.    Patient has noted improvement stool color, no further melena on discharge.  She is cleared for discharge to home.    Therefore, she is discharged in good and stable condition to home with close outpatient follow-up.    The patient met 2-midnight criteria for an inpatient stay at the time of discharge.    Discharge Date  10/4/22    FOLLOW UP ITEMS POST DISCHARGE  Primary care provider in 1 week  GI in 3 months  Continue PPI twice daily for 3 months  Avoid NSAIDs or anticoagulants    DISCHARGE DIAGNOSES  Principal Problem:    Hematemesis without nausea POA: Unknown  Active Problems:    Thrombocytosis POA: Yes      Overview: Sees Dr. Cameron Bi-annually.  Previously on hydroxyurea, now on       anagrelide.    Neoplasm of bladder POA: Yes      Overview: ICD-10 transition    DDD (degenerative disc disease), cervical POA: Yes    Anemia associated with malignant neoplastic disease (HCC) POA: Yes    Myelofibrosis (HCC) POA: Yes  Resolved Problems:    * No resolved hospital problems. *      FOLLOW UP  Future Appointments   Date Time Provider Department Center   10/4/2022  1:30 PM [facility]   10/6/2022 12:40 PM Waqas Boss M.D. [facility]   10/11/2022  2:45 PM [facility]   10/18/2022  1:30 PM [facility]   10/25/2022  1:30 PM [facility]     No follow-up provider specified.    MEDICATIONS ON DISCHARGE     Medication List        START taking these medications        Instructions   pantoprazole 40 MG Tbec  Commonly known as: PROTONIX    Take 1 Tablet by mouth 2 times a day.  Dose: 40 mg            CONTINUE taking these medications        Instructions   acetaminophen 500 MG Tabs  Commonly known as: TYLENOL   Take 500-1,000 mg by mouth 1 time a day as needed for Mild Pain.  Dose: 500-1,000 mg     anagrelide 0.5 MG Caps  Commonly known as: AGRYLIN   Take 0.5 mg by mouth 2 times a day.  Dose: 0.5 mg     Carboxymethylcellulose Sod PF 0.5 % Soln   Administer 1 Drop into both eyes every evening.  Dose: 1 Drop            STOP taking these medications      aspirin 81 MG EC tablet     Naproxen Sodium 220 MG Caps              Allergies  No Known Allergies    DIET  Orders Placed This Encounter   Procedures    Diet Order Diet: Cardiac     Standing Status:   Standing     Number of Occurrences:   1     Order Specific Question:   Diet:     Answer:   Cardiac [6]       ACTIVITY  As tolerated.  Weight bearing as tolerated    CONSULTATIONS  GI      PROCEDURES  EGD, October 1    LABORATORY  Lab Results   Component Value Date    SODIUM 141 10/04/2022    POTASSIUM 4.0 10/04/2022    CHLORIDE 109 10/04/2022    CO2 23 10/04/2022    GLUCOSE 104 (H) 10/04/2022    BUN 12 10/04/2022    CREATININE 0.79 10/04/2022    CREATININE 0.90 10/08/2010    GLOMRATE >59 10/08/2010        Lab Results   Component Value Date    WBC 4.0 (L) 10/04/2022    HEMOGLOBIN 7.6 (L) 10/04/2022    HEMATOCRIT 24.4 (L) 10/04/2022    PLATELETCT 336 10/04/2022        Total time of the discharge process exceeds 45 minutes.

## 2022-10-04 NOTE — PROGRESS NOTES
D/c instructions given, educated on worsening s/s. Pt understands and questions answered. Iv removed, m2b given

## 2022-10-04 NOTE — CARE PLAN
The patient is Stable - Low risk of patient condition declining or worsening    Shift Goals  Clinical Goals: monitore bleeding; comfort  Patient Goals: comfort  Family Goals: N/A    Progress made toward(s) clinical / shift goals:    Problem: Knowledge Deficit - Standard  Goal: Patient and family/care givers will demonstrate understanding of plan of care, disease process/condition, diagnostic tests and medications  Outcome: Progressing     Problem: Risk for Fluid Volume Deficit Related to Bleeding  Goal: Fluid volume balance will be maintained  Outcome: Progressing  Goal: Patient will show no signs and symptoms of excessive bleeding  Outcome: Progressing       Patient is not progressing towards the following goals:

## 2022-10-04 NOTE — DISCHARGE INSTRUCTIONS
Discharge Instructions    Discharged to home by car with relative. Discharged via wheelchair, hospital escort: Yes.  Special equipment needed: Not Applicable    Be sure to schedule a follow-up appointment with your primary care doctor or any specialists as instructed.     Discharge Plan:   Diet Plan: Discussed  Activity Level: Discussed  Confirmed Follow up Appointment: Patient to Call and Schedule Appointment  Confirmed Symptoms Management: Discussed  Medication Reconciliation Updated: Yes  Influenza Vaccine Indication: Patient Refuses  Influenza Vaccine Given - only chart on this line when given: Influenza Vaccine Given (See MAR)    I understand that a diet low in cholesterol, fat, and sodium is recommended for good health. Unless I have been given specific instructions below for another diet, I accept this instruction as my diet prescription.   Other diet: as tolerated    Special Instructions: None    -Is this patient being discharged with medication to prevent blood clots?  No    Is patient discharged on Warfarin / Coumadin?   No     Gastrointestinal Bleeding  Gastrointestinal (GI) bleeding is bleeding somewhere along the path that food travels through the body (digestive tract). This path is anywhere between the mouth and the opening of the butt (anus). You may have blood in your poop (stool) or have black poop. If you throw up (vomit), there may be blood in it.  This condition can be mild, serious, or even life-threatening. If you have a lot of bleeding, you may need to stay in the hospital.  What are the causes?  This condition may be caused by:  Irritation and swelling of the esophagus (esophagitis). The esophagus is part of the body that moves food from your mouth to your stomach.  Swollen veins in the butt (hemorrhoids).  Areas of painful tearing in the opening of the butt (anal fissures). These are often caused by passing hard poop.  Pouches that form on the colon over time (diverticulosis).  Irritation  and swelling (diverticulitis) in areas where pouches have formed on the colon.  Growths (polyps) or cancer. Colon cancer often starts out as growths that are not cancer.  Irritation of the stomach lining (gastritis).  Sores (ulcers) in the stomach.  What increases the risk?  You are more likely to develop this condition if you:  Have a certain type of infection in your stomach (Helicobacter pylori infection).  Take certain medicines.  Smoke.  Drink alcohol.  What are the signs or symptoms?  Common symptoms of this condition include:  Throwing up (vomiting) material that has bright red blood in it. It may look like coffee grounds.  Changes in your poop. The poop may:  Have red blood in it.  Be black, look like tar, and smell stronger than normal.  Be red.  Pain or cramping in the belly (abdomen).  How is this treated?  Treatment for this condition depends on the cause of the bleeding. For example:  Sometimes, the bleeding can be stopped during a procedure that is done to find the problem (endoscopy or colonoscopy).  Medicines can be used to:  Help control irritation, swelling, or infection.  Reduce acid in your stomach.  Certain problems can be treated with:  Creams.  Medicines that are put in the butt (suppositories).  Warm baths.  Surgery is sometimes needed.  If you lose a lot of blood, you may need a blood transfusion.  If bleeding is mild, you may be allowed to go home. If there is a lot of bleeding, you will need to stay in the hospital.  Follow these instructions at home:    Take over-the-counter and prescription medicines only as told by your doctor.  Eat foods that have a lot of fiber in them. These foods include beans, whole grains, and fresh fruits and vegetables. You can also try eating 1-3 prunes each day.  Drink enough fluid to keep your pee (urine) pale yellow.  Keep all follow-up visits as told by your doctor. This is important.  Contact a doctor if:  Your symptoms do not get better.  Get help right  away if:  Your bleeding does not stop.  You feel dizzy or you pass out (faint).  You feel weak.  You have very bad cramps in your back or belly.  You pass large clumps of blood (clots) in your poop.  Your symptoms are getting worse.  You have chest pain or fast heartbeats.  Summary  GI bleeding is bleeding somewhere along the path that food travels through the body (digestive tract).  This bleeding can be caused by many things. Treatment depends on the cause of the bleeding.  Take medicines only as told by your doctor.  Keep all follow-up visits as told by your doctor. This is important.  This information is not intended to replace advice given to you by your health care provider. Make sure you discuss any questions you have with your health care provider.  Document Released: 09/26/2009 Document Revised: 07/31/2019 Document Reviewed: 07/31/2019  Elsevier Patient Education © 2020 [employer]

## 2022-10-04 NOTE — PROGRESS NOTES
Rec'd report from night shift RN. Assumed pt care. Pt assessment completed and pt is AA&0x4. No complaints of pain or discomfort at this time. All needs met. Bed locked, bed in lowest position, call light and personal belongings within reach and treaded socks in place for safety.

## 2022-10-06 ENCOUNTER — OFFICE VISIT (OUTPATIENT)
Dept: MEDICAL GROUP | Facility: PHYSICIAN GROUP | Age: 82
End: 2022-10-06
Payer: MEDICARE

## 2022-10-06 VITALS
DIASTOLIC BLOOD PRESSURE: 78 MMHG | OXYGEN SATURATION: 94 % | BODY MASS INDEX: 28.72 KG/M2 | HEART RATE: 93 BPM | TEMPERATURE: 97.9 F | WEIGHT: 193.9 LBS | SYSTOLIC BLOOD PRESSURE: 142 MMHG | HEIGHT: 69 IN

## 2022-10-06 DIAGNOSIS — D49.4 NEOPLASM OF BLADDER: ICD-10-CM

## 2022-10-06 DIAGNOSIS — D75.839 THROMBOCYTOSIS: ICD-10-CM

## 2022-10-06 DIAGNOSIS — R60.0 LOWER EXTREMITY EDEMA: ICD-10-CM

## 2022-10-06 DIAGNOSIS — D75.81 MYELOFIBROSIS (HCC): ICD-10-CM

## 2022-10-06 DIAGNOSIS — I51.7 CARDIOMEGALY: ICD-10-CM

## 2022-10-06 DIAGNOSIS — R06.09 EXERTIONAL DYSPNEA: ICD-10-CM

## 2022-10-06 DIAGNOSIS — C80.1 ANEMIA ASSOCIATED WITH MALIGNANT NEOPLASTIC DISEASE (HCC): ICD-10-CM

## 2022-10-06 DIAGNOSIS — D63.0 ANEMIA ASSOCIATED WITH MALIGNANT NEOPLASTIC DISEASE (HCC): ICD-10-CM

## 2022-10-06 DIAGNOSIS — R01.1 SYSTOLIC MURMUR: ICD-10-CM

## 2022-10-06 DIAGNOSIS — R03.0 ELEVATED BLOOD PRESSURE READING IN OFFICE WITHOUT DIAGNOSIS OF HYPERTENSION: ICD-10-CM

## 2022-10-06 DIAGNOSIS — K25.4 GASTROINTESTINAL HEMORRHAGE ASSOCIATED WITH GASTRIC ULCER: ICD-10-CM

## 2022-10-06 PROBLEM — K92.0 HEMATEMESIS WITHOUT NAUSEA: Status: RESOLVED | Noted: 2022-09-30 | Resolved: 2022-10-06

## 2022-10-06 PROCEDURE — 99214 OFFICE O/P EST MOD 30 MIN: CPT | Performed by: FAMILY MEDICINE

## 2022-10-06 RX ORDER — TORSEMIDE 5 MG/1
5 TABLET ORAL DAILY
Qty: 30 TABLET | Refills: 3 | Status: SHIPPED | OUTPATIENT
Start: 2022-10-06 | End: 2022-11-09 | Stop reason: SDUPTHER

## 2022-10-06 ASSESSMENT — FIBROSIS 4 INDEX: FIB4 SCORE: .935672514619883041

## 2022-10-06 NOTE — PROGRESS NOTES
"CHIEF COMPLAINT / REASON FOR VISIT  Dedra Lobo is a 82 y.o. female that presents today to establish care.    HISTORY OF PRESENT ILLNESS  Here to establish care.  Reports bilateral ankle pain that radiates into mid anterior shins.    Exercise: does housework, gardening    Does have exertional dyspnea, dyspneic with one flight of stairs.    Denies orthopnea or PND. Denies significantly lower extremity edema.    Past Medical History  Myelofibrosis with anemia ([facility], monitored with weekly hemoglobin and hematocrit, currently treated with weekly Retacrit epoetin cosmo injections)  Recent GI bleed (due to gastric ulcer, status post clip x4  10/1/2022 received 2 units packed red blood cells during hospitalization, discharged hemoglobin of 7.6)      -Recommended 3-month course of oral PPI twice daily and follow-up EGD in 3 months  History of malignant bladder tumor (in 2015 s/p intravesical Valrubicin x6. followed by Dr. Calvillo with [facility], has at least yearly cystoscopies for surveillance)  Thrombocytosis (follows with Dr. Foley, previously on hydroxyurea, now on anagrelide.)  Cardiomegaly on chest x-ray    Objective      BP (!) 142/78 (BP Location: Right arm, Patient Position: Sitting, BP Cuff Size: Adult)   Pulse 93   Temp 36.6 °C (97.9 °F) (Temporal)   Ht 1.74 m (5' 8.5\")   Wt 88 kg (193 lb 14.4 oz)   SpO2 94%   BMI 29.05 kg/m²  Body mass index is 29.05 kg/m².    PHYSICAL EXAM  Constitutional: Sitting comfortably, in no acute distress, responds to questions appropriately.  Head: Normocephalic  Eyes:  No conjunctival injection, no scleral icterus, PERRL  Ears: External ear canals clear, TMs pearly grey with visualized bony landmarks and crisp light reflex  Mouth: Oral mucosa moist  Throat: Oropharynx clear without erythema or tonsillar exudates  Neck: No cervical or supraclavicular lymphadenopathy. No JVD.  Heart: Regular S1 S2, grade 1 systolic murmur " right upper sternal border with radiation to carotids  Lungs: Clear to auscultation bilaterally, no wheezes, rales, or rhonchi  Abdomen: Mild epigastric tenderness without rebound or guarding  Extremities: 2+ bilateral lower extremity pitting edema to knees  Skin: Warm and dry    ASSESSMENT & PLAN  1. Systolic murmur  2. Cardiomegaly  3. Exertional dyspnea  Possible congestive heart failure and undefined systolic murmur.  We will obtain TTE for further evaluation, as well as NT proBNP.  - EC-ECHOCARDIOGRAM COMPLETE W/O CONT; Future  - proBrain Natriuretic Peptide, NT; Future  - THYROID CASCADE PROFILE; Future  - Basic Metabolic Panel; Future    4. Lower extremity edema  Unsure of etiology, normal kidney function and liver function.  No venous stasis dermatitis.  She does have cardiomegaly on chest x-ray and a heart murmur.  May represent congestive heart failure.  She does not tolerate lower extremity compression.  We will start low-dose diuretic with torsemide 5 mg daily.  We will follow-up with me in clinic on October 27.  It is possible that her bilateral lower extremity pain is secondary to the edema, but if it does not improve with lessening of her edema then we will obtain radiographs of her bilateral ankles  - Basic Metabolic Panel; Future    5. Elevated blood pressure reading in office without diagnosis of hypertension  No previous diagnosis of hypertension.  Blood pressure today elevated at 142/78.  We will have her come back in to clinic for another check to confirm    6. Neoplasm of bladder  Malignant bladder tumor diagnosed 2015 s/p intravesical Valrubicin x6. followed by Dr. Calvillo with [facility], has at least yearly cystoscopies for surveillance    7. Myelofibrosis (HCC)  8. Thrombocytosis  9. 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, monitored with weekly hemoglobin and hematocrit, currently treated with weekly Retacrit  epoetin cosmo injections    10. Gastrointestinal hemorrhage associated with gastric ulcer  Still taking pantoprazole 40 mg twice daily.  Will have repeat EGD in 3 months with [facility]

## 2022-10-10 ENCOUNTER — HOSPITAL ENCOUNTER (OUTPATIENT)
Dept: LAB | Facility: MEDICAL CENTER | Age: 82
End: 2022-10-10
Attending: FAMILY MEDICINE
Payer: MEDICARE

## 2022-10-10 DIAGNOSIS — R06.09 EXERTIONAL DYSPNEA: ICD-10-CM

## 2022-10-10 DIAGNOSIS — I51.7 CARDIOMEGALY: ICD-10-CM

## 2022-10-10 DIAGNOSIS — R60.0 LOWER EXTREMITY EDEMA: ICD-10-CM

## 2022-10-10 DIAGNOSIS — R01.1 SYSTOLIC MURMUR: ICD-10-CM

## 2022-10-10 LAB
ANION GAP SERPL CALC-SCNC: 10 MMOL/L (ref 7–16)
BUN SERPL-MCNC: 12 MG/DL (ref 8–22)
CALCIUM SERPL-MCNC: 8.9 MG/DL (ref 8.5–10.5)
CHLORIDE SERPL-SCNC: 107 MMOL/L (ref 96–112)
CO2 SERPL-SCNC: 27 MMOL/L (ref 20–33)
CREAT SERPL-MCNC: 0.7 MG/DL (ref 0.5–1.4)
GFR SERPLBLD CREATININE-BSD FMLA CKD-EPI: 87 ML/MIN/1.73 M 2
GLUCOSE SERPL-MCNC: 84 MG/DL (ref 65–99)
NT-PROBNP SERPL IA-MCNC: 1366 PG/ML (ref 0–125)
POTASSIUM SERPL-SCNC: 3.7 MMOL/L (ref 3.6–5.5)
SODIUM SERPL-SCNC: 144 MMOL/L (ref 135–145)
T4 FREE SERPL-MCNC: 1.09 NG/DL (ref 0.93–1.7)
THYROPEROXIDASE AB SERPL-ACNC: 9 IU/ML (ref 0–9)
TSH SERPL DL<=0.005 MIU/L-ACNC: 4.82 UIU/ML (ref 0.38–5.33)

## 2022-10-10 PROCEDURE — 80048 BASIC METABOLIC PNL TOTAL CA: CPT

## 2022-10-10 PROCEDURE — 86376 MICROSOMAL ANTIBODY EACH: CPT

## 2022-10-10 PROCEDURE — 84443 ASSAY THYROID STIM HORMONE: CPT

## 2022-10-10 PROCEDURE — 83880 ASSAY OF NATRIURETIC PEPTIDE: CPT

## 2022-10-10 PROCEDURE — 84439 ASSAY OF FREE THYROXINE: CPT

## 2022-10-10 PROCEDURE — 36415 COLL VENOUS BLD VENIPUNCTURE: CPT

## 2022-10-11 ENCOUNTER — OUTPATIENT INFUSION SERVICES (OUTPATIENT)
Dept: ONCOLOGY | Facility: MEDICAL CENTER | Age: 82
End: 2022-10-11
Attending: INTERNAL MEDICINE
Payer: MEDICARE

## 2022-10-11 VITALS
TEMPERATURE: 98.4 F | HEART RATE: 113 BPM | DIASTOLIC BLOOD PRESSURE: 71 MMHG | RESPIRATION RATE: 18 BRPM | OXYGEN SATURATION: 91 % | WEIGHT: 190.26 LBS | SYSTOLIC BLOOD PRESSURE: 148 MMHG | HEIGHT: 70 IN | BODY MASS INDEX: 27.24 KG/M2

## 2022-10-11 DIAGNOSIS — C80.1 ANEMIA ASSOCIATED WITH MALIGNANT NEOPLASTIC DISEASE (HCC): ICD-10-CM

## 2022-10-11 DIAGNOSIS — D63.0 ANEMIA ASSOCIATED WITH MALIGNANT NEOPLASTIC DISEASE (HCC): ICD-10-CM

## 2022-10-11 DIAGNOSIS — D75.81 MYELOFIBROSIS (HCC): ICD-10-CM

## 2022-10-11 LAB
HCT VFR BLD AUTO: 28.8 % (ref 37–47)
HGB BLD-MCNC: 8.7 G/DL (ref 12–16)

## 2022-10-11 PROCEDURE — 36415 COLL VENOUS BLD VENIPUNCTURE: CPT

## 2022-10-11 PROCEDURE — 85018 HEMOGLOBIN: CPT

## 2022-10-11 PROCEDURE — 96372 THER/PROPH/DIAG INJ SC/IM: CPT

## 2022-10-11 PROCEDURE — 700111 HCHG RX REV CODE 636 W/ 250 OVERRIDE (IP): Mod: JG | Performed by: INTERNAL MEDICINE

## 2022-10-11 PROCEDURE — 85014 HEMATOCRIT: CPT

## 2022-10-11 RX ORDER — 0.9 % SODIUM CHLORIDE 0.9 %
3 VIAL (ML) INJECTION PRN
Status: CANCELLED | OUTPATIENT
Start: 2022-11-08

## 2022-10-11 RX ORDER — 0.9 % SODIUM CHLORIDE 0.9 %
3 VIAL (ML) INJECTION PRN
Status: CANCELLED | OUTPATIENT
Start: 2022-11-01

## 2022-10-11 RX ORDER — 0.9 % SODIUM CHLORIDE 0.9 %
VIAL (ML) INJECTION PRN
Status: CANCELLED | OUTPATIENT
Start: 2022-11-08

## 2022-10-11 RX ORDER — 0.9 % SODIUM CHLORIDE 0.9 %
10 VIAL (ML) INJECTION PRN
Status: CANCELLED | OUTPATIENT
Start: 2022-11-08

## 2022-10-11 RX ORDER — 0.9 % SODIUM CHLORIDE 0.9 %
10 VIAL (ML) INJECTION PRN
Status: CANCELLED | OUTPATIENT
Start: 2022-11-01

## 2022-10-11 RX ORDER — HEPARIN SODIUM (PORCINE) LOCK FLUSH IV SOLN 100 UNIT/ML 100 UNIT/ML
500 SOLUTION INTRAVENOUS PRN
Status: CANCELLED | OUTPATIENT
Start: 2022-11-08

## 2022-10-11 RX ORDER — 0.9 % SODIUM CHLORIDE 0.9 %
10 VIAL (ML) INJECTION PRN
Status: CANCELLED | OUTPATIENT
Start: 2022-10-11

## 2022-10-11 RX ORDER — 0.9 % SODIUM CHLORIDE 0.9 %
VIAL (ML) INJECTION PRN
Status: CANCELLED | OUTPATIENT
Start: 2022-10-11

## 2022-10-11 RX ORDER — 0.9 % SODIUM CHLORIDE 0.9 %
3 VIAL (ML) INJECTION PRN
Status: CANCELLED | OUTPATIENT
Start: 2022-10-11

## 2022-10-11 RX ORDER — HEPARIN SODIUM (PORCINE) LOCK FLUSH IV SOLN 100 UNIT/ML 100 UNIT/ML
500 SOLUTION INTRAVENOUS PRN
Status: CANCELLED | OUTPATIENT
Start: 2022-11-01

## 2022-10-11 RX ORDER — 0.9 % SODIUM CHLORIDE 0.9 %
VIAL (ML) INJECTION PRN
Status: CANCELLED | OUTPATIENT
Start: 2022-11-01

## 2022-10-11 RX ORDER — HEPARIN SODIUM (PORCINE) LOCK FLUSH IV SOLN 100 UNIT/ML 100 UNIT/ML
500 SOLUTION INTRAVENOUS PRN
Status: CANCELLED | OUTPATIENT
Start: 2022-10-11

## 2022-10-11 RX ADMIN — EPOETIN ALFA-EPBX 40000 UNITS: 40000 INJECTION, SOLUTION INTRAVENOUS; SUBCUTANEOUS at 16:04

## 2022-10-11 ASSESSMENT — FIBROSIS 4 INDEX: FIB4 SCORE: .935672514619883041

## 2022-10-12 NOTE — PROGRESS NOTES
Pt presents to [facility] for epoetin. Butterfly needle used in L hand; brisk blood return observed and pt tolerated well. Labs drawn and within treatable parameters. Epoetin injected into R back arm with no s/s of adverse reactions. Next appointment confirmed and education provided. Pt discharged to self care with all personal belongings and in NAD.

## 2022-10-18 ENCOUNTER — OUTPATIENT INFUSION SERVICES (OUTPATIENT)
Dept: ONCOLOGY | Facility: MEDICAL CENTER | Age: 82
End: 2022-10-18
Attending: INTERNAL MEDICINE
Payer: MEDICARE

## 2022-10-18 VITALS
HEIGHT: 70 IN | WEIGHT: 187.39 LBS | BODY MASS INDEX: 26.83 KG/M2 | SYSTOLIC BLOOD PRESSURE: 134 MMHG | HEART RATE: 85 BPM | RESPIRATION RATE: 18 BRPM | DIASTOLIC BLOOD PRESSURE: 67 MMHG | TEMPERATURE: 97.7 F | OXYGEN SATURATION: 93 %

## 2022-10-18 DIAGNOSIS — C80.1 ANEMIA ASSOCIATED WITH MALIGNANT NEOPLASTIC DISEASE (HCC): ICD-10-CM

## 2022-10-18 DIAGNOSIS — D63.0 ANEMIA ASSOCIATED WITH MALIGNANT NEOPLASTIC DISEASE (HCC): ICD-10-CM

## 2022-10-18 DIAGNOSIS — D75.81 MYELOFIBROSIS (HCC): ICD-10-CM

## 2022-10-18 LAB
HCT VFR BLD AUTO: 29.4 % (ref 37–47)
HGB BLD-MCNC: 8.8 G/DL (ref 12–16)

## 2022-10-18 PROCEDURE — 85018 HEMOGLOBIN: CPT

## 2022-10-18 PROCEDURE — 85014 HEMATOCRIT: CPT

## 2022-10-18 PROCEDURE — 700111 HCHG RX REV CODE 636 W/ 250 OVERRIDE (IP): Mod: JG | Performed by: INTERNAL MEDICINE

## 2022-10-18 PROCEDURE — 96372 THER/PROPH/DIAG INJ SC/IM: CPT

## 2022-10-18 RX ADMIN — EPOETIN ALFA-EPBX 40000 UNITS: 40000 INJECTION, SOLUTION INTRAVENOUS; SUBCUTANEOUS at 14:24

## 2022-10-18 ASSESSMENT — FIBROSIS 4 INDEX: FIB4 SCORE: .935672514619883041

## 2022-10-18 NOTE — PROGRESS NOTES
Pt arrived to [facility] for Retacrit. Labs drawn from R AC. Parameters met for tx. Retacrit given in back of L arm. Confirmed next appt and pt left in stable condition.

## 2022-10-25 ENCOUNTER — OUTPATIENT INFUSION SERVICES (OUTPATIENT)
Dept: ONCOLOGY | Facility: MEDICAL CENTER | Age: 82
End: 2022-10-25
Attending: INTERNAL MEDICINE
Payer: MEDICARE

## 2022-10-25 VITALS
RESPIRATION RATE: 18 BRPM | BODY MASS INDEX: 26.39 KG/M2 | HEIGHT: 70 IN | OXYGEN SATURATION: 91 % | HEART RATE: 85 BPM | WEIGHT: 184.3 LBS | SYSTOLIC BLOOD PRESSURE: 156 MMHG | DIASTOLIC BLOOD PRESSURE: 75 MMHG | TEMPERATURE: 97.8 F

## 2022-10-25 DIAGNOSIS — D63.0 ANEMIA ASSOCIATED WITH MALIGNANT NEOPLASTIC DISEASE (HCC): ICD-10-CM

## 2022-10-25 DIAGNOSIS — D75.81 MYELOFIBROSIS (HCC): ICD-10-CM

## 2022-10-25 DIAGNOSIS — C80.1 ANEMIA ASSOCIATED WITH MALIGNANT NEOPLASTIC DISEASE (HCC): ICD-10-CM

## 2022-10-25 LAB
HCT VFR BLD AUTO: 28.6 % (ref 37–47)
HGB BLD-MCNC: 8.5 G/DL (ref 12–16)

## 2022-10-25 PROCEDURE — 96372 THER/PROPH/DIAG INJ SC/IM: CPT

## 2022-10-25 PROCEDURE — 700111 HCHG RX REV CODE 636 W/ 250 OVERRIDE (IP): Mod: JG | Performed by: INTERNAL MEDICINE

## 2022-10-25 PROCEDURE — 85014 HEMATOCRIT: CPT

## 2022-10-25 PROCEDURE — 36415 COLL VENOUS BLD VENIPUNCTURE: CPT

## 2022-10-25 PROCEDURE — 85018 HEMOGLOBIN: CPT

## 2022-10-25 RX ORDER — M-VIT,TX,IRON,MINS/CALC/FOLIC 27MG-0.4MG
1 TABLET ORAL DAILY
COMMUNITY
End: 2023-09-19

## 2022-10-25 RX ORDER — IBUPROFEN 200 MG
500 CAPSULE ORAL DAILY
COMMUNITY
End: 2023-09-19

## 2022-10-25 RX ORDER — VITAMIN B COMPLEX
1000 TABLET ORAL DAILY
COMMUNITY

## 2022-10-25 RX ADMIN — EPOETIN ALFA-EPBX 40000 UNITS: 40000 INJECTION, SOLUTION INTRAVENOUS; SUBCUTANEOUS at 14:33

## 2022-10-25 ASSESSMENT — FIBROSIS 4 INDEX: FIB4 SCORE: .935672514619883041

## 2022-10-25 NOTE — PROGRESS NOTES
Pt arrived ambulatory to [facility] for weekly H/H possible Retacrit injection. POC discussed with pt and she agrees with plan. Pt with call light in reach for safety. Pt verbalized understanding to call for needs/assist.     H/H drawn as ordered. Results reviewed, Hgb 8.5 and HCT 28.6 today. Pt meets parameters for Retacrit injection. Pt medicated per MAR. Pt tolerated injection without s/s adverse reaction. Pt discharged to self care, NAD. Scheduling emailed for future appts. Pt to monitor My Chart for appt info.

## 2022-10-27 ENCOUNTER — TELEPHONE (OUTPATIENT)
Dept: MEDICAL GROUP | Facility: PHYSICIAN GROUP | Age: 82
End: 2022-10-27
Payer: MEDICARE

## 2022-10-27 NOTE — TELEPHONE ENCOUNTER
Phone Number Called: [phone] (home) [phone] (work)      Call outcome:  spoke to unidentified female at the phone number listed.     Message: Female stated that patient was on her way and would be arriving in about 10 minutes, advised since echo and labs were not completed I was calling to reschedule. Advised  STUART Santos that upon arrival to schedule out 1 week past the 10/06/2022  date of scheduled procedure

## 2022-11-01 ENCOUNTER — OUTPATIENT INFUSION SERVICES (OUTPATIENT)
Dept: ONCOLOGY | Facility: MEDICAL CENTER | Age: 82
End: 2022-11-01
Attending: INTERNAL MEDICINE
Payer: MEDICARE

## 2022-11-01 ENCOUNTER — PHARMACY VISIT (OUTPATIENT)
Dept: PHARMACY | Facility: MEDICAL CENTER | Age: 82
End: 2022-11-01
Payer: COMMERCIAL

## 2022-11-01 VITALS
OXYGEN SATURATION: 98 % | HEART RATE: 106 BPM | HEIGHT: 70 IN | RESPIRATION RATE: 18 BRPM | DIASTOLIC BLOOD PRESSURE: 65 MMHG | TEMPERATURE: 97.6 F | BODY MASS INDEX: 25.82 KG/M2 | WEIGHT: 180.34 LBS | SYSTOLIC BLOOD PRESSURE: 144 MMHG

## 2022-11-01 DIAGNOSIS — D75.81 MYELOFIBROSIS (HCC): ICD-10-CM

## 2022-11-01 DIAGNOSIS — C80.1 ANEMIA ASSOCIATED WITH MALIGNANT NEOPLASTIC DISEASE (HCC): ICD-10-CM

## 2022-11-01 DIAGNOSIS — D63.0 ANEMIA ASSOCIATED WITH MALIGNANT NEOPLASTIC DISEASE (HCC): ICD-10-CM

## 2022-11-01 LAB
HCT VFR BLD AUTO: 30.7 % (ref 37–47)
HGB BLD-MCNC: 9.4 G/DL (ref 12–16)

## 2022-11-01 PROCEDURE — RXMED WILLOW AMBULATORY MEDICATION CHARGE: Performed by: INTERNAL MEDICINE

## 2022-11-01 PROCEDURE — 85014 HEMATOCRIT: CPT

## 2022-11-01 PROCEDURE — 85018 HEMOGLOBIN: CPT

## 2022-11-01 PROCEDURE — 96372 THER/PROPH/DIAG INJ SC/IM: CPT

## 2022-11-01 PROCEDURE — 36415 COLL VENOUS BLD VENIPUNCTURE: CPT

## 2022-11-01 PROCEDURE — 700111 HCHG RX REV CODE 636 W/ 250 OVERRIDE (IP): Mod: JG | Performed by: INTERNAL MEDICINE

## 2022-11-01 RX ADMIN — EPOETIN ALFA-EPBX 40000 UNITS: 40000 INJECTION, SOLUTION INTRAVENOUS; SUBCUTANEOUS at 14:25

## 2022-11-01 ASSESSMENT — FIBROSIS 4 INDEX: FIB4 SCORE: .935672514619883041

## 2022-11-01 NOTE — PROGRESS NOTES
Dedra arrives to [facility] for weekly Retacrit. Patient denies acute health concerns. Reports fatigue has improved with injections. H/H drawn via venipuncture from L-AC without difficulty. Labs and BP within treatable parameters. Retacrit administered SQ to back of left arm without issues. Patient has her next appointment; print out provided. Discharged home to self care in no apparent distress.

## 2022-11-02 ENCOUNTER — HOSPITAL ENCOUNTER (OUTPATIENT)
Dept: CARDIOLOGY | Facility: MEDICAL CENTER | Age: 82
End: 2022-11-02
Attending: FAMILY MEDICINE
Payer: MEDICARE

## 2022-11-02 DIAGNOSIS — R01.1 SYSTOLIC MURMUR: ICD-10-CM

## 2022-11-02 DIAGNOSIS — I51.7 CARDIOMEGALY: ICD-10-CM

## 2022-11-02 LAB
LV EJECT FRACT  99904: 75
LV EJECT FRACT MOD 2C 99903: 73.18
LV EJECT FRACT MOD 4C 99902: 69.93
LV EJECT FRACT MOD BP 99901: 70.15

## 2022-11-02 PROCEDURE — 93306 TTE W/DOPPLER COMPLETE: CPT

## 2022-11-02 PROCEDURE — 93306 TTE W/DOPPLER COMPLETE: CPT | Mod: 26 | Performed by: INTERNAL MEDICINE

## 2022-11-03 DIAGNOSIS — I36.1 NONRHEUMATIC TRICUSPID VALVE REGURGITATION: ICD-10-CM

## 2022-11-03 DIAGNOSIS — I27.20 PULMONARY HYPERTENSION (HCC): Primary | ICD-10-CM

## 2022-11-03 DIAGNOSIS — I50.32 CHRONIC HEART FAILURE WITH PRESERVED EJECTION FRACTION (HCC): ICD-10-CM

## 2022-11-03 PROBLEM — I07.1 TRICUSPID REGURGITATION: Status: ACTIVE | Noted: 2022-11-03

## 2022-11-03 NOTE — PROGRESS NOTES
TTE 11/2/2022 shows:   - hyperdynamic LV systolic function with EF >75%   - moderately dilated RV with normal systolic function   - mild-moderate mitral stenosis   - moderate-severe tricuspid regurgitation   - eRVSP 95 mmHg, consistent with severe pulmonary hypertension     Recommending further evaluation by pulmonology to elucidate etiology of her severe pulmonary hypertension. No known primary lung disease. She does have hyperdynamic left ventricle which can be seen with high output heart failure (given her long-standing severe anemia related to her myelofibrosis). With her moderate-severe tricuspid regurgitation, also recommending evaluation by cardiology given that she is symptomatic with exertional dyspnea.

## 2022-11-07 ENCOUNTER — PATIENT MESSAGE (OUTPATIENT)
Dept: HEALTH INFORMATION MANAGEMENT | Facility: OTHER | Age: 82
End: 2022-11-07

## 2022-11-08 ENCOUNTER — OUTPATIENT INFUSION SERVICES (OUTPATIENT)
Dept: ONCOLOGY | Facility: MEDICAL CENTER | Age: 82
End: 2022-11-08
Attending: INTERNAL MEDICINE
Payer: MEDICARE

## 2022-11-08 VITALS
DIASTOLIC BLOOD PRESSURE: 62 MMHG | HEART RATE: 100 BPM | OXYGEN SATURATION: 98 % | SYSTOLIC BLOOD PRESSURE: 132 MMHG | HEIGHT: 70 IN | RESPIRATION RATE: 18 BRPM | TEMPERATURE: 97.6 F | WEIGHT: 180.78 LBS | BODY MASS INDEX: 25.88 KG/M2

## 2022-11-08 DIAGNOSIS — D75.81 MYELOFIBROSIS (HCC): ICD-10-CM

## 2022-11-08 DIAGNOSIS — D63.0 ANEMIA ASSOCIATED WITH MALIGNANT NEOPLASTIC DISEASE (HCC): ICD-10-CM

## 2022-11-08 DIAGNOSIS — C80.1 ANEMIA ASSOCIATED WITH MALIGNANT NEOPLASTIC DISEASE (HCC): ICD-10-CM

## 2022-11-08 LAB
HCT VFR BLD AUTO: 29.3 % (ref 37–47)
HGB BLD-MCNC: 8.8 G/DL (ref 12–16)

## 2022-11-08 PROCEDURE — 700111 HCHG RX REV CODE 636 W/ 250 OVERRIDE (IP): Mod: JG | Performed by: INTERNAL MEDICINE

## 2022-11-08 PROCEDURE — 96372 THER/PROPH/DIAG INJ SC/IM: CPT

## 2022-11-08 PROCEDURE — 85018 HEMOGLOBIN: CPT

## 2022-11-08 PROCEDURE — 36415 COLL VENOUS BLD VENIPUNCTURE: CPT

## 2022-11-08 PROCEDURE — 85014 HEMATOCRIT: CPT

## 2022-11-08 RX ORDER — 0.9 % SODIUM CHLORIDE 0.9 %
3 VIAL (ML) INJECTION PRN
Status: CANCELLED | OUTPATIENT
Start: 2023-01-31

## 2022-11-08 RX ORDER — 0.9 % SODIUM CHLORIDE 0.9 %
3 VIAL (ML) INJECTION PRN
Status: CANCELLED | OUTPATIENT
Start: 2022-12-06

## 2022-11-08 RX ORDER — 0.9 % SODIUM CHLORIDE 0.9 %
10 VIAL (ML) INJECTION PRN
Status: CANCELLED | OUTPATIENT
Start: 2023-02-15

## 2022-11-08 RX ORDER — 0.9 % SODIUM CHLORIDE 0.9 %
VIAL (ML) INJECTION PRN
Status: CANCELLED | OUTPATIENT
Start: 2023-01-17

## 2022-11-08 RX ORDER — 0.9 % SODIUM CHLORIDE 0.9 %
3 VIAL (ML) INJECTION PRN
Status: CANCELLED | OUTPATIENT
Start: 2022-11-29

## 2022-11-08 RX ORDER — HEPARIN SODIUM (PORCINE) LOCK FLUSH IV SOLN 100 UNIT/ML 100 UNIT/ML
500 SOLUTION INTRAVENOUS PRN
Status: CANCELLED | OUTPATIENT
Start: 2023-01-17

## 2022-11-08 RX ORDER — 0.9 % SODIUM CHLORIDE 0.9 %
10 VIAL (ML) INJECTION PRN
Status: CANCELLED | OUTPATIENT
Start: 2023-01-31

## 2022-11-08 RX ORDER — 0.9 % SODIUM CHLORIDE 0.9 %
10 VIAL (ML) INJECTION PRN
Status: CANCELLED | OUTPATIENT
Start: 2023-01-10

## 2022-11-08 RX ORDER — HEPARIN SODIUM (PORCINE) LOCK FLUSH IV SOLN 100 UNIT/ML 100 UNIT/ML
500 SOLUTION INTRAVENOUS PRN
Status: CANCELLED | OUTPATIENT
Start: 2023-01-31

## 2022-11-08 RX ORDER — 0.9 % SODIUM CHLORIDE 0.9 %
10 VIAL (ML) INJECTION PRN
Status: CANCELLED | OUTPATIENT
Start: 2023-01-03

## 2022-11-08 RX ORDER — 0.9 % SODIUM CHLORIDE 0.9 %
10 VIAL (ML) INJECTION PRN
Status: CANCELLED | OUTPATIENT
Start: 2022-12-06

## 2022-11-08 RX ORDER — 0.9 % SODIUM CHLORIDE 0.9 %
3 VIAL (ML) INJECTION PRN
Status: CANCELLED | OUTPATIENT
Start: 2022-12-27

## 2022-11-08 RX ORDER — 0.9 % SODIUM CHLORIDE 0.9 %
10 VIAL (ML) INJECTION PRN
Status: CANCELLED | OUTPATIENT
Start: 2022-11-29

## 2022-11-08 RX ORDER — 0.9 % SODIUM CHLORIDE 0.9 %
3 VIAL (ML) INJECTION PRN
Status: CANCELLED | OUTPATIENT
Start: 2022-11-15

## 2022-11-08 RX ORDER — 0.9 % SODIUM CHLORIDE 0.9 %
VIAL (ML) INJECTION PRN
Status: CANCELLED | OUTPATIENT
Start: 2023-01-24

## 2022-11-08 RX ORDER — HEPARIN SODIUM (PORCINE) LOCK FLUSH IV SOLN 100 UNIT/ML 100 UNIT/ML
500 SOLUTION INTRAVENOUS PRN
Status: CANCELLED | OUTPATIENT
Start: 2022-11-15

## 2022-11-08 RX ORDER — 0.9 % SODIUM CHLORIDE 0.9 %
10 VIAL (ML) INJECTION PRN
Status: CANCELLED | OUTPATIENT
Start: 2022-11-15

## 2022-11-08 RX ORDER — 0.9 % SODIUM CHLORIDE 0.9 %
10 VIAL (ML) INJECTION PRN
Status: CANCELLED | OUTPATIENT
Start: 2023-02-22

## 2022-11-08 RX ORDER — HEPARIN SODIUM (PORCINE) LOCK FLUSH IV SOLN 100 UNIT/ML 100 UNIT/ML
500 SOLUTION INTRAVENOUS PRN
Status: CANCELLED | OUTPATIENT
Start: 2022-12-20

## 2022-11-08 RX ORDER — 0.9 % SODIUM CHLORIDE 0.9 %
10 VIAL (ML) INJECTION PRN
Status: CANCELLED | OUTPATIENT
Start: 2022-12-13

## 2022-11-08 RX ORDER — 0.9 % SODIUM CHLORIDE 0.9 %
10 VIAL (ML) INJECTION PRN
Status: CANCELLED | OUTPATIENT
Start: 2023-01-24

## 2022-11-08 RX ORDER — 0.9 % SODIUM CHLORIDE 0.9 %
10 VIAL (ML) INJECTION PRN
Status: CANCELLED | OUTPATIENT
Start: 2023-01-17

## 2022-11-08 RX ORDER — HEPARIN SODIUM (PORCINE) LOCK FLUSH IV SOLN 100 UNIT/ML 100 UNIT/ML
500 SOLUTION INTRAVENOUS PRN
Status: CANCELLED | OUTPATIENT
Start: 2023-01-24

## 2022-11-08 RX ORDER — 0.9 % SODIUM CHLORIDE 0.9 %
10 VIAL (ML) INJECTION PRN
Status: CANCELLED | OUTPATIENT
Start: 2023-03-01

## 2022-11-08 RX ORDER — HEPARIN SODIUM (PORCINE) LOCK FLUSH IV SOLN 100 UNIT/ML 100 UNIT/ML
500 SOLUTION INTRAVENOUS PRN
Status: CANCELLED | OUTPATIENT
Start: 2023-02-07

## 2022-11-08 RX ORDER — 0.9 % SODIUM CHLORIDE 0.9 %
10 VIAL (ML) INJECTION PRN
Status: CANCELLED | OUTPATIENT
Start: 2022-12-27

## 2022-11-08 RX ORDER — 0.9 % SODIUM CHLORIDE 0.9 %
3 VIAL (ML) INJECTION PRN
Status: CANCELLED | OUTPATIENT
Start: 2022-11-22

## 2022-11-08 RX ORDER — HEPARIN SODIUM (PORCINE) LOCK FLUSH IV SOLN 100 UNIT/ML 100 UNIT/ML
500 SOLUTION INTRAVENOUS PRN
Status: CANCELLED | OUTPATIENT
Start: 2023-02-22

## 2022-11-08 RX ORDER — 0.9 % SODIUM CHLORIDE 0.9 %
VIAL (ML) INJECTION PRN
Status: CANCELLED | OUTPATIENT
Start: 2023-01-10

## 2022-11-08 RX ORDER — 0.9 % SODIUM CHLORIDE 0.9 %
3 VIAL (ML) INJECTION PRN
Status: CANCELLED | OUTPATIENT
Start: 2023-01-17

## 2022-11-08 RX ORDER — 0.9 % SODIUM CHLORIDE 0.9 %
VIAL (ML) INJECTION PRN
Status: CANCELLED | OUTPATIENT
Start: 2022-12-20

## 2022-11-08 RX ORDER — 0.9 % SODIUM CHLORIDE 0.9 %
10 VIAL (ML) INJECTION PRN
Status: CANCELLED | OUTPATIENT
Start: 2022-11-22

## 2022-11-08 RX ORDER — HEPARIN SODIUM (PORCINE) LOCK FLUSH IV SOLN 100 UNIT/ML 100 UNIT/ML
500 SOLUTION INTRAVENOUS PRN
Status: CANCELLED | OUTPATIENT
Start: 2023-03-01

## 2022-11-08 RX ORDER — 0.9 % SODIUM CHLORIDE 0.9 %
10 VIAL (ML) INJECTION PRN
Status: CANCELLED | OUTPATIENT
Start: 2022-12-20

## 2022-11-08 RX ORDER — 0.9 % SODIUM CHLORIDE 0.9 %
VIAL (ML) INJECTION PRN
Status: CANCELLED | OUTPATIENT
Start: 2022-12-27

## 2022-11-08 RX ORDER — 0.9 % SODIUM CHLORIDE 0.9 %
3 VIAL (ML) INJECTION PRN
Status: CANCELLED | OUTPATIENT
Start: 2023-03-01

## 2022-11-08 RX ORDER — 0.9 % SODIUM CHLORIDE 0.9 %
3 VIAL (ML) INJECTION PRN
Status: CANCELLED | OUTPATIENT
Start: 2023-01-24

## 2022-11-08 RX ORDER — 0.9 % SODIUM CHLORIDE 0.9 %
3 VIAL (ML) INJECTION PRN
Status: CANCELLED | OUTPATIENT
Start: 2023-02-22

## 2022-11-08 RX ORDER — 0.9 % SODIUM CHLORIDE 0.9 %
VIAL (ML) INJECTION PRN
Status: CANCELLED | OUTPATIENT
Start: 2022-11-29

## 2022-11-08 RX ORDER — 0.9 % SODIUM CHLORIDE 0.9 %
3 VIAL (ML) INJECTION PRN
Status: CANCELLED | OUTPATIENT
Start: 2023-02-07

## 2022-11-08 RX ORDER — 0.9 % SODIUM CHLORIDE 0.9 %
VIAL (ML) INJECTION PRN
Status: CANCELLED | OUTPATIENT
Start: 2022-11-15

## 2022-11-08 RX ORDER — HEPARIN SODIUM (PORCINE) LOCK FLUSH IV SOLN 100 UNIT/ML 100 UNIT/ML
500 SOLUTION INTRAVENOUS PRN
Status: CANCELLED | OUTPATIENT
Start: 2022-12-27

## 2022-11-08 RX ORDER — 0.9 % SODIUM CHLORIDE 0.9 %
VIAL (ML) INJECTION PRN
Status: CANCELLED | OUTPATIENT
Start: 2023-01-03

## 2022-11-08 RX ORDER — 0.9 % SODIUM CHLORIDE 0.9 %
VIAL (ML) INJECTION PRN
Status: CANCELLED | OUTPATIENT
Start: 2022-12-06

## 2022-11-08 RX ORDER — 0.9 % SODIUM CHLORIDE 0.9 %
10 VIAL (ML) INJECTION PRN
Status: CANCELLED | OUTPATIENT
Start: 2023-02-07

## 2022-11-08 RX ORDER — 0.9 % SODIUM CHLORIDE 0.9 %
VIAL (ML) INJECTION PRN
Status: CANCELLED | OUTPATIENT
Start: 2023-02-22

## 2022-11-08 RX ORDER — 0.9 % SODIUM CHLORIDE 0.9 %
VIAL (ML) INJECTION PRN
Status: CANCELLED | OUTPATIENT
Start: 2022-11-22

## 2022-11-08 RX ORDER — HEPARIN SODIUM (PORCINE) LOCK FLUSH IV SOLN 100 UNIT/ML 100 UNIT/ML
500 SOLUTION INTRAVENOUS PRN
Status: CANCELLED | OUTPATIENT
Start: 2022-11-22

## 2022-11-08 RX ORDER — 0.9 % SODIUM CHLORIDE 0.9 %
3 VIAL (ML) INJECTION PRN
Status: CANCELLED | OUTPATIENT
Start: 2023-01-03

## 2022-11-08 RX ORDER — 0.9 % SODIUM CHLORIDE 0.9 %
VIAL (ML) INJECTION PRN
Status: CANCELLED | OUTPATIENT
Start: 2023-02-15

## 2022-11-08 RX ORDER — 0.9 % SODIUM CHLORIDE 0.9 %
VIAL (ML) INJECTION PRN
Status: CANCELLED | OUTPATIENT
Start: 2023-03-01

## 2022-11-08 RX ORDER — HEPARIN SODIUM (PORCINE) LOCK FLUSH IV SOLN 100 UNIT/ML 100 UNIT/ML
500 SOLUTION INTRAVENOUS PRN
Status: CANCELLED | OUTPATIENT
Start: 2022-11-29

## 2022-11-08 RX ORDER — 0.9 % SODIUM CHLORIDE 0.9 %
3 VIAL (ML) INJECTION PRN
Status: CANCELLED | OUTPATIENT
Start: 2022-12-20

## 2022-11-08 RX ORDER — HEPARIN SODIUM (PORCINE) LOCK FLUSH IV SOLN 100 UNIT/ML 100 UNIT/ML
500 SOLUTION INTRAVENOUS PRN
Status: CANCELLED | OUTPATIENT
Start: 2022-12-13

## 2022-11-08 RX ORDER — HEPARIN SODIUM (PORCINE) LOCK FLUSH IV SOLN 100 UNIT/ML 100 UNIT/ML
500 SOLUTION INTRAVENOUS PRN
Status: CANCELLED | OUTPATIENT
Start: 2022-12-06

## 2022-11-08 RX ORDER — HEPARIN SODIUM (PORCINE) LOCK FLUSH IV SOLN 100 UNIT/ML 100 UNIT/ML
500 SOLUTION INTRAVENOUS PRN
Status: CANCELLED | OUTPATIENT
Start: 2023-01-10

## 2022-11-08 RX ORDER — 0.9 % SODIUM CHLORIDE 0.9 %
VIAL (ML) INJECTION PRN
Status: CANCELLED | OUTPATIENT
Start: 2023-02-07

## 2022-11-08 RX ORDER — 0.9 % SODIUM CHLORIDE 0.9 %
3 VIAL (ML) INJECTION PRN
Status: CANCELLED | OUTPATIENT
Start: 2023-02-15

## 2022-11-08 RX ORDER — 0.9 % SODIUM CHLORIDE 0.9 %
3 VIAL (ML) INJECTION PRN
Status: CANCELLED | OUTPATIENT
Start: 2023-01-10

## 2022-11-08 RX ORDER — 0.9 % SODIUM CHLORIDE 0.9 %
VIAL (ML) INJECTION PRN
Status: CANCELLED | OUTPATIENT
Start: 2023-01-31

## 2022-11-08 RX ORDER — 0.9 % SODIUM CHLORIDE 0.9 %
3 VIAL (ML) INJECTION PRN
Status: CANCELLED | OUTPATIENT
Start: 2022-12-13

## 2022-11-08 RX ORDER — HEPARIN SODIUM (PORCINE) LOCK FLUSH IV SOLN 100 UNIT/ML 100 UNIT/ML
500 SOLUTION INTRAVENOUS PRN
Status: CANCELLED | OUTPATIENT
Start: 2023-01-03

## 2022-11-08 RX ORDER — HEPARIN SODIUM (PORCINE) LOCK FLUSH IV SOLN 100 UNIT/ML 100 UNIT/ML
500 SOLUTION INTRAVENOUS PRN
Status: CANCELLED | OUTPATIENT
Start: 2023-02-15

## 2022-11-08 RX ORDER — 0.9 % SODIUM CHLORIDE 0.9 %
VIAL (ML) INJECTION PRN
Status: CANCELLED | OUTPATIENT
Start: 2022-12-13

## 2022-11-08 RX ADMIN — EPOETIN ALFA-EPBX 40000 UNITS: 40000 INJECTION, SOLUTION INTRAVENOUS; SUBCUTANEOUS at 14:05

## 2022-11-08 ASSESSMENT — FIBROSIS 4 INDEX: FIB4 SCORE: .935672514619883041

## 2022-11-09 ENCOUNTER — OFFICE VISIT (OUTPATIENT)
Dept: MEDICAL GROUP | Facility: PHYSICIAN GROUP | Age: 82
End: 2022-11-09
Payer: MEDICARE

## 2022-11-09 VITALS
WEIGHT: 182.7 LBS | HEART RATE: 102 BPM | SYSTOLIC BLOOD PRESSURE: 140 MMHG | DIASTOLIC BLOOD PRESSURE: 72 MMHG | BODY MASS INDEX: 27.06 KG/M2 | OXYGEN SATURATION: 95 % | TEMPERATURE: 98.5 F | HEIGHT: 69 IN

## 2022-11-09 DIAGNOSIS — C80.1 ANEMIA ASSOCIATED WITH MALIGNANT NEOPLASTIC DISEASE (HCC): ICD-10-CM

## 2022-11-09 DIAGNOSIS — D63.0 ANEMIA ASSOCIATED WITH MALIGNANT NEOPLASTIC DISEASE (HCC): ICD-10-CM

## 2022-11-09 DIAGNOSIS — I27.20 PULMONARY HYPERTENSION (HCC): ICD-10-CM

## 2022-11-09 DIAGNOSIS — I36.1 NONRHEUMATIC TRICUSPID VALVE REGURGITATION: ICD-10-CM

## 2022-11-09 DIAGNOSIS — R60.0 LOWER EXTREMITY EDEMA: ICD-10-CM

## 2022-11-09 DIAGNOSIS — I50.32 CHRONIC HEART FAILURE WITH PRESERVED EJECTION FRACTION (HCC): ICD-10-CM

## 2022-11-09 PROBLEM — I51.7 CARDIOMEGALY: Status: RESOLVED | Noted: 2022-10-06 | Resolved: 2022-11-09

## 2022-11-09 PROBLEM — R01.1 SYSTOLIC MURMUR: Status: RESOLVED | Noted: 2022-10-06 | Resolved: 2022-11-09

## 2022-11-09 PROCEDURE — 99214 OFFICE O/P EST MOD 30 MIN: CPT | Performed by: FAMILY MEDICINE

## 2022-11-09 RX ORDER — TORSEMIDE 10 MG/1
10 TABLET ORAL DAILY
Qty: 90 TABLET | Refills: 3 | Status: SHIPPED | OUTPATIENT
Start: 2022-11-09 | End: 2022-11-23

## 2022-11-09 ASSESSMENT — FIBROSIS 4 INDEX: FIB4 SCORE: .935672514619883041

## 2022-11-09 NOTE — PROGRESS NOTES
"CHIEF COMPLAINT / REASON FOR VISIT  Dedra Lobo is a 82 y.o. female with history of myelofibrosis and longstanding anemia, severe pulmonary hypertension, heart failure with preserved ejection fraction, that presents today for follow-up of labs and echocardiogram    HISTORY OF PRESENT ILLNESS  At last visit on 10/6/2022 for hypervolemia she was started on torsemide 5 mg daily.  Subsequently on 10/10/2022 her sodium was 144, potassium 3.7, creatinine 0.70 with GFR 87.  Other labs significant for elevated NT proBNP of 1366    TTE 11/2/2022 shows:   - hyperdynamic LV systolic function with EF >75%   - moderately dilated RV with normal systolic function   - mild-moderate mitral stenosis   - moderate-severe tricuspid regurgitation   - eRVSP 95 mmHg, consistent with severe pulmonary hypertension    OBJECTIVE     BP (!) 140/72 (BP Location: Left arm, Patient Position: Sitting, BP Cuff Size: Adult)   Pulse (!) 102   Temp 36.9 °C (98.5 °F) (Temporal)   Ht 1.74 m (5' 8.5\")   Wt 82.9 kg (182 lb 11.2 oz)   SpO2 95%   BMI 27.38 kg/m²  Body mass index is 27.38 kg/m².    PHYSICAL EXAM  Constitutional: Sitting comfortably, in no acute distress, responds to questions appropriately.  Extremities: 1+ pitting edema to mid shin bilaterally  Skin: Warm and dry, no rashes or lesions on face or exposed upper extremities    ASSESSMENT & PLAN  1. Chronic heart failure with preserved ejection fraction (HCC)  2. 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 Lower extremity edema  4. Pulmonary hypertension (HCC)  5. Nonrheumatic tricuspid valve regurgitation  Severe pulmonary hypertension (eRVSP 95 mmHg) of unknown etiology.  I suspect that given her longstanding severe anemia (related to her myelofibrosis), she may have high-output heart failure which is causing her pulmonary hypertension, which would be supported by her hyperdynamic left ventricle with LVEF >75%.  She also has moderate-severe tricuspid " regurgitation.  Recommending further evaluation by cardiology.  Although she has no known primary lung disease, recommended evaluation by pulmonology to rule this out as a potential cause for pulmonary hypertension.    For her lower extremity edema we will increase her torsemide from 5 mg daily to 10 mg daily.  Follow-up in approximately 2 weeks

## 2022-11-15 ENCOUNTER — OUTPATIENT INFUSION SERVICES (OUTPATIENT)
Dept: ONCOLOGY | Facility: MEDICAL CENTER | Age: 82
End: 2022-11-15
Attending: INTERNAL MEDICINE
Payer: MEDICARE

## 2022-11-15 VITALS
RESPIRATION RATE: 18 BRPM | BODY MASS INDEX: 26.01 KG/M2 | HEIGHT: 70 IN | HEART RATE: 109 BPM | SYSTOLIC BLOOD PRESSURE: 139 MMHG | OXYGEN SATURATION: 96 % | TEMPERATURE: 97.3 F | DIASTOLIC BLOOD PRESSURE: 66 MMHG | WEIGHT: 181.66 LBS

## 2022-11-15 DIAGNOSIS — D63.0 ANEMIA ASSOCIATED WITH MALIGNANT NEOPLASTIC DISEASE (HCC): ICD-10-CM

## 2022-11-15 DIAGNOSIS — C80.1 ANEMIA ASSOCIATED WITH MALIGNANT NEOPLASTIC DISEASE (HCC): ICD-10-CM

## 2022-11-15 DIAGNOSIS — D75.81 MYELOFIBROSIS (HCC): ICD-10-CM

## 2022-11-15 LAB
HCT VFR BLD AUTO: 30.6 % (ref 37–47)
HGB BLD-MCNC: 9.1 G/DL (ref 12–16)

## 2022-11-15 PROCEDURE — 700111 HCHG RX REV CODE 636 W/ 250 OVERRIDE (IP): Mod: JG | Performed by: INTERNAL MEDICINE

## 2022-11-15 PROCEDURE — 96372 THER/PROPH/DIAG INJ SC/IM: CPT

## 2022-11-15 PROCEDURE — 85018 HEMOGLOBIN: CPT

## 2022-11-15 PROCEDURE — 85014 HEMATOCRIT: CPT

## 2022-11-15 PROCEDURE — 36415 COLL VENOUS BLD VENIPUNCTURE: CPT

## 2022-11-15 RX ADMIN — EPOETIN ALFA-EPBX 40000 UNITS: 40000 INJECTION, SOLUTION INTRAVENOUS; SUBCUTANEOUS at 15:44

## 2022-11-15 ASSESSMENT — FIBROSIS 4 INDEX: FIB4 SCORE: .935672514619883041

## 2022-11-16 ENCOUNTER — OFFICE VISIT (OUTPATIENT)
Dept: CARDIOLOGY | Facility: MEDICAL CENTER | Age: 82
End: 2022-11-16
Attending: FAMILY MEDICINE
Payer: MEDICARE

## 2022-11-16 VITALS
DIASTOLIC BLOOD PRESSURE: 74 MMHG | RESPIRATION RATE: 16 BRPM | HEIGHT: 70 IN | WEIGHT: 182 LBS | SYSTOLIC BLOOD PRESSURE: 136 MMHG | BODY MASS INDEX: 26.05 KG/M2 | OXYGEN SATURATION: 96 % | HEART RATE: 96 BPM

## 2022-11-16 DIAGNOSIS — I36.1 NONRHEUMATIC TRICUSPID VALVE REGURGITATION: ICD-10-CM

## 2022-11-16 DIAGNOSIS — I27.20 PULMONARY HYPERTENSION (HCC): ICD-10-CM

## 2022-11-16 PROCEDURE — 99204 OFFICE O/P NEW MOD 45 MIN: CPT | Performed by: INTERNAL MEDICINE

## 2022-11-16 ASSESSMENT — ENCOUNTER SYMPTOMS
HEMOPTYSIS: 0
WHEEZING: 0
FEVER: 0
COUGH: 0
DIAPHORESIS: 0
HEMATOCHEZIA: 0

## 2022-11-16 ASSESSMENT — FIBROSIS 4 INDEX: FIB4 SCORE: .935672514619883041

## 2022-11-16 NOTE — PROGRESS NOTES
Pt arrives to [facility] for Retacrit injection.  Discussed plan of care and Retacrit medication guide was given to pt.  Labs drawn via 23g butterfly needle to R-AC.  Hemoglobin is 9.1 today.  Results meet MD parameters for Retacrit.  Retacrit given SC to back of LUE.  Confirmed next appt time on 11/22/22 with pt.   Pt dc home to self care.

## 2022-11-16 NOTE — PROGRESS NOTES
Cardiology Initial Consultation Note    Date of note: 11/15/2022    Primary Care Provider: Waqas Boss M.D.  Referring Provider: Waqas Boss M.D.    Patient Name: Dedra Lobo  YOB: 1940  MRN:              [id number]    Chief Complaint   Patient presents with    Hypertension     NP Dx: Pulmonary hypertension (HCC)      Congestive Heart Failure     NP Dx: Chronic heart failure with preserved ejection fraction (HCC)      Edema     NP Dx: Lower extremity edema       History of Present Illness: Ms. Dedra Lobo is a 82 y.o. female whose current medical problems include history of bladder cancer, status postresection, severe anemia related to myelofibrosis, GI bleed due to gastric ulcer 9/30/2022 that required transfusion, who is here for cardiac consultation for pulmonary hypertension and moderate to severe tricuspid regurgitation noted on recent echo.    The patient was admitted on 79/30/2022 for acute GI bleed that required transfusions and fluid resuscitation.  EGD showed a gastric ulcer.  Patient was put on a PPI.  She reports she did receive a lot of fluids during this hospitalization.  Prior to admission, she did not have lower extremity edema, but since that time she has noticed bilateral leg swelling started after her hospitalization 9/30/2022.  They are going down.  She was on torsemide 5 mg po qd and recently increased to 10 mg po qd.  She does no take it when she has appointments. Goes to infusion clinic every Tuesday.  She estimates on average she may miss 3 or more doses per week as she also does not take it on Sunday when she goes to [place].    She does report for about a year, she was quite debilitated.  However since getting treatment in infusion, she is gaining more strength, she feels less short of breath, her breathing is improving every day.    Never smoked.  She worked for the [employer] before retiring.  She does no really recall any exposure.  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 She does no snore and no excessive daytime sleepiness.  She does not wake up gasping for air.  No recent long rides.  She was down and no able to do much about 1 and 1/2 year ago.    She denies any chest pain, no lightheadedness, no dizziness, no palpitation, no syncope.    Cardiovascular Risk Factors:  1. Smoking status:   Tobacco Use: Low Risk     Smoking Tobacco Use: Never    Smokeless Tobacco Use: Never    Passive Exposure: Not on file     2. Type II Diabetes Mellitus: No results found for: HBA1C  3. Hypertension: No  4. Dyslipidemia: No  Cholesterol,Tot   Date Value Ref Range Status   01/16/2015 172 100 - 199 mg/dL Final     LDL   Date Value Ref Range Status   01/16/2015 89 <100 mg/dL Final     HDL   Date Value Ref Range Status   01/16/2015 52 >=40 mg/dL Final     Triglycerides   Date Value Ref Range Status   01/16/2015 153 (H) 0 - 149 mg/dL Final     5. Family history of early Coronary Artery Disease in a first degree relative (Male less than 55 years of age; Female less than 65 years of age): No    Past Medical History:   Diagnosis Date    Adverse effect of anesthesia     core body temp drops per hx.    Anemia     Anesthesia     \"core temperature drops\"    Arrhythmia     Arthritis     oa, neck and hips    Cancer (HCC) 2015    bladder    Cardiomegaly 10/6/2022    Incidental on CXR. Also has lower extremity pitting edema. Will obtain TTE    Cataract 03/21/2019    bilateral no surgery    GERD (gastroesophageal reflux disease)     Glaucoma 2/2015    \"beginning\"    Osteoporosis, unspecified     Pain     neck    Pneumonia 10/2001    Systolic murmur 10/6/2022    Thrombocytosis 9/29/2010    Ulcer     gastric ulcers    Unspecified disorder of thyroid     LOW THYROID LEVELS- TOOK  MEDS    Urinary bladder disorder 2015    bladder cancer    Urinary incontinence 03/21/2019    Vascular insufficiency of limb 2005    right lower extrematy    Vitamin d deficiency 9/29/2010 "       Past Surgical History:   Procedure Laterality Date    VA UPPER GI ENDOSCOPY,DIAGNOSIS N/A 10/1/2022    Procedure: GASTROSCOPY;  Surgeon: Ana Gaston D.O.;  Location: SURGERY [facility];  Service: Gastroenterology    VA UPPER GI ENDOSCOPY,SCLER INJECT N/A 10/1/2022    Procedure: GASTROSCOPY, WITH SCLEROTHERAPY;  Surgeon: Ana Gaston D.O.;  Location: SURGERY [facility];  Service: Gastroenterology    VA UPPER GI ENDOSCOPY,CTRL BLEED N/A 10/1/2022    Procedure: EGD, WITH CLIP PLACEMENT;  Surgeon: Ana Gaston D.O.;  Location: SURGERY [facility];  Service: Gastroenterology    PB TOTAL KNEE ARTHROPLASTY Left 4/3/2019    Procedure: KNEE ARTHROPLASTY TOTAL;  Surgeon: Robert Vazquez M.D.;  Location: SURGERY [facility];  Service: Orthopedics    CERVICAL DISK AND FUSION ANTERIOR  2/9/2016    Procedure: CERVICAL DISK AND FUSION ANTERIOR  C3-7;  Surgeon: Dusty Rao M.D.;  Location: SURGERY [facility];  Service:     CYSTOSCOPY  3/3/2015    Performed by Walt Olson M.D. at SURGERY [facility]    TRANS URETHRAL RESECTION BLADDER  3/3/2015    Performed by Walt Olson M.D. at [facility]    HIP REPLACEMENT, TOTAL  2006    Dr. Vazquez    CHOLECYSTECTOMY  2003    LUMPECTOMY  1982    cyst    ARTHROPLASTY      bi lateral-Dr. Vazquez       Current Outpatient Medications   Medication Sig Dispense Refill    torsemide (DEMADEX) 10 MG tablet Take 1 Tablet by mouth every day. 90 Tablet 3    vitamin D3 (CHOLECALCIFEROL) 1000 Unit (25 mcg) Tab Take 1 Tablet by mouth every day.      therapeutic multivitamin-minerals (THERAGRAN-M) Tab Take 1 Tablet by mouth every day.      calcium carbonate (OS-KEELY 500) 1250 (500 Ca) MG Tab Take 1 Tablet by mouth every day.      pantoprazole (PROTONIX) 40 MG Tablet Delayed Response Take 1 Tablet by mouth 2 times a day. 60 Tablet 3    acetaminophen (TYLENOL) 500 MG Tab Take 1-2 Tablets by mouth 1 time a day as needed for Mild Pain.      Carboxymethylcellulose Sod  "PF 0.5 % Solution Administer 1 Drop into both eyes every evening.      anagrelide (AGRYLIN) 0.5 MG Cap Take 1 Capsule by mouth 2 times a day.       No current facility-administered medications for this visit.       No Known Allergies    Family History   Problem Relation Age of Onset    Cancer Mother         brain    Lung Disease Mother         smoker    Alcohol/Drug Father         etoh    Lung Disease Sister         COPD smokers    Alcohol/Drug Sister         etoh    Lung Disease Sister         smoker-copd    Alcohol/Drug Sister         etoh       Social History     Socioeconomic History    Marital status: Single   Tobacco Use    Smoking status: Never    Smokeless tobacco: Never   Vaping Use    Vaping Use: Never used   Substance and Sexual Activity    Alcohol use: No     Alcohol/week: 0.0 oz    Drug use: No    Sexual activity: Never        Review of Systems   Constitutional: Negative for diaphoresis and fever.   Respiratory:  Negative for cough, hemoptysis and wheezing.    Gastrointestinal:  Negative for hematochezia and melena.   Genitourinary:  Negative for hematuria.     Physical Exam:  Ambulatory Vitals  /74 (BP Location: Left arm, Patient Position: Sitting, BP Cuff Size: Adult)   Pulse 96   Resp 16   Ht 1.78 m (5' 10.08\")   Wt 82.6 kg (182 lb)   SpO2 96%    Oxygen Therapy:  Pulse Oximetry: 96 %  BP Readings from Last 4 Encounters:   11/16/22 136/74   11/15/22 139/66   11/09/22 (!) 140/72   11/08/22 132/62       Weight/BMI:   Body mass index is 26.05 kg/m².  Wt Readings from Last 2 Encounters:   11/16/22 82.6 kg (182 lb)   11/15/22 82.4 kg (181 lb 10.5 oz)       Physical Exam  Constitutional:       Appearance: Normal appearance.   Eyes:      Extraocular Movements: Extraocular movements intact.      Conjunctiva/sclera: Conjunctivae normal.   Neck:      Vascular: No carotid bruit or JVD.   Cardiovascular:      Rate and Rhythm: Normal rate and regular rhythm.      Pulses:           Radial pulses are 2+ " on the right side and 2+ on the left side.        Posterior tibial pulses are 1+ on the right side and 1+ on the left side.      Heart sounds: Murmur heard.   Rumbling early systolic murmur is present with a grade of 2/6 at the lower left sternal border.     No friction rub. No gallop.   Pulmonary:      Effort: Pulmonary effort is normal. No respiratory distress.      Breath sounds: Normal breath sounds. No wheezing.   Abdominal:      General: Bowel sounds are normal.      Palpations: Abdomen is soft.   Musculoskeletal:      Cervical back: Neck supple.      Right lower leg: No edema.      Left lower leg: No edema.   Skin:     General: Skin is warm and dry.   Neurological:      Mental Status: She is alert and oriented to person, place, and time. Mental status is at baseline.   Psychiatric:         Mood and Affect: Mood normal.        Lab Data Review:  Lab Results   Component Value Date/Time    SODIUM 144 10/10/2022 11:19 AM    POTASSIUM 3.7 10/10/2022 11:19 AM    CHLORIDE 107 10/10/2022 11:19 AM    CO2 27 10/10/2022 11:19 AM    GLUCOSE 84 10/10/2022 11:19 AM    BUN 12 10/10/2022 11:19 AM    CREATININE 0.70 10/10/2022 11:19 AM    CREATININE 0.90 10/08/2010 11:32 AM    BUNCREATRAT 18 10/08/2010 11:32 AM    GLOMRATE >59 10/08/2010 11:32 AM     Lab Results   Component Value Date/Time    ALKPHOSPHAT 89 10/03/2022 12:41 AM    ASTSGOT 12 10/03/2022 12:41 AM    ALTSGPT 9 10/03/2022 12:41 AM    TBILIRUBIN 2.0 (H) 10/03/2022 12:41 AM      Lab Results   Component Value Date/Time    WBC 4.3 (L) 10/04/2022 03:18 PM     Lab Results   Component Value Date/Time    TSHULTRASEN 4.820 10/10/2022 11:19 AM      Cardiac Imaging and Procedures Review:    EKG dated 9/30/2022: My personal interpretation is sinus rhythm, 90 bpm, nonspecific T wave changes that are is new compared to ECG of 3/21/2019 borderline prolonged QT interval also new.    Echo dated 11/2/2022: My personal interpretation is hyperdynamic left ventricular systolic  function, enlarged right ventricular cavity size with mildly decreased systolic function.  Moderate right atrial enlargement.  Sclerotic mitral valve leaflets without significant stenosis.  Mean gradient was measured at 6 mmHg, however visually, the valve appears to open fine.  RV systolic pressure is elevated and estimated at 85 mm capital Hg plus RA pressure.  RA pressures estimated between 8 millimeters capital Hg.  There is also evidence of D-shaped septum    Assessment & Plan     1.  Pulmonary hypertension.  Suspect this is WHO class II, possibly due to heart failure with preserved LVEF.  It is unlikely due to left ventricular dysfunction as echo shows EF is normal to hyperdynamic.  Due to her age, this is unlikely WHO class I, primary pulmonary hypertension.  She has never smoked and does not have any known exposure or pulmonary disease.  She did have a period where she was quite inactive and would at least perform a VQ scan to rule out chronic thromboembolic disease (CTEPH).  Discussed risks and benefits of a VQ scan which does include radiation exposure.  Patient is agreeable to proceed.  This can be due to anemia myeloproliferative disease and anemia but if so this is getting treated.  She is clearly fluid overloaded.  First-line treatment is to diurese her as much as possible.  Stressed to her the importance to take her torsemide daily.  Would then consider follow-up echo.  - Torsemide 10 mg p.o. daily, stressed the patient not to miss any doses.  If there is still leg swelling, would increase to 20 mg p.o. daily.  Check Chem-7 in a couple weeks.  I will let Dr. Boss who she will see in a couple weeks follow-up.  - Follow-up echo in about 6 months to reassess pulmonary hypertension once patient is adequately diuresed.  - VQ scan to rule out chronic pulmonary embolism as a cause for pulmonary hypertension.    2.  Moderate to severe tricuspid regurgitation.  Severity of regurgitation can be volume  dependent.  Therefore at this time we will see what echo is in 6 months after patient is adequately diuresed and euvolemic.    Shared Medical Decision Making:  All of patient's questions were answered to the best of my knowledge and to patient's satisfaction. It was a pleasure seeing Ms. Dedra Lobo in my clinic today. Return in about 7 years (around 11/16/2029). Patient is aware to call the cardiology clinic with any questions or concerns.    Sylvia Magdaleno MD  [facility]  [address]  [place] [address]  Phone:  [phone]  Fax:  [phone]    Please note that this dictation was created using voice recognition software. I have made every reasonable attempt to correct obvious errors, but it is possible there are errors of grammar and possibly content that I did not discover before finalizing the note.

## 2022-11-22 ENCOUNTER — PHARMACY VISIT (OUTPATIENT)
Dept: PHARMACY | Facility: MEDICAL CENTER | Age: 82
End: 2022-11-22
Payer: COMMERCIAL

## 2022-11-22 ENCOUNTER — OUTPATIENT INFUSION SERVICES (OUTPATIENT)
Dept: ONCOLOGY | Facility: MEDICAL CENTER | Age: 82
End: 2022-11-22
Attending: INTERNAL MEDICINE
Payer: MEDICARE

## 2022-11-22 VITALS
WEIGHT: 177.47 LBS | BODY MASS INDEX: 25.41 KG/M2 | DIASTOLIC BLOOD PRESSURE: 59 MMHG | SYSTOLIC BLOOD PRESSURE: 140 MMHG | HEART RATE: 98 BPM | RESPIRATION RATE: 16 BRPM | TEMPERATURE: 97.7 F | OXYGEN SATURATION: 94 % | HEIGHT: 70 IN

## 2022-11-22 DIAGNOSIS — D63.0 ANEMIA ASSOCIATED WITH MALIGNANT NEOPLASTIC DISEASE (HCC): ICD-10-CM

## 2022-11-22 DIAGNOSIS — D75.81 MYELOFIBROSIS (HCC): ICD-10-CM

## 2022-11-22 DIAGNOSIS — C80.1 ANEMIA ASSOCIATED WITH MALIGNANT NEOPLASTIC DISEASE (HCC): ICD-10-CM

## 2022-11-22 PROCEDURE — RXMED WILLOW AMBULATORY MEDICATION CHARGE: Performed by: INTERNAL MEDICINE

## 2022-11-22 PROCEDURE — 700111 HCHG RX REV CODE 636 W/ 250 OVERRIDE (IP): Mod: JG | Performed by: INTERNAL MEDICINE

## 2022-11-22 PROCEDURE — 96372 THER/PROPH/DIAG INJ SC/IM: CPT

## 2022-11-22 RX ADMIN — EPOETIN ALFA-EPBX 40000 UNITS: 40000 INJECTION, SOLUTION INTRAVENOUS; SUBCUTANEOUS at 13:47

## 2022-11-22 ASSESSMENT — FIBROSIS 4 INDEX: FIB4 SCORE: .935672514619883041

## 2022-11-22 NOTE — PROGRESS NOTES
Dedra arrived to [facility] for Retacrit. She had labs drawn yesterday (11/21) at [facility]. Paper copy to scan into media. Hgb 9.4. Parameters met for injection. Retacrit given in back of R arm, covered site with band aid. Confirmed next appt. Pt left in stable condition.

## 2022-11-23 ENCOUNTER — OFFICE VISIT (OUTPATIENT)
Dept: MEDICAL GROUP | Facility: PHYSICIAN GROUP | Age: 82
End: 2022-11-23
Payer: MEDICARE

## 2022-11-23 VITALS
HEIGHT: 69 IN | DIASTOLIC BLOOD PRESSURE: 68 MMHG | TEMPERATURE: 98.4 F | WEIGHT: 179.2 LBS | BODY MASS INDEX: 26.54 KG/M2 | OXYGEN SATURATION: 94 % | HEART RATE: 85 BPM | SYSTOLIC BLOOD PRESSURE: 128 MMHG

## 2022-11-23 DIAGNOSIS — I27.20 PULMONARY HYPERTENSION (HCC): ICD-10-CM

## 2022-11-23 DIAGNOSIS — K92.2 GASTROINTESTINAL HEMORRHAGE, UNSPECIFIED GASTROINTESTINAL HEMORRHAGE TYPE: ICD-10-CM

## 2022-11-23 DIAGNOSIS — I50.32 CHRONIC HEART FAILURE WITH PRESERVED EJECTION FRACTION (HCC): ICD-10-CM

## 2022-11-23 DIAGNOSIS — I36.1 NONRHEUMATIC TRICUSPID VALVE REGURGITATION: ICD-10-CM

## 2022-11-23 PROCEDURE — 99214 OFFICE O/P EST MOD 30 MIN: CPT | Performed by: FAMILY MEDICINE

## 2022-11-23 RX ORDER — TORSEMIDE 20 MG/1
20 TABLET ORAL DAILY
Qty: 90 TABLET | Refills: 3 | Status: SHIPPED | OUTPATIENT
Start: 2022-11-23 | End: 2024-01-25 | Stop reason: SDUPTHER

## 2022-11-23 ASSESSMENT — FIBROSIS 4 INDEX: FIB4 SCORE: .935672514619883041

## 2022-11-23 NOTE — PROGRESS NOTES
"CHIEF COMPLAINT / REASON FOR VISIT  Dedra Lobo is a 82 y.o. female that presents today for follow-up of HFrEF/pulmonary hypertension    HISTORY OF PRESENT ILLNESS  Was seen on 11/16/2022 by cardiology, suspected her pulmonary hypertension was due to left heart failure with preserved ejection fraction.  Recommended diuresis.  Awaiting results of VQ scan to rule out chronic thromboembolic pulmonary hypertension.  Per cardiology, obtain TTE in approximately 6 months to reassess pulmonary hypertension once patient is adequately diuresed, also to reassess degree of tricuspid regurgitation.    Breathing and endurance has continued to improved. Still has significant lower extremity edema. Takes the torsemide 10 mg daily.    OBJECTIVE     /68 (BP Location: Left arm, Patient Position: Sitting, BP Cuff Size: Adult)   Pulse 85   Temp 36.9 °C (98.4 °F) (Temporal)   Ht 1.74 m (5' 8.5\")   Wt 81.3 kg (179 lb 3.2 oz)   SpO2 94%   BMI 26.85 kg/m²  Body mass index is 26.85 kg/m².    PHYSICAL EXAM  Constitutional: Sitting comfortably, in no acute distress, responds to questions appropriately.  Eyes:  No conjunctival injection, no scleral icterus, PERRL  Heart: Regular S1 S2, systolic murmur, rub  Lungs: Clear to auscultation bilaterally, no wheezes, rales, or rhonchi  Extremities: 2+ pitting edema bilateral lower extremities to knees  Skin: Warm and dry, no rashes or lesions on face or exposed upper extremities    ASSESSMENT & PLAN  1. Chronic heart failure with preserved ejection fraction (HCC)  2. Pulmonary hypertension (HCC)  Was seen on 11/16/2022 by cardiology, suspected her pulmonary hypertension was due to left heart failure with preserved ejection fraction.  Recommended diuresis.  Awaiting results of VQ scan to rule out chronic thromboembolic pulmonary hypertension.  Per cardiology, obtain TTE in approximately 6 months to reassess pulmonary hypertension once patient is adequately diuresed.  Today still has " significant lower extremity edema, will increase her torsemide to 20 mg daily, follow-up in 1 week for recheck in clinic along with sodium, potassium, and creatinine.  - CREATININE; Future  - POTASSIUM SERUM (K); Future  - SODIUM SERUM (NA); Future    3. Nonrheumatic tricuspid valve regurgitation  Per cardiology, will reassess in 6 months via TTE once she is properly diuresed    4. Gastrointestinal hemorrhage, unspecified gastrointestinal hemorrhage type  Continue Protonix 40 mg twice daily until repeat EGD

## 2022-11-28 ENCOUNTER — HOSPITAL ENCOUNTER (OUTPATIENT)
Dept: RADIOLOGY | Facility: MEDICAL CENTER | Age: 82
End: 2022-11-28
Attending: INTERNAL MEDICINE
Payer: MEDICARE

## 2022-11-28 DIAGNOSIS — I27.20 PULMONARY HYPERTENSION (HCC): ICD-10-CM

## 2022-11-28 PROCEDURE — 71046 X-RAY EXAM CHEST 2 VIEWS: CPT

## 2022-11-28 PROCEDURE — A9540 TC99M MAA: HCPCS

## 2022-11-29 ENCOUNTER — OUTPATIENT INFUSION SERVICES (OUTPATIENT)
Dept: ONCOLOGY | Facility: MEDICAL CENTER | Age: 82
End: 2022-11-29
Attending: INTERNAL MEDICINE
Payer: MEDICARE

## 2022-11-29 VITALS
RESPIRATION RATE: 16 BRPM | DIASTOLIC BLOOD PRESSURE: 65 MMHG | TEMPERATURE: 97.5 F | HEIGHT: 70 IN | SYSTOLIC BLOOD PRESSURE: 117 MMHG | OXYGEN SATURATION: 94 % | WEIGHT: 178.79 LBS | HEART RATE: 101 BPM | BODY MASS INDEX: 25.6 KG/M2

## 2022-11-29 DIAGNOSIS — D75.81 MYELOFIBROSIS (HCC): ICD-10-CM

## 2022-11-29 DIAGNOSIS — D63.0 ANEMIA ASSOCIATED WITH MALIGNANT NEOPLASTIC DISEASE (HCC): ICD-10-CM

## 2022-11-29 DIAGNOSIS — I27.20 PULMONARY HYPERTENSION (HCC): ICD-10-CM

## 2022-11-29 DIAGNOSIS — C80.1 ANEMIA ASSOCIATED WITH MALIGNANT NEOPLASTIC DISEASE (HCC): ICD-10-CM

## 2022-11-29 DIAGNOSIS — I50.32 CHRONIC HEART FAILURE WITH PRESERVED EJECTION FRACTION (HCC): ICD-10-CM

## 2022-11-29 LAB
CREAT SERPL-MCNC: 0.75 MG/DL (ref 0.5–1.4)
GFR SERPLBLD CREATININE-BSD FMLA CKD-EPI: 80 ML/MIN/1.73 M 2
HCT VFR BLD AUTO: 30.9 % (ref 37–47)
HGB BLD-MCNC: 9.4 G/DL (ref 12–16)
POTASSIUM SERPL-SCNC: 4.2 MMOL/L (ref 3.6–5.5)
SODIUM SERPL-SCNC: 142 MMOL/L (ref 135–145)

## 2022-11-29 PROCEDURE — 85018 HEMOGLOBIN: CPT

## 2022-11-29 PROCEDURE — 36415 COLL VENOUS BLD VENIPUNCTURE: CPT

## 2022-11-29 PROCEDURE — 84295 ASSAY OF SERUM SODIUM: CPT

## 2022-11-29 PROCEDURE — 96372 THER/PROPH/DIAG INJ SC/IM: CPT

## 2022-11-29 PROCEDURE — 84132 ASSAY OF SERUM POTASSIUM: CPT

## 2022-11-29 PROCEDURE — 85014 HEMATOCRIT: CPT

## 2022-11-29 PROCEDURE — 82565 ASSAY OF CREATININE: CPT

## 2022-11-29 PROCEDURE — 700111 HCHG RX REV CODE 636 W/ 250 OVERRIDE (IP): Mod: JG | Performed by: INTERNAL MEDICINE

## 2022-11-29 RX ADMIN — EPOETIN ALFA-EPBX 40000 UNITS: 40000 INJECTION, SOLUTION INTRAVENOUS; SUBCUTANEOUS at 14:36

## 2022-11-29 ASSESSMENT — FIBROSIS 4 INDEX: FIB4 SCORE: .935672514619883041

## 2022-11-30 NOTE — PROGRESS NOTES
Patient presented to [facility] for labs and possible Retacrit injection.  Venipuncture left AC with 23g butterfly. H&H drawn and sent to lab, gauze and coban to venipuncture site. Hgb was 9.4 .  Patient met parameters for Retacrit. Injected in left upper arm, tolerated well. Patient left [facility] in no apparent distress. Next appointment time confirmed prior to departure.

## 2022-12-01 ENCOUNTER — OFFICE VISIT (OUTPATIENT)
Dept: MEDICAL GROUP | Facility: PHYSICIAN GROUP | Age: 82
End: 2022-12-01
Payer: MEDICARE

## 2022-12-01 VITALS
BODY MASS INDEX: 24.91 KG/M2 | HEIGHT: 70 IN | SYSTOLIC BLOOD PRESSURE: 128 MMHG | RESPIRATION RATE: 16 BRPM | WEIGHT: 174 LBS | OXYGEN SATURATION: 92 % | HEART RATE: 109 BPM | DIASTOLIC BLOOD PRESSURE: 60 MMHG

## 2022-12-01 DIAGNOSIS — R60.0 LOWER EXTREMITY EDEMA: ICD-10-CM

## 2022-12-01 DIAGNOSIS — K92.2 GASTROINTESTINAL HEMORRHAGE, UNSPECIFIED GASTROINTESTINAL HEMORRHAGE TYPE: ICD-10-CM

## 2022-12-01 DIAGNOSIS — D64.89 ANEMIA DUE TO OTHER CAUSE, NOT CLASSIFIED: ICD-10-CM

## 2022-12-01 DIAGNOSIS — I50.32 CHRONIC HEART FAILURE WITH PRESERVED EJECTION FRACTION (HCC): ICD-10-CM

## 2022-12-01 DIAGNOSIS — D64.9 ANEMIA, UNSPECIFIED TYPE: ICD-10-CM

## 2022-12-01 PROCEDURE — 99213 OFFICE O/P EST LOW 20 MIN: CPT | Performed by: FAMILY MEDICINE

## 2022-12-01 RX ORDER — PANTOPRAZOLE SODIUM 40 MG/1
40 TABLET, DELAYED RELEASE ORAL 2 TIMES DAILY
Qty: 60 TABLET | Refills: 3 | Status: SHIPPED | OUTPATIENT
Start: 2022-12-01 | End: 2022-12-27

## 2022-12-01 ASSESSMENT — FIBROSIS 4 INDEX: FIB4 SCORE: .935672514619883041

## 2022-12-01 NOTE — PROGRESS NOTES
"CHIEF COMPLAINT / REASON FOR VISIT  Dedra Lobo is a 82 y.o. female that presents today for follow-up of HFpEF and diuretic titration    HISTORY OF PRESENT ILLNESS  At last visit on 11/23/22, increased torsemide from 10 mg daily to 20 mg daily.     Energy is good, sleeping well. Dyspnea slightly improved. Is able to do most ADLs without difficulty.     Weight today is 78.6 kg (from 81.1 kg on 11/29/22)    On 11/29/22, Na is 142, K 4.2, and creatinine 0.75 (from 0.70 on 10/10/22)    OBJECTIVE     /60 (BP Location: Right arm, Patient Position: Sitting, BP Cuff Size: Adult)   Pulse (!) 109   Resp 16   Ht 1.78 m (5' 10.08\")   Wt 78.9 kg (174 lb)   SpO2 92%   BMI 24.91 kg/m²  Body mass index is 24.91 kg/m².    PHYSICAL EXAM  Constitutional: Sitting comfortably, in no acute distress, responds to questions appropriately.  Heart: Regular S1 S2, grade 2 systolic murmur left lower sternal border  Lungs: Clear to auscultation bilaterally, no wheezes, rales, or rhonchi  Extremities: 1+ piitting lower extremity edema  Skin: Warm and dry, no rashes or lesions on face or exposed upper extremities    ASSESSMENT & PLAN  1. Chronic heart failure with preserved ejection fraction (HCC)  2. Lower extremity edema  Nearing dry weight on torsemide 20 mg daily.  Currently 70.6 kg.  We will follow-up in 1 month to recheck volume status, along with sodium, potassium, creatinine.  Given that her longstanding anemia is likely the cause of her heart failure, and given that she did have a GI bleed recently, we will recheck her iron levels to see if there is any potential reversibility (aside from the Retacrit injections that she gets through [facility]).           "

## 2022-12-06 ENCOUNTER — OUTPATIENT INFUSION SERVICES (OUTPATIENT)
Dept: ONCOLOGY | Facility: MEDICAL CENTER | Age: 82
End: 2022-12-06
Attending: INTERNAL MEDICINE
Payer: MEDICARE

## 2022-12-06 VITALS
BODY MASS INDEX: 25.22 KG/M2 | HEIGHT: 70 IN | SYSTOLIC BLOOD PRESSURE: 120 MMHG | OXYGEN SATURATION: 95 % | DIASTOLIC BLOOD PRESSURE: 68 MMHG | WEIGHT: 176.15 LBS | RESPIRATION RATE: 16 BRPM | TEMPERATURE: 97.4 F | HEART RATE: 99 BPM

## 2022-12-06 DIAGNOSIS — D63.0 ANEMIA ASSOCIATED WITH MALIGNANT NEOPLASTIC DISEASE (HCC): ICD-10-CM

## 2022-12-06 DIAGNOSIS — D75.81 MYELOFIBROSIS (HCC): ICD-10-CM

## 2022-12-06 DIAGNOSIS — C80.1 ANEMIA ASSOCIATED WITH MALIGNANT NEOPLASTIC DISEASE (HCC): ICD-10-CM

## 2022-12-06 LAB
HCT VFR BLD AUTO: 31 % (ref 37–47)
HGB BLD-MCNC: 9.5 G/DL (ref 12–16)

## 2022-12-06 PROCEDURE — 36415 COLL VENOUS BLD VENIPUNCTURE: CPT

## 2022-12-06 PROCEDURE — 85018 HEMOGLOBIN: CPT

## 2022-12-06 PROCEDURE — 700111 HCHG RX REV CODE 636 W/ 250 OVERRIDE (IP): Mod: JG | Performed by: INTERNAL MEDICINE

## 2022-12-06 PROCEDURE — 96372 THER/PROPH/DIAG INJ SC/IM: CPT

## 2022-12-06 PROCEDURE — 85014 HEMATOCRIT: CPT

## 2022-12-06 RX ADMIN — EPOETIN ALFA-EPBX 40000 UNITS: 40000 INJECTION, SOLUTION INTRAVENOUS; SUBCUTANEOUS at 14:10

## 2022-12-06 ASSESSMENT — FIBROSIS 4 INDEX: FIB4 SCORE: .935672514619883041

## 2022-12-06 NOTE — PROGRESS NOTES
"Dedra arrives to [facility] for weekly Retacrit. Patient denies acute health concerns. Reports overall health and \"quality of life\" has improved with injections. H/H drawn via venipuncture from L-AC without difficulty. Labs and BP within treatable parameters. Retacrit administered SQ to back of left arm without issues. Patient has her next appointment. Discharged home to self care in no apparent distress.   "

## 2022-12-13 ENCOUNTER — OUTPATIENT INFUSION SERVICES (OUTPATIENT)
Dept: ONCOLOGY | Facility: MEDICAL CENTER | Age: 82
End: 2022-12-13
Attending: INTERNAL MEDICINE
Payer: MEDICARE

## 2022-12-13 VITALS
HEIGHT: 70 IN | OXYGEN SATURATION: 92 % | WEIGHT: 177.47 LBS | TEMPERATURE: 97 F | SYSTOLIC BLOOD PRESSURE: 135 MMHG | HEART RATE: 90 BPM | DIASTOLIC BLOOD PRESSURE: 72 MMHG | BODY MASS INDEX: 25.41 KG/M2 | RESPIRATION RATE: 18 BRPM

## 2022-12-13 DIAGNOSIS — C80.1 ANEMIA ASSOCIATED WITH MALIGNANT NEOPLASTIC DISEASE (HCC): ICD-10-CM

## 2022-12-13 DIAGNOSIS — D75.81 MYELOFIBROSIS (HCC): ICD-10-CM

## 2022-12-13 DIAGNOSIS — D63.0 ANEMIA ASSOCIATED WITH MALIGNANT NEOPLASTIC DISEASE (HCC): ICD-10-CM

## 2022-12-13 LAB
HCT VFR BLD AUTO: 30 % (ref 37–47)
HGB BLD-MCNC: 9.3 G/DL (ref 12–16)

## 2022-12-13 PROCEDURE — 85018 HEMOGLOBIN: CPT

## 2022-12-13 PROCEDURE — 36415 COLL VENOUS BLD VENIPUNCTURE: CPT

## 2022-12-13 PROCEDURE — 700111 HCHG RX REV CODE 636 W/ 250 OVERRIDE (IP): Mod: JG | Performed by: INTERNAL MEDICINE

## 2022-12-13 PROCEDURE — 96372 THER/PROPH/DIAG INJ SC/IM: CPT

## 2022-12-13 PROCEDURE — 85014 HEMATOCRIT: CPT

## 2022-12-13 RX ADMIN — EPOETIN ALFA-EPBX 40000 UNITS: 40000 INJECTION, SOLUTION INTRAVENOUS; SUBCUTANEOUS at 14:20

## 2022-12-13 ASSESSMENT — FIBROSIS 4 INDEX: FIB4 SCORE: .935672514619883041

## 2022-12-13 NOTE — PROGRESS NOTES
Patient presented to [facility] for labs and possible Retacrit injection.  Venipuncture left AC with 25g butterfly. H&H drawn and sent to lab, gauze and coban to venipuncture site. Hgb was 9.3 .  Patient met parameters for Retacrit. Injected in right upper arm, tolerated well. Patient left [facility] in no apparent distress. Copy of appointment schedule given to patient prior to departure.

## 2022-12-20 ENCOUNTER — OUTPATIENT INFUSION SERVICES (OUTPATIENT)
Dept: ONCOLOGY | Facility: MEDICAL CENTER | Age: 82
End: 2022-12-20
Attending: INTERNAL MEDICINE
Payer: MEDICARE

## 2022-12-20 VITALS
OXYGEN SATURATION: 93 % | HEART RATE: 96 BPM | BODY MASS INDEX: 27.43 KG/M2 | TEMPERATURE: 96.9 F | SYSTOLIC BLOOD PRESSURE: 131 MMHG | DIASTOLIC BLOOD PRESSURE: 64 MMHG | RESPIRATION RATE: 18 BRPM | WEIGHT: 181 LBS | HEIGHT: 68 IN

## 2022-12-20 DIAGNOSIS — D75.81 MYELOFIBROSIS (HCC): ICD-10-CM

## 2022-12-20 DIAGNOSIS — C80.1 ANEMIA ASSOCIATED WITH MALIGNANT NEOPLASTIC DISEASE (HCC): ICD-10-CM

## 2022-12-20 DIAGNOSIS — D63.0 ANEMIA ASSOCIATED WITH MALIGNANT NEOPLASTIC DISEASE (HCC): ICD-10-CM

## 2022-12-20 LAB
HCT VFR BLD AUTO: 28.3 % (ref 37–47)
HGB BLD-MCNC: 8.6 G/DL (ref 12–16)

## 2022-12-20 PROCEDURE — 700111 HCHG RX REV CODE 636 W/ 250 OVERRIDE (IP): Mod: JG | Performed by: INTERNAL MEDICINE

## 2022-12-20 PROCEDURE — 96372 THER/PROPH/DIAG INJ SC/IM: CPT

## 2022-12-20 PROCEDURE — 36415 COLL VENOUS BLD VENIPUNCTURE: CPT

## 2022-12-20 PROCEDURE — 85018 HEMOGLOBIN: CPT

## 2022-12-20 PROCEDURE — 85014 HEMATOCRIT: CPT

## 2022-12-20 RX ADMIN — EPOETIN ALFA-EPBX 40000 UNITS: 40000 INJECTION, SOLUTION INTRAVENOUS; SUBCUTANEOUS at 14:27

## 2022-12-20 ASSESSMENT — FIBROSIS 4 INDEX: FIB4 SCORE: .935672514619883041

## 2022-12-20 NOTE — PROGRESS NOTES
Dedra presents to [facility] for labs/ possible Retacrit injection.  H&H collected via 23g butterfly to left AC, gauze and coban to site.  Hgb 8.6, parameters met for Retacrit.  Retacrit injection given to back left arm, adhesive dressing applied.  Dedra tolerated well.  Discharged in NAD, next appointment confirmed.

## 2022-12-27 ENCOUNTER — OUTPATIENT INFUSION SERVICES (OUTPATIENT)
Dept: ONCOLOGY | Facility: MEDICAL CENTER | Age: 82
End: 2022-12-27
Attending: INTERNAL MEDICINE
Payer: MEDICARE

## 2022-12-27 VITALS
TEMPERATURE: 97.4 F | BODY MASS INDEX: 27.06 KG/M2 | WEIGHT: 178.57 LBS | SYSTOLIC BLOOD PRESSURE: 123 MMHG | RESPIRATION RATE: 18 BRPM | DIASTOLIC BLOOD PRESSURE: 63 MMHG | HEART RATE: 95 BPM | HEIGHT: 68 IN | OXYGEN SATURATION: 96 %

## 2022-12-27 DIAGNOSIS — D75.81 MYELOFIBROSIS (HCC): ICD-10-CM

## 2022-12-27 DIAGNOSIS — D63.0 ANEMIA ASSOCIATED WITH MALIGNANT NEOPLASTIC DISEASE (HCC): ICD-10-CM

## 2022-12-27 DIAGNOSIS — C80.1 ANEMIA ASSOCIATED WITH MALIGNANT NEOPLASTIC DISEASE (HCC): ICD-10-CM

## 2022-12-27 LAB
HCT VFR BLD AUTO: 30.2 % (ref 37–47)
HGB BLD-MCNC: 9.2 G/DL (ref 12–16)

## 2022-12-27 PROCEDURE — 700111 HCHG RX REV CODE 636 W/ 250 OVERRIDE (IP): Mod: JG | Performed by: INTERNAL MEDICINE

## 2022-12-27 PROCEDURE — 85014 HEMATOCRIT: CPT

## 2022-12-27 PROCEDURE — 85018 HEMOGLOBIN: CPT

## 2022-12-27 PROCEDURE — 96372 THER/PROPH/DIAG INJ SC/IM: CPT

## 2022-12-27 PROCEDURE — 36415 COLL VENOUS BLD VENIPUNCTURE: CPT

## 2022-12-27 RX ADMIN — EPOETIN ALFA-EPBX 40000 UNITS: 40000 INJECTION, SOLUTION INTRAVENOUS; SUBCUTANEOUS at 14:23

## 2022-12-27 ASSESSMENT — FIBROSIS 4 INDEX: FIB4 SCORE: .935672514619883041

## 2022-12-27 NOTE — PROGRESS NOTES
[facility]. Labs drawn as ordered from LAC with 23G butterfly needle, gauze and coban dressing placed. Hemoglobin is 9.2 and is within parameters for retacrit injection. Retacrit given subcutaneously as ordered in upper left arm. Has next appt, discharged home to self care.

## 2022-12-28 ENCOUNTER — PATIENT MESSAGE (OUTPATIENT)
Dept: MEDICAL GROUP | Facility: PHYSICIAN GROUP | Age: 82
End: 2022-12-28
Payer: MEDICARE

## 2022-12-28 DIAGNOSIS — I50.32 CHRONIC HEART FAILURE WITH PRESERVED EJECTION FRACTION (HCC): ICD-10-CM

## 2023-01-03 ENCOUNTER — OUTPATIENT INFUSION SERVICES (OUTPATIENT)
Dept: ONCOLOGY | Facility: MEDICAL CENTER | Age: 83
End: 2023-01-03
Attending: INTERNAL MEDICINE
Payer: MEDICARE

## 2023-01-03 VITALS
OXYGEN SATURATION: 91 % | HEIGHT: 68 IN | RESPIRATION RATE: 18 BRPM | HEART RATE: 90 BPM | WEIGHT: 176.37 LBS | DIASTOLIC BLOOD PRESSURE: 64 MMHG | TEMPERATURE: 98 F | SYSTOLIC BLOOD PRESSURE: 129 MMHG | BODY MASS INDEX: 26.73 KG/M2

## 2023-01-03 DIAGNOSIS — I50.32 CHRONIC HEART FAILURE WITH PRESERVED EJECTION FRACTION (HCC): ICD-10-CM

## 2023-01-03 DIAGNOSIS — D63.0 ANEMIA ASSOCIATED WITH MALIGNANT NEOPLASTIC DISEASE (HCC): ICD-10-CM

## 2023-01-03 DIAGNOSIS — C80.1 ANEMIA ASSOCIATED WITH MALIGNANT NEOPLASTIC DISEASE (HCC): ICD-10-CM

## 2023-01-03 DIAGNOSIS — E87.6 HYPOKALEMIA: ICD-10-CM

## 2023-01-03 DIAGNOSIS — D75.81 MYELOFIBROSIS (HCC): ICD-10-CM

## 2023-01-03 LAB
CREAT SERPL-MCNC: 0.85 MG/DL (ref 0.5–1.4)
GFR SERPLBLD CREATININE-BSD FMLA CKD-EPI: 69 ML/MIN/1.73 M 2
HCT VFR BLD AUTO: 27.9 % (ref 37–47)
HGB BLD-MCNC: 8.5 G/DL (ref 12–16)
POTASSIUM SERPL-SCNC: 3.3 MMOL/L (ref 3.6–5.5)
SODIUM SERPL-SCNC: 141 MMOL/L (ref 135–145)

## 2023-01-03 PROCEDURE — 96372 THER/PROPH/DIAG INJ SC/IM: CPT

## 2023-01-03 PROCEDURE — 36415 COLL VENOUS BLD VENIPUNCTURE: CPT

## 2023-01-03 PROCEDURE — 700111 HCHG RX REV CODE 636 W/ 250 OVERRIDE (IP): Mod: JG | Performed by: INTERNAL MEDICINE

## 2023-01-03 PROCEDURE — 82565 ASSAY OF CREATININE: CPT

## 2023-01-03 PROCEDURE — 84295 ASSAY OF SERUM SODIUM: CPT

## 2023-01-03 PROCEDURE — 85014 HEMATOCRIT: CPT

## 2023-01-03 PROCEDURE — 84132 ASSAY OF SERUM POTASSIUM: CPT

## 2023-01-03 PROCEDURE — 85018 HEMOGLOBIN: CPT

## 2023-01-03 RX ORDER — POTASSIUM CHLORIDE 20 MEQ/1
40 TABLET, EXTENDED RELEASE ORAL DAILY
Qty: 60 TABLET | Refills: 3 | Status: SHIPPED | OUTPATIENT
Start: 2023-01-03 | End: 2023-01-04

## 2023-01-03 RX ADMIN — EPOETIN ALFA-EPBX 40000 UNITS: 40000 INJECTION, SOLUTION INTRAVENOUS; SUBCUTANEOUS at 14:33

## 2023-01-03 ASSESSMENT — FIBROSIS 4 INDEX: FIB4 SCORE: .935672514619883041

## 2023-01-03 NOTE — PROGRESS NOTES
Dedra presents to [facility] for labs/ possible Retacrit injection.      H&H collected via 23g butterfly to left AC, gauze and coban to site.      Hgb 8.5, parameters met for Retacrit.  Retacrit injection given to back left arm, adhesive dressing applied.  Dedra tolerated well.      Discharged in no apparent distress; next appointment confirmed.

## 2023-01-05 ENCOUNTER — OFFICE VISIT (OUTPATIENT)
Dept: MEDICAL GROUP | Facility: PHYSICIAN GROUP | Age: 83
End: 2023-01-05
Payer: MEDICARE

## 2023-01-05 ENCOUNTER — APPOINTMENT (OUTPATIENT)
Dept: RADIOLOGY | Facility: IMAGING CENTER | Age: 83
End: 2023-01-05
Attending: INTERNAL MEDICINE
Payer: MEDICARE

## 2023-01-05 VITALS
DIASTOLIC BLOOD PRESSURE: 78 MMHG | TEMPERATURE: 98.1 F | WEIGHT: 177.7 LBS | OXYGEN SATURATION: 97 % | SYSTOLIC BLOOD PRESSURE: 130 MMHG | HEART RATE: 84 BPM | HEIGHT: 68 IN | RESPIRATION RATE: 16 BRPM | BODY MASS INDEX: 26.93 KG/M2

## 2023-01-05 DIAGNOSIS — R06.89 ADVENTITIOUS BREATH SOUNDS: ICD-10-CM

## 2023-01-05 DIAGNOSIS — R60.0 LOWER EXTREMITY EDEMA: ICD-10-CM

## 2023-01-05 DIAGNOSIS — I50.32 CHRONIC HEART FAILURE WITH PRESERVED EJECTION FRACTION (HCC): ICD-10-CM

## 2023-01-05 DIAGNOSIS — I27.20 PULMONARY HYPERTENSION (HCC): ICD-10-CM

## 2023-01-05 PROCEDURE — 71046 X-RAY EXAM CHEST 2 VIEWS: CPT | Mod: TC | Performed by: INTERNAL MEDICINE

## 2023-01-05 PROCEDURE — 99214 OFFICE O/P EST MOD 30 MIN: CPT | Performed by: FAMILY MEDICINE

## 2023-01-05 RX ORDER — BENZONATATE 200 MG/1
200 CAPSULE ORAL 3 TIMES DAILY PRN
Qty: 30 CAPSULE | Refills: 0 | Status: SHIPPED | OUTPATIENT
Start: 2023-01-05 | End: 2023-05-02

## 2023-01-05 ASSESSMENT — PATIENT HEALTH QUESTIONNAIRE - PHQ9: CLINICAL INTERPRETATION OF PHQ2 SCORE: 0

## 2023-01-05 ASSESSMENT — FIBROSIS 4 INDEX: FIB4 SCORE: .935672514619883041

## 2023-01-05 NOTE — PROGRESS NOTES
"CHIEF COMPLAINT / REASON FOR VISIT  Dedra Lobo is a 82 y.o. female that presents today for follow-up heart failure    HISTORY OF PRESENT ILLNESS  At last visit on 12/1/2022, weight was 79 kg on torsemide 20 mg daily.  Did not change her diuretic dose at that time.  On 1/3/2023, her potassium was low at 3.3 and she was prescribed potassium chloride 40 mEq daily, hasn't started taking yet but will take.    Recently started to have cold symptoms with chest congestion, wheezing. She feels more than 50% better.   She has been off the torsemide for 4 days since she has been feeling so poorly    OBJECTIVE     /78 (BP Location: Left arm, Patient Position: Sitting, BP Cuff Size: Adult)   Pulse 84   Temp 36.7 °C (98.1 °F) (Temporal)   Resp 16   Ht 1.727 m (5' 8\")   Wt 80.6 kg (177 lb 11.2 oz)   SpO2 97%   BMI 27.02 kg/m²  Body mass index is 27.02 kg/m².    PHYSICAL EXAM  Constitutional: Sitting comfortably, in no acute distress, responds to questions appropriately.  Heart: Regular S1 S2  Lungs: left lower lobe coarse inspiratory crackles  Extremities: 2+ pitting edema to proximal shins  Skin: Warm and dry    ASSESSMENT & PLAN  1. Chronic heart failure with preserved ejection fraction (HCC)  2. Pulmonary hypertension (HCC)  3. Lower extremity edema  Weight today is 80.6 kg, with 2+ lower extremity pitting edema.  Since she has been off of the torsemide for 4 days, we will have her restart torsemide 20 mg daily which she will take regularly.  She will also start her potassium supplement today for mild hypokalemia.  I will see her back in 1 month for repeat exam and electrolyte check.  If still having significant edema would consider increasing her torsemide dose.    4. Adventitious breath sounds  She had left lower lobe coarse inspiratory crackles so we obtained a chest x-ray, particularly in the setting of her recent cold.  There are no signs of pneumonia.  I advised that this could represent atelectasis " and she should make sure to do deep breathing periodically.

## 2023-01-10 ENCOUNTER — OUTPATIENT INFUSION SERVICES (OUTPATIENT)
Dept: ONCOLOGY | Facility: MEDICAL CENTER | Age: 83
End: 2023-01-10
Attending: INTERNAL MEDICINE
Payer: MEDICARE

## 2023-01-10 VITALS
SYSTOLIC BLOOD PRESSURE: 123 MMHG | HEIGHT: 68 IN | TEMPERATURE: 97 F | BODY MASS INDEX: 26.43 KG/M2 | OXYGEN SATURATION: 93 % | HEART RATE: 94 BPM | DIASTOLIC BLOOD PRESSURE: 57 MMHG | RESPIRATION RATE: 18 BRPM | WEIGHT: 174.38 LBS

## 2023-01-10 DIAGNOSIS — D63.0 ANEMIA ASSOCIATED WITH MALIGNANT NEOPLASTIC DISEASE (HCC): ICD-10-CM

## 2023-01-10 DIAGNOSIS — C80.1 ANEMIA ASSOCIATED WITH MALIGNANT NEOPLASTIC DISEASE (HCC): ICD-10-CM

## 2023-01-10 DIAGNOSIS — D75.81 MYELOFIBROSIS (HCC): ICD-10-CM

## 2023-01-10 LAB
HCT VFR BLD AUTO: 29.1 % (ref 37–47)
HGB BLD-MCNC: 9 G/DL (ref 12–16)

## 2023-01-10 PROCEDURE — 700111 HCHG RX REV CODE 636 W/ 250 OVERRIDE (IP): Mod: JG | Performed by: INTERNAL MEDICINE

## 2023-01-10 PROCEDURE — 85014 HEMATOCRIT: CPT

## 2023-01-10 PROCEDURE — 96372 THER/PROPH/DIAG INJ SC/IM: CPT

## 2023-01-10 PROCEDURE — 85018 HEMOGLOBIN: CPT

## 2023-01-10 PROCEDURE — 36415 COLL VENOUS BLD VENIPUNCTURE: CPT

## 2023-01-10 RX ADMIN — EPOETIN ALFA-EPBX 40000 UNITS: 40000 INJECTION, SOLUTION INTRAVENOUS; SUBCUTANEOUS at 14:41

## 2023-01-10 ASSESSMENT — FIBROSIS 4 INDEX: FIB4 SCORE: .935672514619883041

## 2023-01-10 NOTE — PROGRESS NOTES
Dedra is here for epoetin (Retacrit) injection. Labs drawn using 23 gauge butterfly to left AC; gauze/coban applied to site. Hgb = 9.0. Dedra meets parameters for Retacrit injection. Retacrit injection given per MAR; adhesive bandage applied to site. Next appointment scheduled. Discharged to self care; no apparent distress noted.

## 2023-01-11 ENCOUNTER — TELEPHONE (OUTPATIENT)
Dept: CARDIOLOGY | Facility: MEDICAL CENTER | Age: 83
End: 2023-01-11
Payer: MEDICARE

## 2023-01-11 NOTE — LETTER
PROCEDURE/SURGERY CLEARANCE FORM      Encounter Date: 1/11/2023    Patient: Dedra Lobo  YOB: 1940    CARDIOLOGIST:  Dr. Sylvia Magdaleno    REFERRING DOCTOR:  CHILO        The above patient is cleared to have the following procedure/surgery: endoscopy                                           Additional comments: Patient can proceed with low to moderate risk. She is not taking oral anticoagulants.        Electronically signed    MD Signature   Dr. Sylvia Magdaleno

## 2023-01-11 NOTE — TELEPHONE ENCOUNTER
To AB as member of care team. Received stratification/clearance request from [facility], pt to have endoscopy. Pt seen by  on 11/16/22 with recommended 7 year f/u.  Ok to proceed as low risk? Pt not on anticoagulants.

## 2023-01-17 ENCOUNTER — OUTPATIENT INFUSION SERVICES (OUTPATIENT)
Dept: ONCOLOGY | Facility: MEDICAL CENTER | Age: 83
End: 2023-01-17
Attending: INTERNAL MEDICINE
Payer: MEDICARE

## 2023-01-17 VITALS
BODY MASS INDEX: 26.1 KG/M2 | WEIGHT: 172.18 LBS | HEART RATE: 100 BPM | SYSTOLIC BLOOD PRESSURE: 127 MMHG | HEIGHT: 68 IN | OXYGEN SATURATION: 92 % | RESPIRATION RATE: 18 BRPM | DIASTOLIC BLOOD PRESSURE: 58 MMHG | TEMPERATURE: 97.6 F

## 2023-01-17 DIAGNOSIS — C80.1 ANEMIA ASSOCIATED WITH MALIGNANT NEOPLASTIC DISEASE (HCC): ICD-10-CM

## 2023-01-17 DIAGNOSIS — D75.81 MYELOFIBROSIS (HCC): ICD-10-CM

## 2023-01-17 DIAGNOSIS — D63.0 ANEMIA ASSOCIATED WITH MALIGNANT NEOPLASTIC DISEASE (HCC): ICD-10-CM

## 2023-01-17 LAB
HCT VFR BLD AUTO: 31.1 % (ref 37–47)
HGB BLD-MCNC: 9.7 G/DL (ref 12–16)

## 2023-01-17 PROCEDURE — 85018 HEMOGLOBIN: CPT

## 2023-01-17 PROCEDURE — 96372 THER/PROPH/DIAG INJ SC/IM: CPT

## 2023-01-17 PROCEDURE — 700111 HCHG RX REV CODE 636 W/ 250 OVERRIDE (IP): Mod: JG | Performed by: INTERNAL MEDICINE

## 2023-01-17 PROCEDURE — 36415 COLL VENOUS BLD VENIPUNCTURE: CPT

## 2023-01-17 PROCEDURE — 85014 HEMATOCRIT: CPT

## 2023-01-17 RX ADMIN — EPOETIN ALFA-EPBX 40000 UNITS: 40000 INJECTION, SOLUTION INTRAVENOUS; SUBCUTANEOUS at 14:14

## 2023-01-17 ASSESSMENT — FIBROSIS 4 INDEX: FIB4 SCORE: .935672514619883041

## 2023-01-18 NOTE — PROGRESS NOTES
Dedra presents to [facility] for labs/ possible Retacrit injection.  H&H collected via 23g butterfly to left AC, gauze and coban to site.  Hgb 9.7, parameters met for Retacrit.  Retacrit injection given to back left arm, adhesive dressing applied. Dedra tolerated well.  Discharged in NAD, next appointment confirmed.

## 2023-01-19 NOTE — TELEPHONE ENCOUNTER
Sylvia Magdaleno M.D.  You 13 hours ago (5:44 PM)     STEVEN Rogers,     She should be fine to proceed as low to moderate risk.  I did want her to fu in 7 months, not 7 years (my note keeps defaulting to years not sure why).        Stratification letter sent to pt via [employer] and faxed to [facility]. Stratification request from [facility] scanned into chart.

## 2023-01-19 NOTE — TELEPHONE ENCOUNTER
I am going to defer this back to Dr. Magdaleno, since I haven't seen the patient. (I think the FU was meant to be 1 year?)  Thanks, AB

## 2023-01-24 ENCOUNTER — OUTPATIENT INFUSION SERVICES (OUTPATIENT)
Dept: ONCOLOGY | Facility: MEDICAL CENTER | Age: 83
End: 2023-01-24
Attending: INTERNAL MEDICINE
Payer: MEDICARE

## 2023-01-24 VITALS
HEIGHT: 68 IN | RESPIRATION RATE: 18 BRPM | SYSTOLIC BLOOD PRESSURE: 122 MMHG | BODY MASS INDEX: 26.46 KG/M2 | WEIGHT: 174.6 LBS | HEART RATE: 100 BPM | TEMPERATURE: 97.8 F | DIASTOLIC BLOOD PRESSURE: 57 MMHG

## 2023-01-24 DIAGNOSIS — D75.81 MYELOFIBROSIS (HCC): ICD-10-CM

## 2023-01-24 DIAGNOSIS — D63.0 ANEMIA ASSOCIATED WITH MALIGNANT NEOPLASTIC DISEASE (HCC): ICD-10-CM

## 2023-01-24 DIAGNOSIS — C80.1 ANEMIA ASSOCIATED WITH MALIGNANT NEOPLASTIC DISEASE (HCC): ICD-10-CM

## 2023-01-24 LAB
HCT VFR BLD AUTO: 30.5 % (ref 37–47)
HGB BLD-MCNC: 9.5 G/DL (ref 12–16)

## 2023-01-24 PROCEDURE — 96372 THER/PROPH/DIAG INJ SC/IM: CPT

## 2023-01-24 PROCEDURE — 700111 HCHG RX REV CODE 636 W/ 250 OVERRIDE (IP): Mod: JG | Performed by: INTERNAL MEDICINE

## 2023-01-24 PROCEDURE — 85014 HEMATOCRIT: CPT

## 2023-01-24 PROCEDURE — 85018 HEMOGLOBIN: CPT

## 2023-01-24 PROCEDURE — 36415 COLL VENOUS BLD VENIPUNCTURE: CPT

## 2023-01-24 RX ADMIN — EPOETIN ALFA-EPBX 40000 UNITS: 40000 INJECTION, SOLUTION INTRAVENOUS; SUBCUTANEOUS at 14:38

## 2023-01-24 ASSESSMENT — FIBROSIS 4 INDEX: FIB4 SCORE: .935672514619883041

## 2023-01-24 NOTE — PROGRESS NOTES
Pt ambulatory to [facility] for weekly H&H/possible Retacrit SQ injection.  Pt has no s/s of infection, pt has no complaints at this time. Blood drawn from L AC using 23G butterfly, gauze and coban dressing applied. Pt hgb of 9.5 meets parameters for tx today. Retacrit injected into back of R arm, band-aid applied. Confirmed pt's next appt., left on foot in NAD.

## 2023-01-31 ENCOUNTER — OUTPATIENT INFUSION SERVICES (OUTPATIENT)
Dept: ONCOLOGY | Facility: MEDICAL CENTER | Age: 83
End: 2023-01-31
Attending: INTERNAL MEDICINE
Payer: MEDICARE

## 2023-01-31 VITALS
HEART RATE: 102 BPM | HEIGHT: 68 IN | BODY MASS INDEX: 25.69 KG/M2 | SYSTOLIC BLOOD PRESSURE: 138 MMHG | DIASTOLIC BLOOD PRESSURE: 76 MMHG | RESPIRATION RATE: 18 BRPM | WEIGHT: 169.53 LBS | TEMPERATURE: 98 F | OXYGEN SATURATION: 94 %

## 2023-01-31 DIAGNOSIS — D75.81 MYELOFIBROSIS (HCC): ICD-10-CM

## 2023-01-31 DIAGNOSIS — D63.0 ANEMIA ASSOCIATED WITH MALIGNANT NEOPLASTIC DISEASE (HCC): ICD-10-CM

## 2023-01-31 DIAGNOSIS — D64.9 ANEMIA, UNSPECIFIED TYPE: ICD-10-CM

## 2023-01-31 DIAGNOSIS — E87.6 HYPOKALEMIA: ICD-10-CM

## 2023-01-31 DIAGNOSIS — I50.32 CHRONIC HEART FAILURE WITH PRESERVED EJECTION FRACTION (HCC): ICD-10-CM

## 2023-01-31 DIAGNOSIS — C80.1 ANEMIA ASSOCIATED WITH MALIGNANT NEOPLASTIC DISEASE (HCC): ICD-10-CM

## 2023-01-31 LAB
CREAT SERPL-MCNC: 0.8 MG/DL (ref 0.5–1.4)
FERRITIN SERPL-MCNC: 157 NG/ML (ref 10–291)
GFR SERPLBLD CREATININE-BSD FMLA CKD-EPI: 74 ML/MIN/1.73 M 2
HCT VFR BLD AUTO: 32.6 % (ref 37–47)
HGB BLD-MCNC: 10 G/DL (ref 12–16)
IRON SATN MFR SERPL: 32 % (ref 15–55)
IRON SERPL-MCNC: 90 UG/DL (ref 40–170)
MAGNESIUM SERPL-MCNC: 2.2 MG/DL (ref 1.5–2.5)
POTASSIUM SERPL-SCNC: 3.7 MMOL/L (ref 3.6–5.5)
SODIUM SERPL-SCNC: 141 MMOL/L (ref 135–145)
TIBC SERPL-MCNC: 281 UG/DL (ref 250–450)
UIBC SERPL-MCNC: 191 UG/DL (ref 110–370)

## 2023-01-31 PROCEDURE — 96372 THER/PROPH/DIAG INJ SC/IM: CPT

## 2023-01-31 PROCEDURE — 83550 IRON BINDING TEST: CPT

## 2023-01-31 PROCEDURE — 83540 ASSAY OF IRON: CPT

## 2023-01-31 PROCEDURE — 82565 ASSAY OF CREATININE: CPT

## 2023-01-31 PROCEDURE — 85018 HEMOGLOBIN: CPT

## 2023-01-31 PROCEDURE — 83735 ASSAY OF MAGNESIUM: CPT

## 2023-01-31 PROCEDURE — 82728 ASSAY OF FERRITIN: CPT

## 2023-01-31 PROCEDURE — 36415 COLL VENOUS BLD VENIPUNCTURE: CPT

## 2023-01-31 PROCEDURE — 84132 ASSAY OF SERUM POTASSIUM: CPT

## 2023-01-31 PROCEDURE — 85014 HEMATOCRIT: CPT

## 2023-01-31 PROCEDURE — 700111 HCHG RX REV CODE 636 W/ 250 OVERRIDE (IP): Mod: JG | Performed by: INTERNAL MEDICINE

## 2023-01-31 PROCEDURE — 84295 ASSAY OF SERUM SODIUM: CPT

## 2023-01-31 RX ADMIN — EPOETIN ALFA-EPBX 40000 UNITS: 40000 INJECTION, SOLUTION INTRAVENOUS; SUBCUTANEOUS at 15:07

## 2023-01-31 ASSESSMENT — FIBROSIS 4 INDEX: FIB4 SCORE: .935672514619883041

## 2023-02-01 NOTE — PROGRESS NOTES
[facility]acrit injection. Labs drawn as ordered from [facility] with 23G butterfly needle, gauze and coban dressing placed. Hemoglobin is 10.0 and is within parameters for retacrit injection. Retacrit given subcutaneously as ordered in upper left arm. Has next appt, discharged home to self care.

## 2023-02-07 ENCOUNTER — APPOINTMENT (OUTPATIENT)
Dept: ONCOLOGY | Facility: MEDICAL CENTER | Age: 83
End: 2023-02-07
Attending: INTERNAL MEDICINE
Payer: MEDICARE

## 2023-02-09 ENCOUNTER — OFFICE VISIT (OUTPATIENT)
Dept: MEDICAL GROUP | Facility: PHYSICIAN GROUP | Age: 83
End: 2023-02-09
Payer: MEDICARE

## 2023-02-09 VITALS
TEMPERATURE: 97.4 F | OXYGEN SATURATION: 90 % | SYSTOLIC BLOOD PRESSURE: 128 MMHG | HEIGHT: 69 IN | BODY MASS INDEX: 25.77 KG/M2 | RESPIRATION RATE: 14 BRPM | WEIGHT: 174 LBS | DIASTOLIC BLOOD PRESSURE: 76 MMHG | HEART RATE: 101 BPM

## 2023-02-09 DIAGNOSIS — R60.0 LOWER EXTREMITY EDEMA: ICD-10-CM

## 2023-02-09 DIAGNOSIS — I50.32 CHRONIC HEART FAILURE WITH PRESERVED EJECTION FRACTION (HCC): ICD-10-CM

## 2023-02-09 PROCEDURE — 99213 OFFICE O/P EST LOW 20 MIN: CPT | Performed by: FAMILY MEDICINE

## 2023-02-09 ASSESSMENT — FIBROSIS 4 INDEX: FIB4 SCORE: .935672514619883041

## 2023-02-09 NOTE — PROGRESS NOTES
"CHIEF COMPLAINT / REASON FOR VISIT  Dedra Lobo is a 82 y.o. female that presents today for follow-up of HFpEF    HISTORY OF PRESENT ILLNESS  Had been doing deep breathing exercises. She feels like her breathing is better. She is walking more and is getting less dyspneic.     Has been taking torsemide 20 mg every day. Drinking 40-60 oz per day.    OBJECTIVE     /76 (BP Location: Left arm, Patient Position: Sitting, BP Cuff Size: Adult)   Pulse (!) 101   Temp 36.3 °C (97.4 °F) (Temporal)   Resp 14   Ht 1.74 m (5' 8.5\")   Wt 78.9 kg (174 lb)   SpO2 90%   BMI 26.07 kg/m²  Body mass index is 26.07 kg/m².    PHYSICAL EXAM  Constitutional: Sitting comfortably, in no acute distress, responds to questions appropriately.  Extremities: 2+ pitting edema to mid shin bilaterally  Skin: Warm and dry, no rashes or lesions on face or exposed upper extremities    ASSESSMENT & PLAN  1. Lower extremity edema  2. Chronic heart failure with preserved ejection fraction (HCC)  Her lower extremity edema is stable, not really bothering her at its current level.  Her breathing has also generally improved.  We will keep her at her current dose of torsemide 20 mg daily.  Could consider uptitrating in the future.  She notes that it is difficult to take the large potassium chloride tablet.  In the future would switch to packets of dissolvable potassium chloride.      Follow-up in 3 months      "

## 2023-02-14 ENCOUNTER — OUTPATIENT INFUSION SERVICES (OUTPATIENT)
Dept: ONCOLOGY | Facility: MEDICAL CENTER | Age: 83
End: 2023-02-14
Attending: INTERNAL MEDICINE
Payer: MEDICARE

## 2023-02-14 VITALS
TEMPERATURE: 98.1 F | RESPIRATION RATE: 16 BRPM | HEART RATE: 89 BPM | BODY MASS INDEX: 26.6 KG/M2 | DIASTOLIC BLOOD PRESSURE: 51 MMHG | SYSTOLIC BLOOD PRESSURE: 119 MMHG | HEIGHT: 68 IN | OXYGEN SATURATION: 93 % | WEIGHT: 175.49 LBS

## 2023-02-14 DIAGNOSIS — D63.0 ANEMIA ASSOCIATED WITH MALIGNANT NEOPLASTIC DISEASE (HCC): ICD-10-CM

## 2023-02-14 DIAGNOSIS — D75.81 MYELOFIBROSIS (HCC): ICD-10-CM

## 2023-02-14 DIAGNOSIS — C80.1 ANEMIA ASSOCIATED WITH MALIGNANT NEOPLASTIC DISEASE (HCC): ICD-10-CM

## 2023-02-14 LAB
HCT VFR BLD AUTO: 30.6 % (ref 37–47)
HGB BLD-MCNC: 9.4 G/DL (ref 12–16)

## 2023-02-14 PROCEDURE — 85014 HEMATOCRIT: CPT

## 2023-02-14 PROCEDURE — 96372 THER/PROPH/DIAG INJ SC/IM: CPT

## 2023-02-14 PROCEDURE — 36415 COLL VENOUS BLD VENIPUNCTURE: CPT

## 2023-02-14 PROCEDURE — 85018 HEMOGLOBIN: CPT

## 2023-02-14 PROCEDURE — 700111 HCHG RX REV CODE 636 W/ 250 OVERRIDE (IP): Mod: JG | Performed by: INTERNAL MEDICINE

## 2023-02-14 RX ADMIN — EPOETIN ALFA-EPBX 40000 UNITS: 40000 INJECTION, SOLUTION INTRAVENOUS; SUBCUTANEOUS at 14:12

## 2023-02-14 ASSESSMENT — FIBROSIS 4 INDEX: FIB4 SCORE: .935672514619883041

## 2023-02-14 NOTE — PROGRESS NOTES
Pt ambulatory to [facility] for q week Retacrit SQ injection. Pt has no s/s of infection and no complaints at this time. Blood drawn from L AC, gauze and coban dressing applied. Pt hgb of 9.4 meets parameters for tx today.  Retacrit injected into back of R arm, band-aid applied. Confirmed pt's next appt, left on foot in NAD.

## 2023-02-21 ENCOUNTER — OUTPATIENT INFUSION SERVICES (OUTPATIENT)
Dept: ONCOLOGY | Facility: MEDICAL CENTER | Age: 83
End: 2023-02-21
Attending: INTERNAL MEDICINE
Payer: MEDICARE

## 2023-02-21 VITALS
RESPIRATION RATE: 18 BRPM | HEIGHT: 68 IN | BODY MASS INDEX: 26.43 KG/M2 | DIASTOLIC BLOOD PRESSURE: 58 MMHG | HEART RATE: 87 BPM | SYSTOLIC BLOOD PRESSURE: 133 MMHG | OXYGEN SATURATION: 98 % | TEMPERATURE: 97 F | WEIGHT: 174.38 LBS

## 2023-02-21 DIAGNOSIS — D75.81 MYELOFIBROSIS (HCC): ICD-10-CM

## 2023-02-21 DIAGNOSIS — D63.0 ANEMIA ASSOCIATED WITH MALIGNANT NEOPLASTIC DISEASE (HCC): ICD-10-CM

## 2023-02-21 DIAGNOSIS — C80.1 ANEMIA ASSOCIATED WITH MALIGNANT NEOPLASTIC DISEASE (HCC): ICD-10-CM

## 2023-02-21 LAB
HCT VFR BLD AUTO: 30.7 % (ref 37–47)
HGB BLD-MCNC: 9.5 G/DL (ref 12–16)

## 2023-02-21 PROCEDURE — 96372 THER/PROPH/DIAG INJ SC/IM: CPT

## 2023-02-21 PROCEDURE — 85014 HEMATOCRIT: CPT

## 2023-02-21 PROCEDURE — 700111 HCHG RX REV CODE 636 W/ 250 OVERRIDE (IP): Mod: JG | Performed by: INTERNAL MEDICINE

## 2023-02-21 PROCEDURE — 36415 COLL VENOUS BLD VENIPUNCTURE: CPT

## 2023-02-21 PROCEDURE — 85018 HEMOGLOBIN: CPT

## 2023-02-21 RX ADMIN — EPOETIN ALFA-EPBX 40000 UNITS: 40000 INJECTION, SOLUTION INTRAVENOUS; SUBCUTANEOUS at 14:18

## 2023-02-21 ASSESSMENT — FIBROSIS 4 INDEX: FIB4 SCORE: .935672514619883041

## 2023-02-21 NOTE — PROGRESS NOTES
Patient to [facility] for Retacrit injection. Lab performed by venipuncture in right AC. HGB 9.5 patient meets parameters for Retacrit. Retacrit injected into left back of arm. Patient has future appointment. Patient to home in care of self.

## 2023-02-28 ENCOUNTER — OUTPATIENT INFUSION SERVICES (OUTPATIENT)
Dept: ONCOLOGY | Facility: MEDICAL CENTER | Age: 83
End: 2023-02-28
Attending: INTERNAL MEDICINE
Payer: MEDICARE

## 2023-02-28 VITALS
HEIGHT: 68 IN | SYSTOLIC BLOOD PRESSURE: 123 MMHG | OXYGEN SATURATION: 98 % | WEIGHT: 174.38 LBS | BODY MASS INDEX: 26.43 KG/M2 | TEMPERATURE: 97.2 F | HEART RATE: 87 BPM | DIASTOLIC BLOOD PRESSURE: 60 MMHG | RESPIRATION RATE: 18 BRPM

## 2023-02-28 DIAGNOSIS — D75.81 MYELOFIBROSIS (HCC): ICD-10-CM

## 2023-02-28 DIAGNOSIS — C80.1 ANEMIA ASSOCIATED WITH MALIGNANT NEOPLASTIC DISEASE (HCC): ICD-10-CM

## 2023-02-28 DIAGNOSIS — D63.0 ANEMIA ASSOCIATED WITH MALIGNANT NEOPLASTIC DISEASE (HCC): ICD-10-CM

## 2023-02-28 LAB
HCT VFR BLD AUTO: 29.6 % (ref 37–47)
HGB BLD-MCNC: 9.2 G/DL (ref 12–16)

## 2023-02-28 PROCEDURE — 36415 COLL VENOUS BLD VENIPUNCTURE: CPT

## 2023-02-28 PROCEDURE — 96372 THER/PROPH/DIAG INJ SC/IM: CPT

## 2023-02-28 PROCEDURE — 700111 HCHG RX REV CODE 636 W/ 250 OVERRIDE (IP): Mod: JG | Performed by: INTERNAL MEDICINE

## 2023-02-28 PROCEDURE — 85018 HEMOGLOBIN: CPT

## 2023-02-28 PROCEDURE — 85014 HEMATOCRIT: CPT

## 2023-02-28 RX ADMIN — EPOETIN ALFA-EPBX 40000 UNITS: 40000 INJECTION, SOLUTION INTRAVENOUS; SUBCUTANEOUS at 14:15

## 2023-02-28 ASSESSMENT — FIBROSIS 4 INDEX: FIB4 SCORE: .935672514619883041

## 2023-02-28 NOTE — PROGRESS NOTES
Dedra arrives to [facility] for weekly Retacrit. Patient denies acute health concerns. Reports feeling well/energized. H/H drawn via venipuncture from L-AC without difficulty. Labs and BP within treatable parameters. Retacrit administered SQ to back of right arm without issues. Emailed scheduling regarding future appts. Discharged home to self care in no apparent distress.

## 2023-03-07 ENCOUNTER — OUTPATIENT INFUSION SERVICES (OUTPATIENT)
Dept: ONCOLOGY | Facility: MEDICAL CENTER | Age: 83
End: 2023-03-07
Attending: INTERNAL MEDICINE
Payer: MEDICARE

## 2023-03-07 VITALS
DIASTOLIC BLOOD PRESSURE: 65 MMHG | OXYGEN SATURATION: 98 % | BODY MASS INDEX: 26.8 KG/M2 | HEIGHT: 68 IN | WEIGHT: 176.81 LBS | SYSTOLIC BLOOD PRESSURE: 139 MMHG | HEART RATE: 96 BPM | TEMPERATURE: 97 F | RESPIRATION RATE: 18 BRPM

## 2023-03-07 DIAGNOSIS — D63.0 ANEMIA ASSOCIATED WITH MALIGNANT NEOPLASTIC DISEASE (HCC): ICD-10-CM

## 2023-03-07 DIAGNOSIS — C80.1 ANEMIA ASSOCIATED WITH MALIGNANT NEOPLASTIC DISEASE (HCC): ICD-10-CM

## 2023-03-07 DIAGNOSIS — D75.81 MYELOFIBROSIS (HCC): ICD-10-CM

## 2023-03-07 LAB
HCT VFR BLD AUTO: 30.7 % (ref 37–47)
HGB BLD-MCNC: 9.6 G/DL (ref 12–16)

## 2023-03-07 PROCEDURE — 96372 THER/PROPH/DIAG INJ SC/IM: CPT

## 2023-03-07 PROCEDURE — 700111 HCHG RX REV CODE 636 W/ 250 OVERRIDE (IP): Mod: JG | Performed by: INTERNAL MEDICINE

## 2023-03-07 PROCEDURE — 85014 HEMATOCRIT: CPT

## 2023-03-07 PROCEDURE — 85018 HEMOGLOBIN: CPT

## 2023-03-07 PROCEDURE — 36415 COLL VENOUS BLD VENIPUNCTURE: CPT

## 2023-03-07 RX ADMIN — EPOETIN ALFA-EPBX 40000 UNITS: 40000 INJECTION, SOLUTION INTRAVENOUS; SUBCUTANEOUS at 14:07

## 2023-03-07 ASSESSMENT — FIBROSIS 4 INDEX: FIB4 SCORE: .935672514619883041

## 2023-03-07 NOTE — PROGRESS NOTES
Pt arrived ambulatory to [facility] for H/H possible Retacrit injection. POC discussed with pt and she agrees with plan. Pt with call light in reach for safety. Pt verbalized understanding to call for needs/assist.    H/H drawn as ordered. Results reviewed, Hgb 9.6 today. Pt meets parameters for Retacrit. Pt medicated per MAR. Pt tolerated injection without s/s adverse reaction. Pt discharged to self care, NAD. Pt's next appt confirmed 3/14/2023.

## 2023-03-09 ENCOUNTER — TELEPHONE (OUTPATIENT)
Dept: CARDIOLOGY | Facility: MEDICAL CENTER | Age: 83
End: 2023-03-09
Payer: MEDICARE

## 2023-03-09 NOTE — TELEPHONE ENCOUNTER
Received clearance request from [facility] for endoscopy procedure clearance. Same one already in [employer], scanned 1/19/23. Letter was sent on this date as well, electronically faxed again today to [facility] fax [phone].

## 2023-03-14 ENCOUNTER — OUTPATIENT INFUSION SERVICES (OUTPATIENT)
Dept: ONCOLOGY | Facility: MEDICAL CENTER | Age: 83
End: 2023-03-14
Attending: INTERNAL MEDICINE
Payer: MEDICARE

## 2023-03-14 VITALS
BODY MASS INDEX: 26.96 KG/M2 | RESPIRATION RATE: 18 BRPM | TEMPERATURE: 97.8 F | SYSTOLIC BLOOD PRESSURE: 125 MMHG | WEIGHT: 177.91 LBS | DIASTOLIC BLOOD PRESSURE: 65 MMHG | HEIGHT: 68 IN | HEART RATE: 96 BPM | OXYGEN SATURATION: 98 %

## 2023-03-14 DIAGNOSIS — C80.1 ANEMIA ASSOCIATED WITH MALIGNANT NEOPLASTIC DISEASE (HCC): ICD-10-CM

## 2023-03-14 DIAGNOSIS — D63.0 ANEMIA ASSOCIATED WITH MALIGNANT NEOPLASTIC DISEASE (HCC): ICD-10-CM

## 2023-03-14 DIAGNOSIS — D75.81 MYELOFIBROSIS (HCC): ICD-10-CM

## 2023-03-14 LAB
HCT VFR BLD AUTO: 30.3 % (ref 37–47)
HGB BLD-MCNC: 9.3 G/DL (ref 12–16)

## 2023-03-14 PROCEDURE — 96372 THER/PROPH/DIAG INJ SC/IM: CPT

## 2023-03-14 PROCEDURE — 85014 HEMATOCRIT: CPT

## 2023-03-14 PROCEDURE — 700111 HCHG RX REV CODE 636 W/ 250 OVERRIDE (IP): Mod: JG | Performed by: INTERNAL MEDICINE

## 2023-03-14 PROCEDURE — 36415 COLL VENOUS BLD VENIPUNCTURE: CPT

## 2023-03-14 PROCEDURE — 85018 HEMOGLOBIN: CPT

## 2023-03-14 RX ORDER — 0.9 % SODIUM CHLORIDE 0.9 %
VIAL (ML) INJECTION PRN
Status: CANCELLED | OUTPATIENT
Start: 2023-03-14

## 2023-03-14 RX ORDER — 0.9 % SODIUM CHLORIDE 0.9 %
10 VIAL (ML) INJECTION PRN
Status: CANCELLED | OUTPATIENT
Start: 2023-03-15

## 2023-03-14 RX ORDER — 0.9 % SODIUM CHLORIDE 0.9 %
10 VIAL (ML) INJECTION PRN
Status: CANCELLED | OUTPATIENT
Start: 2023-03-14

## 2023-03-14 RX ORDER — 0.9 % SODIUM CHLORIDE 0.9 %
VIAL (ML) INJECTION PRN
Status: CANCELLED | OUTPATIENT
Start: 2023-03-15

## 2023-03-14 RX ORDER — 0.9 % SODIUM CHLORIDE 0.9 %
3 VIAL (ML) INJECTION PRN
Status: CANCELLED | OUTPATIENT
Start: 2023-03-15

## 2023-03-14 RX ORDER — 0.9 % SODIUM CHLORIDE 0.9 %
3 VIAL (ML) INJECTION PRN
Status: CANCELLED | OUTPATIENT
Start: 2023-03-14

## 2023-03-14 RX ORDER — HEPARIN SODIUM (PORCINE) LOCK FLUSH IV SOLN 100 UNIT/ML 100 UNIT/ML
500 SOLUTION INTRAVENOUS PRN
Status: CANCELLED | OUTPATIENT
Start: 2023-03-14

## 2023-03-14 RX ORDER — HEPARIN SODIUM (PORCINE) LOCK FLUSH IV SOLN 100 UNIT/ML 100 UNIT/ML
500 SOLUTION INTRAVENOUS PRN
Status: CANCELLED | OUTPATIENT
Start: 2023-03-15

## 2023-03-14 RX ADMIN — EPOETIN ALFA-EPBX 40000 UNITS: 40000 INJECTION, SOLUTION INTRAVENOUS; SUBCUTANEOUS at 14:14

## 2023-03-14 ASSESSMENT — FIBROSIS 4 INDEX: FIB4 SCORE: .935672514619883041

## 2023-03-14 NOTE — PROGRESS NOTES
[facility]edra [facility]acrit. Patient denies acute health concerns. Reports feeling well/energized. H/H drawn via venipuncture from L-AC without difficulty. Labs and BP within treatable parameters. Retacrit administered SQ to back of right arm without issues. She has her next appt. Discharged home to self care in no apparent distress

## 2023-03-21 ENCOUNTER — OUTPATIENT INFUSION SERVICES (OUTPATIENT)
Dept: ONCOLOGY | Facility: MEDICAL CENTER | Age: 83
End: 2023-03-21
Attending: INTERNAL MEDICINE
Payer: MEDICARE

## 2023-03-21 VITALS
TEMPERATURE: 97.9 F | BODY MASS INDEX: 26.93 KG/M2 | HEART RATE: 96 BPM | RESPIRATION RATE: 18 BRPM | DIASTOLIC BLOOD PRESSURE: 58 MMHG | HEIGHT: 68 IN | WEIGHT: 177.69 LBS | SYSTOLIC BLOOD PRESSURE: 119 MMHG | OXYGEN SATURATION: 94 %

## 2023-03-21 DIAGNOSIS — D75.81 MYELOFIBROSIS (HCC): ICD-10-CM

## 2023-03-21 DIAGNOSIS — D63.0 ANEMIA ASSOCIATED WITH MALIGNANT NEOPLASTIC DISEASE (HCC): ICD-10-CM

## 2023-03-21 DIAGNOSIS — C80.1 ANEMIA ASSOCIATED WITH MALIGNANT NEOPLASTIC DISEASE (HCC): ICD-10-CM

## 2023-03-21 LAB
HCT VFR BLD AUTO: 31.6 % (ref 37–47)
HGB BLD-MCNC: 9.8 G/DL (ref 12–16)

## 2023-03-21 PROCEDURE — 96372 THER/PROPH/DIAG INJ SC/IM: CPT

## 2023-03-21 PROCEDURE — 85014 HEMATOCRIT: CPT

## 2023-03-21 PROCEDURE — 36415 COLL VENOUS BLD VENIPUNCTURE: CPT

## 2023-03-21 PROCEDURE — 700111 HCHG RX REV CODE 636 W/ 250 OVERRIDE (IP): Mod: JG | Performed by: INTERNAL MEDICINE

## 2023-03-21 PROCEDURE — 85018 HEMOGLOBIN: CPT

## 2023-03-21 RX ORDER — METRONIDAZOLE 250 MG/1
250 TABLET ORAL 4 TIMES DAILY
COMMUNITY
Start: 2023-03-14 | End: 2023-05-02

## 2023-03-21 RX ORDER — TETRACYCLINE HYDROCHLORIDE 500 MG/1
500 CAPSULE ORAL 4 TIMES DAILY
COMMUNITY
Start: 2023-03-17 | End: 2023-05-02

## 2023-03-21 RX ORDER — OMEPRAZOLE 40 MG/1
40 CAPSULE, DELAYED RELEASE ORAL 2 TIMES DAILY
COMMUNITY
Start: 2023-03-14 | End: 2023-05-02

## 2023-03-21 RX ADMIN — EPOETIN ALFA-EPBX 40000 UNITS: 40000 INJECTION, SOLUTION INTRAVENOUS; SUBCUTANEOUS at 14:26

## 2023-03-21 ASSESSMENT — FIBROSIS 4 INDEX: FIB4 SCORE: .935672514619883041

## 2023-03-21 NOTE — PROGRESS NOTES
Dedra to infusion for weekly labs and Retacrit injection. Patient reports tolerating treatment well so far, no acute complaints. Labs drawn by venipuncture from L AC. Labs reviewed by RN, Hgb: 9.8, within parameters for Retacrit injection. Retacrit given SQ in R posterior arm, patient tolerated well with no adverse effects. Patient left in stable condition, knows when to return for next appt.

## 2023-03-27 RX ORDER — 0.9 % SODIUM CHLORIDE 0.9 %
3 VIAL (ML) INJECTION PRN
Status: CANCELLED | OUTPATIENT
Start: 2023-04-18

## 2023-03-27 RX ORDER — HEPARIN SODIUM (PORCINE) LOCK FLUSH IV SOLN 100 UNIT/ML 100 UNIT/ML
500 SOLUTION INTRAVENOUS PRN
Status: CANCELLED | OUTPATIENT
Start: 2023-03-27

## 2023-03-27 RX ORDER — 0.9 % SODIUM CHLORIDE 0.9 %
VIAL (ML) INJECTION PRN
Status: CANCELLED | OUTPATIENT
Start: 2023-04-11

## 2023-03-27 RX ORDER — 0.9 % SODIUM CHLORIDE 0.9 %
3 VIAL (ML) INJECTION PRN
Status: CANCELLED | OUTPATIENT
Start: 2023-03-27

## 2023-03-27 RX ORDER — 0.9 % SODIUM CHLORIDE 0.9 %
VIAL (ML) INJECTION PRN
Status: CANCELLED | OUTPATIENT
Start: 2023-05-02

## 2023-03-27 RX ORDER — 0.9 % SODIUM CHLORIDE 0.9 %
10 VIAL (ML) INJECTION PRN
Status: CANCELLED | OUTPATIENT
Start: 2023-04-25

## 2023-03-27 RX ORDER — 0.9 % SODIUM CHLORIDE 0.9 %
VIAL (ML) INJECTION PRN
Status: CANCELLED | OUTPATIENT
Start: 2023-03-27

## 2023-03-27 RX ORDER — HEPARIN SODIUM (PORCINE) LOCK FLUSH IV SOLN 100 UNIT/ML 100 UNIT/ML
500 SOLUTION INTRAVENOUS PRN
Status: CANCELLED | OUTPATIENT
Start: 2023-04-18

## 2023-03-27 RX ORDER — 0.9 % SODIUM CHLORIDE 0.9 %
10 VIAL (ML) INJECTION PRN
Status: CANCELLED | OUTPATIENT
Start: 2023-04-18

## 2023-03-27 RX ORDER — 0.9 % SODIUM CHLORIDE 0.9 %
3 VIAL (ML) INJECTION PRN
Status: CANCELLED | OUTPATIENT
Start: 2023-05-02

## 2023-03-27 RX ORDER — HEPARIN SODIUM (PORCINE) LOCK FLUSH IV SOLN 100 UNIT/ML 100 UNIT/ML
500 SOLUTION INTRAVENOUS PRN
Status: CANCELLED | OUTPATIENT
Start: 2023-04-25

## 2023-03-27 RX ORDER — 0.9 % SODIUM CHLORIDE 0.9 %
10 VIAL (ML) INJECTION PRN
Status: CANCELLED | OUTPATIENT
Start: 2023-03-27

## 2023-03-27 RX ORDER — HEPARIN SODIUM (PORCINE) LOCK FLUSH IV SOLN 100 UNIT/ML 100 UNIT/ML
500 SOLUTION INTRAVENOUS PRN
Status: CANCELLED | OUTPATIENT
Start: 2023-04-04

## 2023-03-27 RX ORDER — 0.9 % SODIUM CHLORIDE 0.9 %
3 VIAL (ML) INJECTION PRN
Status: CANCELLED | OUTPATIENT
Start: 2023-04-11

## 2023-03-27 RX ORDER — 0.9 % SODIUM CHLORIDE 0.9 %
VIAL (ML) INJECTION PRN
Status: CANCELLED | OUTPATIENT
Start: 2023-04-04

## 2023-03-27 RX ORDER — HEPARIN SODIUM (PORCINE) LOCK FLUSH IV SOLN 100 UNIT/ML 100 UNIT/ML
500 SOLUTION INTRAVENOUS PRN
Status: CANCELLED | OUTPATIENT
Start: 2023-04-11

## 2023-03-27 RX ORDER — HEPARIN SODIUM (PORCINE) LOCK FLUSH IV SOLN 100 UNIT/ML 100 UNIT/ML
500 SOLUTION INTRAVENOUS PRN
Status: CANCELLED | OUTPATIENT
Start: 2023-05-02

## 2023-03-27 RX ORDER — 0.9 % SODIUM CHLORIDE 0.9 %
10 VIAL (ML) INJECTION PRN
Status: CANCELLED | OUTPATIENT
Start: 2023-05-02

## 2023-03-27 RX ORDER — 0.9 % SODIUM CHLORIDE 0.9 %
10 VIAL (ML) INJECTION PRN
Status: CANCELLED | OUTPATIENT
Start: 2023-04-11

## 2023-03-27 RX ORDER — 0.9 % SODIUM CHLORIDE 0.9 %
VIAL (ML) INJECTION PRN
Status: CANCELLED | OUTPATIENT
Start: 2023-04-25

## 2023-03-27 RX ORDER — 0.9 % SODIUM CHLORIDE 0.9 %
3 VIAL (ML) INJECTION PRN
Status: CANCELLED | OUTPATIENT
Start: 2023-04-04

## 2023-03-27 RX ORDER — 0.9 % SODIUM CHLORIDE 0.9 %
3 VIAL (ML) INJECTION PRN
Status: CANCELLED | OUTPATIENT
Start: 2023-04-25

## 2023-03-27 RX ORDER — 0.9 % SODIUM CHLORIDE 0.9 %
VIAL (ML) INJECTION PRN
Status: CANCELLED | OUTPATIENT
Start: 2023-04-18

## 2023-03-27 RX ORDER — 0.9 % SODIUM CHLORIDE 0.9 %
10 VIAL (ML) INJECTION PRN
Status: CANCELLED | OUTPATIENT
Start: 2023-04-04

## 2023-03-28 ENCOUNTER — OUTPATIENT INFUSION SERVICES (OUTPATIENT)
Dept: ONCOLOGY | Facility: MEDICAL CENTER | Age: 83
End: 2023-03-28
Attending: INTERNAL MEDICINE
Payer: MEDICARE

## 2023-03-28 VITALS
RESPIRATION RATE: 18 BRPM | WEIGHT: 177.25 LBS | OXYGEN SATURATION: 97 % | DIASTOLIC BLOOD PRESSURE: 66 MMHG | SYSTOLIC BLOOD PRESSURE: 142 MMHG | HEIGHT: 68 IN | HEART RATE: 89 BPM | BODY MASS INDEX: 26.86 KG/M2 | TEMPERATURE: 97.1 F

## 2023-03-28 DIAGNOSIS — C80.1 ANEMIA ASSOCIATED WITH MALIGNANT NEOPLASTIC DISEASE (HCC): ICD-10-CM

## 2023-03-28 DIAGNOSIS — D63.0 ANEMIA ASSOCIATED WITH MALIGNANT NEOPLASTIC DISEASE (HCC): ICD-10-CM

## 2023-03-28 DIAGNOSIS — D75.81 MYELOFIBROSIS (HCC): ICD-10-CM

## 2023-03-28 LAB
HCT VFR BLD AUTO: 32.1 % (ref 37–47)
HGB BLD-MCNC: 10 G/DL (ref 12–16)

## 2023-03-28 PROCEDURE — 36415 COLL VENOUS BLD VENIPUNCTURE: CPT

## 2023-03-28 PROCEDURE — 700111 HCHG RX REV CODE 636 W/ 250 OVERRIDE (IP): Mod: JG | Performed by: INTERNAL MEDICINE

## 2023-03-28 PROCEDURE — 85018 HEMOGLOBIN: CPT

## 2023-03-28 PROCEDURE — 85014 HEMATOCRIT: CPT

## 2023-03-28 PROCEDURE — 96372 THER/PROPH/DIAG INJ SC/IM: CPT

## 2023-03-28 RX ADMIN — EPOETIN ALFA-EPBX 40000 UNITS: 40000 INJECTION, SOLUTION INTRAVENOUS; SUBCUTANEOUS at 14:15

## 2023-03-28 ASSESSMENT — FIBROSIS 4 INDEX: FIB4 SCORE: .935672514619883041

## 2023-03-28 NOTE — PROGRESS NOTES
Pt presents to [facility] for epoetin. Butterfly needle used in RAC; brisk blood return observed and pt tolerated well. Labs drawn and within treatable parameters. Epoetin injected into L back arm with no s/s of adverse reactions. Next appointment confirmed and education provided. Pt discharged to self care with all personal belongings and in NAD.

## 2023-04-04 ENCOUNTER — OUTPATIENT INFUSION SERVICES (OUTPATIENT)
Dept: ONCOLOGY | Facility: MEDICAL CENTER | Age: 83
End: 2023-04-04
Attending: INTERNAL MEDICINE
Payer: MEDICARE

## 2023-04-04 VITALS
BODY MASS INDEX: 26.73 KG/M2 | OXYGEN SATURATION: 96 % | HEIGHT: 68 IN | DIASTOLIC BLOOD PRESSURE: 58 MMHG | HEART RATE: 94 BPM | WEIGHT: 176.37 LBS | TEMPERATURE: 97 F | RESPIRATION RATE: 18 BRPM | SYSTOLIC BLOOD PRESSURE: 125 MMHG

## 2023-04-04 DIAGNOSIS — D63.0 ANEMIA ASSOCIATED WITH MALIGNANT NEOPLASTIC DISEASE (HCC): ICD-10-CM

## 2023-04-04 DIAGNOSIS — D75.81 MYELOFIBROSIS (HCC): ICD-10-CM

## 2023-04-04 DIAGNOSIS — C80.1 ANEMIA ASSOCIATED WITH MALIGNANT NEOPLASTIC DISEASE (HCC): ICD-10-CM

## 2023-04-04 LAB
HCT VFR BLD AUTO: 31.5 % (ref 37–47)
HGB BLD-MCNC: 9.9 G/DL (ref 12–16)

## 2023-04-04 PROCEDURE — 96372 THER/PROPH/DIAG INJ SC/IM: CPT

## 2023-04-04 PROCEDURE — 85014 HEMATOCRIT: CPT

## 2023-04-04 PROCEDURE — 36415 COLL VENOUS BLD VENIPUNCTURE: CPT

## 2023-04-04 PROCEDURE — 85018 HEMOGLOBIN: CPT

## 2023-04-04 PROCEDURE — 700111 HCHG RX REV CODE 636 W/ 250 OVERRIDE (IP): Mod: JG | Performed by: INTERNAL MEDICINE

## 2023-04-04 RX ADMIN — EPOETIN ALFA-EPBX 40000 UNITS: 40000 INJECTION, SOLUTION INTRAVENOUS; SUBCUTANEOUS at 14:13

## 2023-04-04 ASSESSMENT — FIBROSIS 4 INDEX: FIB4 SCORE: .935672514619883041

## 2023-04-04 NOTE — PROGRESS NOTES
Pt presents to [facility] for epoetin. Butterfly needle used in LAC; brisk blood return observed and pt tolerated well. Labs drawn and within treatable parameters. Epoetin injected into L back arm with no s/s of adverse reactions. Next appointment confirmed and education provided. Pt discharged to self care with all personal belongings and in NAD.

## 2023-04-10 ENCOUNTER — HOSPITAL ENCOUNTER (OUTPATIENT)
Dept: RADIOLOGY | Facility: MEDICAL CENTER | Age: 83
End: 2023-04-10
Attending: INTERNAL MEDICINE
Payer: MEDICARE

## 2023-04-10 DIAGNOSIS — Z79.899 NEED FOR PROPHYLACTIC CHEMOTHERAPY: ICD-10-CM

## 2023-04-10 DIAGNOSIS — D70.9 NEUTROPENIC TYPHLITIS (HCC): ICD-10-CM

## 2023-04-10 DIAGNOSIS — M86.9 SAPHO SYNDROME (HCC): ICD-10-CM

## 2023-04-10 DIAGNOSIS — L40.3 SAPHO SYNDROME (HCC): ICD-10-CM

## 2023-04-10 DIAGNOSIS — L70.9 SAPHO SYNDROME (HCC): ICD-10-CM

## 2023-04-10 DIAGNOSIS — K37 NEUTROPENIC TYPHLITIS (HCC): ICD-10-CM

## 2023-04-10 DIAGNOSIS — D75.81 MYELOFIBROSIS-OSTEOSCLEROSIS (HCC): ICD-10-CM

## 2023-04-10 DIAGNOSIS — M85.80 SAPHO SYNDROME (HCC): ICD-10-CM

## 2023-04-10 DIAGNOSIS — D64.9 ANEMIA, UNSPECIFIED TYPE: ICD-10-CM

## 2023-04-10 DIAGNOSIS — D47.3 THROMBOCYTHEMIA, ESSENTIAL (HCC): ICD-10-CM

## 2023-04-10 DIAGNOSIS — D59.9 ACQUIRED HEMOLYTIC ANEMIA, UNSPECIFIED (HCC): ICD-10-CM

## 2023-04-10 DIAGNOSIS — M65.9 SAPHO SYNDROME (HCC): ICD-10-CM

## 2023-04-10 PROCEDURE — 76700 US EXAM ABDOM COMPLETE: CPT

## 2023-04-11 ENCOUNTER — OUTPATIENT INFUSION SERVICES (OUTPATIENT)
Dept: ONCOLOGY | Facility: MEDICAL CENTER | Age: 83
End: 2023-04-11
Attending: INTERNAL MEDICINE
Payer: MEDICARE

## 2023-04-11 VITALS
OXYGEN SATURATION: 95 % | HEART RATE: 97 BPM | WEIGHT: 180.12 LBS | BODY MASS INDEX: 27.3 KG/M2 | SYSTOLIC BLOOD PRESSURE: 128 MMHG | TEMPERATURE: 98.5 F | RESPIRATION RATE: 18 BRPM | HEIGHT: 68 IN | DIASTOLIC BLOOD PRESSURE: 56 MMHG

## 2023-04-11 DIAGNOSIS — D63.0 ANEMIA ASSOCIATED WITH MALIGNANT NEOPLASTIC DISEASE (HCC): ICD-10-CM

## 2023-04-11 DIAGNOSIS — C80.1 ANEMIA ASSOCIATED WITH MALIGNANT NEOPLASTIC DISEASE (HCC): ICD-10-CM

## 2023-04-11 DIAGNOSIS — D75.81 MYELOFIBROSIS (HCC): ICD-10-CM

## 2023-04-11 LAB
HCT VFR BLD AUTO: 30 % (ref 37–47)
HGB BLD-MCNC: 9.1 G/DL (ref 12–16)

## 2023-04-11 PROCEDURE — 85018 HEMOGLOBIN: CPT

## 2023-04-11 PROCEDURE — 96372 THER/PROPH/DIAG INJ SC/IM: CPT

## 2023-04-11 PROCEDURE — 36415 COLL VENOUS BLD VENIPUNCTURE: CPT

## 2023-04-11 PROCEDURE — 700111 HCHG RX REV CODE 636 W/ 250 OVERRIDE (IP): Mod: JG | Performed by: INTERNAL MEDICINE

## 2023-04-11 PROCEDURE — 85014 HEMATOCRIT: CPT

## 2023-04-11 RX ADMIN — EPOETIN ALFA-EPBX 40000 UNITS: 40000 INJECTION, SOLUTION INTRAVENOUS; SUBCUTANEOUS at 15:29

## 2023-04-11 ASSESSMENT — FIBROSIS 4 INDEX: FIB4 SCORE: .935672514619883041

## 2023-04-11 NOTE — PROGRESS NOTES
Pt arrives to [facility] for Retacrit injection.  Labs drawn via 25g butterfly needle to R-AC.  Hemoglobin is 9.1 today.  Results meet MD parameters for Retacrit.  Retacrit given SC to back of LUE.  Confirmed next appt time on 4/18/23 with pt.   Pt dc home to self care.

## 2023-04-18 ENCOUNTER — OUTPATIENT INFUSION SERVICES (OUTPATIENT)
Dept: ONCOLOGY | Facility: MEDICAL CENTER | Age: 83
End: 2023-04-18
Attending: INTERNAL MEDICINE
Payer: MEDICARE

## 2023-04-18 VITALS
OXYGEN SATURATION: 94 % | HEART RATE: 92 BPM | WEIGHT: 173.06 LBS | BODY MASS INDEX: 26.23 KG/M2 | SYSTOLIC BLOOD PRESSURE: 121 MMHG | DIASTOLIC BLOOD PRESSURE: 58 MMHG | TEMPERATURE: 97.4 F | HEIGHT: 68 IN | RESPIRATION RATE: 18 BRPM

## 2023-04-18 DIAGNOSIS — C80.1 ANEMIA ASSOCIATED WITH MALIGNANT NEOPLASTIC DISEASE (HCC): ICD-10-CM

## 2023-04-18 DIAGNOSIS — D75.81 MYELOFIBROSIS (HCC): ICD-10-CM

## 2023-04-18 DIAGNOSIS — D63.0 ANEMIA ASSOCIATED WITH MALIGNANT NEOPLASTIC DISEASE (HCC): ICD-10-CM

## 2023-04-18 LAB
HCT VFR BLD AUTO: 33.4 % (ref 37–47)
HGB BLD-MCNC: 10.4 G/DL (ref 12–16)

## 2023-04-18 PROCEDURE — 85014 HEMATOCRIT: CPT

## 2023-04-18 PROCEDURE — 36415 COLL VENOUS BLD VENIPUNCTURE: CPT

## 2023-04-18 PROCEDURE — 85018 HEMOGLOBIN: CPT

## 2023-04-18 ASSESSMENT — FIBROSIS 4 INDEX: FIB4 SCORE: .935672514619883041

## 2023-04-18 NOTE — PROGRESS NOTES
Pt presents to [facility] for epoetin. Butterfly needle used in RAC; brisk blood return observed and pt tolerated well. Labs drawn and not within treatable parameters. Next appointment confirmed and education provided. Pt discharged to self care with all personal belongings and in NAD.

## 2023-04-25 ENCOUNTER — OUTPATIENT INFUSION SERVICES (OUTPATIENT)
Dept: ONCOLOGY | Facility: MEDICAL CENTER | Age: 83
End: 2023-04-25
Attending: INTERNAL MEDICINE
Payer: MEDICARE

## 2023-04-25 VITALS
RESPIRATION RATE: 18 BRPM | OXYGEN SATURATION: 96 % | BODY MASS INDEX: 26.33 KG/M2 | WEIGHT: 173.72 LBS | SYSTOLIC BLOOD PRESSURE: 137 MMHG | HEART RATE: 89 BPM | HEIGHT: 68 IN | DIASTOLIC BLOOD PRESSURE: 72 MMHG | TEMPERATURE: 97.7 F

## 2023-04-25 DIAGNOSIS — D75.81 MYELOFIBROSIS (HCC): ICD-10-CM

## 2023-04-25 DIAGNOSIS — D63.0 ANEMIA ASSOCIATED WITH MALIGNANT NEOPLASTIC DISEASE (HCC): ICD-10-CM

## 2023-04-25 DIAGNOSIS — C80.1 ANEMIA ASSOCIATED WITH MALIGNANT NEOPLASTIC DISEASE (HCC): ICD-10-CM

## 2023-04-25 LAB
HCT VFR BLD AUTO: 32.3 % (ref 37–47)
HGB BLD-MCNC: 10 G/DL (ref 12–16)

## 2023-04-25 PROCEDURE — 36415 COLL VENOUS BLD VENIPUNCTURE: CPT

## 2023-04-25 PROCEDURE — 700111 HCHG RX REV CODE 636 W/ 250 OVERRIDE (IP): Mod: JG | Performed by: INTERNAL MEDICINE

## 2023-04-25 PROCEDURE — 96372 THER/PROPH/DIAG INJ SC/IM: CPT

## 2023-04-25 PROCEDURE — 85014 HEMATOCRIT: CPT

## 2023-04-25 PROCEDURE — 85018 HEMOGLOBIN: CPT

## 2023-04-25 RX ADMIN — EPOETIN ALFA-EPBX 40000 UNITS: 40000 INJECTION, SOLUTION INTRAVENOUS; SUBCUTANEOUS at 15:07

## 2023-04-25 ASSESSMENT — FIBROSIS 4 INDEX: FIB4 SCORE: .935672514619883041

## 2023-04-25 NOTE — PROGRESS NOTES
Pt arrived ambulatory and stable to clinic today for epoetin. Butterfly needle used in RAC; labs drawn and reviewed.    HGB 10.0    Administered retacrit subcutaneous, bandaid applied. Pt left clinic stable.

## 2023-05-02 ENCOUNTER — OUTPATIENT INFUSION SERVICES (OUTPATIENT)
Dept: ONCOLOGY | Facility: MEDICAL CENTER | Age: 83
End: 2023-05-02
Attending: INTERNAL MEDICINE
Payer: MEDICARE

## 2023-05-02 VITALS
OXYGEN SATURATION: 97 % | TEMPERATURE: 97.4 F | DIASTOLIC BLOOD PRESSURE: 60 MMHG | HEART RATE: 91 BPM | SYSTOLIC BLOOD PRESSURE: 116 MMHG | BODY MASS INDEX: 26.23 KG/M2 | RESPIRATION RATE: 18 BRPM | WEIGHT: 173.06 LBS | HEIGHT: 68 IN

## 2023-05-02 DIAGNOSIS — D63.0 ANEMIA ASSOCIATED WITH MALIGNANT NEOPLASTIC DISEASE (HCC): ICD-10-CM

## 2023-05-02 DIAGNOSIS — D75.81 MYELOFIBROSIS (HCC): ICD-10-CM

## 2023-05-02 DIAGNOSIS — C80.1 ANEMIA ASSOCIATED WITH MALIGNANT NEOPLASTIC DISEASE (HCC): ICD-10-CM

## 2023-05-02 LAB
HCT VFR BLD AUTO: 29.7 % (ref 37–47)
HGB BLD-MCNC: 9.3 G/DL (ref 12–16)

## 2023-05-02 PROCEDURE — 96372 THER/PROPH/DIAG INJ SC/IM: CPT

## 2023-05-02 PROCEDURE — 36415 COLL VENOUS BLD VENIPUNCTURE: CPT

## 2023-05-02 PROCEDURE — 85018 HEMOGLOBIN: CPT

## 2023-05-02 PROCEDURE — 700111 HCHG RX REV CODE 636 W/ 250 OVERRIDE (IP): Mod: JG | Performed by: INTERNAL MEDICINE

## 2023-05-02 PROCEDURE — 85014 HEMATOCRIT: CPT

## 2023-05-02 RX ADMIN — EPOETIN ALFA-EPBX 40000 UNITS: 40000 INJECTION, SOLUTION INTRAVENOUS; SUBCUTANEOUS at 12:43

## 2023-05-02 ASSESSMENT — FIBROSIS 4 INDEX: FIB4 SCORE: .935672514619883041

## 2023-05-02 NOTE — PROGRESS NOTES
[facility]rit [facility]. Labs drawn as ordered from [facility] with 23G butterfly needle, gauze and coban dressing placed. Hemoglobin is 9.3 and is within parameters for retacrit injection. Retacrit given subcutaneously as ordered in left upper arm. Has next appt, discharged home to self care.

## 2023-05-03 RX ORDER — HEPARIN SODIUM (PORCINE) LOCK FLUSH IV SOLN 100 UNIT/ML 100 UNIT/ML
500 SOLUTION INTRAVENOUS PRN
Status: CANCELLED | OUTPATIENT
Start: 2023-05-09

## 2023-05-03 RX ORDER — 0.9 % SODIUM CHLORIDE 0.9 %
VIAL (ML) INJECTION PRN
Status: CANCELLED | OUTPATIENT
Start: 2023-05-30

## 2023-05-03 RX ORDER — 0.9 % SODIUM CHLORIDE 0.9 %
10 VIAL (ML) INJECTION PRN
Status: CANCELLED | OUTPATIENT
Start: 2023-05-16

## 2023-05-03 RX ORDER — 0.9 % SODIUM CHLORIDE 0.9 %
3 VIAL (ML) INJECTION PRN
Status: CANCELLED | OUTPATIENT
Start: 2023-05-16

## 2023-05-03 RX ORDER — HEPARIN SODIUM (PORCINE) LOCK FLUSH IV SOLN 100 UNIT/ML 100 UNIT/ML
500 SOLUTION INTRAVENOUS PRN
Status: CANCELLED | OUTPATIENT
Start: 2023-05-30

## 2023-05-03 RX ORDER — 0.9 % SODIUM CHLORIDE 0.9 %
10 VIAL (ML) INJECTION PRN
Status: CANCELLED | OUTPATIENT
Start: 2023-05-23

## 2023-05-03 RX ORDER — 0.9 % SODIUM CHLORIDE 0.9 %
VIAL (ML) INJECTION PRN
Status: CANCELLED | OUTPATIENT
Start: 2023-05-23

## 2023-05-03 RX ORDER — 0.9 % SODIUM CHLORIDE 0.9 %
3 VIAL (ML) INJECTION PRN
Status: CANCELLED | OUTPATIENT
Start: 2023-05-09

## 2023-05-03 RX ORDER — 0.9 % SODIUM CHLORIDE 0.9 %
10 VIAL (ML) INJECTION PRN
Status: CANCELLED | OUTPATIENT
Start: 2023-05-09

## 2023-05-03 RX ORDER — 0.9 % SODIUM CHLORIDE 0.9 %
3 VIAL (ML) INJECTION PRN
Status: CANCELLED | OUTPATIENT
Start: 2023-05-23

## 2023-05-03 RX ORDER — HEPARIN SODIUM (PORCINE) LOCK FLUSH IV SOLN 100 UNIT/ML 100 UNIT/ML
500 SOLUTION INTRAVENOUS PRN
Status: CANCELLED | OUTPATIENT
Start: 2023-05-16

## 2023-05-03 RX ORDER — 0.9 % SODIUM CHLORIDE 0.9 %
VIAL (ML) INJECTION PRN
Status: CANCELLED | OUTPATIENT
Start: 2023-05-09

## 2023-05-03 RX ORDER — 0.9 % SODIUM CHLORIDE 0.9 %
3 VIAL (ML) INJECTION PRN
Status: CANCELLED | OUTPATIENT
Start: 2023-05-30

## 2023-05-03 RX ORDER — 0.9 % SODIUM CHLORIDE 0.9 %
10 VIAL (ML) INJECTION PRN
Status: CANCELLED | OUTPATIENT
Start: 2023-05-30

## 2023-05-03 RX ORDER — 0.9 % SODIUM CHLORIDE 0.9 %
VIAL (ML) INJECTION PRN
Status: CANCELLED | OUTPATIENT
Start: 2023-05-16

## 2023-05-03 RX ORDER — HEPARIN SODIUM (PORCINE) LOCK FLUSH IV SOLN 100 UNIT/ML 100 UNIT/ML
500 SOLUTION INTRAVENOUS PRN
Status: CANCELLED | OUTPATIENT
Start: 2023-05-23

## 2023-05-09 ENCOUNTER — OUTPATIENT INFUSION SERVICES (OUTPATIENT)
Dept: ONCOLOGY | Facility: MEDICAL CENTER | Age: 83
End: 2023-05-09
Attending: INTERNAL MEDICINE
Payer: MEDICARE

## 2023-05-09 VITALS
OXYGEN SATURATION: 99 % | BODY MASS INDEX: 26.56 KG/M2 | HEIGHT: 68 IN | HEART RATE: 95 BPM | SYSTOLIC BLOOD PRESSURE: 130 MMHG | DIASTOLIC BLOOD PRESSURE: 59 MMHG | WEIGHT: 175.27 LBS | RESPIRATION RATE: 18 BRPM | TEMPERATURE: 98 F

## 2023-05-09 DIAGNOSIS — C80.1 ANEMIA ASSOCIATED WITH MALIGNANT NEOPLASTIC DISEASE (HCC): ICD-10-CM

## 2023-05-09 DIAGNOSIS — D63.0 ANEMIA ASSOCIATED WITH MALIGNANT NEOPLASTIC DISEASE (HCC): ICD-10-CM

## 2023-05-09 DIAGNOSIS — D75.81 MYELOFIBROSIS (HCC): ICD-10-CM

## 2023-05-09 LAB
HCT VFR BLD AUTO: 30.3 % (ref 37–47)
HGB BLD-MCNC: 9.3 G/DL (ref 12–16)

## 2023-05-09 PROCEDURE — 36415 COLL VENOUS BLD VENIPUNCTURE: CPT

## 2023-05-09 PROCEDURE — 85018 HEMOGLOBIN: CPT

## 2023-05-09 PROCEDURE — 96372 THER/PROPH/DIAG INJ SC/IM: CPT

## 2023-05-09 PROCEDURE — 85014 HEMATOCRIT: CPT

## 2023-05-09 PROCEDURE — 700111 HCHG RX REV CODE 636 W/ 250 OVERRIDE (IP): Mod: JG | Performed by: INTERNAL MEDICINE

## 2023-05-09 RX ADMIN — EPOETIN ALFA-EPBX 40000 UNITS: 40000 INJECTION, SOLUTION INTRAVENOUS; SUBCUTANEOUS at 11:56

## 2023-05-09 ASSESSMENT — FIBROSIS 4 INDEX: FIB4 SCORE: .935672514619883041

## 2023-05-09 NOTE — PROGRESS NOTES
Pt arrived ambulatory to [facility] for H/H possible Retacrit injection. POC discussed with pt and she agrees with plan.     H/H drawn as ordered. Results reviewed, Hgb 9.3 today. Pt meets parameters for Retacrit today. Pt medicated per MAR. Pt tolerated injection without s/s adverse reaction. Pt discharged to self care, NAD. Pt's next appt confirmed 5/16/2023.

## 2023-05-16 ENCOUNTER — OUTPATIENT INFUSION SERVICES (OUTPATIENT)
Dept: ONCOLOGY | Facility: MEDICAL CENTER | Age: 83
End: 2023-05-16
Attending: INTERNAL MEDICINE
Payer: MEDICARE

## 2023-05-16 VITALS
RESPIRATION RATE: 18 BRPM | OXYGEN SATURATION: 94 % | DIASTOLIC BLOOD PRESSURE: 63 MMHG | TEMPERATURE: 97.2 F | BODY MASS INDEX: 26.3 KG/M2 | HEART RATE: 93 BPM | SYSTOLIC BLOOD PRESSURE: 125 MMHG | WEIGHT: 173.5 LBS | HEIGHT: 68 IN

## 2023-05-16 DIAGNOSIS — D63.0 ANEMIA ASSOCIATED WITH MALIGNANT NEOPLASTIC DISEASE (HCC): ICD-10-CM

## 2023-05-16 DIAGNOSIS — D75.81 MYELOFIBROSIS (HCC): ICD-10-CM

## 2023-05-16 DIAGNOSIS — C80.1 ANEMIA ASSOCIATED WITH MALIGNANT NEOPLASTIC DISEASE (HCC): ICD-10-CM

## 2023-05-16 LAB
HCT VFR BLD AUTO: 32.2 % (ref 37–47)
HGB BLD-MCNC: 9.9 G/DL (ref 12–16)

## 2023-05-16 PROCEDURE — 85018 HEMOGLOBIN: CPT

## 2023-05-16 PROCEDURE — 700111 HCHG RX REV CODE 636 W/ 250 OVERRIDE (IP): Mod: JG | Performed by: INTERNAL MEDICINE

## 2023-05-16 PROCEDURE — 96372 THER/PROPH/DIAG INJ SC/IM: CPT

## 2023-05-16 PROCEDURE — 85014 HEMATOCRIT: CPT

## 2023-05-16 PROCEDURE — 36415 COLL VENOUS BLD VENIPUNCTURE: CPT

## 2023-05-16 RX ADMIN — EPOETIN ALFA-EPBX 40000 UNITS: 40000 INJECTION, SOLUTION INTRAVENOUS; SUBCUTANEOUS at 11:58

## 2023-05-16 ASSESSMENT — FIBROSIS 4 INDEX: FIB4 SCORE: .935672514619883041

## 2023-05-16 NOTE — PROGRESS NOTES
Pt arrived ambulatory to  for CBC/Retacrit injection.  POC discussed. Labs drawn from [facility], results reviewed.  Pt meets parameters for injection.  Retacrit given to back of L arm, site covered with adhesive bandage.  Pt tolerated well.  Next appointment confirmed.  Pt discharged from IS in NAD under self care.

## 2023-05-17 ENCOUNTER — HOSPITAL ENCOUNTER (OUTPATIENT)
Dept: CARDIOLOGY | Facility: MEDICAL CENTER | Age: 83
End: 2023-05-17
Attending: INTERNAL MEDICINE
Payer: MEDICARE

## 2023-05-17 DIAGNOSIS — I27.20 PULMONARY HYPERTENSION (HCC): ICD-10-CM

## 2023-05-17 PROCEDURE — 93308 TTE F-UP OR LMTD: CPT

## 2023-05-18 LAB
LV EJECT FRACT  99904: 65
LV EJECT FRACT MOD 2C 99903: 63.7
LV EJECT FRACT MOD 4C 99902: 70.63
LV EJECT FRACT MOD BP 99901: 66.34

## 2023-05-18 PROCEDURE — 93308 TTE F-UP OR LMTD: CPT | Mod: 26 | Performed by: INTERNAL MEDICINE

## 2023-05-23 ENCOUNTER — OUTPATIENT INFUSION SERVICES (OUTPATIENT)
Dept: ONCOLOGY | Facility: MEDICAL CENTER | Age: 83
End: 2023-05-23
Attending: INTERNAL MEDICINE
Payer: MEDICARE

## 2023-05-23 VITALS
SYSTOLIC BLOOD PRESSURE: 133 MMHG | OXYGEN SATURATION: 93 % | BODY MASS INDEX: 26.3 KG/M2 | DIASTOLIC BLOOD PRESSURE: 61 MMHG | HEART RATE: 92 BPM | WEIGHT: 173.5 LBS | HEIGHT: 68 IN | RESPIRATION RATE: 18 BRPM | TEMPERATURE: 98 F

## 2023-05-23 DIAGNOSIS — C80.1 ANEMIA ASSOCIATED WITH MALIGNANT NEOPLASTIC DISEASE (HCC): ICD-10-CM

## 2023-05-23 DIAGNOSIS — D63.0 ANEMIA ASSOCIATED WITH MALIGNANT NEOPLASTIC DISEASE (HCC): ICD-10-CM

## 2023-05-23 DIAGNOSIS — D75.81 MYELOFIBROSIS (HCC): ICD-10-CM

## 2023-05-23 LAB
HCT VFR BLD AUTO: 30.6 % (ref 37–47)
HGB BLD-MCNC: 9.6 G/DL (ref 12–16)

## 2023-05-23 PROCEDURE — 85018 HEMOGLOBIN: CPT

## 2023-05-23 PROCEDURE — 96372 THER/PROPH/DIAG INJ SC/IM: CPT

## 2023-05-23 PROCEDURE — 36415 COLL VENOUS BLD VENIPUNCTURE: CPT

## 2023-05-23 PROCEDURE — 700111 HCHG RX REV CODE 636 W/ 250 OVERRIDE (IP): Mod: JG | Performed by: INTERNAL MEDICINE

## 2023-05-23 PROCEDURE — 85014 HEMATOCRIT: CPT

## 2023-05-23 RX ADMIN — EPOETIN ALFA-EPBX 40000 UNITS: 40000 INJECTION, SOLUTION INTRAVENOUS; SUBCUTANEOUS at 14:19

## 2023-05-23 ASSESSMENT — FIBROSIS 4 INDEX: FIB4 SCORE: .935672514619883041

## 2023-05-23 NOTE — PROGRESS NOTES
Patient came to infusion independently. Orders and vital signs reviewed, assessment done. Labs collected and reviewed. Retacrit injected SubQ in the back of the right arm as ordered with no adverse events.  Pt left in stable condition with next appointment confirmed.

## 2023-05-24 ENCOUNTER — OFFICE VISIT (OUTPATIENT)
Dept: MEDICAL GROUP | Facility: PHYSICIAN GROUP | Age: 83
End: 2023-05-24
Payer: MEDICARE

## 2023-05-24 ENCOUNTER — HOSPITAL ENCOUNTER (OUTPATIENT)
Dept: LAB | Facility: MEDICAL CENTER | Age: 83
End: 2023-05-24
Attending: INTERNAL MEDICINE
Payer: MEDICARE

## 2023-05-24 VITALS
HEIGHT: 67 IN | SYSTOLIC BLOOD PRESSURE: 118 MMHG | WEIGHT: 172.7 LBS | HEART RATE: 93 BPM | TEMPERATURE: 98.4 F | BODY MASS INDEX: 27.11 KG/M2 | DIASTOLIC BLOOD PRESSURE: 64 MMHG | OXYGEN SATURATION: 92 %

## 2023-05-24 DIAGNOSIS — I50.32 CHRONIC HEART FAILURE WITH PRESERVED EJECTION FRACTION (HCC): ICD-10-CM

## 2023-05-24 DIAGNOSIS — I36.1 NONRHEUMATIC TRICUSPID VALVE REGURGITATION: ICD-10-CM

## 2023-05-24 DIAGNOSIS — D75.81 MYELOFIBROSIS (HCC): ICD-10-CM

## 2023-05-24 DIAGNOSIS — M79.3 LIPODERMATOSCLEROSIS OF BOTH LOWER EXTREMITIES: ICD-10-CM

## 2023-05-24 PROCEDURE — 83013 H PYLORI (C-13) BREATH: CPT

## 2023-05-24 PROCEDURE — 99214 OFFICE O/P EST MOD 30 MIN: CPT | Performed by: FAMILY MEDICINE

## 2023-05-24 PROCEDURE — 3074F SYST BP LT 130 MM HG: CPT | Performed by: FAMILY MEDICINE

## 2023-05-24 PROCEDURE — 3078F DIAST BP <80 MM HG: CPT | Performed by: FAMILY MEDICINE

## 2023-05-24 ASSESSMENT — FIBROSIS 4 INDEX: FIB4 SCORE: .935672514619883041

## 2023-05-24 NOTE — PROGRESS NOTES
"CHIEF COMPLAINT / REASON FOR VISIT  Dedra Lobo is a 82 y.o. female that presents today for chronic disease mgmt    HISTORY OF PRESENT ILLNESS  Takes torsemide 20 mg for leg swelling, wears compression stockings. Has noticed a little redness and hardening of skin on anteromedial shins    Quality of life still good in terms of dyspnea.   Had TTE done.    OBJECTIVE    /64 (BP Location: Right arm, Patient Position: Sitting, BP Cuff Size: Adult)   Pulse 93   Temp 36.9 °C (98.4 °F) (Temporal)   Ht 1.702 m (5' 7\")   Wt 78.3 kg (172 lb 11.2 oz)   SpO2 92%   BMI 27.05 kg/m²     PHYSICAL EXAM  Constitutional: Sitting comfortably, in no acute distress, responds to questions appropriately.  Heart: Regular S1 S2, grade 1 systolic murmur   Lungs: Clear to auscultation bilaterally, no wheezes, rales, or rhonchi  Extremities: 1+ bilateral lower extremity pitting edema to mid shin, slight erythema and hyperpigmentation bilateral anteromedial shin  Skin: Warm and dry, no rashes or lesions on face or exposed upper extremities    ASSESSMENT & PLAN  1. Chronic heart failure with preserved ejection fraction (HCC)  2. Nonrheumatic tricuspid valve regurgitation  Chronic, improved from previous.  Lower extremity edema is fairly well controlled on torsemide 20 mg daily.  Her recent TTE showed improvement in tricuspid regurgitation (now moderate) and estimated right ventricular systolic pressure, now categorized as mild pulmonary hypertension rather than severe.  She does have an upcoming cardiology visit    3. Myelofibrosis (HCC)  Chronic, stable.  Follows with Dr. Foley with oncology, receives epoetin alpha injections    4. Lipodermatosclerosis of both lower extremities  Mild bilateral lipodermatosclerosis.  Recommend aggressive edema control with compression stockings and to continue torsemide 20 mg daily    3-month follow-up visit      "

## 2023-05-26 LAB — UREA BREATH TEST QL: POSITIVE

## 2023-05-30 ENCOUNTER — APPOINTMENT (OUTPATIENT)
Dept: ONCOLOGY | Facility: MEDICAL CENTER | Age: 83
End: 2023-05-30
Attending: INTERNAL MEDICINE
Payer: MEDICARE

## 2023-05-30 VITALS
SYSTOLIC BLOOD PRESSURE: 115 MMHG | HEIGHT: 68 IN | WEIGHT: 168.87 LBS | RESPIRATION RATE: 18 BRPM | HEART RATE: 83 BPM | BODY MASS INDEX: 25.59 KG/M2 | DIASTOLIC BLOOD PRESSURE: 53 MMHG | TEMPERATURE: 98 F | OXYGEN SATURATION: 99 %

## 2023-05-30 DIAGNOSIS — D75.81 MYELOFIBROSIS (HCC): ICD-10-CM

## 2023-05-30 DIAGNOSIS — C80.1 ANEMIA ASSOCIATED WITH MALIGNANT NEOPLASTIC DISEASE (HCC): ICD-10-CM

## 2023-05-30 DIAGNOSIS — D63.0 ANEMIA ASSOCIATED WITH MALIGNANT NEOPLASTIC DISEASE (HCC): ICD-10-CM

## 2023-05-30 LAB
HCT VFR BLD AUTO: 32.3 % (ref 37–47)
HGB BLD-MCNC: 10.1 G/DL (ref 12–16)

## 2023-05-30 PROCEDURE — 36415 COLL VENOUS BLD VENIPUNCTURE: CPT

## 2023-05-30 PROCEDURE — 85018 HEMOGLOBIN: CPT

## 2023-05-30 PROCEDURE — 85014 HEMATOCRIT: CPT

## 2023-05-30 ASSESSMENT — FIBROSIS 4 INDEX: FIB4 SCORE: .935672514619883041

## 2023-05-30 NOTE — PROGRESS NOTES
Pt arrived to [facility] ambulatory. H/H orders released and obtained. Results reviewed and, per order, holding Retacrit for Hgb>10. Pt updated and ambulated out of facility with all belongings and no further questions.

## 2023-06-01 RX ORDER — 0.9 % SODIUM CHLORIDE 0.9 %
10 VIAL (ML) INJECTION PRN
Status: CANCELLED | OUTPATIENT
Start: 2023-06-13

## 2023-06-01 RX ORDER — 0.9 % SODIUM CHLORIDE 0.9 %
VIAL (ML) INJECTION PRN
Status: CANCELLED | OUTPATIENT
Start: 2023-06-06

## 2023-06-01 RX ORDER — 0.9 % SODIUM CHLORIDE 0.9 %
VIAL (ML) INJECTION PRN
Status: CANCELLED | OUTPATIENT
Start: 2023-06-13

## 2023-06-01 RX ORDER — HEPARIN SODIUM (PORCINE) LOCK FLUSH IV SOLN 100 UNIT/ML 100 UNIT/ML
500 SOLUTION INTRAVENOUS PRN
Status: CANCELLED | OUTPATIENT
Start: 2023-06-06

## 2023-06-01 RX ORDER — HEPARIN SODIUM (PORCINE) LOCK FLUSH IV SOLN 100 UNIT/ML 100 UNIT/ML
500 SOLUTION INTRAVENOUS PRN
Status: CANCELLED | OUTPATIENT
Start: 2023-06-13

## 2023-06-01 RX ORDER — 0.9 % SODIUM CHLORIDE 0.9 %
10 VIAL (ML) INJECTION PRN
Status: CANCELLED | OUTPATIENT
Start: 2023-06-06

## 2023-06-01 RX ORDER — 0.9 % SODIUM CHLORIDE 0.9 %
3 VIAL (ML) INJECTION PRN
Status: CANCELLED | OUTPATIENT
Start: 2023-06-13

## 2023-06-01 RX ORDER — 0.9 % SODIUM CHLORIDE 0.9 %
3 VIAL (ML) INJECTION PRN
Status: CANCELLED | OUTPATIENT
Start: 2023-06-06

## 2023-06-06 ENCOUNTER — OUTPATIENT INFUSION SERVICES (OUTPATIENT)
Dept: ONCOLOGY | Facility: MEDICAL CENTER | Age: 83
End: 2023-06-06
Attending: INTERNAL MEDICINE
Payer: MEDICARE

## 2023-06-06 VITALS
RESPIRATION RATE: 16 BRPM | DIASTOLIC BLOOD PRESSURE: 62 MMHG | WEIGHT: 174.82 LBS | HEART RATE: 84 BPM | BODY MASS INDEX: 26.5 KG/M2 | OXYGEN SATURATION: 95 % | SYSTOLIC BLOOD PRESSURE: 136 MMHG | HEIGHT: 68 IN | TEMPERATURE: 97.6 F

## 2023-06-06 DIAGNOSIS — C80.1 ANEMIA ASSOCIATED WITH MALIGNANT NEOPLASTIC DISEASE (HCC): ICD-10-CM

## 2023-06-06 DIAGNOSIS — D63.0 ANEMIA ASSOCIATED WITH MALIGNANT NEOPLASTIC DISEASE (HCC): ICD-10-CM

## 2023-06-06 DIAGNOSIS — D75.81 MYELOFIBROSIS (HCC): ICD-10-CM

## 2023-06-06 LAB
HCT VFR BLD AUTO: 30.5 % (ref 37–47)
HGB BLD-MCNC: 9.5 G/DL (ref 12–16)

## 2023-06-06 PROCEDURE — 85014 HEMATOCRIT: CPT

## 2023-06-06 PROCEDURE — 700111 HCHG RX REV CODE 636 W/ 250 OVERRIDE (IP): Mod: JG | Performed by: INTERNAL MEDICINE

## 2023-06-06 PROCEDURE — 36415 COLL VENOUS BLD VENIPUNCTURE: CPT

## 2023-06-06 PROCEDURE — 85018 HEMOGLOBIN: CPT

## 2023-06-06 PROCEDURE — 96372 THER/PROPH/DIAG INJ SC/IM: CPT

## 2023-06-06 RX ADMIN — EPOETIN ALFA-EPBX 40000 UNITS: 40000 INJECTION, SOLUTION INTRAVENOUS; SUBCUTANEOUS at 14:42

## 2023-06-06 ASSESSMENT — FIBROSIS 4 INDEX: FIB4 SCORE: .935672514619883041

## 2023-06-06 NOTE — PROGRESS NOTES
Dedra to infusion for weekly labs and Retacrit injection. Patient reports tolerating treatment well so far, no acute complaints. Labs drawn by venipuncture from L AC. Labs and BP reviewed by RN, Hgb: 9.5, meets criteria for Retacrit injection. Retacrit given SQ in R posterior arm, patient tolerated well with no adverse effects. Patient left in stable condition, knows when to return for next appt.

## 2023-06-06 NOTE — PROGRESS NOTES
Cardiology Follow Up Note    Date of note: 6/6/2023    Primary Care Provider: Waqas Boss M.D.    Patient Name: Dedra Lobo  YOB: 1940  MRN:              [id number]    Reason for Followup: Echo results and torsemide usage    History of Present Illness: Ms. Dedra Lobo is a 82 y.o. female whose current medical problems include bladder cancer, status postresection, severe anemia related to myelofibrosis, GI bleed due to gastric ulcers 9/30/2022, who is here for cardiac for follow up.  Patient reports that she is feeling improved.  Normally if she takes her torsemide 20 mg p.o. daily, she does not have any leg swelling.  She has not taken her torsemide today because she was coming for an appointment visit.  She is feeling better and able to do more activity and overall quality of life is improved.  She reports she stopped taking the potassium supplements about 2 months ago because they were too large and bothering her throat.  She is just taking the torsemide 20 mg orally daily.  No potassium checked since January when she was still taking the potassium supplements.    Patient was in initially seen in cardiology on 11/16/2022 for pulmonary hypertension and moderate to severe tricuspid regurgitation noted on echocardiogram that was done during an hospital stay for GI bleeding due to gastric ulcer.    Pulmonary hypertension was felt likely secondary to left heart diastolic dysfunction, potentially anemia.  Work-up did include a VQ scan that was low probability for chronic thromboembolic disease, as a cause of pulmonary hypertension.  Torsemide was started at 10 mg p.o. daily.  Follow-up echo dated 5/17/2023 shows moderate tricuspid regurgitation, but improved RV systolic pressure estimated at 44 mmHg.  It had improved significantly from prior echo dated 11/2/2022.  Review of the echo, the TR velocity jet is not seen well, and it is anywhere between 3-4 which would put it also at high end  of 44 to 64 mmHg.      Cardiovascular Risk Factors:  1. Smoking status:   Tobacco Use: Low Risk  (5/24/2023)    Patient History     Smoking Tobacco Use: Never     Smokeless Tobacco Use: Never     Passive Exposure: Not on file     2. Type II Diabetes Mellitus: No results found for: HBA1C  3. Hypertension: No  4. Dyslipidemia: No  Cholesterol,Tot   Date Value Ref Range Status   01/16/2015 172 100 - 199 mg/dL Final     LDL   Date Value Ref Range Status   01/16/2015 89 <100 mg/dL Final     HDL   Date Value Ref Range Status   01/16/2015 52 >=40 mg/dL Final     Triglycerides   Date Value Ref Range Status   01/16/2015 153 (H) 0 - 149 mg/dL Final     No problems updated.     Past Surgical History:   Procedure Laterality Date    [place] UPPER GI ENDOSCOPY,DIAGNOSIS N/A 10/1/2022    Procedure: GASTROSCOPY;  Surgeon: Ana Gaston D.O.;  Location: [facility];  Service: Gastroenterology    [place] UPPER GI ENDOSCOPY,SCLER INJECT N/A 10/1/2022    Procedure: GASTROSCOPY, WITH SCLEROTHERAPY;  Surgeon: Ana Gaston D.O.;  Location: [facility];  Service: Gastroenterology    [place] UPPER GI ENDOSCOPY,CTRL BLEED N/A 10/1/2022    Procedure: EGD, WITH CLIP PLACEMENT;  Surgeon: Ana Gaston D.O.;  Location: [facility];  Service: Gastroenterology    PB TOTAL KNEE ARTHROPLASTY Left 4/3/2019    Procedure: KNEE ARTHROPLASTY TOTAL;  Surgeon: Robert Vazquez M.D.;  Location: [facility];  Service: Orthopedics    CERVICAL DISK AND FUSION ANTERIOR  2/9/2016    Procedure: CERVICAL DISK AND FUSION ANTERIOR  C3-7;  Surgeon: Dusty Rao M.D.;  Location: [facility]ERY [facility];  Service:     CYSTOSCOPY  3/3/2015    Performed by Walt Olson M.D. at SUR[facility]ERY [facility]    TRANS URETHRAL RESECTION BLADDER  3/3/2015    Performed by Walt Olson M.D. at [facility]    HIP REPLACEMENT, TOTAL  2006    Dr. Vazquez    CHOLECYSTECTOMY  2003    LUMPECTOMY  1982    cyst    ARTHROPLASTY      bi lateral-  "[facility]        Current Outpatient Medications   Medication Sig Dispense Refill    torsemide (DEMADEX) 20 MG Tab Take 1 Tablet by mouth every day. 90 Tablet 3    vitamin D3 (CHOLECALCIFEROL) 1000 Unit (25 mcg) Tab Take 1 Tablet by mouth every day.      therapeutic multivitamin-minerals (THERAGRAN-M) Tab Take 1 Tablet by mouth every day.      calcium carbonate (OS-KEELY 500) 1250 (500 Ca) MG Tab Take 1 Tablet by mouth every day.      Carboxymethylcellulose Sod PF 0.5 % Solution Administer 1 Drop into both eyes every evening.      anagrelide (AGRYLIN) 0.5 MG Cap Take 1 Capsule by mouth 2 times a day.      acetaminophen (TYLENOL) 500 MG Tab Take 1-2 Tablets by mouth 1 time a day as needed for Mild Pain. (Patient not taking: Reported on 6/7/2023)       No current facility-administered medications for this visit.       No Known Allergies    Review of Systems   Cardiovascular:  Negative for chest pain, dyspnea on exertion, leg swelling, near-syncope, orthopnea, palpitations, paroxysmal nocturnal dyspnea and syncope.   Respiratory: Negative.     Gastrointestinal:  Negative for hematochezia and melena.       Physical Exam:  Ambulatory Vitals  /52 (BP Location: Left arm, Patient Position: Sitting, BP Cuff Size: Adult)   Pulse 91   Resp 16   Ht 1.702 m (5' 7\")   Wt 78.9 kg (174 lb)   SpO2 93%    Oxygen Therapy:  Pulse Oximetry: 93 %  BP Readings from Last 4 Encounters:   06/07/23 110/52   06/06/23 136/62   05/30/23 115/53   05/24/23 118/64       Weight/BMI:   Body mass index is 27.25 kg/m².  Wt Readings from Last 2 Encounters:   06/07/23 78.9 kg (174 lb)   06/06/23 79.3 kg (174 lb 13.2 oz)       Physical Exam  Constitutional:       Appearance: Normal appearance.   Neck:      Vascular: No JVD.   Cardiovascular:      Rate and Rhythm: Normal rate and regular rhythm.      Pulses: Normal pulses and intact distal pulses.      Heart sounds: S1 normal and S2 normal. Murmur heard.      Early systolic murmur is present with a " grade of 2/6 at the lower left sternal border.      No friction rub. No gallop.   Pulmonary:      Effort: Pulmonary effort is normal. No respiratory distress.      Breath sounds: Normal breath sounds. No wheezing or rales.   Musculoskeletal:      Cervical back: Neck supple.      Right lower leg: Edema present.      Left lower leg: Edema present.   Neurological:      Mental Status: She is alert and oriented to person, place, and time.          Lab Data Review:  Lab Results   Component Value Date/Time    SODIUM 141 01/31/2023 01:50 PM    POTASSIUM 3.7 01/31/2023 01:50 PM    CHLORIDE 107 10/10/2022 11:19 AM    CO2 27 10/10/2022 11:19 AM    GLUCOSE 84 10/10/2022 11:19 AM    BUN 12 10/10/2022 11:19 AM    CREATININE 0.80 01/31/2023 01:50 PM    CREATININE 0.90 10/08/2010 11:32 AM    BUNCREATRAT 18 10/08/2010 11:32 AM    GLOMRATE >59 10/08/2010 11:32 AM     Lab Results   Component Value Date/Time    ALKPHOSPHAT 89 10/03/2022 12:41 AM    ASTSGOT 12 10/03/2022 12:41 AM    ALTSGPT 9 10/03/2022 12:41 AM    TBILIRUBIN 2.0 (H) 10/03/2022 12:41 AM      Lab Results   Component Value Date/Time    WBC 4.3 (L) 10/04/2022 03:18 PM     Lab Results   Component Value Date/Time    TSHULTRASEN 4.820 10/10/2022 11:19 AM       Cardiac Imaging and Procedures Review:    Echo dated 5/18/2023: My personal interpretation is normal left ventricular cavity size, mild concentric hypertrophy (measured as more septal thickness, but I think overall it does not meet criteria for asymmetric septal hypertrophy).  Normal left ventricular systolic function.  There is still somewhat of a D-shaped septum in both systole and diastole suggestive of RV pressure and volume overload.  Mildly enlarged right ventricular cavity size with preserved systolic function.  Biatrial enlargement.  Sclerotic trileaflet aortic valve without significant stenosis or regurgitation.  Mildly thickened mitral valve leaflets with mild central regurgitation.  There is moderate  tricuspid regurgitation.  Unfortunately the TR velocity jet is not very clear and accuracy is questionable.  Estimated at 40 mm Hg, but can be higher, possibly 64.    Assessment & Plan     1.  Pulmonary hypertension, likely WHO class II due to heart failure with preserved LVEF.  I am not quite sure if her myelofibrosis and thrombocytosis plays any role here.  That is being treated by oncology/hematology.  VQ scan was low probability for chronic thromboembolic cause of pulmonary hypertension.  She does not have a lot of risk factors for lung issues.  I discussed with the patient ruling out interstitial lung disease as a cause of pulmonary hypertension by doing a pulmonary function test.  Patient is agreeable.  To rule it out, we will go ahead and order PFTs.  Her lower extremity edema though is much improved so we will continue with the torsemide.  - Continue with torsemide 20 mg p.o. daily.  - She could not tolerate the potassium tablets so she stopped, we will go ahead and recheck her basic metabolic panel.  If her potassium is low, instead of K-Dur which she could not tolerate the tablets, we can try KCl oral solution or else Micro-K which is a capsule or Klor-Con which is around her tablet    2.  Moderate tricuspid regurgitation.  With diuresis, tricuspid regurgitation were appears to be improved.  Can consider follow-up echo in 1 year.  We will have her follow-up with cardiology in 6 months just to reassess at that time an echo can be ordered at that time.    Shared Medical Decision Making:  All of patient's questions were answered to the best of my knowledge and to patient's satisfaction. It was a pleasure seeing Ms. Dedra Lobo in my clinic today. Return in about 6 months (around 12/7/2023). Patient is aware to call the cardiology clinic with any questions or concerns.    Sylvia Magdaleno MD  [facility]  [address]  [place] [address]  Phone:   [phone]  Fax:  [phone]    Please note that this dictation was created using voice recognition software. I have made every reasonable attempt to correct obvious errors, but it is possible there are errors of grammar and possibly content that I did not discover before finalizing the note.

## 2023-06-07 ENCOUNTER — OFFICE VISIT (OUTPATIENT)
Dept: CARDIOLOGY | Facility: MEDICAL CENTER | Age: 83
End: 2023-06-07
Attending: INTERNAL MEDICINE
Payer: MEDICARE

## 2023-06-07 VITALS
BODY MASS INDEX: 27.31 KG/M2 | DIASTOLIC BLOOD PRESSURE: 52 MMHG | WEIGHT: 174 LBS | HEART RATE: 91 BPM | SYSTOLIC BLOOD PRESSURE: 110 MMHG | HEIGHT: 67 IN | OXYGEN SATURATION: 93 % | RESPIRATION RATE: 16 BRPM

## 2023-06-07 DIAGNOSIS — I27.20 PULMONARY HYPERTENSION (HCC): ICD-10-CM

## 2023-06-07 DIAGNOSIS — I36.1 NONRHEUMATIC TRICUSPID VALVE REGURGITATION: ICD-10-CM

## 2023-06-07 PROCEDURE — 3078F DIAST BP <80 MM HG: CPT | Performed by: INTERNAL MEDICINE

## 2023-06-07 PROCEDURE — 99212 OFFICE O/P EST SF 10 MIN: CPT | Performed by: INTERNAL MEDICINE

## 2023-06-07 PROCEDURE — 99214 OFFICE O/P EST MOD 30 MIN: CPT | Performed by: INTERNAL MEDICINE

## 2023-06-07 PROCEDURE — 3074F SYST BP LT 130 MM HG: CPT | Performed by: INTERNAL MEDICINE

## 2023-06-07 ASSESSMENT — ENCOUNTER SYMPTOMS
PND: 0
DYSPNEA ON EXERTION: 0
HEMATOCHEZIA: 0
PALPITATIONS: 0
RESPIRATORY NEGATIVE: 1
ORTHOPNEA: 0
NEAR-SYNCOPE: 0
SYNCOPE: 0

## 2023-06-07 ASSESSMENT — FIBROSIS 4 INDEX: FIB4 SCORE: .935672514619883041

## 2023-06-12 ENCOUNTER — NON-PROVIDER VISIT (OUTPATIENT)
Dept: SLEEP MEDICINE | Facility: MEDICAL CENTER | Age: 83
End: 2023-06-12
Attending: INTERNAL MEDICINE
Payer: MEDICARE

## 2023-06-12 VITALS — BODY MASS INDEX: 26.21 KG/M2 | WEIGHT: 167 LBS | HEIGHT: 67 IN

## 2023-06-12 DIAGNOSIS — I27.20 PULMONARY HYPERTENSION (HCC): ICD-10-CM

## 2023-06-12 PROCEDURE — 94726 PLETHYSMOGRAPHY LUNG VOLUMES: CPT | Performed by: INTERNAL MEDICINE

## 2023-06-12 PROCEDURE — 94729 DIFFUSING CAPACITY: CPT | Performed by: INTERNAL MEDICINE

## 2023-06-12 PROCEDURE — 94726 PLETHYSMOGRAPHY LUNG VOLUMES: CPT | Mod: 26 | Performed by: INTERNAL MEDICINE

## 2023-06-12 PROCEDURE — 94060 EVALUATION OF WHEEZING: CPT | Mod: 26 | Performed by: INTERNAL MEDICINE

## 2023-06-12 PROCEDURE — 94729 DIFFUSING CAPACITY: CPT | Mod: 26 | Performed by: INTERNAL MEDICINE

## 2023-06-12 PROCEDURE — 94060 EVALUATION OF WHEEZING: CPT | Performed by: INTERNAL MEDICINE

## 2023-06-12 ASSESSMENT — PULMONARY FUNCTION TESTS
FEV1_PREDICTED: 2.13
FEV1: 2
FEV1_PERCENT_PREDICTED: 93
FEV1/FVC_PERCENT_PREDICTED: 97
FVC_LLN: 2.36
FEV1/FVC_PERCENT_PREDICTED: 99
FEV1: 1.85
FEV1_PERCENT_CHANGE: 8
FEV1/FVC_PERCENT_CHANGE: 0
FEV1/FVC_PREDICTED: 77
FEV1_PERCENT_PREDICTED: 86
FVC: 2.47
FVC_PERCENT_PREDICTED: 87
FVC_PERCENT_PREDICTED: 95
FEV1/FVC: 75
FVC: 2.68
FEV1/FVC_PERCENT_PREDICTED: 98
FEV1/FVC_PERCENT_PREDICTED: 76
FEV1/FVC: 75
FEV1/FVC_PERCENT_PREDICTED: 97
FEV1/FVC: 75
FVC_PREDICTED: 2.82
FEV1/FVC: 74.63
FEV1_LLN: 1.78
FEV1_PERCENT_CHANGE: 7
FEV1/FVC_PERCENT_CHANGE: 88
FEV1/FVC_PERCENT_LLN: 64

## 2023-06-12 ASSESSMENT — FIBROSIS 4 INDEX: FIB4 SCORE: .935672514619883041

## 2023-06-12 NOTE — PROCEDURES
Technician: ABIGAIL Alvarez    Technician Comment:  Good patient effort & cooperation.  The results of this test meet the ATS/ERS standards for acceptability & reproducibility.  Test was performed on the [employer] Plethysmograph-Elite DX system.  Predicted values were GLI-2012 for spirometry, GLI-2017 for DLCO, ITS for Lung Volumes.  The DLCO was uncorrected for Hgb.  A bronchodilator of Xopenex HFA -2puffs via spacer administered.  DLCO performed during dilation period.    Interpretation:   Baseline function shows normal airflows.  There is significant bronchodilator response based on absolute increase in FVC.  Lung volumes are within normal limits.  Diffusion capacity is within normal limits.  Overall normal pulmonary function testing with normal flow volume loop.  There is bronchodilator responsiveness.

## 2023-06-13 ENCOUNTER — OUTPATIENT INFUSION SERVICES (OUTPATIENT)
Dept: ONCOLOGY | Facility: MEDICAL CENTER | Age: 83
End: 2023-06-13
Attending: INTERNAL MEDICINE
Payer: MEDICARE

## 2023-06-13 VITALS
DIASTOLIC BLOOD PRESSURE: 59 MMHG | SYSTOLIC BLOOD PRESSURE: 121 MMHG | BODY MASS INDEX: 25.79 KG/M2 | WEIGHT: 170.19 LBS | TEMPERATURE: 97.5 F | HEART RATE: 94 BPM | RESPIRATION RATE: 16 BRPM | HEIGHT: 68 IN | OXYGEN SATURATION: 95 %

## 2023-06-13 DIAGNOSIS — D63.0 ANEMIA ASSOCIATED WITH MALIGNANT NEOPLASTIC DISEASE (HCC): ICD-10-CM

## 2023-06-13 DIAGNOSIS — C80.1 ANEMIA ASSOCIATED WITH MALIGNANT NEOPLASTIC DISEASE (HCC): ICD-10-CM

## 2023-06-13 DIAGNOSIS — D75.81 MYELOFIBROSIS (HCC): ICD-10-CM

## 2023-06-13 DIAGNOSIS — I27.20 PULMONARY HYPERTENSION (HCC): ICD-10-CM

## 2023-06-13 LAB
ANION GAP SERPL CALC-SCNC: 11 MMOL/L (ref 7–16)
BUN SERPL-MCNC: 19 MG/DL (ref 8–22)
CALCIUM SERPL-MCNC: 9 MG/DL (ref 8.5–10.5)
CHLORIDE SERPL-SCNC: 105 MMOL/L (ref 96–112)
CO2 SERPL-SCNC: 27 MMOL/L (ref 20–33)
CREAT SERPL-MCNC: 0.79 MG/DL (ref 0.5–1.4)
GFR SERPLBLD CREATININE-BSD FMLA CKD-EPI: 75 ML/MIN/1.73 M 2
GLUCOSE SERPL-MCNC: 96 MG/DL (ref 65–99)
HCT VFR BLD AUTO: 31.9 % (ref 37–47)
HGB BLD-MCNC: 10 G/DL (ref 12–16)
POTASSIUM SERPL-SCNC: 3.9 MMOL/L (ref 3.6–5.5)
SODIUM SERPL-SCNC: 143 MMOL/L (ref 135–145)

## 2023-06-13 PROCEDURE — 85018 HEMOGLOBIN: CPT

## 2023-06-13 PROCEDURE — 96372 THER/PROPH/DIAG INJ SC/IM: CPT

## 2023-06-13 PROCEDURE — 700111 HCHG RX REV CODE 636 W/ 250 OVERRIDE (IP): Mod: JG | Performed by: INTERNAL MEDICINE

## 2023-06-13 PROCEDURE — 85014 HEMATOCRIT: CPT

## 2023-06-13 PROCEDURE — 80048 BASIC METABOLIC PNL TOTAL CA: CPT

## 2023-06-13 PROCEDURE — 36415 COLL VENOUS BLD VENIPUNCTURE: CPT

## 2023-06-13 RX ADMIN — EPOETIN ALFA-EPBX 40000 UNITS: 40000 INJECTION, SOLUTION INTRAVENOUS; SUBCUTANEOUS at 15:54

## 2023-06-13 ASSESSMENT — PAIN DESCRIPTION - PAIN TYPE: TYPE: ACUTE PAIN

## 2023-06-13 ASSESSMENT — FIBROSIS 4 INDEX: FIB4 SCORE: .935672514619883041

## 2023-06-13 NOTE — PROGRESS NOTES
[facility] presented to [facility] for possible retacrit injection. Labs drawn as ordered from [facility] with 23G butterfly needle, gauze and coban dressing placed. Additional BMP drawn as order per patient's request. Hemoglobin is 10.0 and is within parameters for retacrit injection. Retacrit given subcutaneously as ordered in left upper arm. Has next appt, discharged home to self care.

## 2023-06-15 RX ORDER — 0.9 % SODIUM CHLORIDE 0.9 %
10 VIAL (ML) INJECTION PRN
Status: CANCELLED | OUTPATIENT
Start: 2023-07-04

## 2023-06-15 RX ORDER — 0.9 % SODIUM CHLORIDE 0.9 %
VIAL (ML) INJECTION PRN
Status: CANCELLED | OUTPATIENT
Start: 2023-06-27

## 2023-06-15 RX ORDER — 0.9 % SODIUM CHLORIDE 0.9 %
3 VIAL (ML) INJECTION PRN
Status: CANCELLED | OUTPATIENT
Start: 2023-06-27

## 2023-06-15 RX ORDER — HEPARIN SODIUM (PORCINE) LOCK FLUSH IV SOLN 100 UNIT/ML 100 UNIT/ML
500 SOLUTION INTRAVENOUS PRN
Status: CANCELLED | OUTPATIENT
Start: 2023-06-27

## 2023-06-15 RX ORDER — HEPARIN SODIUM (PORCINE) LOCK FLUSH IV SOLN 100 UNIT/ML 100 UNIT/ML
500 SOLUTION INTRAVENOUS PRN
Status: CANCELLED | OUTPATIENT
Start: 2023-06-20

## 2023-06-15 RX ORDER — 0.9 % SODIUM CHLORIDE 0.9 %
10 VIAL (ML) INJECTION PRN
Status: CANCELLED | OUTPATIENT
Start: 2023-06-20

## 2023-06-15 RX ORDER — 0.9 % SODIUM CHLORIDE 0.9 %
10 VIAL (ML) INJECTION PRN
Status: CANCELLED | OUTPATIENT
Start: 2023-07-11

## 2023-06-15 RX ORDER — HEPARIN SODIUM (PORCINE) LOCK FLUSH IV SOLN 100 UNIT/ML 100 UNIT/ML
500 SOLUTION INTRAVENOUS PRN
Status: CANCELLED | OUTPATIENT
Start: 2023-07-04

## 2023-06-15 RX ORDER — 0.9 % SODIUM CHLORIDE 0.9 %
VIAL (ML) INJECTION PRN
Status: CANCELLED | OUTPATIENT
Start: 2023-06-20

## 2023-06-15 RX ORDER — 0.9 % SODIUM CHLORIDE 0.9 %
3 VIAL (ML) INJECTION PRN
Status: CANCELLED | OUTPATIENT
Start: 2023-07-11

## 2023-06-15 RX ORDER — HEPARIN SODIUM (PORCINE) LOCK FLUSH IV SOLN 100 UNIT/ML 100 UNIT/ML
500 SOLUTION INTRAVENOUS PRN
Status: CANCELLED | OUTPATIENT
Start: 2023-07-11

## 2023-06-15 RX ORDER — 0.9 % SODIUM CHLORIDE 0.9 %
VIAL (ML) INJECTION PRN
Status: CANCELLED | OUTPATIENT
Start: 2023-07-04

## 2023-06-15 RX ORDER — 0.9 % SODIUM CHLORIDE 0.9 %
3 VIAL (ML) INJECTION PRN
Status: CANCELLED | OUTPATIENT
Start: 2023-07-04

## 2023-06-15 RX ORDER — 0.9 % SODIUM CHLORIDE 0.9 %
VIAL (ML) INJECTION PRN
Status: CANCELLED | OUTPATIENT
Start: 2023-07-11

## 2023-06-15 RX ORDER — 0.9 % SODIUM CHLORIDE 0.9 %
3 VIAL (ML) INJECTION PRN
Status: CANCELLED | OUTPATIENT
Start: 2023-06-20

## 2023-06-15 RX ORDER — 0.9 % SODIUM CHLORIDE 0.9 %
10 VIAL (ML) INJECTION PRN
Status: CANCELLED | OUTPATIENT
Start: 2023-06-27

## 2023-06-20 ENCOUNTER — OUTPATIENT INFUSION SERVICES (OUTPATIENT)
Dept: ONCOLOGY | Facility: MEDICAL CENTER | Age: 83
End: 2023-06-20
Attending: INTERNAL MEDICINE
Payer: MEDICARE

## 2023-06-20 VITALS
DIASTOLIC BLOOD PRESSURE: 62 MMHG | SYSTOLIC BLOOD PRESSURE: 119 MMHG | BODY MASS INDEX: 26.06 KG/M2 | TEMPERATURE: 97.2 F | HEART RATE: 89 BPM | OXYGEN SATURATION: 96 % | WEIGHT: 171.96 LBS | RESPIRATION RATE: 16 BRPM | HEIGHT: 68 IN

## 2023-06-20 DIAGNOSIS — C80.1 ANEMIA ASSOCIATED WITH MALIGNANT NEOPLASTIC DISEASE (HCC): ICD-10-CM

## 2023-06-20 DIAGNOSIS — D63.0 ANEMIA ASSOCIATED WITH MALIGNANT NEOPLASTIC DISEASE (HCC): ICD-10-CM

## 2023-06-20 DIAGNOSIS — D75.81 MYELOFIBROSIS (HCC): ICD-10-CM

## 2023-06-20 LAB
HCT VFR BLD AUTO: 30.4 % (ref 37–47)
HGB BLD-MCNC: 9.6 G/DL (ref 12–16)

## 2023-06-20 PROCEDURE — 85018 HEMOGLOBIN: CPT

## 2023-06-20 PROCEDURE — 700111 HCHG RX REV CODE 636 W/ 250 OVERRIDE (IP): Mod: JG | Performed by: INTERNAL MEDICINE

## 2023-06-20 PROCEDURE — 96372 THER/PROPH/DIAG INJ SC/IM: CPT

## 2023-06-20 PROCEDURE — 85014 HEMATOCRIT: CPT

## 2023-06-20 PROCEDURE — 36415 COLL VENOUS BLD VENIPUNCTURE: CPT

## 2023-06-20 RX ADMIN — EPOETIN ALFA-EPBX 40000 UNITS: 40000 INJECTION, SOLUTION INTRAVENOUS; SUBCUTANEOUS at 16:39

## 2023-06-20 ASSESSMENT — FIBROSIS 4 INDEX: FIB4 SCORE: .935672514619883041

## 2023-06-20 NOTE — PROGRESS NOTES
Dedra arrives to [facility] for weekly Retacrit. Patient denies acute health concerns. Reports feeling well. H/H drawn via venipuncture from R-AC without difficulty. Labs and BP within treatable parameters. Retacrit administered SQ to back of right arm without issues. She has her next appt. Discharged home to self care in no apparent distress.

## 2023-06-23 ENCOUNTER — APPOINTMENT (OUTPATIENT)
Dept: LAB | Facility: MEDICAL CENTER | Age: 83
End: 2023-06-23
Payer: MEDICARE

## 2023-06-26 ENCOUNTER — HOSPITAL ENCOUNTER (OUTPATIENT)
Facility: MEDICAL CENTER | Age: 83
End: 2023-06-26
Attending: INTERNAL MEDICINE
Payer: MEDICARE

## 2023-06-26 LAB — H PYLORI AG STL QL IA: DETECTED

## 2023-06-26 PROCEDURE — 87338 HPYLORI STOOL AG IA: CPT

## 2023-06-27 ENCOUNTER — OUTPATIENT INFUSION SERVICES (OUTPATIENT)
Dept: ONCOLOGY | Facility: MEDICAL CENTER | Age: 83
End: 2023-06-27
Attending: INTERNAL MEDICINE
Payer: MEDICARE

## 2023-06-27 VITALS
BODY MASS INDEX: 26.2 KG/M2 | TEMPERATURE: 98.2 F | HEIGHT: 68 IN | HEART RATE: 86 BPM | RESPIRATION RATE: 16 BRPM | OXYGEN SATURATION: 92 % | WEIGHT: 172.84 LBS | SYSTOLIC BLOOD PRESSURE: 118 MMHG | DIASTOLIC BLOOD PRESSURE: 62 MMHG

## 2023-06-27 DIAGNOSIS — D75.81 MYELOFIBROSIS (HCC): ICD-10-CM

## 2023-06-27 DIAGNOSIS — C80.1 ANEMIA ASSOCIATED WITH MALIGNANT NEOPLASTIC DISEASE (HCC): ICD-10-CM

## 2023-06-27 DIAGNOSIS — D63.0 ANEMIA ASSOCIATED WITH MALIGNANT NEOPLASTIC DISEASE (HCC): ICD-10-CM

## 2023-06-27 LAB
HCT VFR BLD AUTO: 31 % (ref 37–47)
HGB BLD-MCNC: 9.6 G/DL (ref 12–16)

## 2023-06-27 PROCEDURE — 36415 COLL VENOUS BLD VENIPUNCTURE: CPT

## 2023-06-27 PROCEDURE — 96372 THER/PROPH/DIAG INJ SC/IM: CPT

## 2023-06-27 PROCEDURE — 700111 HCHG RX REV CODE 636 W/ 250 OVERRIDE (IP): Mod: JG | Performed by: INTERNAL MEDICINE

## 2023-06-27 PROCEDURE — 85014 HEMATOCRIT: CPT

## 2023-06-27 PROCEDURE — 85018 HEMOGLOBIN: CPT

## 2023-06-27 RX ADMIN — EPOETIN ALFA-EPBX 40000 UNITS: 40000 INJECTION, SOLUTION INTRAVENOUS; SUBCUTANEOUS at 14:30

## 2023-06-27 ASSESSMENT — FIBROSIS 4 INDEX: FIB4 SCORE: .935672514619883041

## 2023-06-28 NOTE — PROGRESS NOTES
Pt presents to [facility] for epoetin. Butterfly needle used in LAC; brisk blood return observed and pt tolerated well. Labs drawn and within treatable parameters. Epoetin injected into L back arm with no s/s of adverse reactions. Next appointment confirmed and education provided. Pt discharged to self care with all personal belongings and in NAD.

## 2023-07-04 ENCOUNTER — OUTPATIENT INFUSION SERVICES (OUTPATIENT)
Dept: ONCOLOGY | Facility: MEDICAL CENTER | Age: 83
End: 2023-07-04
Attending: INTERNAL MEDICINE
Payer: MEDICARE

## 2023-07-04 VITALS
BODY MASS INDEX: 26.16 KG/M2 | SYSTOLIC BLOOD PRESSURE: 123 MMHG | HEART RATE: 83 BPM | WEIGHT: 172.62 LBS | OXYGEN SATURATION: 95 % | DIASTOLIC BLOOD PRESSURE: 64 MMHG | TEMPERATURE: 97.7 F | RESPIRATION RATE: 18 BRPM | HEIGHT: 68 IN

## 2023-07-04 DIAGNOSIS — D75.81 MYELOFIBROSIS (HCC): ICD-10-CM

## 2023-07-04 DIAGNOSIS — D63.0 ANEMIA ASSOCIATED WITH MALIGNANT NEOPLASTIC DISEASE (HCC): ICD-10-CM

## 2023-07-04 DIAGNOSIS — C80.1 ANEMIA ASSOCIATED WITH MALIGNANT NEOPLASTIC DISEASE (HCC): ICD-10-CM

## 2023-07-04 LAB
HCT VFR BLD AUTO: 29.9 % (ref 37–47)
HGB BLD-MCNC: 9.3 G/DL (ref 12–16)

## 2023-07-04 PROCEDURE — 36415 COLL VENOUS BLD VENIPUNCTURE: CPT

## 2023-07-04 PROCEDURE — 85014 HEMATOCRIT: CPT

## 2023-07-04 PROCEDURE — 96372 THER/PROPH/DIAG INJ SC/IM: CPT

## 2023-07-04 PROCEDURE — 85018 HEMOGLOBIN: CPT

## 2023-07-04 PROCEDURE — 700111 HCHG RX REV CODE 636 W/ 250 OVERRIDE (IP): Mod: JZ,JG | Performed by: INTERNAL MEDICINE

## 2023-07-04 RX ADMIN — EPOETIN ALFA-EPBX 40000 UNITS: 40000 INJECTION, SOLUTION INTRAVENOUS; SUBCUTANEOUS at 15:37

## 2023-07-04 ASSESSMENT — FIBROSIS 4 INDEX: FIB4 SCORE: .935672514619883041

## 2023-07-05 NOTE — PROGRESS NOTES
Dedra arrives to [facility] for weekly Retacrit. Patient denies acute health concerns. Reports feeling well. H/H drawn via venipuncture from R-AC without difficulty. Labs and BP within treatable parameters. Retacrit administered SQ to back of left arm by TM RN without issues. She has her next appt. Discharged home to self care in no apparent distress.

## 2023-07-11 ENCOUNTER — OUTPATIENT INFUSION SERVICES (OUTPATIENT)
Dept: ONCOLOGY | Facility: MEDICAL CENTER | Age: 83
End: 2023-07-11
Attending: INTERNAL MEDICINE
Payer: MEDICARE

## 2023-07-11 VITALS
RESPIRATION RATE: 18 BRPM | OXYGEN SATURATION: 98 % | HEIGHT: 67 IN | HEART RATE: 98 BPM | BODY MASS INDEX: 27.06 KG/M2 | DIASTOLIC BLOOD PRESSURE: 59 MMHG | SYSTOLIC BLOOD PRESSURE: 130 MMHG | WEIGHT: 172.4 LBS | TEMPERATURE: 97.1 F

## 2023-07-11 DIAGNOSIS — D63.0 ANEMIA ASSOCIATED WITH MALIGNANT NEOPLASTIC DISEASE (HCC): ICD-10-CM

## 2023-07-11 DIAGNOSIS — D75.81 MYELOFIBROSIS (HCC): ICD-10-CM

## 2023-07-11 DIAGNOSIS — C80.1 ANEMIA ASSOCIATED WITH MALIGNANT NEOPLASTIC DISEASE (HCC): ICD-10-CM

## 2023-07-11 LAB
HCT VFR BLD AUTO: 31.9 % (ref 37–47)
HGB BLD-MCNC: 9.9 G/DL (ref 12–16)

## 2023-07-11 PROCEDURE — 700111 HCHG RX REV CODE 636 W/ 250 OVERRIDE (IP): Mod: JZ,JG | Performed by: INTERNAL MEDICINE

## 2023-07-11 PROCEDURE — 85014 HEMATOCRIT: CPT

## 2023-07-11 PROCEDURE — 96372 THER/PROPH/DIAG INJ SC/IM: CPT

## 2023-07-11 PROCEDURE — 36415 COLL VENOUS BLD VENIPUNCTURE: CPT

## 2023-07-11 PROCEDURE — 85018 HEMOGLOBIN: CPT

## 2023-07-11 RX ADMIN — EPOETIN ALFA-EPBX 40000 UNITS: 40000 INJECTION, SOLUTION INTRAVENOUS; SUBCUTANEOUS at 14:28

## 2023-07-11 ASSESSMENT — FIBROSIS 4 INDEX: FIB4 SCORE: .947368421052631579

## 2023-07-11 NOTE — PROGRESS NOTES
Pt arrived ambulatory to [facility] for weekly Retacrit. POC discussed and pt acknowledged understanding. Labs drawn from LAC x1 attempt by KRISTIN RN. Site covered with sterile gauze/coban and pt tolerated well. Hgb 9.9 and . Pt meets parameters for Retacrit. Injection administered per MAR and pt tolerated well. No s/s of reactions or complications noted. Band-aid applied to site. Pt confirmed her next appt and discharged ambulatory in NAD.

## 2023-07-17 RX ORDER — 0.9 % SODIUM CHLORIDE 0.9 %
VIAL (ML) INJECTION PRN
Status: CANCELLED | OUTPATIENT
Start: 2023-07-25

## 2023-07-17 RX ORDER — 0.9 % SODIUM CHLORIDE 0.9 %
10 VIAL (ML) INJECTION PRN
Status: CANCELLED | OUTPATIENT
Start: 2023-07-18

## 2023-07-17 RX ORDER — HEPARIN SODIUM (PORCINE) LOCK FLUSH IV SOLN 100 UNIT/ML 100 UNIT/ML
500 SOLUTION INTRAVENOUS PRN
Status: CANCELLED | OUTPATIENT
Start: 2023-07-18

## 2023-07-17 RX ORDER — 0.9 % SODIUM CHLORIDE 0.9 %
10 VIAL (ML) INJECTION PRN
Status: CANCELLED | OUTPATIENT
Start: 2023-07-25

## 2023-07-17 RX ORDER — HEPARIN SODIUM (PORCINE) LOCK FLUSH IV SOLN 100 UNIT/ML 100 UNIT/ML
500 SOLUTION INTRAVENOUS PRN
Status: CANCELLED | OUTPATIENT
Start: 2023-07-25

## 2023-07-17 RX ORDER — 0.9 % SODIUM CHLORIDE 0.9 %
VIAL (ML) INJECTION PRN
Status: CANCELLED | OUTPATIENT
Start: 2023-07-18

## 2023-07-17 RX ORDER — 0.9 % SODIUM CHLORIDE 0.9 %
3 VIAL (ML) INJECTION PRN
Status: CANCELLED | OUTPATIENT
Start: 2023-07-18

## 2023-07-17 RX ORDER — 0.9 % SODIUM CHLORIDE 0.9 %
3 VIAL (ML) INJECTION PRN
Status: CANCELLED | OUTPATIENT
Start: 2023-07-25

## 2023-07-18 ENCOUNTER — OUTPATIENT INFUSION SERVICES (OUTPATIENT)
Dept: ONCOLOGY | Facility: MEDICAL CENTER | Age: 83
End: 2023-07-18
Attending: INTERNAL MEDICINE
Payer: MEDICARE

## 2023-07-18 VITALS
TEMPERATURE: 97.4 F | HEIGHT: 67 IN | WEIGHT: 172.4 LBS | BODY MASS INDEX: 27.06 KG/M2 | OXYGEN SATURATION: 98 % | RESPIRATION RATE: 18 BRPM | HEART RATE: 88 BPM | SYSTOLIC BLOOD PRESSURE: 121 MMHG | DIASTOLIC BLOOD PRESSURE: 60 MMHG

## 2023-07-18 DIAGNOSIS — D75.81 MYELOFIBROSIS (HCC): ICD-10-CM

## 2023-07-18 DIAGNOSIS — D63.0 ANEMIA ASSOCIATED WITH MALIGNANT NEOPLASTIC DISEASE (HCC): ICD-10-CM

## 2023-07-18 DIAGNOSIS — C80.1 ANEMIA ASSOCIATED WITH MALIGNANT NEOPLASTIC DISEASE (HCC): ICD-10-CM

## 2023-07-18 LAB
HCT VFR BLD AUTO: 32 % (ref 37–47)
HGB BLD-MCNC: 9.7 G/DL (ref 12–16)

## 2023-07-18 PROCEDURE — 85014 HEMATOCRIT: CPT

## 2023-07-18 PROCEDURE — 85018 HEMOGLOBIN: CPT

## 2023-07-18 PROCEDURE — 96372 THER/PROPH/DIAG INJ SC/IM: CPT

## 2023-07-18 PROCEDURE — 700111 HCHG RX REV CODE 636 W/ 250 OVERRIDE (IP): Mod: JZ,JG | Performed by: INTERNAL MEDICINE

## 2023-07-18 PROCEDURE — 36415 COLL VENOUS BLD VENIPUNCTURE: CPT

## 2023-07-18 RX ADMIN — EPOETIN ALFA-EPBX 40000 UNITS: 40000 INJECTION, SOLUTION INTRAVENOUS; SUBCUTANEOUS at 14:14

## 2023-07-18 ASSESSMENT — FIBROSIS 4 INDEX: FIB4 SCORE: .947368421052631579

## 2023-07-18 NOTE — PROGRESS NOTES
Dedra arrived at infusion services for epoetin injection. Labs drawn and within criteria to give the injection. Epoetin injected into right back arm with no signs or symptoms of adverse reactions. She then discharged home in stable condition aware of next appointment.

## 2023-07-24 ENCOUNTER — HOSPITAL ENCOUNTER (OUTPATIENT)
Dept: LAB | Facility: MEDICAL CENTER | Age: 83
End: 2023-07-24
Attending: INTERNAL MEDICINE
Payer: MEDICARE

## 2023-07-24 LAB
ALBUMIN SERPL BCP-MCNC: 3.9 G/DL (ref 3.2–4.9)
ALBUMIN/GLOB SERPL: 1.6 G/DL
ALP SERPL-CCNC: 96 U/L (ref 30–99)
ALT SERPL-CCNC: 12 U/L (ref 2–50)
ANION GAP SERPL CALC-SCNC: 8 MMOL/L (ref 7–16)
ANISOCYTOSIS BLD QL SMEAR: ABNORMAL
AST SERPL-CCNC: 16 U/L (ref 12–45)
BASOPHILS # BLD AUTO: 1.7 % (ref 0–1.8)
BASOPHILS # BLD: 0.09 K/UL (ref 0–0.12)
BILIRUB SERPL-MCNC: 0.8 MG/DL (ref 0.1–1.5)
BUN SERPL-MCNC: 19 MG/DL (ref 8–22)
CALCIUM ALBUM COR SERPL-MCNC: 9.6 MG/DL (ref 8.5–10.5)
CALCIUM SERPL-MCNC: 9.5 MG/DL (ref 8.5–10.5)
CHLORIDE SERPL-SCNC: 105 MMOL/L (ref 96–112)
CO2 SERPL-SCNC: 28 MMOL/L (ref 20–33)
CREAT SERPL-MCNC: 0.84 MG/DL (ref 0.5–1.4)
CRP SERPL HS-MCNC: 0.58 MG/DL (ref 0–0.75)
EOSINOPHIL # BLD AUTO: 0 K/UL (ref 0–0.51)
EOSINOPHIL NFR BLD: 0 % (ref 0–6.9)
ERYTHROCYTE [DISTWIDTH] IN BLOOD BY AUTOMATED COUNT: 77.3 FL (ref 35.9–50)
GFR SERPLBLD CREATININE-BSD FMLA CKD-EPI: 70 ML/MIN/1.73 M 2
GLOBULIN SER CALC-MCNC: 2.4 G/DL (ref 1.9–3.5)
GLUCOSE SERPL-MCNC: 78 MG/DL (ref 65–99)
HCT VFR BLD AUTO: 32.3 % (ref 37–47)
HGB BLD-MCNC: 9.7 G/DL (ref 12–16)
HYPOCHROMIA BLD QL SMEAR: ABNORMAL
LYMPHOCYTES # BLD AUTO: 1.26 K/UL (ref 1–4.8)
LYMPHOCYTES NFR BLD: 24.8 % (ref 22–41)
MACROCYTES BLD QL SMEAR: ABNORMAL
MANUAL DIFF BLD: NORMAL
MCH RBC QN AUTO: 30.8 PG (ref 27–33)
MCHC RBC AUTO-ENTMCNC: 30 G/DL (ref 32.2–35.5)
MCV RBC AUTO: 102.5 FL (ref 81.4–97.8)
METAMYELOCYTES NFR BLD MANUAL: 1.7 %
MICROCYTES BLD QL SMEAR: ABNORMAL
MONOCYTES # BLD AUTO: 0.53 K/UL (ref 0–0.85)
MONOCYTES NFR BLD AUTO: 10.3 % (ref 0–13.4)
MORPHOLOGY BLD-IMP: NORMAL
MYELOCYTES NFR BLD MANUAL: 0.8 %
NEUTROPHILS # BLD AUTO: 3.1 K/UL (ref 1.82–7.42)
NEUTROPHILS NFR BLD: 60.7 % (ref 44–72)
NRBC # BLD AUTO: 0.04 K/UL
NRBC BLD-RTO: 0.8 /100 WBC (ref 0–0.2)
OVALOCYTES BLD QL SMEAR: NORMAL
PLATELET # BLD AUTO: 510 K/UL (ref 164–446)
PLATELET BLD QL SMEAR: NORMAL
PMV BLD AUTO: 12.6 FL (ref 9–12.9)
POIKILOCYTOSIS BLD QL SMEAR: NORMAL
POTASSIUM SERPL-SCNC: 4.2 MMOL/L (ref 3.6–5.5)
PROT SERPL-MCNC: 6.3 G/DL (ref 6–8.2)
RBC # BLD AUTO: 3.15 M/UL (ref 4.2–5.4)
RBC BLD AUTO: PRESENT
SCHISTOCYTES BLD QL SMEAR: NORMAL
SODIUM SERPL-SCNC: 141 MMOL/L (ref 135–145)
WBC # BLD AUTO: 5.1 K/UL (ref 4.8–10.8)

## 2023-07-24 PROCEDURE — 85025 COMPLETE CBC W/AUTO DIFF WBC: CPT

## 2023-07-24 PROCEDURE — 86140 C-REACTIVE PROTEIN: CPT

## 2023-07-24 PROCEDURE — 85652 RBC SED RATE AUTOMATED: CPT

## 2023-07-24 PROCEDURE — 36415 COLL VENOUS BLD VENIPUNCTURE: CPT

## 2023-07-24 PROCEDURE — 85007 BL SMEAR W/DIFF WBC COUNT: CPT

## 2023-07-24 PROCEDURE — 80053 COMPREHEN METABOLIC PANEL: CPT

## 2023-07-25 ENCOUNTER — OUTPATIENT INFUSION SERVICES (OUTPATIENT)
Dept: ONCOLOGY | Facility: MEDICAL CENTER | Age: 83
End: 2023-07-25
Attending: INTERNAL MEDICINE
Payer: MEDICARE

## 2023-07-25 VITALS
DIASTOLIC BLOOD PRESSURE: 58 MMHG | OXYGEN SATURATION: 98 % | BODY MASS INDEX: 27.23 KG/M2 | TEMPERATURE: 97.2 F | WEIGHT: 173.5 LBS | HEIGHT: 67 IN | RESPIRATION RATE: 18 BRPM | HEART RATE: 94 BPM | SYSTOLIC BLOOD PRESSURE: 119 MMHG

## 2023-07-25 DIAGNOSIS — D75.81 MYELOFIBROSIS (HCC): Primary | ICD-10-CM

## 2023-07-25 DIAGNOSIS — D63.0 ANEMIA ASSOCIATED WITH MALIGNANT NEOPLASTIC DISEASE (HCC): ICD-10-CM

## 2023-07-25 DIAGNOSIS — C80.1 ANEMIA ASSOCIATED WITH MALIGNANT NEOPLASTIC DISEASE (HCC): ICD-10-CM

## 2023-07-25 LAB — ERYTHROCYTE [SEDIMENTATION RATE] IN BLOOD BY WESTERGREN METHOD: 11 MM/HOUR (ref 0–25)

## 2023-07-25 PROCEDURE — 96372 THER/PROPH/DIAG INJ SC/IM: CPT

## 2023-07-25 PROCEDURE — 700111 HCHG RX REV CODE 636 W/ 250 OVERRIDE (IP): Mod: JZ,JG | Performed by: INTERNAL MEDICINE

## 2023-07-25 RX ADMIN — ERYTHROPOIETIN 40000 UNITS: 40000 INJECTION, SOLUTION INTRAVENOUS; SUBCUTANEOUS at 13:45

## 2023-07-25 ASSESSMENT — FIBROSIS 4 INDEX: FIB4 SCORE: 0.73

## 2023-07-25 NOTE — PROGRESS NOTES
Ms. Lobo arrived to the infusion center for epoetin injection. She had labs drawn yesterday and per the pharmacist, those labs are okay to use for today's treatment. Epoetin injection given to the back of the left arm with no issues. Band-Aid applied to the site. Discharged home with no apparent distress and confirmed next appointment.

## 2023-07-31 RX ORDER — HEPARIN SODIUM (PORCINE) LOCK FLUSH IV SOLN 100 UNIT/ML 100 UNIT/ML
500 SOLUTION INTRAVENOUS PRN
Status: CANCELLED | OUTPATIENT
Start: 2023-08-01

## 2023-07-31 RX ORDER — 0.9 % SODIUM CHLORIDE 0.9 %
10 VIAL (ML) INJECTION PRN
Status: CANCELLED | OUTPATIENT
Start: 2023-08-01

## 2023-07-31 RX ORDER — 0.9 % SODIUM CHLORIDE 0.9 %
VIAL (ML) INJECTION PRN
Status: CANCELLED | OUTPATIENT
Start: 2023-08-01

## 2023-07-31 RX ORDER — 0.9 % SODIUM CHLORIDE 0.9 %
3 VIAL (ML) INJECTION PRN
Status: CANCELLED | OUTPATIENT
Start: 2023-08-01

## 2023-08-01 ENCOUNTER — OUTPATIENT INFUSION SERVICES (OUTPATIENT)
Dept: ONCOLOGY | Facility: MEDICAL CENTER | Age: 83
End: 2023-08-01
Attending: INTERNAL MEDICINE
Payer: MEDICARE

## 2023-08-01 VITALS
BODY MASS INDEX: 26.71 KG/M2 | RESPIRATION RATE: 18 BRPM | OXYGEN SATURATION: 93 % | HEART RATE: 91 BPM | HEIGHT: 67 IN | WEIGHT: 170.19 LBS | TEMPERATURE: 97.4 F | DIASTOLIC BLOOD PRESSURE: 54 MMHG | SYSTOLIC BLOOD PRESSURE: 105 MMHG

## 2023-08-01 DIAGNOSIS — D75.81 MYELOFIBROSIS (HCC): ICD-10-CM

## 2023-08-01 DIAGNOSIS — C80.1 ANEMIA ASSOCIATED WITH MALIGNANT NEOPLASTIC DISEASE (HCC): ICD-10-CM

## 2023-08-01 DIAGNOSIS — D63.0 ANEMIA ASSOCIATED WITH MALIGNANT NEOPLASTIC DISEASE (HCC): ICD-10-CM

## 2023-08-01 LAB
HCT VFR BLD AUTO: 32.3 % (ref 37–47)
HGB BLD-MCNC: 10.3 G/DL (ref 12–16)

## 2023-08-01 PROCEDURE — 36415 COLL VENOUS BLD VENIPUNCTURE: CPT

## 2023-08-01 PROCEDURE — 85014 HEMATOCRIT: CPT

## 2023-08-01 PROCEDURE — 85018 HEMOGLOBIN: CPT

## 2023-08-01 ASSESSMENT — FIBROSIS 4 INDEX: FIB4 SCORE: 0.73

## 2023-08-01 NOTE — PROGRESS NOTES
Dedra came into infusion services today for retacrit injection. No complaints. Labs drawn from right forearm, covered with gauze and coban. Labs are not within parameters for treatment. Pt has future appointments. Discharged to self care.

## 2023-08-08 ENCOUNTER — OUTPATIENT INFUSION SERVICES (OUTPATIENT)
Dept: ONCOLOGY | Facility: MEDICAL CENTER | Age: 83
End: 2023-08-08
Attending: INTERNAL MEDICINE
Payer: MEDICARE

## 2023-08-08 VITALS
RESPIRATION RATE: 18 BRPM | HEIGHT: 67 IN | HEART RATE: 87 BPM | WEIGHT: 172.4 LBS | TEMPERATURE: 98 F | BODY MASS INDEX: 27.06 KG/M2 | SYSTOLIC BLOOD PRESSURE: 120 MMHG | DIASTOLIC BLOOD PRESSURE: 70 MMHG | OXYGEN SATURATION: 97 %

## 2023-08-08 DIAGNOSIS — D75.81 MYELOFIBROSIS (HCC): ICD-10-CM

## 2023-08-08 DIAGNOSIS — D63.0 ANEMIA ASSOCIATED WITH MALIGNANT NEOPLASTIC DISEASE (HCC): ICD-10-CM

## 2023-08-08 DIAGNOSIS — C80.1 ANEMIA ASSOCIATED WITH MALIGNANT NEOPLASTIC DISEASE (HCC): ICD-10-CM

## 2023-08-08 LAB
HCT VFR BLD AUTO: 32.3 % (ref 37–47)
HGB BLD-MCNC: 10 G/DL (ref 12–16)

## 2023-08-08 PROCEDURE — 96372 THER/PROPH/DIAG INJ SC/IM: CPT

## 2023-08-08 PROCEDURE — 700111 HCHG RX REV CODE 636 W/ 250 OVERRIDE (IP): Mod: JZ,JG | Performed by: INTERNAL MEDICINE

## 2023-08-08 PROCEDURE — 85018 HEMOGLOBIN: CPT

## 2023-08-08 PROCEDURE — 36415 COLL VENOUS BLD VENIPUNCTURE: CPT

## 2023-08-08 PROCEDURE — 85014 HEMATOCRIT: CPT

## 2023-08-08 RX ORDER — 0.9 % SODIUM CHLORIDE 0.9 %
VIAL (ML) INJECTION PRN
Status: CANCELLED | OUTPATIENT
Start: 2023-08-08

## 2023-08-08 RX ORDER — 0.9 % SODIUM CHLORIDE 0.9 %
10 VIAL (ML) INJECTION PRN
Status: CANCELLED | OUTPATIENT
Start: 2023-08-08

## 2023-08-08 RX ORDER — HEPARIN SODIUM (PORCINE) LOCK FLUSH IV SOLN 100 UNIT/ML 100 UNIT/ML
500 SOLUTION INTRAVENOUS PRN
Status: CANCELLED | OUTPATIENT
Start: 2023-08-08

## 2023-08-08 RX ORDER — 0.9 % SODIUM CHLORIDE 0.9 %
3 VIAL (ML) INJECTION PRN
Status: CANCELLED | OUTPATIENT
Start: 2023-08-08

## 2023-08-08 RX ADMIN — EPOETIN ALFA-EPBX 40000 UNITS: 40000 INJECTION, SOLUTION INTRAVENOUS; SUBCUTANEOUS at 14:17

## 2023-08-08 ASSESSMENT — FIBROSIS 4 INDEX: FIB4 SCORE: 0.73

## 2023-08-08 NOTE — PROGRESS NOTES
Dedra presented to [facility] for possible retacrit injection. Labs drawn as ordered from [facility] with 23G butterfly needle, gauze and coban dressing placed. Hemoglobin is 10.0 and is within parameters for retacrit injection. Retacrit given subcutaneously as ordered in right upper arm. Has next appt, discharged home to self care.

## 2023-08-14 RX ORDER — 0.9 % SODIUM CHLORIDE 0.9 %
10 VIAL (ML) INJECTION PRN
Status: CANCELLED | OUTPATIENT
Start: 2023-09-12

## 2023-08-14 RX ORDER — 0.9 % SODIUM CHLORIDE 0.9 %
3 VIAL (ML) INJECTION PRN
Status: CANCELLED | OUTPATIENT
Start: 2023-08-29

## 2023-08-14 RX ORDER — HEPARIN SODIUM (PORCINE) LOCK FLUSH IV SOLN 100 UNIT/ML 100 UNIT/ML
500 SOLUTION INTRAVENOUS PRN
Status: CANCELLED | OUTPATIENT
Start: 2023-08-29

## 2023-08-14 RX ORDER — 0.9 % SODIUM CHLORIDE 0.9 %
VIAL (ML) INJECTION PRN
Status: CANCELLED | OUTPATIENT
Start: 2023-10-03

## 2023-08-14 RX ORDER — 0.9 % SODIUM CHLORIDE 0.9 %
10 VIAL (ML) INJECTION PRN
Status: CANCELLED | OUTPATIENT
Start: 2023-09-19

## 2023-08-14 RX ORDER — 0.9 % SODIUM CHLORIDE 0.9 %
3 VIAL (ML) INJECTION PRN
Status: CANCELLED | OUTPATIENT
Start: 2023-09-12

## 2023-08-14 RX ORDER — 0.9 % SODIUM CHLORIDE 0.9 %
VIAL (ML) INJECTION PRN
Status: CANCELLED | OUTPATIENT
Start: 2023-09-05

## 2023-08-14 RX ORDER — 0.9 % SODIUM CHLORIDE 0.9 %
VIAL (ML) INJECTION PRN
Status: CANCELLED | OUTPATIENT
Start: 2023-08-15

## 2023-08-14 RX ORDER — 0.9 % SODIUM CHLORIDE 0.9 %
10 VIAL (ML) INJECTION PRN
Status: CANCELLED | OUTPATIENT
Start: 2023-09-05

## 2023-08-14 RX ORDER — HEPARIN SODIUM (PORCINE) LOCK FLUSH IV SOLN 100 UNIT/ML 100 UNIT/ML
500 SOLUTION INTRAVENOUS PRN
Status: CANCELLED | OUTPATIENT
Start: 2023-09-05

## 2023-08-14 RX ORDER — HEPARIN SODIUM (PORCINE) LOCK FLUSH IV SOLN 100 UNIT/ML 100 UNIT/ML
500 SOLUTION INTRAVENOUS PRN
Status: CANCELLED | OUTPATIENT
Start: 2023-09-19

## 2023-08-14 RX ORDER — HEPARIN SODIUM (PORCINE) LOCK FLUSH IV SOLN 100 UNIT/ML 100 UNIT/ML
500 SOLUTION INTRAVENOUS PRN
Status: CANCELLED | OUTPATIENT
Start: 2023-09-12

## 2023-08-14 RX ORDER — 0.9 % SODIUM CHLORIDE 0.9 %
3 VIAL (ML) INJECTION PRN
Status: CANCELLED | OUTPATIENT
Start: 2023-10-10

## 2023-08-14 RX ORDER — HEPARIN SODIUM (PORCINE) LOCK FLUSH IV SOLN 100 UNIT/ML 100 UNIT/ML
500 SOLUTION INTRAVENOUS PRN
Status: CANCELLED | OUTPATIENT
Start: 2023-10-24

## 2023-08-14 RX ORDER — HEPARIN SODIUM (PORCINE) LOCK FLUSH IV SOLN 100 UNIT/ML 100 UNIT/ML
500 SOLUTION INTRAVENOUS PRN
Status: CANCELLED | OUTPATIENT
Start: 2023-09-26

## 2023-08-14 RX ORDER — 0.9 % SODIUM CHLORIDE 0.9 %
VIAL (ML) INJECTION PRN
Status: CANCELLED | OUTPATIENT
Start: 2023-08-29

## 2023-08-14 RX ORDER — HEPARIN SODIUM (PORCINE) LOCK FLUSH IV SOLN 100 UNIT/ML 100 UNIT/ML
500 SOLUTION INTRAVENOUS PRN
Status: CANCELLED | OUTPATIENT
Start: 2023-10-10

## 2023-08-14 RX ORDER — HEPARIN SODIUM (PORCINE) LOCK FLUSH IV SOLN 100 UNIT/ML 100 UNIT/ML
500 SOLUTION INTRAVENOUS PRN
Status: CANCELLED | OUTPATIENT
Start: 2023-10-03

## 2023-08-14 RX ORDER — 0.9 % SODIUM CHLORIDE 0.9 %
10 VIAL (ML) INJECTION PRN
Status: CANCELLED | OUTPATIENT
Start: 2023-10-03

## 2023-08-14 RX ORDER — 0.9 % SODIUM CHLORIDE 0.9 %
VIAL (ML) INJECTION PRN
Status: CANCELLED | OUTPATIENT
Start: 2023-10-31

## 2023-08-14 RX ORDER — 0.9 % SODIUM CHLORIDE 0.9 %
VIAL (ML) INJECTION PRN
Status: CANCELLED | OUTPATIENT
Start: 2023-10-24

## 2023-08-14 RX ORDER — 0.9 % SODIUM CHLORIDE 0.9 %
3 VIAL (ML) INJECTION PRN
Status: CANCELLED | OUTPATIENT
Start: 2023-10-17

## 2023-08-14 RX ORDER — 0.9 % SODIUM CHLORIDE 0.9 %
10 VIAL (ML) INJECTION PRN
Status: CANCELLED | OUTPATIENT
Start: 2023-10-24

## 2023-08-14 RX ORDER — 0.9 % SODIUM CHLORIDE 0.9 %
10 VIAL (ML) INJECTION PRN
Status: CANCELLED | OUTPATIENT
Start: 2023-10-10

## 2023-08-14 RX ORDER — 0.9 % SODIUM CHLORIDE 0.9 %
10 VIAL (ML) INJECTION PRN
Status: CANCELLED | OUTPATIENT
Start: 2023-10-31

## 2023-08-14 RX ORDER — 0.9 % SODIUM CHLORIDE 0.9 %
3 VIAL (ML) INJECTION PRN
Status: CANCELLED | OUTPATIENT
Start: 2023-09-26

## 2023-08-14 RX ORDER — 0.9 % SODIUM CHLORIDE 0.9 %
10 VIAL (ML) INJECTION PRN
Status: CANCELLED | OUTPATIENT
Start: 2023-08-22

## 2023-08-14 RX ORDER — 0.9 % SODIUM CHLORIDE 0.9 %
3 VIAL (ML) INJECTION PRN
Status: CANCELLED | OUTPATIENT
Start: 2023-09-05

## 2023-08-14 RX ORDER — 0.9 % SODIUM CHLORIDE 0.9 %
10 VIAL (ML) INJECTION PRN
Status: CANCELLED | OUTPATIENT
Start: 2023-10-17

## 2023-08-14 RX ORDER — 0.9 % SODIUM CHLORIDE 0.9 %
10 VIAL (ML) INJECTION PRN
Status: CANCELLED | OUTPATIENT
Start: 2023-08-29

## 2023-08-14 RX ORDER — HEPARIN SODIUM (PORCINE) LOCK FLUSH IV SOLN 100 UNIT/ML 100 UNIT/ML
500 SOLUTION INTRAVENOUS PRN
Status: CANCELLED | OUTPATIENT
Start: 2023-08-22

## 2023-08-14 RX ORDER — 0.9 % SODIUM CHLORIDE 0.9 %
3 VIAL (ML) INJECTION PRN
Status: CANCELLED | OUTPATIENT
Start: 2023-10-31

## 2023-08-14 RX ORDER — HEPARIN SODIUM (PORCINE) LOCK FLUSH IV SOLN 100 UNIT/ML 100 UNIT/ML
500 SOLUTION INTRAVENOUS PRN
Status: CANCELLED | OUTPATIENT
Start: 2023-10-17

## 2023-08-14 RX ORDER — 0.9 % SODIUM CHLORIDE 0.9 %
10 VIAL (ML) INJECTION PRN
Status: CANCELLED | OUTPATIENT
Start: 2023-08-15

## 2023-08-14 RX ORDER — 0.9 % SODIUM CHLORIDE 0.9 %
VIAL (ML) INJECTION PRN
Status: CANCELLED | OUTPATIENT
Start: 2023-08-22

## 2023-08-14 RX ORDER — HEPARIN SODIUM (PORCINE) LOCK FLUSH IV SOLN 100 UNIT/ML 100 UNIT/ML
500 SOLUTION INTRAVENOUS PRN
Status: CANCELLED | OUTPATIENT
Start: 2023-08-15

## 2023-08-14 RX ORDER — 0.9 % SODIUM CHLORIDE 0.9 %
3 VIAL (ML) INJECTION PRN
Status: CANCELLED | OUTPATIENT
Start: 2023-08-15

## 2023-08-14 RX ORDER — HEPARIN SODIUM (PORCINE) LOCK FLUSH IV SOLN 100 UNIT/ML 100 UNIT/ML
500 SOLUTION INTRAVENOUS PRN
Status: CANCELLED | OUTPATIENT
Start: 2023-10-31

## 2023-08-14 RX ORDER — 0.9 % SODIUM CHLORIDE 0.9 %
10 VIAL (ML) INJECTION PRN
Status: CANCELLED | OUTPATIENT
Start: 2023-09-26

## 2023-08-14 RX ORDER — 0.9 % SODIUM CHLORIDE 0.9 %
3 VIAL (ML) INJECTION PRN
Status: CANCELLED | OUTPATIENT
Start: 2023-10-03

## 2023-08-14 RX ORDER — 0.9 % SODIUM CHLORIDE 0.9 %
VIAL (ML) INJECTION PRN
Status: CANCELLED | OUTPATIENT
Start: 2023-09-26

## 2023-08-14 RX ORDER — 0.9 % SODIUM CHLORIDE 0.9 %
3 VIAL (ML) INJECTION PRN
Status: CANCELLED | OUTPATIENT
Start: 2023-09-19

## 2023-08-14 RX ORDER — 0.9 % SODIUM CHLORIDE 0.9 %
3 VIAL (ML) INJECTION PRN
Status: CANCELLED | OUTPATIENT
Start: 2023-10-24

## 2023-08-14 RX ORDER — 0.9 % SODIUM CHLORIDE 0.9 %
VIAL (ML) INJECTION PRN
Status: CANCELLED | OUTPATIENT
Start: 2023-10-10

## 2023-08-14 RX ORDER — 0.9 % SODIUM CHLORIDE 0.9 %
VIAL (ML) INJECTION PRN
Status: CANCELLED | OUTPATIENT
Start: 2023-09-12

## 2023-08-14 RX ORDER — 0.9 % SODIUM CHLORIDE 0.9 %
VIAL (ML) INJECTION PRN
Status: CANCELLED | OUTPATIENT
Start: 2023-10-17

## 2023-08-14 RX ORDER — 0.9 % SODIUM CHLORIDE 0.9 %
VIAL (ML) INJECTION PRN
Status: CANCELLED | OUTPATIENT
Start: 2023-09-19

## 2023-08-14 RX ORDER — 0.9 % SODIUM CHLORIDE 0.9 %
3 VIAL (ML) INJECTION PRN
Status: CANCELLED | OUTPATIENT
Start: 2023-08-22

## 2023-08-15 ENCOUNTER — OUTPATIENT INFUSION SERVICES (OUTPATIENT)
Dept: ONCOLOGY | Facility: MEDICAL CENTER | Age: 83
End: 2023-08-15
Attending: INTERNAL MEDICINE
Payer: MEDICARE

## 2023-08-15 VITALS
WEIGHT: 172.84 LBS | DIASTOLIC BLOOD PRESSURE: 61 MMHG | TEMPERATURE: 96.6 F | BODY MASS INDEX: 26.2 KG/M2 | OXYGEN SATURATION: 98 % | SYSTOLIC BLOOD PRESSURE: 116 MMHG | HEART RATE: 94 BPM | RESPIRATION RATE: 20 BRPM | HEIGHT: 68 IN

## 2023-08-15 DIAGNOSIS — D75.81 MYELOFIBROSIS (HCC): ICD-10-CM

## 2023-08-15 DIAGNOSIS — D63.0 ANEMIA ASSOCIATED WITH MALIGNANT NEOPLASTIC DISEASE (HCC): ICD-10-CM

## 2023-08-15 DIAGNOSIS — C80.1 ANEMIA ASSOCIATED WITH MALIGNANT NEOPLASTIC DISEASE (HCC): ICD-10-CM

## 2023-08-15 LAB
HCT VFR BLD AUTO: 30.1 % (ref 37–47)
HGB BLD-MCNC: 9.6 G/DL (ref 12–16)

## 2023-08-15 PROCEDURE — 36415 COLL VENOUS BLD VENIPUNCTURE: CPT

## 2023-08-15 PROCEDURE — 700111 HCHG RX REV CODE 636 W/ 250 OVERRIDE (IP): Mod: JZ,JG | Performed by: INTERNAL MEDICINE

## 2023-08-15 PROCEDURE — 85018 HEMOGLOBIN: CPT

## 2023-08-15 PROCEDURE — 96372 THER/PROPH/DIAG INJ SC/IM: CPT

## 2023-08-15 PROCEDURE — 85014 HEMATOCRIT: CPT

## 2023-08-15 RX ADMIN — EPOETIN ALFA-EPBX 40000 UNITS: 40000 INJECTION, SOLUTION INTRAVENOUS; SUBCUTANEOUS at 14:27

## 2023-08-15 ASSESSMENT — FIBROSIS 4 INDEX: FIB4 SCORE: 0.73

## 2023-08-15 NOTE — PROGRESS NOTES
Pt arrived ambulatory to [facility] for weekly H/H possible Retacrit injection. POC discussed with pt and she agrees with plan. Pt with call light in reach for safety. Pt verbalized understanding to call for needs/assist.    H/H drawn as ordered. Results reviewed, Hgb 9.6 today. Pt meets parameters for Retacrit injection. Pt medicated per MAR. Pt tolerated injection without s/s adverse reaction. Pt discharged to self care, NAD. Pt's next appt confirmed 8/22/2023.

## 2023-08-18 ENCOUNTER — APPOINTMENT (OUTPATIENT)
Dept: RADIOLOGY | Facility: IMAGING CENTER | Age: 83
End: 2023-08-18
Attending: PHYSICIAN ASSISTANT
Payer: MEDICARE

## 2023-08-18 ENCOUNTER — OFFICE VISIT (OUTPATIENT)
Dept: URGENT CARE | Facility: PHYSICIAN GROUP | Age: 83
End: 2023-08-18
Payer: MEDICARE

## 2023-08-18 VITALS
OXYGEN SATURATION: 93 % | BODY MASS INDEX: 26.53 KG/M2 | TEMPERATURE: 97.6 F | HEIGHT: 67 IN | HEART RATE: 96 BPM | SYSTOLIC BLOOD PRESSURE: 124 MMHG | DIASTOLIC BLOOD PRESSURE: 72 MMHG | RESPIRATION RATE: 12 BRPM | WEIGHT: 169 LBS

## 2023-08-18 DIAGNOSIS — M79.641 RIGHT HAND PAIN: ICD-10-CM

## 2023-08-18 DIAGNOSIS — S66.911A HAND STRAIN, RIGHT, INITIAL ENCOUNTER: ICD-10-CM

## 2023-08-18 PROCEDURE — 3078F DIAST BP <80 MM HG: CPT | Performed by: PHYSICIAN ASSISTANT

## 2023-08-18 PROCEDURE — 3074F SYST BP LT 130 MM HG: CPT | Performed by: PHYSICIAN ASSISTANT

## 2023-08-18 PROCEDURE — 99213 OFFICE O/P EST LOW 20 MIN: CPT | Performed by: PHYSICIAN ASSISTANT

## 2023-08-18 PROCEDURE — 73130 X-RAY EXAM OF HAND: CPT | Mod: TC,RT | Performed by: NURSE PRACTITIONER

## 2023-08-18 ASSESSMENT — FIBROSIS 4 INDEX: FIB4 SCORE: 0.73

## 2023-08-18 NOTE — PROGRESS NOTES
Subjective:   Dedra Lobo is a 83 y.o. female who presents today with   Chief Complaint   Patient presents with    Hand Injury     Right hand swelling, injured        Hand Injury  This is a new problem. Episode onset: 5 days. The problem occurs constantly. The problem has been unchanged. Treatments tried: elevate. The treatment provided mild relief.     Patient states she has been helping feed her neighbors animals and felt as if she may have strained her hand.  She states she did not fall on it.  She has significant pain and swelling to the right hand.    PMH:  has a past medical history of Adverse effect of anesthesia, Anemia, Anesthesia, Arrhythmia, Arthritis, Cancer (HCC) (2015), Cardiomegaly (10/6/2022), Cataract (03/21/2019), GERD (gastroesophageal reflux disease), Glaucoma (2/2015), Osteoporosis, unspecified, Pain, Pneumonia (10/2001), Systolic murmur (10/6/2022), Thrombocytosis (9/29/2010), Ulcer, Unspecified disorder of thyroid, Urinary bladder disorder (2015), Urinary incontinence (03/21/2019), Vascular insufficiency of limb (2005), and Vitamin d deficiency (9/29/2010).    She has no past medical history of Allergy, unspecified not elsewhere classified, Anxiety, Asymptomatic human immunodeficiency virus (HIV) infection status ([facility]), Blood transfusion, without reported diagnosis, CHF (congestive heart failure) ([facility]), Chronic airway obstruction, not elsewhere classified, Clotting disorder ([facility]), Depression, Diabetic neuropathy ([facility]), Emphysema, Goiter, Headache, classical migraine, Heart attack ([facility]), Hyperlipidemia, IBD (inflammatory bowel disease), Kidney disease, Meningitis, Substance abuse ([facility]), or Thyroid disease.  MEDS:   Current Outpatient Medications:     torsemide (DEMADEX) 20 MG Tab, Take 1 Tablet by mouth every day., Disp: 90 Tablet, Rfl: 3    vitamin D3 (CHOLECALCIFEROL) 1000 Unit (25 mcg) Tab, Take 1 Tablet by mouth every day., Disp: , Rfl:     therapeutic multivitamin-minerals  (THERAGRAN-M) Tab, Take 1 Tablet by mouth every day., Disp: , Rfl:     calcium carbonate (OS-KEELY 500) 1250 (500 Ca) MG Tab, Take 1 Tablet by mouth every day., Disp: , Rfl:     acetaminophen (TYLENOL) 500 MG Tab, Take 500-1,000 mg by mouth 1 time a day as needed for Mild Pain., Disp: , Rfl:     Carboxymethylcellulose Sod PF 0.5 % Solution, Administer 1 Drop into both eyes every evening., Disp: , Rfl:     anagrelide (AGRYLIN) 0.5 MG Cap, Take 1 Capsule by mouth 2 times a day., Disp: , Rfl:   ALLERGIES: No Known Allergies  SURGHX:   Past Surgical History:   Procedure Laterality Date    [place] UPPER GI ENDOSCOPY,DIAGNOSIS N/A 10/1/2022    Procedure: GASTROSCOPY;  Surgeon: Ana Gaston D.O.;  Location: [facility];  Service: Gastroenterology    [place] UPPER GI ENDOSCOPY,SCLER INJECT N/A 10/1/2022    Procedure: GASTROSCOPY, WITH SCLEROTHERAPY;  Surgeon: Ana Gaston D.O.;  Location: [facility];  Service: Gastroenterology    [place] UPPER GI ENDOSCOPY,CTRL BLEED N/A 10/1/2022    Procedure: EGD, WITH CLIP PLACEMENT;  Surgeon: Ana Gaston D.O.;  Location: [facility];  Service: Gastroenterology    PB TOTAL KNEE ARTHROPLASTY Left 4/3/2019    Procedure: KNEE ARTHROPLASTY TOTAL;  Surgeon: Robert Vazquez M.D.;  Location: SUR[facility]ERY [facility];  Service: Orthopedics    CERVICAL DISK AND FUSION ANTERIOR  2/9/2016    Procedure: CERVICAL DISK AND FUSION ANTERIOR  C3-7;  Surgeon: Dusty Rao M.D.;  Location: SURGERY [facility];  Service:     CYSTOSCOPY  3/3/2015    Performed by Walt Olson M.D. at SURGERY [facility]    TRANS URETHRAL RESECTION BLADDER  3/3/2015    Performed by Walt Olson M.D. at [facility]    HIP REPLACEMENT, TOTAL  2006    Dr. Vazquez    CHOLECYSTECTOMY  2003    LUMPECTOMY  1982    cyst    ARTHROPLASTY      bi lateral-Dr. Vazquez     SOCHX:  reports that she has never smoked. She has never used smokeless tobacco. She reports that she does not drink alcohol and  "does not use drugs.  FH: Reviewed with patient, not pertinent to this visit.     Review of Systems   Musculoskeletal:         Right hand pain      Objective:   /72 (BP Location: Right arm, Patient Position: Sitting, BP Cuff Size: Adult)   Pulse 96   Temp 36.4 °C (97.6 °F) (Temporal)   Resp 12   Ht 1.702 m (5' 7\")   Wt 76.7 kg (169 lb)   SpO2 93%   BMI 26.47 kg/m²   Physical Exam  Vitals and nursing note reviewed.   Constitutional:       General: She is not in acute distress.     Appearance: Normal appearance. She is well-developed. She is not ill-appearing or toxic-appearing.   HENT:      Head: Normocephalic and atraumatic.      Right Ear: Hearing normal.      Left Ear: Hearing normal.   Cardiovascular:      Rate and Rhythm: Normal rate.   Pulmonary:      Effort: Pulmonary effort is normal.   Musculoskeletal:        Hands:       Comments: Patient has full range of motion of the digits of the right hand.  Neurovascular intact distally.  5 of 5  strength.  Tenderness to palpation of the dorsum of the hand and into the wrist.  Swelling and erythema to the hand as well.  No open wound sites or drainage or discharge.  No induration or fluctuance.  Patient has full range of motion of the right wrist and elbow as well.   Skin:     General: Skin is warm and dry.   Neurological:      Mental Status: She is alert.      Coordination: Coordination normal.   Psychiatric:         Mood and Affect: Mood normal.     DX HAND  FINDINGS:     MINERALIZATION: Mineralization is unremarkable for age.     INJURY: Small osseous fragments adjacent to the ring and small finger middle phalanges.     JOINTS: No erosive arthropathy is evident.  Degenerative changes of the basal joints of the thumb.           IMPRESSION:     1.  Small osseous fragments adjacent to the ring and small finger middle phalanges, likely sequela of remote injury. Correlate with point tenderness for acute injury.  2.  Degenerative changes of the basal " joints of the thumb.      Assessment/Plan:   Assessment    1. Right hand pain  - DX-HAND 3+ RIGHT; Future    2. Hand strain, right, initial encounter    Was able to remove ring from patient's ring finger of the right hand.  No concerning findings on x-ray today regarding area of pain to the dorsum of the right hand.  Believe is more consistent with overuse/strain of the right hand and would recommend removal brace today and patient is agreeable to this plan.  Would recommend continue with rest, ice and elevation as well.  Try to rest as much as she can over the next 2 days.    Differential diagnosis, natural history, supportive care, and indications for immediate follow-up discussed.   Patient given instructions and understanding of medications and treatment.    If not improving in 3-5 days, F/U with PCP or return to  if symptoms worsen.    Patient agreeable to plan.      Please note that this dictation was created using voice recognition software. I have made every reasonable attempt to correct obvious errors, but I expect that there are errors of grammar and possibly content that I did not discover before finalizing the note.    Pilo Montano PA-C

## 2023-08-22 ENCOUNTER — OUTPATIENT INFUSION SERVICES (OUTPATIENT)
Dept: ONCOLOGY | Facility: MEDICAL CENTER | Age: 83
End: 2023-08-22
Attending: INTERNAL MEDICINE
Payer: MEDICARE

## 2023-08-22 VITALS
DIASTOLIC BLOOD PRESSURE: 57 MMHG | HEART RATE: 87 BPM | RESPIRATION RATE: 18 BRPM | SYSTOLIC BLOOD PRESSURE: 117 MMHG | TEMPERATURE: 97.6 F | HEIGHT: 68 IN | WEIGHT: 173.5 LBS | OXYGEN SATURATION: 98 % | BODY MASS INDEX: 26.3 KG/M2

## 2023-08-22 DIAGNOSIS — C80.1 ANEMIA ASSOCIATED WITH MALIGNANT NEOPLASTIC DISEASE (HCC): ICD-10-CM

## 2023-08-22 DIAGNOSIS — D75.81 MYELOFIBROSIS (HCC): ICD-10-CM

## 2023-08-22 DIAGNOSIS — D63.0 ANEMIA ASSOCIATED WITH MALIGNANT NEOPLASTIC DISEASE (HCC): ICD-10-CM

## 2023-08-22 LAB
HCT VFR BLD AUTO: 30 % (ref 37–47)
HGB BLD-MCNC: 9.2 G/DL (ref 12–16)

## 2023-08-22 PROCEDURE — 85014 HEMATOCRIT: CPT

## 2023-08-22 PROCEDURE — 36415 COLL VENOUS BLD VENIPUNCTURE: CPT

## 2023-08-22 PROCEDURE — 700111 HCHG RX REV CODE 636 W/ 250 OVERRIDE (IP): Mod: JZ,JG | Performed by: INTERNAL MEDICINE

## 2023-08-22 PROCEDURE — 85018 HEMOGLOBIN: CPT

## 2023-08-22 RX ADMIN — EPOETIN ALFA-EPBX 40000 UNITS: 40000 INJECTION, SOLUTION INTRAVENOUS; SUBCUTANEOUS at 14:20

## 2023-08-22 ASSESSMENT — FIBROSIS 4 INDEX: FIB4 SCORE: 0.73

## 2023-08-22 NOTE — PROGRESS NOTES
Dedra arrives ambulatory to [facility] for her weekly Retacrit injection. Labs drawn via venupuncture to left AC and site covered with gauze and coban. Labs reviewed.   Hgb 9.2 Hct 30  Dedra is within parameters for therapy. Retacrit injected into back left arm and site covered with a bandaid. Future appointments verified.

## 2023-08-29 ENCOUNTER — OUTPATIENT INFUSION SERVICES (OUTPATIENT)
Dept: ONCOLOGY | Facility: MEDICAL CENTER | Age: 83
End: 2023-08-29
Attending: INTERNAL MEDICINE
Payer: MEDICARE

## 2023-08-29 VITALS
TEMPERATURE: 97.6 F | HEIGHT: 68 IN | DIASTOLIC BLOOD PRESSURE: 60 MMHG | BODY MASS INDEX: 25.96 KG/M2 | WEIGHT: 171.3 LBS | SYSTOLIC BLOOD PRESSURE: 132 MMHG | OXYGEN SATURATION: 94 % | HEART RATE: 93 BPM | RESPIRATION RATE: 18 BRPM

## 2023-08-29 DIAGNOSIS — D63.0 ANEMIA ASSOCIATED WITH MALIGNANT NEOPLASTIC DISEASE (HCC): ICD-10-CM

## 2023-08-29 DIAGNOSIS — D75.81 MYELOFIBROSIS (HCC): ICD-10-CM

## 2023-08-29 DIAGNOSIS — C80.1 ANEMIA ASSOCIATED WITH MALIGNANT NEOPLASTIC DISEASE (HCC): ICD-10-CM

## 2023-08-29 LAB
HCT VFR BLD AUTO: 30.7 % (ref 37–47)
HGB BLD-MCNC: 9.5 G/DL (ref 12–16)

## 2023-08-29 PROCEDURE — 85014 HEMATOCRIT: CPT

## 2023-08-29 PROCEDURE — 36415 COLL VENOUS BLD VENIPUNCTURE: CPT

## 2023-08-29 PROCEDURE — 700111 HCHG RX REV CODE 636 W/ 250 OVERRIDE (IP): Mod: JZ,JG | Performed by: INTERNAL MEDICINE

## 2023-08-29 PROCEDURE — 96372 THER/PROPH/DIAG INJ SC/IM: CPT

## 2023-08-29 PROCEDURE — 85018 HEMOGLOBIN: CPT

## 2023-08-29 RX ADMIN — ERYTHROPOIETIN 40000 UNITS: 40000 INJECTION, SOLUTION INTRAVENOUS; SUBCUTANEOUS at 15:07

## 2023-08-29 ASSESSMENT — FIBROSIS 4 INDEX: FIB4 SCORE: 0.73

## 2023-08-29 NOTE — PROGRESS NOTES
Dedra arrives to \[facility]\"" for her weekly retacrit injection. H/H drawn via butterfly to left AC. Site covered with gauze and coban.  Hgb 9.5 and Hct 30.7  Dedra is within parameters for treatment today. Retacrit injected into back left arm and site covered with a bandaid. Future appointments verified.

## 2023-09-05 ENCOUNTER — OUTPATIENT INFUSION SERVICES (OUTPATIENT)
Dept: ONCOLOGY | Facility: MEDICAL CENTER | Age: 83
End: 2023-09-05
Attending: INTERNAL MEDICINE
Payer: MEDICARE

## 2023-09-05 VITALS
RESPIRATION RATE: 18 BRPM | DIASTOLIC BLOOD PRESSURE: 61 MMHG | OXYGEN SATURATION: 98 % | BODY MASS INDEX: 25.59 KG/M2 | SYSTOLIC BLOOD PRESSURE: 142 MMHG | WEIGHT: 168.87 LBS | HEIGHT: 68 IN | TEMPERATURE: 97.8 F | HEART RATE: 105 BPM

## 2023-09-05 DIAGNOSIS — D63.0 ANEMIA ASSOCIATED WITH MALIGNANT NEOPLASTIC DISEASE (HCC): ICD-10-CM

## 2023-09-05 DIAGNOSIS — D75.81 MYELOFIBROSIS (HCC): ICD-10-CM

## 2023-09-05 DIAGNOSIS — C80.1 ANEMIA ASSOCIATED WITH MALIGNANT NEOPLASTIC DISEASE (HCC): ICD-10-CM

## 2023-09-05 LAB
HCT VFR BLD AUTO: 32.1 % (ref 37–47)
HGB BLD-MCNC: 9.8 G/DL (ref 12–16)

## 2023-09-05 PROCEDURE — 85014 HEMATOCRIT: CPT

## 2023-09-05 PROCEDURE — 700111 HCHG RX REV CODE 636 W/ 250 OVERRIDE (IP): Mod: JZ,JG | Performed by: INTERNAL MEDICINE

## 2023-09-05 PROCEDURE — 85018 HEMOGLOBIN: CPT

## 2023-09-05 PROCEDURE — 96372 THER/PROPH/DIAG INJ SC/IM: CPT

## 2023-09-05 PROCEDURE — 36415 COLL VENOUS BLD VENIPUNCTURE: CPT

## 2023-09-05 RX ORDER — PANTOPRAZOLE SODIUM 20 MG/1
20 TABLET, DELAYED RELEASE ORAL EVERY MORNING
COMMUNITY
Start: 2023-08-31 | End: 2023-09-25

## 2023-09-05 RX ADMIN — ERYTHROPOIETIN 40000 UNITS: 40000 INJECTION, SOLUTION INTRAVENOUS; SUBCUTANEOUS at 15:01

## 2023-09-05 ASSESSMENT — FIBROSIS 4 INDEX: FIB4 SCORE: 0.73

## 2023-09-06 NOTE — PROGRESS NOTES
Dedra presents to [facility] for labs/ possible Retacrit injection.  H&H collected via 23g butterfly to left AC, gauze and coban to site.  Hgb 9.8, parameters met for Retacrit.  Retacrit injection given to back right arm, adhesive dressing applied. Dedra tolerated well.  Discharged in NAD, next appointment confirmed.

## 2023-09-07 ENCOUNTER — TELEPHONE (OUTPATIENT)
Dept: CARDIOLOGY | Facility: MEDICAL CENTER | Age: 83
End: 2023-09-07
Payer: MEDICARE

## 2023-09-07 NOTE — LETTER
PROCEDURE/SURGERY CLEARANCE FORM    Date: 9/7/2023   Patient Name: Dedra Lobo    Dear Surgeon or Proceduralist,      Thank you for your request for cardiac stratification of our mutual patient Dedra Lobo 1940. We have reviewed their [facility] records; and to the best of our understanding this patient has not had stenting, ablation, cardiothoracic surgery or hospitalization for cardiovascular reasons in the past 6 months.  Dedra Lobo has been seen within the past 18 months and is considered to have non-modifiable cardiac risk for this low-risk procedure/surgery (Colonoscopy). They may proceed from a cardiovascular standpoint and may hold their antiplatelet/anticoagulation as briefly as possible. Please have patient resume this medication when hemodynamically stable to do so.     Aspirin or Prasugrel   - hold 7 days prior to procedure/surgery, resume when hemodynamically stable      Clopidrogrel or Ticagrelor  - hold 7 days for all neurological procedures, hold 5 days prior to all other procedure/surgery,  resume when hemodynamically stable     Warfarin - hold 7 days for all neurological procedures, hold 5 days prior to all other procedure/surgery and coordinate with [facility] Anticoagulation Clinic ([phone]) INR testing and dose management.      Pradaxa/Xarelto/Eliquis/Savesya - hold 1 day prior to procedure for low bleeding risk procedure, 2 days for high bleeding risk procedure, or consider holding 3 days or longer for patients with reduced kidney function (CrCl <30mL/min) or spinal/cranial surgeries/procedures.      If they have a mechanical heart valve, please coordinate with [facility] Anticoagulation Service ([phone]) the proper management of their anticoagulant in the periprocedural or perioperative period.      Some patients have higher risk for cardiovascular complications or holding medication. If our patient has had prior complications of holding antiplatelet or  anticoagulants in the past and we have seen them after these events, we have addressed these concerns with the patient. They are at an unknown degree of increased risk for recurrent complication.  You may hold anticoagulation/antiplatelets for the procedure or surgery if the benefits of the procedure or surgery outweigh this nonmodifiable risk.      If Dedra Lobo 1940 has new symptoms of heart failure decompensation, unstable arrythmia, or angina please reach out and we will assess the patient.      If you have other patient-specific concerns, please feel free to reach out to the patient's cardiologist directly at [phone].     Thank you,       [facility]

## 2023-09-07 NOTE — TELEPHONE ENCOUNTER
"Last OV: 6/7/2023  Proposed Surgery: Colonoscopy  Surgery Date: 10/5/2023  Requesting Office Name: [facility] rufino Yanes  Fax Number: [phone]  Preference of Location (default is surgery center unless specified by Cardiologist or [facility])  Prior Clearance Addressed: No      Anticoags/Antiplatelets: Other None  Outstanding Cardiac Imaging : No  Stent, Cardiac Devices, or Catheterization: No  Ablation, TAVR/Valve (including open heart), Cardioversion: No  Recent Cardiac Hospitalization: No            When: Greater than 6 months since hospitalization.   History (cardiac history):   Past Medical History:   Diagnosis Date    Adverse effect of anesthesia     core body temp drops per hx.    Anemia     Anesthesia     \"core temperature drops\"    Arrhythmia     Arthritis     oa, neck and hips    Cancer (HCC) 2015    bladder    Cardiomegaly 10/6/2022    Incidental on CXR. Also has lower extremity pitting edema. Will obtain TTE    Cataract 03/21/2019    bilateral no surgery    GERD (gastroesophageal reflux disease)     Glaucoma 2/2015    \"beginning\"    Osteoporosis, unspecified     Pain     neck    Pneumonia 10/2001    Systolic murmur 10/6/2022    Thrombocytosis 9/29/2010    Ulcer     gastric ulcers    Unspecified disorder of thyroid     LOW THYROID LEVELS- TOOK  MEDS    Urinary bladder disorder 2015    bladder cancer    Urinary incontinence 03/21/2019    Vascular insufficiency of limb 2005    right lower extrematy    Vitamin d deficiency 9/29/2010             Surgical Clearance Letter Sent: YES   **Scan clearance request letter into [employer].**    "

## 2023-09-12 ENCOUNTER — OUTPATIENT INFUSION SERVICES (OUTPATIENT)
Dept: ONCOLOGY | Facility: MEDICAL CENTER | Age: 83
End: 2023-09-12
Attending: INTERNAL MEDICINE
Payer: MEDICARE

## 2023-09-12 VITALS
RESPIRATION RATE: 18 BRPM | OXYGEN SATURATION: 96 % | TEMPERATURE: 97.3 F | SYSTOLIC BLOOD PRESSURE: 120 MMHG | WEIGHT: 170.64 LBS | DIASTOLIC BLOOD PRESSURE: 60 MMHG | HEIGHT: 68 IN | BODY MASS INDEX: 25.86 KG/M2 | HEART RATE: 89 BPM

## 2023-09-12 DIAGNOSIS — D63.0 ANEMIA ASSOCIATED WITH MALIGNANT NEOPLASTIC DISEASE (HCC): ICD-10-CM

## 2023-09-12 DIAGNOSIS — C80.1 ANEMIA ASSOCIATED WITH MALIGNANT NEOPLASTIC DISEASE (HCC): ICD-10-CM

## 2023-09-12 DIAGNOSIS — D75.81 MYELOFIBROSIS (HCC): ICD-10-CM

## 2023-09-12 LAB
HCT VFR BLD AUTO: 29.8 % (ref 37–47)
HGB BLD-MCNC: 9.4 G/DL (ref 12–16)

## 2023-09-12 PROCEDURE — 36415 COLL VENOUS BLD VENIPUNCTURE: CPT

## 2023-09-12 PROCEDURE — 85018 HEMOGLOBIN: CPT

## 2023-09-12 PROCEDURE — 700111 HCHG RX REV CODE 636 W/ 250 OVERRIDE (IP): Mod: JZ,JG | Performed by: INTERNAL MEDICINE

## 2023-09-12 PROCEDURE — 96372 THER/PROPH/DIAG INJ SC/IM: CPT

## 2023-09-12 PROCEDURE — 85014 HEMATOCRIT: CPT

## 2023-09-12 RX ADMIN — ERYTHROPOIETIN 40000 UNITS: 40000 INJECTION, SOLUTION INTRAVENOUS; SUBCUTANEOUS at 14:24

## 2023-09-12 ASSESSMENT — FIBROSIS 4 INDEX: FIB4 SCORE: 0.73

## 2023-09-12 ASSESSMENT — PAIN DESCRIPTION - PAIN TYPE: TYPE: ACUTE PAIN

## 2023-09-12 NOTE — PROGRESS NOTES
Pt presents to [facility] for epoetin. Butterfly needle used in RAC; brisk blood return observed and pt tolerated well. Labs drawn and within treatable parameters. Epoetin injected into L back arm with no s/s of adverse reactions. Next appointment confirmed and education provided. Pt discharged to self care with all personal belongings and in NAD.

## 2023-09-19 ENCOUNTER — OUTPATIENT INFUSION SERVICES (OUTPATIENT)
Dept: ONCOLOGY | Facility: MEDICAL CENTER | Age: 83
End: 2023-09-19
Attending: INTERNAL MEDICINE
Payer: MEDICARE

## 2023-09-19 VITALS
HEIGHT: 68 IN | OXYGEN SATURATION: 96 % | SYSTOLIC BLOOD PRESSURE: 126 MMHG | RESPIRATION RATE: 16 BRPM | TEMPERATURE: 96.6 F | BODY MASS INDEX: 25.69 KG/M2 | DIASTOLIC BLOOD PRESSURE: 63 MMHG | HEART RATE: 86 BPM | WEIGHT: 169.53 LBS

## 2023-09-19 DIAGNOSIS — A04.8 BACTERIAL INFECTION DUE TO H. PYLORI: ICD-10-CM

## 2023-09-19 DIAGNOSIS — R79.82 ELEVATED C-REACTIVE PROTEIN (CRP): ICD-10-CM

## 2023-09-19 DIAGNOSIS — D63.0 ANEMIA ASSOCIATED WITH MALIGNANT NEOPLASTIC DISEASE (HCC): ICD-10-CM

## 2023-09-19 DIAGNOSIS — D75.81 MYELOFIBROSIS (HCC): ICD-10-CM

## 2023-09-19 DIAGNOSIS — C80.1 ANEMIA ASSOCIATED WITH MALIGNANT NEOPLASTIC DISEASE (HCC): ICD-10-CM

## 2023-09-19 LAB
ALBUMIN SERPL BCP-MCNC: 4 G/DL (ref 3.2–4.9)
ALBUMIN/GLOB SERPL: 2.1 G/DL
ALP SERPL-CCNC: 79 U/L (ref 30–99)
ALT SERPL-CCNC: 11 U/L (ref 2–50)
ANION GAP SERPL CALC-SCNC: 9 MMOL/L (ref 7–16)
ANISOCYTOSIS BLD QL SMEAR: ABNORMAL
AST SERPL-CCNC: 13 U/L (ref 12–45)
BASOPHILS # BLD AUTO: 0 % (ref 0–1.8)
BASOPHILS # BLD: 0 K/UL (ref 0–0.12)
BILIRUB SERPL-MCNC: 0.8 MG/DL (ref 0.1–1.5)
BUN SERPL-MCNC: 13 MG/DL (ref 8–22)
CALCIUM ALBUM COR SERPL-MCNC: 9.2 MG/DL (ref 8.5–10.5)
CALCIUM SERPL-MCNC: 9.2 MG/DL (ref 8.5–10.5)
CHLORIDE SERPL-SCNC: 108 MMOL/L (ref 96–112)
CO2 SERPL-SCNC: 25 MMOL/L (ref 20–33)
COMMENT NL1176: NORMAL
CREAT SERPL-MCNC: 0.66 MG/DL (ref 0.5–1.4)
DACRYOCYTES BLD QL SMEAR: NORMAL
EOSINOPHIL # BLD AUTO: 0.05 K/UL (ref 0–0.51)
EOSINOPHIL NFR BLD: 0.9 % (ref 0–6.9)
ERYTHROCYTE [DISTWIDTH] IN BLOOD BY AUTOMATED COUNT: 81.3 FL (ref 35.9–50)
GFR SERPLBLD CREATININE-BSD FMLA CKD-EPI: 88 ML/MIN/1.73 M 2
GLOBULIN SER CALC-MCNC: 1.9 G/DL (ref 1.9–3.5)
GLUCOSE SERPL-MCNC: 92 MG/DL (ref 65–99)
HCT VFR BLD AUTO: 30.8 % (ref 37–47)
HGB BLD-MCNC: 9.7 G/DL (ref 12–16)
HYPOCHROMIA BLD QL SMEAR: ABNORMAL
LG PLATELETS BLD QL SMEAR: NORMAL
LYMPHOCYTES # BLD AUTO: 0.56 K/UL (ref 1–4.8)
LYMPHOCYTES NFR BLD: 11.2 % (ref 22–41)
MACROCYTES BLD QL SMEAR: ABNORMAL
MANUAL DIFF BLD: NORMAL
MCH RBC QN AUTO: 31.1 PG (ref 27–33)
MCHC RBC AUTO-ENTMCNC: 31.5 G/DL (ref 32.2–35.5)
MCV RBC AUTO: 98.7 FL (ref 81.4–97.8)
MICROCYTES BLD QL SMEAR: ABNORMAL
MONOCYTES # BLD AUTO: 0.48 K/UL (ref 0–0.85)
MONOCYTES NFR BLD AUTO: 9.5 % (ref 0–13.4)
MORPHOLOGY BLD-IMP: NORMAL
NEUTROPHILS # BLD AUTO: 3.92 K/UL (ref 1.82–7.42)
NEUTROPHILS NFR BLD: 72.4 % (ref 44–72)
NEUTS BAND NFR BLD MANUAL: 6 % (ref 0–10)
NRBC # BLD AUTO: 0.02 K/UL
NRBC BLD-RTO: 0.4 /100 WBC (ref 0–0.2)
OVALOCYTES BLD QL SMEAR: NORMAL
PLATELET # BLD AUTO: 473 K/UL (ref 164–446)
PLATELET BLD QL SMEAR: NORMAL
PMV BLD AUTO: 12.9 FL (ref 9–12.9)
POIKILOCYTOSIS BLD QL SMEAR: NORMAL
POTASSIUM SERPL-SCNC: 4.3 MMOL/L (ref 3.6–5.5)
PROT SERPL-MCNC: 5.9 G/DL (ref 6–8.2)
RBC # BLD AUTO: 3.12 M/UL (ref 4.2–5.4)
RBC BLD AUTO: PRESENT
SCHISTOCYTES BLD QL SMEAR: NORMAL
SODIUM SERPL-SCNC: 142 MMOL/L (ref 135–145)
WBC # BLD AUTO: 5 K/UL (ref 4.8–10.8)
WBC TOXIC VACUOLES BLD QL SMEAR: NORMAL

## 2023-09-19 PROCEDURE — 36415 COLL VENOUS BLD VENIPUNCTURE: CPT

## 2023-09-19 PROCEDURE — 85007 BL SMEAR W/DIFF WBC COUNT: CPT

## 2023-09-19 PROCEDURE — 85025 COMPLETE CBC W/AUTO DIFF WBC: CPT

## 2023-09-19 PROCEDURE — 80053 COMPREHEN METABOLIC PANEL: CPT

## 2023-09-19 PROCEDURE — 700111 HCHG RX REV CODE 636 W/ 250 OVERRIDE (IP): Mod: JZ,JG | Performed by: INTERNAL MEDICINE

## 2023-09-19 PROCEDURE — 96372 THER/PROPH/DIAG INJ SC/IM: CPT

## 2023-09-19 RX ADMIN — ERYTHROPOIETIN 40000 UNITS: 40000 INJECTION, SOLUTION INTRAVENOUS; SUBCUTANEOUS at 15:07

## 2023-09-19 ASSESSMENT — FIBROSIS 4 INDEX: FIB4 SCORE: 0.73

## 2023-09-19 NOTE — PROGRESS NOTES
Pt arrives to [facility] for Retacrit injection.  Pt requests labs from Dr. Jimmie Renteria (Infectious disease) be drawn today per written order.  Labs drawn via 25g butterfly needle to R-AC.  Hemoglobin is 9.7 today.  Results meet MD parameters for Retacrit.  Retacrit not available per pharmacy.  Procrit substituted for Retacrit today.  Procrit given SC to back of [facility].  Confirmed next appt time on 9/26/23 with pt.  Pt dc home to self care.

## 2023-09-21 ENCOUNTER — HOSPITAL ENCOUNTER (OUTPATIENT)
Facility: MEDICAL CENTER | Age: 83
End: 2023-09-21
Attending: UROLOGY
Payer: MEDICARE

## 2023-09-21 LAB
APPEARANCE UR: CLEAR
BILIRUB UR QL STRIP.AUTO: NEGATIVE
COLOR UR: YELLOW
GLUCOSE UR STRIP.AUTO-MCNC: NEGATIVE MG/DL
KETONES UR STRIP.AUTO-MCNC: NEGATIVE MG/DL
LEUKOCYTE ESTERASE UR QL STRIP.AUTO: NEGATIVE
MICRO URNS: NORMAL
NITRITE UR QL STRIP.AUTO: NEGATIVE
PH UR STRIP.AUTO: 6 [PH] (ref 5–8)
PROT UR QL STRIP: NEGATIVE MG/DL
RBC UR QL AUTO: NEGATIVE
SP GR UR STRIP.AUTO: 1.01
UROBILINOGEN UR STRIP.AUTO-MCNC: 0.2 MG/DL

## 2023-09-21 PROCEDURE — 81003 URINALYSIS AUTO W/O SCOPE: CPT

## 2023-09-22 ENCOUNTER — TELEPHONE (OUTPATIENT)
Dept: CARDIOLOGY | Facility: MEDICAL CENTER | Age: 83
End: 2023-09-22
Payer: MEDICARE

## 2023-09-22 NOTE — TELEPHONE ENCOUNTER
"Prepped under ADD  Last OV: 06/07/2023   Proposed Surgery: Cystoscopy with bladder biopsy and fulguration with installation gemcitabine  Surgery Date: 9/27/23  Requesting Office Name: [facility]   Fax Number: [phone]  Preference of Location (default is surgery center unless specified by Cardiologist or [facility])  Prior Clearance Addressed: No      Anticoags/Antiplatelets: Other none  Outstanding Cardiac Imaging : No  Stent, Cardiac Devices, or Catheterization: No  Ablation, TAVR/Valve (including open heart), Cardioversion: No  Recent Cardiac Hospitalization: No            When: N/A  History (cardiac history):   Past Medical History:   Diagnosis Date    Adverse effect of anesthesia     core body temp drops per hx.    Anemia     Anesthesia     \"core temperature drops\"    Arrhythmia     Arthritis     oa, neck and hips    Cancer (HCC) 2015    bladder    Cardiomegaly 10/6/2022    Incidental on CXR. Also has lower extremity pitting edema. Will obtain TTE    Cataract 03/21/2019    bilateral no surgery    GERD (gastroesophageal reflux disease)     Glaucoma 2/2015    \"beginning\"    Osteoporosis, unspecified     Pain     neck    Pneumonia 10/2001    Systolic murmur 10/6/2022    Thrombocytosis 9/29/2010    Ulcer     gastric ulcers    Unspecified disorder of thyroid     LOW THYROID LEVELS- TOOK  MEDS    Urinary bladder disorder 2015    bladder cancer    Urinary incontinence 03/21/2019    Vascular insufficiency of limb 2005    right lower extrematy    Vitamin d deficiency 9/29/2010             Surgical Clearance Letter Sent: YES   **Scan clearance request letter into [employer].**    "

## 2023-09-22 NOTE — LETTER
PROCEDURE/SURGERY CLEARANCE FORM      Encounter Date: 9/22/2023    Patient: Dedra Lobo  YOB: 1940    CARDIOLOGIST:  Tomas Jeffery M.D.    REFERRING DOCTOR:  No ref. provider found    The above patient is cleared to have the following procedure/surgery: Cystoscopy with bladder biopsy and fulguration with installation gemcitabine       Electronically signed         MD Signature   Tomas Jeffery M.D.    PROCEDURE/SURGERY CLEARANCE FORM    Date: 9/22/2023   Patient Name: Dedra Lobo    Dear Surgeon or Proceduralist,      Thank you for your request for cardiac stratification of our mutual patient Dedra Lobo 1940. We have reviewed their [facility] records; and to the best of our understanding this patient has not had stenting, ablation, cardiothoracic surgery or hospitalization for cardiovascular reasons in the past 6 months.  Dedra Lobo has been seen within the past 18 months and is considered to have non-modifiable cardiac risk for this low-risk procedure/surgery. They may proceed from a cardiovascular standpoint and may hold their antiplatelet/anticoagulation as briefly as possible. Please have patient resume this medication when hemodynamically stable to do so.     Aspirin or Prasugrel   - hold 7 days prior to procedure/surgery, resume when hemodynamically stable      Clopidrogrel or Ticagrelor  - hold 7 days for all neurological procedures, hold 5 days prior to all other procedure/surgery,  resume when hemodynamically stable     Warfarin - hold 7 days for all neurological procedures, hold 5 days prior to all other procedure/surgery and coordinate with [facility] Anticoagulation Clinic ([phone]) INR testing and dose management.      Pradaxa/Xarelto/Eliquis/Savesya - hold 1 day prior to procedure for low bleeding risk procedure, 2 days for high bleeding risk procedure, or consider holding 3 days or longer for patients with reduced kidney function (CrCl <30mL/min) or  spinal/cranial surgeries/procedures.      If they have a mechanical heart valve, please coordinate with [facility] Anticoagulation Service ([phone]) the proper management of their anticoagulant in the periprocedural or perioperative period.      Some patients have higher risk for cardiovascular complications or holding medication. If our patient has had prior complications of holding antiplatelet or anticoagulants in the past and we have seen them after these events, we have addressed these concerns with the patient. They are at an unknown degree of increased risk for recurrent complication.  You may hold anticoagulation/antiplatelets for the procedure or surgery if the benefits of the procedure or surgery outweigh this nonmodifiable risk.      If Dedra Lobo 1940 has new symptoms of heart failure decompensation, unstable arrythmia, or angina please reach out and we will assess the patient.      If you have other patient-specific concerns, please feel free to reach out to the patient's cardiologist directly at [phone].     Thank you,       [facility]

## 2023-09-22 NOTE — TELEPHONE ENCOUNTER
Caller: Caitie from [facility]    Office Name, phone number, fax number: [facility]  Phone: [phone]  Fax: [phone]    Clearance has been faxed over - see encounter from 09.22.23. Caitie was calling to follow up on this.     Procedure Name: Cystoscopy with bladder biopsy and fulguration with installation gemcitabine    Procedure Scheduled Date: 09.27.23    Callback Number: [phone]    Please advise.     Thank you,     Kavitha ISAAC

## 2023-09-25 ENCOUNTER — OFFICE VISIT (OUTPATIENT)
Dept: MEDICAL GROUP | Facility: PHYSICIAN GROUP | Age: 83
End: 2023-09-25
Payer: MEDICARE

## 2023-09-25 VITALS
DIASTOLIC BLOOD PRESSURE: 64 MMHG | SYSTOLIC BLOOD PRESSURE: 132 MMHG | TEMPERATURE: 97.6 F | OXYGEN SATURATION: 97 % | WEIGHT: 167 LBS | BODY MASS INDEX: 25.31 KG/M2 | RESPIRATION RATE: 14 BRPM | HEIGHT: 68 IN | HEART RATE: 92 BPM

## 2023-09-25 DIAGNOSIS — I50.32 CHRONIC HEART FAILURE WITH PRESERVED EJECTION FRACTION (HCC): ICD-10-CM

## 2023-09-25 DIAGNOSIS — B96.81 HELICOBACTER PYLORI GASTRITIS: ICD-10-CM

## 2023-09-25 DIAGNOSIS — D49.4 NEOPLASM OF BLADDER: ICD-10-CM

## 2023-09-25 DIAGNOSIS — K29.70 HELICOBACTER PYLORI GASTRITIS: ICD-10-CM

## 2023-09-25 PROCEDURE — 3078F DIAST BP <80 MM HG: CPT | Performed by: FAMILY MEDICINE

## 2023-09-25 PROCEDURE — 3075F SYST BP GE 130 - 139MM HG: CPT | Performed by: FAMILY MEDICINE

## 2023-09-25 PROCEDURE — 99213 OFFICE O/P EST LOW 20 MIN: CPT | Performed by: FAMILY MEDICINE

## 2023-09-25 ASSESSMENT — FIBROSIS 4 INDEX: FIB4 SCORE: .6712292723898204959

## 2023-09-25 NOTE — PROGRESS NOTES
"CHIEF COMPLAINT / REASON FOR VISIT  Dedra Lobo is a 83 y.o. female that presents today for general follow up    HISTORY OF PRESENT ILLNESS  Recent bladder cancer diagnosis, is going to have transurethral resection.    H pylori, recurrent - had to undergo repeat treatment (she is about to undergo 3rd round of antibiotics)    Still getting retacrit injections weekly    OBJECTIVE     /64 (BP Location: Right arm, Patient Position: Sitting, BP Cuff Size: Adult)   Pulse 92   Temp 36.4 °C (97.6 °F) (Temporal)   Resp 14   Ht 1.715 m (5' 7.5\")   Wt 75.8 kg (167 lb)   SpO2 97%   BMI 25.77 kg/m²      PHYSICAL EXAM  Constitutional: Sitting comfortably, in no acute distress, responds to questions appropriately.  Extremities: 1+ pitting lower extremity edema  Skin: Warm and dry, no rashes or lesions on face or exposed upper extremities    ASSESSMENT & PLAN  1. Helicobacter pylori gastritis  Recurrent H. pylori infection, following with gastroenterology for treatment, soon is going to undergo a third round of antibiotics    2. Neoplasm of bladder  Initial diagnosis 2015, with recent recurrence.  Following with urology for management, going to have a trans urethral resection of bladder tumor later this week.    3. Chronic heart failure with preserved ejection fraction (HCC)  Stable, following with cardiology for management.  Maintaining euvolemia with torsemide 20 mg daily.    Follow-up in 4 months for general follow-up visit  "

## 2023-09-26 ENCOUNTER — OUTPATIENT INFUSION SERVICES (OUTPATIENT)
Dept: ONCOLOGY | Facility: MEDICAL CENTER | Age: 83
End: 2023-09-26
Attending: INTERNAL MEDICINE
Payer: MEDICARE

## 2023-09-26 ENCOUNTER — HOSPITAL ENCOUNTER (OUTPATIENT)
Dept: CARDIOLOGY | Facility: MEDICAL CENTER | Age: 83
End: 2023-09-26
Attending: UROLOGY
Payer: MEDICARE

## 2023-09-26 VITALS
HEIGHT: 68 IN | BODY MASS INDEX: 25.73 KG/M2 | SYSTOLIC BLOOD PRESSURE: 120 MMHG | OXYGEN SATURATION: 98 % | WEIGHT: 169.75 LBS | DIASTOLIC BLOOD PRESSURE: 56 MMHG | TEMPERATURE: 98 F | HEART RATE: 85 BPM | RESPIRATION RATE: 18 BRPM

## 2023-09-26 DIAGNOSIS — D63.0 ANEMIA ASSOCIATED WITH MALIGNANT NEOPLASTIC DISEASE (HCC): ICD-10-CM

## 2023-09-26 DIAGNOSIS — D75.81 MYELOFIBROSIS (HCC): ICD-10-CM

## 2023-09-26 DIAGNOSIS — D49.4 NEOPLASM OF BLADDER: Primary | ICD-10-CM

## 2023-09-26 DIAGNOSIS — C80.1 ANEMIA ASSOCIATED WITH MALIGNANT NEOPLASTIC DISEASE (HCC): ICD-10-CM

## 2023-09-26 LAB
EKG IMPRESSION: NORMAL
HCT VFR BLD AUTO: 30.3 % (ref 37–47)
HGB BLD-MCNC: 9.4 G/DL (ref 12–16)

## 2023-09-26 PROCEDURE — 93010 ELECTROCARDIOGRAM REPORT: CPT | Performed by: INTERNAL MEDICINE

## 2023-09-26 PROCEDURE — 36415 COLL VENOUS BLD VENIPUNCTURE: CPT

## 2023-09-26 PROCEDURE — 85014 HEMATOCRIT: CPT

## 2023-09-26 PROCEDURE — 96372 THER/PROPH/DIAG INJ SC/IM: CPT

## 2023-09-26 PROCEDURE — 700111 HCHG RX REV CODE 636 W/ 250 OVERRIDE (IP): Mod: JZ,JG | Performed by: INTERNAL MEDICINE

## 2023-09-26 PROCEDURE — 93005 ELECTROCARDIOGRAM TRACING: CPT

## 2023-09-26 PROCEDURE — 85018 HEMOGLOBIN: CPT

## 2023-09-26 RX ADMIN — ERYTHROPOIETIN 40000 UNITS: 40000 INJECTION, SOLUTION INTRAVENOUS; SUBCUTANEOUS at 14:28

## 2023-09-26 ASSESSMENT — FIBROSIS 4 INDEX: FIB4 SCORE: .6712292723898204959

## 2023-09-26 NOTE — PROGRESS NOTES
Pt arrived ambulatory to  for weekly Retacrit. POC discussed and pt acknowledged understanding. Labs drawn from [facility] [place] with 23G butterfly needle, and pt tolerated well. Site covered with sterile gauze/coban and pt tolerated well. Hgb 9.4 and . Pt meets parameters for Retacrit. Injection administered per MAR and pt tolerated well. No s/s of reactions or complications noted. Band-aid applied to site. Pt confirmed her next appt and discharged ambulatory in [facility].

## 2023-10-03 ENCOUNTER — OUTPATIENT INFUSION SERVICES (OUTPATIENT)
Dept: ONCOLOGY | Facility: MEDICAL CENTER | Age: 83
End: 2023-10-03
Attending: INTERNAL MEDICINE
Payer: MEDICARE

## 2023-10-03 VITALS
HEART RATE: 78 BPM | TEMPERATURE: 98 F | WEIGHT: 170.42 LBS | OXYGEN SATURATION: 98 % | SYSTOLIC BLOOD PRESSURE: 123 MMHG | HEIGHT: 68 IN | RESPIRATION RATE: 18 BRPM | DIASTOLIC BLOOD PRESSURE: 60 MMHG | BODY MASS INDEX: 25.83 KG/M2

## 2023-10-03 DIAGNOSIS — D75.81 MYELOFIBROSIS (HCC): ICD-10-CM

## 2023-10-03 DIAGNOSIS — C80.1 ANEMIA ASSOCIATED WITH MALIGNANT NEOPLASTIC DISEASE (HCC): ICD-10-CM

## 2023-10-03 DIAGNOSIS — D63.0 ANEMIA ASSOCIATED WITH MALIGNANT NEOPLASTIC DISEASE (HCC): ICD-10-CM

## 2023-10-03 LAB
HCT VFR BLD AUTO: 30.6 % (ref 37–47)
HGB BLD-MCNC: 9.6 G/DL (ref 12–16)

## 2023-10-03 PROCEDURE — 700111 HCHG RX REV CODE 636 W/ 250 OVERRIDE (IP): Mod: JZ,JG | Performed by: INTERNAL MEDICINE

## 2023-10-03 PROCEDURE — 36415 COLL VENOUS BLD VENIPUNCTURE: CPT

## 2023-10-03 PROCEDURE — 96372 THER/PROPH/DIAG INJ SC/IM: CPT

## 2023-10-03 PROCEDURE — 85018 HEMOGLOBIN: CPT

## 2023-10-03 PROCEDURE — 85014 HEMATOCRIT: CPT

## 2023-10-03 RX ADMIN — EPOETIN ALFA-EPBX 40000 UNITS: 40000 INJECTION, SOLUTION INTRAVENOUS; SUBCUTANEOUS at 14:49

## 2023-10-03 ASSESSMENT — FIBROSIS 4 INDEX: FIB4 SCORE: .6712292723898204959

## 2023-10-03 NOTE — PROGRESS NOTES
Pt presents to [facility] for epoetin. Butterfly needle used in LAC; brisk blood return observed and pt tolerated well. Labs drawn and within treatable parameters. Epoetin injected into L back arm with no s/s of adverse reactions. Next appointment confirmed and education provided. Pt discharged to self care with all personal belongings and in NAD.

## 2023-10-10 ENCOUNTER — OUTPATIENT INFUSION SERVICES (OUTPATIENT)
Dept: ONCOLOGY | Facility: MEDICAL CENTER | Age: 83
End: 2023-10-10
Attending: INTERNAL MEDICINE
Payer: MEDICARE

## 2023-10-10 VITALS
BODY MASS INDEX: 24.89 KG/M2 | HEIGHT: 68 IN | DIASTOLIC BLOOD PRESSURE: 63 MMHG | OXYGEN SATURATION: 99 % | RESPIRATION RATE: 18 BRPM | WEIGHT: 164.24 LBS | TEMPERATURE: 97.2 F | SYSTOLIC BLOOD PRESSURE: 115 MMHG | HEART RATE: 92 BPM

## 2023-10-10 DIAGNOSIS — D75.81 MYELOFIBROSIS (HCC): ICD-10-CM

## 2023-10-10 DIAGNOSIS — C80.1 ANEMIA ASSOCIATED WITH MALIGNANT NEOPLASTIC DISEASE (HCC): ICD-10-CM

## 2023-10-10 DIAGNOSIS — D63.0 ANEMIA ASSOCIATED WITH MALIGNANT NEOPLASTIC DISEASE (HCC): ICD-10-CM

## 2023-10-10 LAB
HCT VFR BLD AUTO: 34.4 % (ref 37–47)
HGB BLD-MCNC: 11 G/DL (ref 12–16)

## 2023-10-10 PROCEDURE — 85014 HEMATOCRIT: CPT

## 2023-10-10 PROCEDURE — 36415 COLL VENOUS BLD VENIPUNCTURE: CPT

## 2023-10-10 PROCEDURE — 85018 HEMOGLOBIN: CPT

## 2023-10-10 ASSESSMENT — FIBROSIS 4 INDEX: FIB4 SCORE: .6712292723898204959

## 2023-10-10 NOTE — PROGRESS NOTES
Dedra came into infusion services today for retacrit injection. No complaints. Labs drawn from left hand, covered with gauze and coban. Hgb 11, Hct 34.4. Pt does not meet parameters for injection. Pt has future appointments. Discharged to self care.

## 2023-10-17 ENCOUNTER — OUTPATIENT INFUSION SERVICES (OUTPATIENT)
Dept: ONCOLOGY | Facility: MEDICAL CENTER | Age: 83
End: 2023-10-17
Attending: INTERNAL MEDICINE
Payer: MEDICARE

## 2023-10-17 VITALS
TEMPERATURE: 97.2 F | HEIGHT: 68 IN | WEIGHT: 166.45 LBS | OXYGEN SATURATION: 95 % | BODY MASS INDEX: 25.23 KG/M2 | SYSTOLIC BLOOD PRESSURE: 124 MMHG | HEART RATE: 98 BPM | RESPIRATION RATE: 16 BRPM | DIASTOLIC BLOOD PRESSURE: 63 MMHG

## 2023-10-17 DIAGNOSIS — D63.0 ANEMIA ASSOCIATED WITH MALIGNANT NEOPLASTIC DISEASE (HCC): ICD-10-CM

## 2023-10-17 DIAGNOSIS — D75.81 MYELOFIBROSIS (HCC): ICD-10-CM

## 2023-10-17 DIAGNOSIS — C80.1 ANEMIA ASSOCIATED WITH MALIGNANT NEOPLASTIC DISEASE (HCC): ICD-10-CM

## 2023-10-17 LAB
HCT VFR BLD AUTO: 36.1 % (ref 37–47)
HGB BLD-MCNC: 11.4 G/DL (ref 12–16)

## 2023-10-17 PROCEDURE — 36415 COLL VENOUS BLD VENIPUNCTURE: CPT

## 2023-10-17 PROCEDURE — 85014 HEMATOCRIT: CPT

## 2023-10-17 PROCEDURE — 85018 HEMOGLOBIN: CPT

## 2023-10-17 RX ORDER — METRONIDAZOLE 500 MG/1
500 TABLET ORAL EVERY 8 HOURS
COMMUNITY
End: 2023-11-07

## 2023-10-17 RX ORDER — OMEPRAZOLE 20 MG/1
20 CAPSULE, DELAYED RELEASE ORAL DAILY
COMMUNITY
End: 2023-10-31

## 2023-10-17 RX ORDER — TETRACYCLINE HYDROCHLORIDE 500 MG/1
500 CAPSULE ORAL 4 TIMES DAILY
COMMUNITY
End: 2023-11-07

## 2023-10-17 ASSESSMENT — FIBROSIS 4 INDEX: FIB4 SCORE: .6712292723898204959

## 2023-10-17 NOTE — PROGRESS NOTES
Pt came in for weekly Retacrit injection, denies c/o.  Pt recently updated all vaccines (flu, covid, pneumonia, and RSV) and feels good after those injections.  Labs drawn from LAC x 1 attempt. Hgb 11.4, no Retacrit needed today.  Pt Dc'd home without incident and will f/u next week as scheduled, email sent to scheduling to add more appointments for November.  Pt has upcoming appt with Dr. Foley and will review change in Hgb at that time.

## 2023-10-24 ENCOUNTER — OUTPATIENT INFUSION SERVICES (OUTPATIENT)
Dept: ONCOLOGY | Facility: MEDICAL CENTER | Age: 83
End: 2023-10-24
Attending: INTERNAL MEDICINE
Payer: MEDICARE

## 2023-10-24 VITALS
BODY MASS INDEX: 25.59 KG/M2 | SYSTOLIC BLOOD PRESSURE: 111 MMHG | WEIGHT: 168.87 LBS | TEMPERATURE: 98 F | HEIGHT: 68 IN | HEART RATE: 83 BPM | OXYGEN SATURATION: 91 % | DIASTOLIC BLOOD PRESSURE: 62 MMHG | RESPIRATION RATE: 18 BRPM

## 2023-10-24 DIAGNOSIS — D75.81 MYELOFIBROSIS (HCC): ICD-10-CM

## 2023-10-24 DIAGNOSIS — C80.1 ANEMIA ASSOCIATED WITH MALIGNANT NEOPLASTIC DISEASE (HCC): ICD-10-CM

## 2023-10-24 DIAGNOSIS — D63.0 ANEMIA ASSOCIATED WITH MALIGNANT NEOPLASTIC DISEASE (HCC): ICD-10-CM

## 2023-10-24 LAB
HCT VFR BLD AUTO: 33 % (ref 37–47)
HGB BLD-MCNC: 10.3 G/DL (ref 12–16)

## 2023-10-24 PROCEDURE — 85014 HEMATOCRIT: CPT

## 2023-10-24 PROCEDURE — 85018 HEMOGLOBIN: CPT

## 2023-10-24 PROCEDURE — 36415 COLL VENOUS BLD VENIPUNCTURE: CPT

## 2023-10-24 ASSESSMENT — FIBROSIS 4 INDEX: FIB4 SCORE: .6712292723898204959

## 2023-10-24 NOTE — PROGRESS NOTES
[facility]edra [facility] for possible retacrit injection. Labs drawn as ordered from [facility] with 23G butterfly needle, gauze and Coban dressing placed. Hemoglobin is 10.3 and does not meet  parameters for retacrit injection today. Pt has future appointments. Pt discharged to home in good condition.

## 2023-10-30 ENCOUNTER — HOSPITAL ENCOUNTER (OUTPATIENT)
Facility: MEDICAL CENTER | Age: 83
End: 2023-10-30
Attending: INTERNAL MEDICINE
Payer: MEDICARE

## 2023-10-31 ENCOUNTER — OUTPATIENT INFUSION SERVICES (OUTPATIENT)
Dept: ONCOLOGY | Facility: MEDICAL CENTER | Age: 83
End: 2023-10-31
Attending: INTERNAL MEDICINE
Payer: MEDICARE

## 2023-10-31 VITALS
HEIGHT: 68 IN | BODY MASS INDEX: 25.73 KG/M2 | DIASTOLIC BLOOD PRESSURE: 57 MMHG | HEART RATE: 90 BPM | WEIGHT: 169.75 LBS | RESPIRATION RATE: 18 BRPM | TEMPERATURE: 97 F | SYSTOLIC BLOOD PRESSURE: 109 MMHG | OXYGEN SATURATION: 98 %

## 2023-10-31 DIAGNOSIS — D63.0 ANEMIA ASSOCIATED WITH MALIGNANT NEOPLASTIC DISEASE (HCC): ICD-10-CM

## 2023-10-31 DIAGNOSIS — D75.81 MYELOFIBROSIS (HCC): ICD-10-CM

## 2023-10-31 DIAGNOSIS — C80.1 ANEMIA ASSOCIATED WITH MALIGNANT NEOPLASTIC DISEASE (HCC): ICD-10-CM

## 2023-10-31 LAB
HCT VFR BLD AUTO: 30.7 % (ref 37–47)
HGB BLD-MCNC: 9.7 G/DL (ref 12–16)

## 2023-10-31 PROCEDURE — 85014 HEMATOCRIT: CPT

## 2023-10-31 PROCEDURE — 96372 THER/PROPH/DIAG INJ SC/IM: CPT

## 2023-10-31 PROCEDURE — 700111 HCHG RX REV CODE 636 W/ 250 OVERRIDE (IP): Mod: JZ,JG | Performed by: INTERNAL MEDICINE

## 2023-10-31 PROCEDURE — 85018 HEMOGLOBIN: CPT

## 2023-10-31 PROCEDURE — 36415 COLL VENOUS BLD VENIPUNCTURE: CPT

## 2023-10-31 RX ADMIN — EPOETIN ALFA-EPBX 40000 UNITS: 40000 INJECTION, SOLUTION INTRAVENOUS; SUBCUTANEOUS at 14:22

## 2023-10-31 ASSESSMENT — FIBROSIS 4 INDEX: FIB4 SCORE: .6712292723898204959

## 2023-10-31 NOTE — PROGRESS NOTES
Pt arrives to [facility] for Retacrit injection.  Pt reports fatigue.  Labs drawn via 23g butterfly needle to L-AC.  Hemoglobin is 9.7 today.  Results meet MD parameters for Retacrit.  Retacrit not available per pharmacy.  Procrit substituted for Retacrit today.  Procrit given SC to back of LUE.  Confirmed next appt time on 11/07/23 with pt.  Pt dc home to self care.

## 2023-11-06 RX ORDER — 0.9 % SODIUM CHLORIDE 0.9 %
10 VIAL (ML) INJECTION PRN
Status: CANCELLED | OUTPATIENT
Start: 2023-11-07

## 2023-11-06 RX ORDER — 0.9 % SODIUM CHLORIDE 0.9 %
10 VIAL (ML) INJECTION PRN
Status: CANCELLED | OUTPATIENT
Start: 2023-11-14

## 2023-11-06 RX ORDER — 0.9 % SODIUM CHLORIDE 0.9 %
VIAL (ML) INJECTION PRN
Status: CANCELLED | OUTPATIENT
Start: 2023-11-14

## 2023-11-06 RX ORDER — HEPARIN SODIUM (PORCINE) LOCK FLUSH IV SOLN 100 UNIT/ML 100 UNIT/ML
500 SOLUTION INTRAVENOUS PRN
Status: CANCELLED | OUTPATIENT
Start: 2023-11-07

## 2023-11-06 RX ORDER — HEPARIN SODIUM (PORCINE) LOCK FLUSH IV SOLN 100 UNIT/ML 100 UNIT/ML
500 SOLUTION INTRAVENOUS PRN
Status: CANCELLED | OUTPATIENT
Start: 2023-11-14

## 2023-11-06 RX ORDER — 0.9 % SODIUM CHLORIDE 0.9 %
VIAL (ML) INJECTION PRN
Status: CANCELLED | OUTPATIENT
Start: 2023-11-07

## 2023-11-06 RX ORDER — 0.9 % SODIUM CHLORIDE 0.9 %
3 VIAL (ML) INJECTION PRN
Status: CANCELLED | OUTPATIENT
Start: 2023-11-14

## 2023-11-06 RX ORDER — 0.9 % SODIUM CHLORIDE 0.9 %
3 VIAL (ML) INJECTION PRN
Status: CANCELLED | OUTPATIENT
Start: 2023-11-07

## 2023-11-07 ENCOUNTER — OUTPATIENT INFUSION SERVICES (OUTPATIENT)
Dept: ONCOLOGY | Facility: MEDICAL CENTER | Age: 83
End: 2023-11-07
Attending: INTERNAL MEDICINE
Payer: MEDICARE

## 2023-11-07 VITALS
DIASTOLIC BLOOD PRESSURE: 64 MMHG | SYSTOLIC BLOOD PRESSURE: 135 MMHG | TEMPERATURE: 98 F | RESPIRATION RATE: 18 BRPM | BODY MASS INDEX: 25.16 KG/M2 | HEART RATE: 88 BPM | HEIGHT: 68 IN | WEIGHT: 166.01 LBS | OXYGEN SATURATION: 98 %

## 2023-11-07 DIAGNOSIS — D63.0 ANEMIA ASSOCIATED WITH MALIGNANT NEOPLASTIC DISEASE (HCC): ICD-10-CM

## 2023-11-07 DIAGNOSIS — C80.1 ANEMIA ASSOCIATED WITH MALIGNANT NEOPLASTIC DISEASE (HCC): ICD-10-CM

## 2023-11-07 DIAGNOSIS — D75.81 MYELOFIBROSIS (HCC): ICD-10-CM

## 2023-11-07 LAB
HCT VFR BLD AUTO: 33.2 % (ref 37–47)
HGB BLD-MCNC: 10.3 G/DL (ref 12–16)

## 2023-11-07 PROCEDURE — 85014 HEMATOCRIT: CPT

## 2023-11-07 PROCEDURE — 36415 COLL VENOUS BLD VENIPUNCTURE: CPT

## 2023-11-07 PROCEDURE — 85018 HEMOGLOBIN: CPT

## 2023-11-07 ASSESSMENT — FIBROSIS 4 INDEX: FIB4 SCORE: .6712292723898204959

## 2023-11-07 NOTE — PROGRESS NOTES
Pt arrived ambulatory to [facility] for weekly H/H possible Retacrit injection. POC discussed with pt and she agrees with plan.     H/H drawn as ordered. Results reviewed, Hgb 10.3 today. Pt does NOT meet parameters for Retacirt injection. Pt discharged to self care, [facility]. Pt's next appt confirmed 11/14/2023.

## 2023-11-09 ENCOUNTER — OFFICE VISIT (OUTPATIENT)
Dept: CARDIOLOGY | Facility: MEDICAL CENTER | Age: 83
End: 2023-11-09
Attending: NURSE PRACTITIONER
Payer: MEDICARE

## 2023-11-09 VITALS
RESPIRATION RATE: 16 BRPM | DIASTOLIC BLOOD PRESSURE: 82 MMHG | BODY MASS INDEX: 25.9 KG/M2 | WEIGHT: 165 LBS | SYSTOLIC BLOOD PRESSURE: 134 MMHG | HEART RATE: 103 BPM | OXYGEN SATURATION: 94 % | HEIGHT: 67 IN

## 2023-11-09 DIAGNOSIS — I50.32 CHRONIC HEART FAILURE WITH PRESERVED EJECTION FRACTION (HCC): ICD-10-CM

## 2023-11-09 PROCEDURE — 99214 OFFICE O/P EST MOD 30 MIN: CPT | Performed by: NURSE PRACTITIONER

## 2023-11-09 PROCEDURE — 3075F SYST BP GE 130 - 139MM HG: CPT | Performed by: NURSE PRACTITIONER

## 2023-11-09 PROCEDURE — 99212 OFFICE O/P EST SF 10 MIN: CPT | Performed by: NURSE PRACTITIONER

## 2023-11-09 PROCEDURE — 93005 ELECTROCARDIOGRAM TRACING: CPT | Performed by: NURSE PRACTITIONER

## 2023-11-09 PROCEDURE — 3079F DIAST BP 80-89 MM HG: CPT | Performed by: NURSE PRACTITIONER

## 2023-11-09 ASSESSMENT — ENCOUNTER SYMPTOMS
HALLUCINATIONS: 0
GASTROINTESTINAL NEGATIVE: 1
WHEEZING: 0
NERVOUS/ANXIOUS: 0
PALPITATIONS: 0
ORTHOPNEA: 0
MUSCULOSKELETAL NEGATIVE: 1
NAUSEA: 0
DIZZINESS: 0
BRUISES/BLEEDS EASILY: 0
NEUROLOGICAL NEGATIVE: 1
PND: 0
RESPIRATORY NEGATIVE: 1
CLAUDICATION: 0
EYES NEGATIVE: 1
CONSTITUTIONAL NEGATIVE: 1
DEPRESSION: 0
SHORTNESS OF BREATH: 0
SENSORY CHANGE: 0

## 2023-11-09 ASSESSMENT — FIBROSIS 4 INDEX: FIB4 SCORE: .6712292723898204959

## 2023-11-09 NOTE — PROGRESS NOTES
"Cardiology Follow up Note    DOS: 11/9/2023  [id number]  Dedra Lobo    Chief complaint/Reason for consult: annual visit    HPI: Pt is a 83 y.o. female who presents to the clinic today in follow up for annual visit. Patient has a past medical history significant for but not limited to: HFpEF, myelofibrosis, pulmonary hypertension, H pylori, tricuspid regurgitation. Patient doing well. Was seeing Dr. Magdaleno who no longer works outpatient. Most of her issues are non cardiac. Feeling good. Still uses torsemide as needed and no edema today. Can continue annual follow ups. Echocardiogram every 18 months to reassess valvular disease.       Past Medical History:   Diagnosis Date    Adverse effect of anesthesia     core body temp drops per hx.    Anemia     Anesthesia     \"core temperature drops\"    Arrhythmia     Arthritis     oa, neck and hips    Cancer (HCC) 2015    bladder    Cardiomegaly 10/6/2022    Incidental on CXR. Also has lower extremity pitting edema. Will obtain TTE    Cataract 03/21/2019    bilateral no surgery    GERD (gastroesophageal reflux disease)     Glaucoma 2/2015    \"beginning\"    Osteoporosis, unspecified     Pain     neck    Pneumonia 10/2001    Systolic murmur 10/6/2022    Thrombocytosis 9/29/2010    Ulcer     gastric ulcers    Unspecified disorder of thyroid     LOW THYROID LEVELS- TOOK  MEDS    Urinary bladder disorder 2015    bladder cancer    Urinary incontinence 03/21/2019    Vascular insufficiency of limb 2005    right lower extrematy    Vitamin d deficiency 9/29/2010       Past Surgical History:   Procedure Laterality Date    NY UPPER GI ENDOSCOPY,DIAGNOSIS N/A 10/1/2022    Procedure: GASTROSCOPY;  Surgeon: Ana Gaston D.O.;  Location: SURGERY [facility];  Service: Gastroenterology    NY UPPER GI ENDOSCOPY,SCLER INJECT N/A 10/1/2022    Procedure: GASTROSCOPY, WITH SCLEROTHERAPY;  Surgeon: Ana Gaston D.O.;  Location: SURGERY [facility];  Service: Gastroenterology    NY UPPER GI " ENDOSCOPY,CTRL BLEED N/A 10/1/2022    Procedure: EGD, WITH CLIP PLACEMENT;  Surgeon: Ana Gaston D.O.;  Location: SURGERY [facility];  Service: Gastroenterology    PB TOTAL KNEE ARTHROPLASTY Left 4/3/2019    Procedure: KNEE ARTHROPLASTY TOTAL;  Surgeon: Robert Vazquez M.D.;  Location: SURGERY [facility];  Service: Orthopedics    CERVICAL DISK AND FUSION ANTERIOR  2/9/2016    Procedure: CERVICAL DISK AND FUSION ANTERIOR  C3-7;  Surgeon: Dusty Rao M.D.;  Location: SURGERY [facility];  Service:     CYSTOSCOPY  3/3/2015    Performed by Walt Olson M.D. at SURGERY [facility]    TRANS URETHRAL RESECTION BLADDER  3/3/2015    Performed by Walt Olson M.D. at SURGERY [facility]    HIP REPLACEMENT, TOTAL  2006    Dr. Vazquez    CHOLECYSTECTOMY  2003    LUMPECTOMY  1982    cyst    ARTHROPLASTY      bi lateral-Dr. Vazquez       Social History     Socioeconomic History    Marital status: Single     Spouse name: Not on file    Number of children: Not on file    Years of education: Not on file    Highest education level: Not on file   Occupational History    Not on file   Tobacco Use    Smoking status: Never    Smokeless tobacco: Never   Vaping Use    Vaping Use: Never used   Substance and Sexual Activity    Alcohol use: No     Alcohol/week: 0.0 oz    Drug use: No    Sexual activity: Never   Other Topics Concern    Not on file   Social History Narrative    Not on file     Social Determinants of Health     Financial Resource Strain: Not on file   Food Insecurity: Not on file   Transportation Needs: Not on file   Physical Activity: Not on file   Stress: Not on file   Social Connections: Not on file   Intimate Partner Violence: Not on file   Housing Stability: Not on file       Family History   Problem Relation Age of Onset    Cancer Mother         brain    Lung Disease Mother         smoker    Alcohol/Drug Father         etoh    Lung Disease Sister         COPD smokers    Alcohol/Drug Sister          etoh    Lung Disease Sister         smoker-copd    Alcohol/Drug Sister         etoh       No Known Allergies    Current Outpatient Medications   Medication Sig Dispense Refill    torsemide (DEMADEX) 20 MG Tab Take 1 Tablet by mouth every day. 90 Tablet 3    vitamin D3 (CHOLECALCIFEROL) 1000 Unit (25 mcg) Tab Take 1,000 Units by mouth every day.      acetaminophen (TYLENOL) 500 MG Tab Take 500-1,000 mg by mouth 1 time a day as needed for Mild Pain.      Carboxymethylcellulose Sod PF 0.5 % Solution Administer 1 Drop into both eyes every evening.      anagrelide (AGRYLIN) 0.5 MG Cap Take 1 Capsule by mouth 2 times a day.       No current facility-administered medications for this visit.       Vitals:    11/09/23 1424   BP: 134/82   Pulse: (!) 103   Resp: 16   SpO2: 94%         Review of Systems   Constitutional: Negative.  Negative for malaise/fatigue.   HENT: Negative.     Eyes: Negative.    Respiratory: Negative.  Negative for shortness of breath and wheezing.    Cardiovascular:  Negative for chest pain, palpitations, orthopnea, claudication, leg swelling and PND.   Gastrointestinal: Negative.  Negative for nausea.   Genitourinary: Negative.    Musculoskeletal: Negative.    Skin: Negative.    Neurological: Negative.  Negative for dizziness and sensory change.   Endo/Heme/Allergies: Negative.  Does not bruise/bleed easily.   Psychiatric/Behavioral:  Negative for depression and hallucinations. The patient is not nervous/anxious.             EKG interpreted by me: Sinus rhythm     Physical Exam  Constitutional:       Appearance: Normal appearance.   HENT:      Head: Normocephalic.   Eyes:      Pupils: Pupils are equal, round, and reactive to light.   Neck:      Vascular: No JVD.   Cardiovascular:      Rate and Rhythm: Normal rate and regular rhythm.      Pulses: Normal pulses.      Heart sounds: Normal heart sounds.   Pulmonary:      Effort: Pulmonary effort is normal.      Breath sounds: Normal breath sounds.  "  Abdominal:      General: Abdomen is flat.      Palpations: Abdomen is soft.   Musculoskeletal:      Cervical back: Normal range of motion.      Right lower leg: No edema.      Left lower leg: No edema.   Skin:     General: Skin is warm and dry.   Neurological:      Mental Status: She is alert and oriented to person, place, and time.   Psychiatric:         Mood and Affect: Mood normal.         Behavior: Behavior normal.          Data:  Lipids:   Lab Results   Component Value Date/Time    CHOLSTRLTOT 172 01/16/2015 09:03 AM    TRIGLYCERIDE 153 (H) 01/16/2015 09:03 AM    HDL 52 01/16/2015 09:03 AM    LDL 89 01/16/2015 09:03 AM        BMP:  Lab Results   Component Value Date/Time    SODIUM 142 09/19/2023 1400    POTASSIUM 4.3 09/19/2023 1400    CHLORIDE 108 09/19/2023 1400    CO2 25 09/19/2023 1400    GLUCOSE 92 09/19/2023 1400    BUN 13 09/19/2023 1400    CREATININE 0.66 09/19/2023 1400    CALCIUM 9.2 09/19/2023 1400    ANION 9.0 09/19/2023 1400       GFR:  Lab Results   Component Value Date/Time    IFAFRICA >60 02/22/2022 1531    IFNOTAFR >60 02/22/2022 1531        TSH:   Lab Results   Component Value Date/Time    TSHULTRASEN 4.820 10/10/2022 1119       MAGNESIUM:  Lab Results   Component Value Date/Time    MAGNESIUM 2.2 01/31/2023 1350    MAGNESIUM 1.9 10/01/2022 0932    MAGNESIUM 1.9 09/30/2022 1957        THYROXINE (T4):   No results found for: \"FREEDIR\"     CBC:   Lab Results   Component Value Date/Time    WBC 5.0 09/19/2023 01:45 PM    RBC 3.12 (L) 09/19/2023 01:45 PM    HEMOGLOBIN 10.3 (L) 11/07/2023 01:46 PM    HEMATOCRIT 33.2 (L) 11/07/2023 01:46 PM    MCV 98.7 (H) 09/19/2023 01:45 PM    MCH 31.1 09/19/2023 01:45 PM    MCHC 31.5 (L) 09/19/2023 01:45 PM    RDW 81.3 (H) 09/19/2023 01:45 PM    PLATELETCT 473 (H) 09/19/2023 01:45 PM    MPV 12.9 09/19/2023 01:45 PM    NEUTSPOLYS 72.40 (H) 09/19/2023 01:45 PM    LYMPHOCYTES 11.20 (L) 09/19/2023 01:45 PM    MONOCYTES 9.50 09/19/2023 01:45 PM    EOSINOPHILS 0.90 " 09/19/2023 01:45 PM    BASOPHILS 0.00 09/19/2023 01:45 PM    IMMGRAN 4.60 (H) 03/22/2022 02:45 PM    NRBC 0.40 (H) 09/19/2023 01:45 PM    NEUTS 3.92 09/19/2023 01:45 PM    LYMPHS 0.56 (L) 09/19/2023 01:45 PM    MONOS 0.48 09/19/2023 01:45 PM    EOS 0.05 09/19/2023 01:45 PM    BASO 0.00 09/19/2023 01:45 PM    IMMGRANAB 0.19 (H) 03/22/2022 02:45 PM    NRBCAB 0.02 09/19/2023 01:45 PM        CBC w/o DIFF  Lab Results   Component Value Date/Time    WBC 5.0 09/19/2023 01:45 PM    RBC 3.12 (L) 09/19/2023 01:45 PM    HEMOGLOBIN 10.3 (L) 11/07/2023 01:46 PM    MCV 98.7 (H) 09/19/2023 01:45 PM    MCH 31.1 09/19/2023 01:45 PM    MCHC 31.5 (L) 09/19/2023 01:45 PM    RDW 81.3 (H) 09/19/2023 01:45 PM    MPV 12.9 09/19/2023 01:45 PM       LIVER:  Lab Results   Component Value Date/Time    ALKPHOSPHAT 79 09/19/2023 02:00 PM    ASTSGOT 13 09/19/2023 02:00 PM    ALTSGPT 11 09/19/2023 02:00 PM    TBILIRUBIN 0.8 09/19/2023 02:00 PM       BNP:  Lab Results   Component Value Date/Time    BNPBTYPENAT 52 01/30/2016 02:55 PM       PT/INR:  Lab Results   Component Value Date/Time    PROTHROMBTM 17.5 (H) 09/30/2022 07:57 PM    PROTHROMBTM 13.3 02/01/2016 12:24 PM    PROTHROMBTM 12.7 02/23/2015 02:46 PM    INR 1.47 (H) 09/30/2022 07:57 PM    INR 1.01 02/01/2016 12:24 PM    INR 0.96 02/23/2015 02:46 PM             Impression/Plan:  1. Chronic heart failure with preserved ejection fraction (HCC)  EKG         - blood pressure and heart rate well controlled and monitored for regularly for her myelofibrosis treatments   - continue torsemide as needed   - echo cardiogram in 18 months   - annual in office visit         A total of 24 minutes of time was spent on day of encounter reviewing medical record, performing history and examination, counseling, ordering medication/test/consults, collaborating with referring service, and documentation.    Waqas Morris AGACNP-EP  Cardiac Electrophysiology

## 2023-11-12 LAB — EKG IMPRESSION: NORMAL

## 2023-11-12 PROCEDURE — 93010 ELECTROCARDIOGRAM REPORT: CPT | Performed by: INTERNAL MEDICINE

## 2023-11-14 ENCOUNTER — OUTPATIENT INFUSION SERVICES (OUTPATIENT)
Dept: ONCOLOGY | Facility: MEDICAL CENTER | Age: 83
End: 2023-11-14
Attending: INTERNAL MEDICINE
Payer: MEDICARE

## 2023-11-14 VITALS
HEIGHT: 68 IN | SYSTOLIC BLOOD PRESSURE: 122 MMHG | DIASTOLIC BLOOD PRESSURE: 65 MMHG | HEART RATE: 100 BPM | OXYGEN SATURATION: 98 % | BODY MASS INDEX: 25.26 KG/M2 | RESPIRATION RATE: 18 BRPM | TEMPERATURE: 98 F | WEIGHT: 166.67 LBS

## 2023-11-14 DIAGNOSIS — C80.1 ANEMIA ASSOCIATED WITH MALIGNANT NEOPLASTIC DISEASE (HCC): ICD-10-CM

## 2023-11-14 DIAGNOSIS — D63.0 ANEMIA ASSOCIATED WITH MALIGNANT NEOPLASTIC DISEASE (HCC): ICD-10-CM

## 2023-11-14 DIAGNOSIS — D75.81 MYELOFIBROSIS (HCC): ICD-10-CM

## 2023-11-14 LAB
HCT VFR BLD AUTO: 31.6 % (ref 37–47)
HGB BLD-MCNC: 10.2 G/DL (ref 12–16)

## 2023-11-14 PROCEDURE — 85014 HEMATOCRIT: CPT

## 2023-11-14 PROCEDURE — 85018 HEMOGLOBIN: CPT

## 2023-11-14 PROCEDURE — 36415 COLL VENOUS BLD VENIPUNCTURE: CPT

## 2023-11-14 ASSESSMENT — FIBROSIS 4 INDEX: FIB4 SCORE: .6712292723898204959

## 2023-11-14 NOTE — PROGRESS NOTES
Dedra arrives ambulatory to [facility] for possible Retacrit injection. Labs drawn via butterfly to right AC and site covered with gauze and coban.  Hgb 10.2. No treatment indicated today. Next appointment verified.

## 2023-11-21 ENCOUNTER — OUTPATIENT INFUSION SERVICES (OUTPATIENT)
Dept: ONCOLOGY | Facility: MEDICAL CENTER | Age: 83
End: 2023-11-21
Attending: INTERNAL MEDICINE
Payer: MEDICARE

## 2023-11-21 VITALS
SYSTOLIC BLOOD PRESSURE: 133 MMHG | WEIGHT: 169.09 LBS | HEIGHT: 67 IN | DIASTOLIC BLOOD PRESSURE: 66 MMHG | RESPIRATION RATE: 17 BRPM | OXYGEN SATURATION: 94 % | TEMPERATURE: 97.2 F | BODY MASS INDEX: 26.54 KG/M2 | HEART RATE: 91 BPM

## 2023-11-21 DIAGNOSIS — D75.81 MYELOFIBROSIS (HCC): ICD-10-CM

## 2023-11-21 DIAGNOSIS — C80.1 ANEMIA ASSOCIATED WITH MALIGNANT NEOPLASTIC DISEASE (HCC): ICD-10-CM

## 2023-11-21 DIAGNOSIS — D63.0 ANEMIA ASSOCIATED WITH MALIGNANT NEOPLASTIC DISEASE (HCC): ICD-10-CM

## 2023-11-21 LAB
HCT VFR BLD AUTO: 30.8 % (ref 37–47)
HGB BLD-MCNC: 9.6 G/DL (ref 12–16)

## 2023-11-21 PROCEDURE — 85018 HEMOGLOBIN: CPT

## 2023-11-21 PROCEDURE — 96372 THER/PROPH/DIAG INJ SC/IM: CPT

## 2023-11-21 PROCEDURE — 700111 HCHG RX REV CODE 636 W/ 250 OVERRIDE (IP): Mod: JZ,JG | Performed by: INTERNAL MEDICINE

## 2023-11-21 PROCEDURE — 36415 COLL VENOUS BLD VENIPUNCTURE: CPT

## 2023-11-21 PROCEDURE — 85014 HEMATOCRIT: CPT

## 2023-11-21 RX ORDER — HEPARIN SODIUM (PORCINE) LOCK FLUSH IV SOLN 100 UNIT/ML 100 UNIT/ML
500 SOLUTION INTRAVENOUS PRN
Status: CANCELLED | OUTPATIENT
Start: 2023-12-12

## 2023-11-21 RX ORDER — 0.9 % SODIUM CHLORIDE 0.9 %
VIAL (ML) INJECTION PRN
Status: CANCELLED | OUTPATIENT
Start: 2023-12-12

## 2023-11-21 RX ORDER — 0.9 % SODIUM CHLORIDE 0.9 %
3 VIAL (ML) INJECTION PRN
Status: CANCELLED | OUTPATIENT
Start: 2023-11-21

## 2023-11-21 RX ORDER — 0.9 % SODIUM CHLORIDE 0.9 %
10 VIAL (ML) INJECTION PRN
Status: CANCELLED | OUTPATIENT
Start: 2023-11-28

## 2023-11-21 RX ORDER — 0.9 % SODIUM CHLORIDE 0.9 %
VIAL (ML) INJECTION PRN
Status: CANCELLED | OUTPATIENT
Start: 2023-11-21

## 2023-11-21 RX ORDER — 0.9 % SODIUM CHLORIDE 0.9 %
3 VIAL (ML) INJECTION PRN
Status: CANCELLED | OUTPATIENT
Start: 2023-11-28

## 2023-11-21 RX ORDER — HEPARIN SODIUM (PORCINE) LOCK FLUSH IV SOLN 100 UNIT/ML 100 UNIT/ML
500 SOLUTION INTRAVENOUS PRN
Status: CANCELLED | OUTPATIENT
Start: 2023-12-05

## 2023-11-21 RX ORDER — 0.9 % SODIUM CHLORIDE 0.9 %
10 VIAL (ML) INJECTION PRN
Status: CANCELLED | OUTPATIENT
Start: 2023-12-12

## 2023-11-21 RX ORDER — 0.9 % SODIUM CHLORIDE 0.9 %
10 VIAL (ML) INJECTION PRN
Status: CANCELLED | OUTPATIENT
Start: 2023-11-21

## 2023-11-21 RX ORDER — HEPARIN SODIUM (PORCINE) LOCK FLUSH IV SOLN 100 UNIT/ML 100 UNIT/ML
500 SOLUTION INTRAVENOUS PRN
Status: CANCELLED | OUTPATIENT
Start: 2023-11-21

## 2023-11-21 RX ORDER — 0.9 % SODIUM CHLORIDE 0.9 %
3 VIAL (ML) INJECTION PRN
Status: CANCELLED | OUTPATIENT
Start: 2023-12-05

## 2023-11-21 RX ORDER — 0.9 % SODIUM CHLORIDE 0.9 %
3 VIAL (ML) INJECTION PRN
Status: CANCELLED | OUTPATIENT
Start: 2023-12-12

## 2023-11-21 RX ORDER — 0.9 % SODIUM CHLORIDE 0.9 %
VIAL (ML) INJECTION PRN
Status: CANCELLED | OUTPATIENT
Start: 2023-12-05

## 2023-11-21 RX ORDER — HEPARIN SODIUM (PORCINE) LOCK FLUSH IV SOLN 100 UNIT/ML 100 UNIT/ML
500 SOLUTION INTRAVENOUS PRN
Status: CANCELLED | OUTPATIENT
Start: 2023-11-28

## 2023-11-21 RX ORDER — 0.9 % SODIUM CHLORIDE 0.9 %
10 VIAL (ML) INJECTION PRN
Status: CANCELLED | OUTPATIENT
Start: 2023-12-05

## 2023-11-21 RX ORDER — 0.9 % SODIUM CHLORIDE 0.9 %
VIAL (ML) INJECTION PRN
Status: CANCELLED | OUTPATIENT
Start: 2023-11-28

## 2023-11-21 RX ADMIN — EPOETIN ALFA-EPBX 40000 UNITS: 40000 INJECTION, SOLUTION INTRAVENOUS; SUBCUTANEOUS at 14:52

## 2023-11-21 ASSESSMENT — FIBROSIS 4 INDEX: FIB4 SCORE: .6712292723898204959

## 2023-11-22 NOTE — PROGRESS NOTES
Dedra to infusion for weekly Retacrit injection. Reports feeling well today with no major complaints. Labs drawn via venipuncture to [facility]. Labs reviewed by RN, Hgb: 9.6, within parameters for Retacrit injection today. Retacrit given SQ in L upper arm, patient tolerated well with no adverse effects. Patient left in stable condition, knows when to return for next appt.

## 2023-11-28 ENCOUNTER — OUTPATIENT INFUSION SERVICES (OUTPATIENT)
Dept: ONCOLOGY | Facility: MEDICAL CENTER | Age: 83
End: 2023-11-28
Attending: INTERNAL MEDICINE
Payer: MEDICARE

## 2023-11-28 VITALS
OXYGEN SATURATION: 98 % | SYSTOLIC BLOOD PRESSURE: 109 MMHG | HEART RATE: 76 BPM | WEIGHT: 167.55 LBS | HEIGHT: 67 IN | RESPIRATION RATE: 18 BRPM | BODY MASS INDEX: 26.3 KG/M2 | TEMPERATURE: 98 F | DIASTOLIC BLOOD PRESSURE: 60 MMHG

## 2023-11-28 DIAGNOSIS — D75.81 MYELOFIBROSIS (HCC): ICD-10-CM

## 2023-11-28 DIAGNOSIS — C80.1 ANEMIA ASSOCIATED WITH MALIGNANT NEOPLASTIC DISEASE (HCC): ICD-10-CM

## 2023-11-28 DIAGNOSIS — D63.0 ANEMIA ASSOCIATED WITH MALIGNANT NEOPLASTIC DISEASE (HCC): ICD-10-CM

## 2023-11-28 LAB
HCT VFR BLD AUTO: 31.3 % (ref 37–47)
HGB BLD-MCNC: 10 G/DL (ref 12–16)

## 2023-11-28 PROCEDURE — 96372 THER/PROPH/DIAG INJ SC/IM: CPT

## 2023-11-28 PROCEDURE — 85014 HEMATOCRIT: CPT

## 2023-11-28 PROCEDURE — 36415 COLL VENOUS BLD VENIPUNCTURE: CPT

## 2023-11-28 PROCEDURE — 700111 HCHG RX REV CODE 636 W/ 250 OVERRIDE (IP): Mod: JZ,JG | Performed by: INTERNAL MEDICINE

## 2023-11-28 PROCEDURE — 85018 HEMOGLOBIN: CPT

## 2023-11-28 RX ADMIN — EPOETIN ALFA-EPBX 40000 UNITS: 40000 INJECTION, SOLUTION INTRAVENOUS; SUBCUTANEOUS at 14:38

## 2023-11-28 ASSESSMENT — FIBROSIS 4 INDEX: FIB4 SCORE: .6712292723898204959

## 2023-11-28 NOTE — PROGRESS NOTES
Dedra presents to IS for labs/ possible Retacrit injection.  H&H collected via 23g butterfly to left AC, gauze and coban to site.  Hgb 10.0, parameters met for Retacrit.  Retacrit injection given to back right arm, adhesive dressing applied. Dedra tolerated well.  Discharged in NAD, next appointment confirmed.

## 2023-11-29 ENCOUNTER — PATIENT MESSAGE (OUTPATIENT)
Dept: HEALTH INFORMATION MANAGEMENT | Facility: OTHER | Age: 83
End: 2023-11-29

## 2023-12-05 ENCOUNTER — OUTPATIENT INFUSION SERVICES (OUTPATIENT)
Dept: ONCOLOGY | Facility: MEDICAL CENTER | Age: 83
End: 2023-12-05
Attending: INTERNAL MEDICINE
Payer: MEDICARE

## 2023-12-05 VITALS
WEIGHT: 166.67 LBS | TEMPERATURE: 96.8 F | HEART RATE: 105 BPM | HEIGHT: 67 IN | OXYGEN SATURATION: 99 % | SYSTOLIC BLOOD PRESSURE: 122 MMHG | RESPIRATION RATE: 18 BRPM | DIASTOLIC BLOOD PRESSURE: 61 MMHG | BODY MASS INDEX: 26.16 KG/M2

## 2023-12-05 DIAGNOSIS — D63.0 ANEMIA ASSOCIATED WITH MALIGNANT NEOPLASTIC DISEASE (HCC): ICD-10-CM

## 2023-12-05 DIAGNOSIS — C80.1 ANEMIA ASSOCIATED WITH MALIGNANT NEOPLASTIC DISEASE (HCC): ICD-10-CM

## 2023-12-05 DIAGNOSIS — D75.81 MYELOFIBROSIS (HCC): ICD-10-CM

## 2023-12-05 LAB
HCT VFR BLD AUTO: 31.5 % (ref 37–47)
HGB BLD-MCNC: 10.1 G/DL (ref 12–16)

## 2023-12-05 PROCEDURE — 36415 COLL VENOUS BLD VENIPUNCTURE: CPT

## 2023-12-05 PROCEDURE — 85014 HEMATOCRIT: CPT

## 2023-12-05 PROCEDURE — 85018 HEMOGLOBIN: CPT

## 2023-12-05 ASSESSMENT — FIBROSIS 4 INDEX: FIB4 SCORE: .6712292723898204959

## 2023-12-06 NOTE — PROGRESS NOTES
Dedra arrives to [facility] for Retacrit. Patient denies acute health concerns. C/o fatigue. H/H drawn via venipuncture from [facility] without difficulty. Hgb 10.1; Hct 31.5%, which does NOT meet parameters to receive Retacrit today. Patient has her next appt. Discharged home to self care in no apparent distress.

## 2023-12-07 ENCOUNTER — HOSPITAL ENCOUNTER (OUTPATIENT)
Facility: MEDICAL CENTER | Age: 83
End: 2023-12-07
Attending: INTERNAL MEDICINE
Payer: MEDICARE

## 2023-12-07 LAB — H PYLORI AG STL QL IA: NOT DETECTED

## 2023-12-07 PROCEDURE — 87338 HPYLORI STOOL AG IA: CPT

## 2023-12-12 ENCOUNTER — OUTPATIENT INFUSION SERVICES (OUTPATIENT)
Dept: ONCOLOGY | Facility: MEDICAL CENTER | Age: 83
End: 2023-12-12
Attending: INTERNAL MEDICINE
Payer: MEDICARE

## 2023-12-12 VITALS
HEIGHT: 67 IN | OXYGEN SATURATION: 96 % | TEMPERATURE: 97 F | SYSTOLIC BLOOD PRESSURE: 135 MMHG | RESPIRATION RATE: 18 BRPM | BODY MASS INDEX: 26.3 KG/M2 | DIASTOLIC BLOOD PRESSURE: 67 MMHG | HEART RATE: 89 BPM | WEIGHT: 167.55 LBS

## 2023-12-12 DIAGNOSIS — C80.1 ANEMIA ASSOCIATED WITH MALIGNANT NEOPLASTIC DISEASE (HCC): ICD-10-CM

## 2023-12-12 DIAGNOSIS — D63.0 ANEMIA ASSOCIATED WITH MALIGNANT NEOPLASTIC DISEASE (HCC): ICD-10-CM

## 2023-12-12 DIAGNOSIS — D75.81 MYELOFIBROSIS (HCC): ICD-10-CM

## 2023-12-12 LAB
HCT VFR BLD AUTO: 32.1 % (ref 37–47)
HGB BLD-MCNC: 10.3 G/DL (ref 12–16)

## 2023-12-12 PROCEDURE — 85014 HEMATOCRIT: CPT

## 2023-12-12 PROCEDURE — 36415 COLL VENOUS BLD VENIPUNCTURE: CPT

## 2023-12-12 PROCEDURE — 85018 HEMOGLOBIN: CPT

## 2023-12-12 ASSESSMENT — FIBROSIS 4 INDEX: FIB4 SCORE: .6712292723898204959

## 2023-12-12 ASSESSMENT — PAIN DESCRIPTION - PAIN TYPE: TYPE: ACUTE PAIN

## 2023-12-13 RX ORDER — 0.9 % SODIUM CHLORIDE 0.9 %
10 VIAL (ML) INJECTION PRN
Status: CANCELLED | OUTPATIENT
Start: 2024-01-02

## 2023-12-13 RX ORDER — 0.9 % SODIUM CHLORIDE 0.9 %
10 VIAL (ML) INJECTION PRN
Status: CANCELLED | OUTPATIENT
Start: 2023-12-19

## 2023-12-13 RX ORDER — HEPARIN SODIUM (PORCINE) LOCK FLUSH IV SOLN 100 UNIT/ML 100 UNIT/ML
500 SOLUTION INTRAVENOUS PRN
Status: CANCELLED | OUTPATIENT
Start: 2023-12-26

## 2023-12-13 RX ORDER — HEPARIN SODIUM (PORCINE) LOCK FLUSH IV SOLN 100 UNIT/ML 100 UNIT/ML
500 SOLUTION INTRAVENOUS PRN
Status: CANCELLED | OUTPATIENT
Start: 2023-12-19

## 2023-12-13 RX ORDER — HEPARIN SODIUM (PORCINE) LOCK FLUSH IV SOLN 100 UNIT/ML 100 UNIT/ML
500 SOLUTION INTRAVENOUS PRN
Status: CANCELLED | OUTPATIENT
Start: 2024-01-02

## 2023-12-13 RX ORDER — 0.9 % SODIUM CHLORIDE 0.9 %
VIAL (ML) INJECTION PRN
Status: CANCELLED | OUTPATIENT
Start: 2023-12-26

## 2023-12-13 RX ORDER — 0.9 % SODIUM CHLORIDE 0.9 %
VIAL (ML) INJECTION PRN
Status: CANCELLED | OUTPATIENT
Start: 2024-01-09

## 2023-12-13 RX ORDER — 0.9 % SODIUM CHLORIDE 0.9 %
10 VIAL (ML) INJECTION PRN
Status: CANCELLED | OUTPATIENT
Start: 2023-12-26

## 2023-12-13 RX ORDER — 0.9 % SODIUM CHLORIDE 0.9 %
VIAL (ML) INJECTION PRN
Status: CANCELLED | OUTPATIENT
Start: 2023-12-19

## 2023-12-13 RX ORDER — 0.9 % SODIUM CHLORIDE 0.9 %
10 VIAL (ML) INJECTION PRN
Status: CANCELLED | OUTPATIENT
Start: 2024-01-09

## 2023-12-13 RX ORDER — 0.9 % SODIUM CHLORIDE 0.9 %
3 VIAL (ML) INJECTION PRN
Status: CANCELLED | OUTPATIENT
Start: 2024-01-02

## 2023-12-13 RX ORDER — 0.9 % SODIUM CHLORIDE 0.9 %
3 VIAL (ML) INJECTION PRN
Status: CANCELLED | OUTPATIENT
Start: 2023-12-26

## 2023-12-13 RX ORDER — 0.9 % SODIUM CHLORIDE 0.9 %
VIAL (ML) INJECTION PRN
Status: CANCELLED | OUTPATIENT
Start: 2024-01-02

## 2023-12-13 RX ORDER — HEPARIN SODIUM (PORCINE) LOCK FLUSH IV SOLN 100 UNIT/ML 100 UNIT/ML
500 SOLUTION INTRAVENOUS PRN
Status: CANCELLED | OUTPATIENT
Start: 2024-01-09

## 2023-12-13 RX ORDER — 0.9 % SODIUM CHLORIDE 0.9 %
3 VIAL (ML) INJECTION PRN
Status: CANCELLED | OUTPATIENT
Start: 2024-01-09

## 2023-12-13 RX ORDER — 0.9 % SODIUM CHLORIDE 0.9 %
3 VIAL (ML) INJECTION PRN
Status: CANCELLED | OUTPATIENT
Start: 2023-12-19

## 2023-12-13 NOTE — PROGRESS NOTES
Dedra presented to [facility] for possible retacrit injection. Labs drawn as ordered from [facility] with 23G butterfly needle, gauze and Coban dressing placed. Hemoglobin is 10.3 and does not meet  parameters for retacrit injection today. Pt has future appointments. Pt discharged to home in good condition.

## 2023-12-19 ENCOUNTER — OUTPATIENT INFUSION SERVICES (OUTPATIENT)
Dept: ONCOLOGY | Facility: MEDICAL CENTER | Age: 83
End: 2023-12-19
Attending: INTERNAL MEDICINE
Payer: MEDICARE

## 2023-12-19 VITALS
HEIGHT: 67 IN | OXYGEN SATURATION: 96 % | RESPIRATION RATE: 18 BRPM | SYSTOLIC BLOOD PRESSURE: 131 MMHG | BODY MASS INDEX: 26.23 KG/M2 | TEMPERATURE: 96.8 F | HEART RATE: 89 BPM | WEIGHT: 167.11 LBS | DIASTOLIC BLOOD PRESSURE: 60 MMHG

## 2023-12-19 DIAGNOSIS — D63.0 ANEMIA ASSOCIATED WITH MALIGNANT NEOPLASTIC DISEASE (HCC): ICD-10-CM

## 2023-12-19 DIAGNOSIS — C80.1 ANEMIA ASSOCIATED WITH MALIGNANT NEOPLASTIC DISEASE (HCC): ICD-10-CM

## 2023-12-19 DIAGNOSIS — D75.81 MYELOFIBROSIS (HCC): ICD-10-CM

## 2023-12-19 LAB
HCT VFR BLD AUTO: 32 % (ref 37–47)
HGB BLD-MCNC: 10.1 G/DL (ref 12–16)

## 2023-12-19 PROCEDURE — 85014 HEMATOCRIT: CPT

## 2023-12-19 PROCEDURE — 85018 HEMOGLOBIN: CPT

## 2023-12-19 PROCEDURE — 36415 COLL VENOUS BLD VENIPUNCTURE: CPT

## 2023-12-19 ASSESSMENT — FIBROSIS 4 INDEX: FIB4 SCORE: .6712292723898204959

## 2023-12-19 NOTE — PROGRESS NOTES
Dedra presented to Infusion Center for possible retacrit injection. Labs drawn as ordered from [facility] with 23G butterfly needle, gauze and Coban dressing placed. Hemoglobin is 10.1 and does not meet  parameters for retacrit injection today. Pt one future appointment. Schedulers emailed for more appointments. Pt discharged to home in good condition.

## 2023-12-26 ENCOUNTER — HOSPITAL ENCOUNTER (OUTPATIENT)
Dept: LAB | Facility: MEDICAL CENTER | Age: 83
End: 2023-12-26
Attending: INTERNAL MEDICINE
Payer: MEDICARE

## 2023-12-26 ENCOUNTER — OUTPATIENT INFUSION SERVICES (OUTPATIENT)
Dept: ONCOLOGY | Facility: MEDICAL CENTER | Age: 83
End: 2023-12-26
Attending: INTERNAL MEDICINE
Payer: MEDICARE

## 2023-12-26 VITALS
DIASTOLIC BLOOD PRESSURE: 58 MMHG | HEART RATE: 90 BPM | OXYGEN SATURATION: 95 % | BODY MASS INDEX: 26.64 KG/M2 | TEMPERATURE: 97.3 F | SYSTOLIC BLOOD PRESSURE: 124 MMHG | RESPIRATION RATE: 18 BRPM | WEIGHT: 169.75 LBS | HEIGHT: 67 IN

## 2023-12-26 DIAGNOSIS — D63.0 ANEMIA ASSOCIATED WITH MALIGNANT NEOPLASTIC DISEASE (HCC): ICD-10-CM

## 2023-12-26 DIAGNOSIS — D75.81 MYELOFIBROSIS (HCC): ICD-10-CM

## 2023-12-26 DIAGNOSIS — C80.1 ANEMIA ASSOCIATED WITH MALIGNANT NEOPLASTIC DISEASE (HCC): ICD-10-CM

## 2023-12-26 LAB
HCT VFR BLD AUTO: 31.1 % (ref 37–47)
HGB BLD-MCNC: 9.9 G/DL (ref 12–16)

## 2023-12-26 PROCEDURE — 85018 HEMOGLOBIN: CPT

## 2023-12-26 PROCEDURE — 83013 H PYLORI (C-13) BREATH: CPT

## 2023-12-26 PROCEDURE — 700111 HCHG RX REV CODE 636 W/ 250 OVERRIDE (IP): Mod: JZ,JG | Performed by: INTERNAL MEDICINE

## 2023-12-26 PROCEDURE — 85014 HEMATOCRIT: CPT

## 2023-12-26 PROCEDURE — 96372 THER/PROPH/DIAG INJ SC/IM: CPT

## 2023-12-26 PROCEDURE — 36415 COLL VENOUS BLD VENIPUNCTURE: CPT

## 2023-12-26 RX ADMIN — EPOETIN ALFA-EPBX 40000 UNITS: 40000 INJECTION, SOLUTION INTRAVENOUS; SUBCUTANEOUS at 14:00

## 2023-12-26 ASSESSMENT — FIBROSIS 4 INDEX: FIB4 SCORE: .6712292723898204959

## 2023-12-27 NOTE — PROGRESS NOTES
Dedra arrives to [facility] for weekly Retacrit. Patient denies acute health concerns. C/o fatigue. H/H drawn via venipuncture from [facility] without difficulty. Hgb 9.9; Hct 31.1%, which DOES meet parameters to receive Retacrit today. Retacrit administered SQ to back of left arm without issues. Patient tolerated injection well. Patient has her next appt. Discharged home to self care in no apparent distress.

## 2023-12-28 LAB — UREA BREATH TEST QL: NEGATIVE

## 2024-01-02 ENCOUNTER — OUTPATIENT INFUSION SERVICES (OUTPATIENT)
Dept: ONCOLOGY | Facility: MEDICAL CENTER | Age: 84
End: 2024-01-02
Attending: INTERNAL MEDICINE
Payer: MEDICARE

## 2024-01-02 VITALS
DIASTOLIC BLOOD PRESSURE: 63 MMHG | SYSTOLIC BLOOD PRESSURE: 127 MMHG | TEMPERATURE: 97.4 F | HEART RATE: 85 BPM | BODY MASS INDEX: 26.4 KG/M2 | RESPIRATION RATE: 17 BRPM | HEIGHT: 67 IN | WEIGHT: 168.21 LBS | OXYGEN SATURATION: 95 %

## 2024-01-02 DIAGNOSIS — C80.1 ANEMIA ASSOCIATED WITH MALIGNANT NEOPLASTIC DISEASE (HCC): ICD-10-CM

## 2024-01-02 DIAGNOSIS — D63.0 ANEMIA ASSOCIATED WITH MALIGNANT NEOPLASTIC DISEASE (HCC): ICD-10-CM

## 2024-01-02 DIAGNOSIS — D75.81 MYELOFIBROSIS (HCC): ICD-10-CM

## 2024-01-02 LAB
HCT VFR BLD AUTO: 32.2 % (ref 37–47)
HGB BLD-MCNC: 10.5 G/DL (ref 12–16)

## 2024-01-02 PROCEDURE — 85018 HEMOGLOBIN: CPT

## 2024-01-02 PROCEDURE — 36415 COLL VENOUS BLD VENIPUNCTURE: CPT

## 2024-01-02 PROCEDURE — 85014 HEMATOCRIT: CPT

## 2024-01-02 ASSESSMENT — FIBROSIS 4 INDEX: FIB4 SCORE: .6712292723898204959

## 2024-01-02 NOTE — PROGRESS NOTES
Patient arrived to unit for Retacrit. She denies any signs or symptoms. VSS. Labs drawn peripherally.    Labs reviewed. Patient's Hgb came back at 10.5. No treatment indicated at this time.    Future appointments confirmed. Patient discharged home in stable condition.

## 2024-01-09 ENCOUNTER — OUTPATIENT INFUSION SERVICES (OUTPATIENT)
Dept: ONCOLOGY | Facility: MEDICAL CENTER | Age: 84
End: 2024-01-09
Attending: INTERNAL MEDICINE
Payer: MEDICARE

## 2024-01-09 VITALS
DIASTOLIC BLOOD PRESSURE: 64 MMHG | WEIGHT: 170.42 LBS | RESPIRATION RATE: 18 BRPM | SYSTOLIC BLOOD PRESSURE: 131 MMHG | OXYGEN SATURATION: 91 % | HEART RATE: 67 BPM | BODY MASS INDEX: 26.75 KG/M2 | HEIGHT: 67 IN | TEMPERATURE: 97.6 F

## 2024-01-09 DIAGNOSIS — D63.0 ANEMIA ASSOCIATED WITH MALIGNANT NEOPLASTIC DISEASE (HCC): ICD-10-CM

## 2024-01-09 DIAGNOSIS — C80.1 ANEMIA ASSOCIATED WITH MALIGNANT NEOPLASTIC DISEASE (HCC): ICD-10-CM

## 2024-01-09 DIAGNOSIS — D75.81 MYELOFIBROSIS (HCC): ICD-10-CM

## 2024-01-09 LAB
HCT VFR BLD AUTO: 31.4 % (ref 37–47)
HGB BLD-MCNC: 10 G/DL (ref 12–16)

## 2024-01-09 PROCEDURE — 700111 HCHG RX REV CODE 636 W/ 250 OVERRIDE (IP): Mod: JZ,JG | Performed by: INTERNAL MEDICINE

## 2024-01-09 PROCEDURE — 85018 HEMOGLOBIN: CPT

## 2024-01-09 PROCEDURE — 96372 THER/PROPH/DIAG INJ SC/IM: CPT

## 2024-01-09 PROCEDURE — 36415 COLL VENOUS BLD VENIPUNCTURE: CPT

## 2024-01-09 PROCEDURE — 85014 HEMATOCRIT: CPT

## 2024-01-09 RX ADMIN — EPOETIN ALFA-EPBX 40000 UNITS: 40000 INJECTION, SOLUTION INTRAVENOUS; SUBCUTANEOUS at 14:49

## 2024-01-09 ASSESSMENT — FIBROSIS 4 INDEX: FIB4 SCORE: .6712292723898204959

## 2024-01-09 NOTE — PROGRESS NOTES
Pt arrived ambulatory for lab draw and possible Retacrit.  Pt denies c/o, states she has finished treatment for H. Pylori and is feeling good.  Labs drawn from [facility] without difficulty. Hgb 10.0, meets parameters for Retacrit and pt would like to receive injection.  Retacrit given in left arm, tolerated well, no issues noted.  Pt Dc'd home without incident and will f/u as scheduled.

## 2024-01-15 RX ORDER — 0.9 % SODIUM CHLORIDE 0.9 %
10 VIAL (ML) INJECTION PRN
Status: CANCELLED | OUTPATIENT
Start: 2024-01-16

## 2024-01-15 RX ORDER — 0.9 % SODIUM CHLORIDE 0.9 %
10 VIAL (ML) INJECTION PRN
Status: CANCELLED | OUTPATIENT
Start: 2024-01-30

## 2024-01-15 RX ORDER — 0.9 % SODIUM CHLORIDE 0.9 %
3 VIAL (ML) INJECTION PRN
Status: CANCELLED | OUTPATIENT
Start: 2024-01-16

## 2024-01-15 RX ORDER — 0.9 % SODIUM CHLORIDE 0.9 %
VIAL (ML) INJECTION PRN
Status: CANCELLED | OUTPATIENT
Start: 2024-02-06

## 2024-01-15 RX ORDER — 0.9 % SODIUM CHLORIDE 0.9 %
10 VIAL (ML) INJECTION PRN
Status: CANCELLED | OUTPATIENT
Start: 2024-01-23

## 2024-01-15 RX ORDER — 0.9 % SODIUM CHLORIDE 0.9 %
10 VIAL (ML) INJECTION PRN
Status: CANCELLED | OUTPATIENT
Start: 2024-02-06

## 2024-01-15 RX ORDER — 0.9 % SODIUM CHLORIDE 0.9 %
3 VIAL (ML) INJECTION PRN
Status: CANCELLED | OUTPATIENT
Start: 2024-02-06

## 2024-01-15 RX ORDER — 0.9 % SODIUM CHLORIDE 0.9 %
VIAL (ML) INJECTION PRN
Status: CANCELLED | OUTPATIENT
Start: 2024-01-23

## 2024-01-15 RX ORDER — HEPARIN SODIUM (PORCINE) LOCK FLUSH IV SOLN 100 UNIT/ML 100 UNIT/ML
500 SOLUTION INTRAVENOUS PRN
Status: CANCELLED | OUTPATIENT
Start: 2024-01-16

## 2024-01-15 RX ORDER — 0.9 % SODIUM CHLORIDE 0.9 %
VIAL (ML) INJECTION PRN
Status: CANCELLED | OUTPATIENT
Start: 2024-01-16

## 2024-01-15 RX ORDER — HEPARIN SODIUM (PORCINE) LOCK FLUSH IV SOLN 100 UNIT/ML 100 UNIT/ML
500 SOLUTION INTRAVENOUS PRN
Status: CANCELLED | OUTPATIENT
Start: 2024-02-06

## 2024-01-15 RX ORDER — 0.9 % SODIUM CHLORIDE 0.9 %
3 VIAL (ML) INJECTION PRN
Status: CANCELLED | OUTPATIENT
Start: 2024-01-30

## 2024-01-15 RX ORDER — HEPARIN SODIUM (PORCINE) LOCK FLUSH IV SOLN 100 UNIT/ML 100 UNIT/ML
500 SOLUTION INTRAVENOUS PRN
Status: CANCELLED | OUTPATIENT
Start: 2024-01-23

## 2024-01-15 RX ORDER — 0.9 % SODIUM CHLORIDE 0.9 %
3 VIAL (ML) INJECTION PRN
Status: CANCELLED | OUTPATIENT
Start: 2024-01-23

## 2024-01-15 RX ORDER — HEPARIN SODIUM (PORCINE) LOCK FLUSH IV SOLN 100 UNIT/ML 100 UNIT/ML
500 SOLUTION INTRAVENOUS PRN
Status: CANCELLED | OUTPATIENT
Start: 2024-01-30

## 2024-01-15 RX ORDER — 0.9 % SODIUM CHLORIDE 0.9 %
VIAL (ML) INJECTION PRN
Status: CANCELLED | OUTPATIENT
Start: 2024-01-30

## 2024-01-16 ENCOUNTER — OUTPATIENT INFUSION SERVICES (OUTPATIENT)
Dept: ONCOLOGY | Facility: MEDICAL CENTER | Age: 84
End: 2024-01-16
Attending: INTERNAL MEDICINE
Payer: MEDICARE

## 2024-01-16 VITALS
HEART RATE: 86 BPM | RESPIRATION RATE: 18 BRPM | SYSTOLIC BLOOD PRESSURE: 128 MMHG | BODY MASS INDEX: 26.99 KG/M2 | TEMPERATURE: 97.6 F | HEIGHT: 67 IN | WEIGHT: 171.96 LBS | DIASTOLIC BLOOD PRESSURE: 57 MMHG | OXYGEN SATURATION: 95 %

## 2024-01-16 DIAGNOSIS — C80.1 ANEMIA ASSOCIATED WITH MALIGNANT NEOPLASTIC DISEASE (HCC): ICD-10-CM

## 2024-01-16 DIAGNOSIS — D63.0 ANEMIA ASSOCIATED WITH MALIGNANT NEOPLASTIC DISEASE (HCC): ICD-10-CM

## 2024-01-16 DIAGNOSIS — D75.81 MYELOFIBROSIS (HCC): ICD-10-CM

## 2024-01-16 LAB
HCT VFR BLD AUTO: 29.8 % (ref 37–47)
HGB BLD-MCNC: 9.5 G/DL (ref 12–16)

## 2024-01-16 PROCEDURE — 700111 HCHG RX REV CODE 636 W/ 250 OVERRIDE (IP): Mod: JZ,JG | Performed by: INTERNAL MEDICINE

## 2024-01-16 PROCEDURE — 96372 THER/PROPH/DIAG INJ SC/IM: CPT

## 2024-01-16 PROCEDURE — 85018 HEMOGLOBIN: CPT

## 2024-01-16 PROCEDURE — 85014 HEMATOCRIT: CPT

## 2024-01-16 RX ADMIN — EPOETIN ALFA-EPBX 40000 UNITS: 40000 INJECTION, SOLUTION INTRAVENOUS; SUBCUTANEOUS at 15:57

## 2024-01-16 ASSESSMENT — FIBROSIS 4 INDEX: FIB4 SCORE: .6712292723898204959

## 2024-01-17 NOTE — PROGRESS NOTES
Dedra arrived to the Infusion Center for labs and possible Retacrit injection for anemia. POC reviewed. 25 G butterfly used to draw labs from R AC/ gauze and coban applied. Lab results reviewed, Hgb 9.5, within parameters for treatment. Retacrit administered per MD order, R BA,Dedra tolerated well, bandaid applied. Confirmed next appointment and Dedra was discharged home in no acute distress.

## 2024-01-23 ENCOUNTER — OUTPATIENT INFUSION SERVICES (OUTPATIENT)
Dept: ONCOLOGY | Facility: MEDICAL CENTER | Age: 84
End: 2024-01-23
Attending: INTERNAL MEDICINE
Payer: MEDICARE

## 2024-01-23 VITALS
SYSTOLIC BLOOD PRESSURE: 130 MMHG | OXYGEN SATURATION: 94 % | TEMPERATURE: 97 F | WEIGHT: 168.43 LBS | DIASTOLIC BLOOD PRESSURE: 57 MMHG | HEART RATE: 81 BPM | HEIGHT: 67 IN | BODY MASS INDEX: 26.44 KG/M2 | RESPIRATION RATE: 18 BRPM

## 2024-01-23 DIAGNOSIS — D63.0 ANEMIA ASSOCIATED WITH MALIGNANT NEOPLASTIC DISEASE (HCC): ICD-10-CM

## 2024-01-23 DIAGNOSIS — C80.1 ANEMIA ASSOCIATED WITH MALIGNANT NEOPLASTIC DISEASE (HCC): ICD-10-CM

## 2024-01-23 DIAGNOSIS — D75.81 MYELOFIBROSIS (HCC): ICD-10-CM

## 2024-01-23 LAB
HCT VFR BLD AUTO: 32.4 % (ref 37–47)
HGB BLD-MCNC: 10 G/DL (ref 12–16)

## 2024-01-23 PROCEDURE — 85014 HEMATOCRIT: CPT

## 2024-01-23 PROCEDURE — 96372 THER/PROPH/DIAG INJ SC/IM: CPT

## 2024-01-23 PROCEDURE — 85018 HEMOGLOBIN: CPT

## 2024-01-23 PROCEDURE — 36415 COLL VENOUS BLD VENIPUNCTURE: CPT

## 2024-01-23 PROCEDURE — 700111 HCHG RX REV CODE 636 W/ 250 OVERRIDE (IP): Mod: JZ,JG | Performed by: INTERNAL MEDICINE

## 2024-01-23 RX ADMIN — EPOETIN ALFA-EPBX 40000 UNITS: 40000 INJECTION, SOLUTION INTRAVENOUS; SUBCUTANEOUS at 14:24

## 2024-01-23 ASSESSMENT — FIBROSIS 4 INDEX: FIB4 SCORE: .6712292723898204959

## 2024-01-23 NOTE — PROGRESS NOTES
Patient to [facility] for Retacrit injection. Lab drawn by venipuncture in right AC, gauze and coban applied as a dressing. HGB 10.0, patient meets parameters for injection. Retacrit injected into right back of arm. Patient has future appointments. Patient to home in care of self.

## 2024-01-25 ENCOUNTER — OFFICE VISIT (OUTPATIENT)
Dept: MEDICAL GROUP | Facility: PHYSICIAN GROUP | Age: 84
End: 2024-01-25
Payer: MEDICARE

## 2024-01-25 VITALS
BODY MASS INDEX: 25.46 KG/M2 | WEIGHT: 168 LBS | RESPIRATION RATE: 14 BRPM | HEIGHT: 68 IN | HEART RATE: 102 BPM | TEMPERATURE: 98.2 F | DIASTOLIC BLOOD PRESSURE: 56 MMHG | OXYGEN SATURATION: 97 % | SYSTOLIC BLOOD PRESSURE: 122 MMHG

## 2024-01-25 DIAGNOSIS — L82.0 SEBORRHEIC KERATOSES, INFLAMED: ICD-10-CM

## 2024-01-25 DIAGNOSIS — I50.32 CHRONIC HEART FAILURE WITH PRESERVED EJECTION FRACTION (HCC): ICD-10-CM

## 2024-01-25 PROCEDURE — 3074F SYST BP LT 130 MM HG: CPT | Performed by: FAMILY MEDICINE

## 2024-01-25 PROCEDURE — 99213 OFFICE O/P EST LOW 20 MIN: CPT | Performed by: FAMILY MEDICINE

## 2024-01-25 PROCEDURE — 3078F DIAST BP <80 MM HG: CPT | Performed by: FAMILY MEDICINE

## 2024-01-25 RX ORDER — TORSEMIDE 20 MG/1
20 TABLET ORAL DAILY
Qty: 90 TABLET | Refills: 3 | Status: SHIPPED | OUTPATIENT
Start: 2024-01-25

## 2024-01-25 ASSESSMENT — PATIENT HEALTH QUESTIONNAIRE - PHQ9: CLINICAL INTERPRETATION OF PHQ2 SCORE: 0

## 2024-01-25 ASSESSMENT — FIBROSIS 4 INDEX: FIB4 SCORE: .6712292723898204959

## 2024-01-25 NOTE — PROGRESS NOTES
"CHIEF COMPLAINT / REASON FOR VISIT  Dedra Lobo is a 83 y.o. female that presents today for   Chief Complaint   Patient presents with    Follow-Up       HISTORY OF PRESENT ILLNESS  Has transurethral resection of bladder tumor, following with urology for surveillance annually    Colonoscopy showed colon polyps x 2 on 10/6/23, were adenomatous.     Completed treatment for H pylori    Still following with oncology, with epoetin injection hemoglobin is staying in the 10 range    OBJECTIVE     /56 (BP Location: Right arm, Patient Position: Sitting, BP Cuff Size: Adult)   Pulse (!) 102   Temp 36.8 °C (98.2 °F) (Temporal)   Resp 14   Ht 1.715 m (5' 7.5\")   Wt 76.2 kg (168 lb)   SpO2 97%   BMI 25.92 kg/m²     PHYSICAL EXAM  Constitutional: Sitting comfortably, in no acute distress, responds to questions appropriately.  Skin: waxy brown papules, stuck on appearance, across trunk    ASSESSMENT & PLAN  1. Seborrheic keratoses, inflamed  Inflamed SKs on trunk, irritated by her bra strap, she will consider making visit to have these removed    2. Chronic heart failure with preserved ejection fraction (HCC)  Chronic, stable on torsemide 20 mg daily, only slightly hypervolemic on today's exam.  Continue current therapy.  - torsemide (DEMADEX) 20 MG Tab; Take 1 Tablet by mouth every day.  Dispense: 90 Tablet; Refill: 3        "

## 2024-01-30 ENCOUNTER — OUTPATIENT INFUSION SERVICES (OUTPATIENT)
Dept: ONCOLOGY | Facility: MEDICAL CENTER | Age: 84
End: 2024-01-30
Attending: INTERNAL MEDICINE
Payer: MEDICARE

## 2024-01-30 VITALS
HEART RATE: 89 BPM | DIASTOLIC BLOOD PRESSURE: 56 MMHG | TEMPERATURE: 98.2 F | HEIGHT: 67 IN | RESPIRATION RATE: 18 BRPM | WEIGHT: 167.99 LBS | BODY MASS INDEX: 26.37 KG/M2 | SYSTOLIC BLOOD PRESSURE: 115 MMHG | OXYGEN SATURATION: 95 %

## 2024-01-30 DIAGNOSIS — C80.1 ANEMIA ASSOCIATED WITH MALIGNANT NEOPLASTIC DISEASE (HCC): ICD-10-CM

## 2024-01-30 DIAGNOSIS — D75.81 MYELOFIBROSIS (HCC): ICD-10-CM

## 2024-01-30 DIAGNOSIS — D63.0 ANEMIA ASSOCIATED WITH MALIGNANT NEOPLASTIC DISEASE (HCC): ICD-10-CM

## 2024-01-30 LAB
HCT VFR BLD AUTO: 32.4 % (ref 37–47)
HGB BLD-MCNC: 10.1 G/DL (ref 12–16)

## 2024-01-30 PROCEDURE — 36415 COLL VENOUS BLD VENIPUNCTURE: CPT

## 2024-01-30 PROCEDURE — 85014 HEMATOCRIT: CPT

## 2024-01-30 PROCEDURE — 85018 HEMOGLOBIN: CPT

## 2024-01-30 ASSESSMENT — FIBROSIS 4 INDEX: FIB4 SCORE: .6712292723898204959

## 2024-01-31 NOTE — PROGRESS NOTES
Dedra is here for epoetin (Retacrit) injection. Labs drawn using 23 gauge butterfly to right AC; gauze/coban applied to site. Hgb = 10.1. Next appointment scheduled. Discharged to self care; no apparent distress noted.

## 2024-02-06 ENCOUNTER — OUTPATIENT INFUSION SERVICES (OUTPATIENT)
Dept: ONCOLOGY | Facility: MEDICAL CENTER | Age: 84
End: 2024-02-06
Attending: INTERNAL MEDICINE
Payer: MEDICARE

## 2024-02-06 VITALS
TEMPERATURE: 98 F | HEIGHT: 67 IN | HEART RATE: 89 BPM | BODY MASS INDEX: 26.3 KG/M2 | OXYGEN SATURATION: 96 % | RESPIRATION RATE: 18 BRPM | SYSTOLIC BLOOD PRESSURE: 127 MMHG | DIASTOLIC BLOOD PRESSURE: 59 MMHG | WEIGHT: 167.55 LBS

## 2024-02-06 DIAGNOSIS — C80.1 ANEMIA ASSOCIATED WITH MALIGNANT NEOPLASTIC DISEASE (HCC): ICD-10-CM

## 2024-02-06 DIAGNOSIS — D75.81 MYELOFIBROSIS (HCC): ICD-10-CM

## 2024-02-06 DIAGNOSIS — D63.0 ANEMIA ASSOCIATED WITH MALIGNANT NEOPLASTIC DISEASE (HCC): ICD-10-CM

## 2024-02-06 LAB
HCT VFR BLD AUTO: 33.9 % (ref 37–47)
HGB BLD-MCNC: 10.5 G/DL (ref 12–16)

## 2024-02-06 PROCEDURE — 85014 HEMATOCRIT: CPT

## 2024-02-06 PROCEDURE — 36415 COLL VENOUS BLD VENIPUNCTURE: CPT

## 2024-02-06 PROCEDURE — 85018 HEMOGLOBIN: CPT

## 2024-02-06 ASSESSMENT — FIBROSIS 4 INDEX: FIB4 SCORE: .6712292723898204959

## 2024-02-06 NOTE — PROGRESS NOTES
Patient came into INF independently for Retacrit injection. Orders and vitals reviewed assessment done. Labs collected peripherally in right AC with 25g butterfly needle. Patient does not meet parameters with hgb of 10.5. Patient left in stable condition with next appointment confirmed.

## 2024-02-12 RX ORDER — 0.9 % SODIUM CHLORIDE 0.9 %
3 VIAL (ML) INJECTION PRN
Status: CANCELLED | OUTPATIENT
Start: 2024-03-19

## 2024-02-12 RX ORDER — HEPARIN SODIUM (PORCINE) LOCK FLUSH IV SOLN 100 UNIT/ML 100 UNIT/ML
500 SOLUTION INTRAVENOUS PRN
Status: CANCELLED | OUTPATIENT
Start: 2024-03-05

## 2024-02-12 RX ORDER — 0.9 % SODIUM CHLORIDE 0.9 %
10 VIAL (ML) INJECTION PRN
Status: CANCELLED | OUTPATIENT
Start: 2024-03-19

## 2024-02-12 RX ORDER — 0.9 % SODIUM CHLORIDE 0.9 %
3 VIAL (ML) INJECTION PRN
OUTPATIENT
Start: 2024-04-02

## 2024-02-12 RX ORDER — 0.9 % SODIUM CHLORIDE 0.9 %
VIAL (ML) INJECTION PRN
OUTPATIENT
Start: 2024-04-02

## 2024-02-12 RX ORDER — 0.9 % SODIUM CHLORIDE 0.9 %
10 VIAL (ML) INJECTION PRN
OUTPATIENT
Start: 2024-04-02

## 2024-02-12 RX ORDER — 0.9 % SODIUM CHLORIDE 0.9 %
10 VIAL (ML) INJECTION PRN
Status: CANCELLED | OUTPATIENT
Start: 2024-03-26

## 2024-02-12 RX ORDER — HEPARIN SODIUM (PORCINE) LOCK FLUSH IV SOLN 100 UNIT/ML 100 UNIT/ML
500 SOLUTION INTRAVENOUS PRN
Status: CANCELLED | OUTPATIENT
Start: 2024-03-26

## 2024-02-12 RX ORDER — 0.9 % SODIUM CHLORIDE 0.9 %
VIAL (ML) INJECTION PRN
Status: CANCELLED | OUTPATIENT
Start: 2024-03-19

## 2024-02-12 RX ORDER — 0.9 % SODIUM CHLORIDE 0.9 %
10 VIAL (ML) INJECTION PRN
Status: CANCELLED | OUTPATIENT
Start: 2024-03-05

## 2024-02-12 RX ORDER — 0.9 % SODIUM CHLORIDE 0.9 %
10 VIAL (ML) INJECTION PRN
Status: CANCELLED | OUTPATIENT
Start: 2024-03-12

## 2024-02-12 RX ORDER — 0.9 % SODIUM CHLORIDE 0.9 %
VIAL (ML) INJECTION PRN
Status: CANCELLED | OUTPATIENT
Start: 2024-03-05

## 2024-02-12 RX ORDER — 0.9 % SODIUM CHLORIDE 0.9 %
10 VIAL (ML) INJECTION PRN
Status: CANCELLED | OUTPATIENT
Start: 2024-02-20

## 2024-02-12 RX ORDER — 0.9 % SODIUM CHLORIDE 0.9 %
VIAL (ML) INJECTION PRN
Status: CANCELLED | OUTPATIENT
Start: 2024-02-27

## 2024-02-12 RX ORDER — 0.9 % SODIUM CHLORIDE 0.9 %
3 VIAL (ML) INJECTION PRN
Status: CANCELLED | OUTPATIENT
Start: 2024-03-12

## 2024-02-12 RX ORDER — 0.9 % SODIUM CHLORIDE 0.9 %
3 VIAL (ML) INJECTION PRN
Status: CANCELLED | OUTPATIENT
Start: 2024-03-05

## 2024-02-12 RX ORDER — 0.9 % SODIUM CHLORIDE 0.9 %
VIAL (ML) INJECTION PRN
Status: CANCELLED | OUTPATIENT
Start: 2024-03-26

## 2024-02-12 RX ORDER — HEPARIN SODIUM (PORCINE) LOCK FLUSH IV SOLN 100 UNIT/ML 100 UNIT/ML
500 SOLUTION INTRAVENOUS PRN
Status: CANCELLED | OUTPATIENT
Start: 2024-03-19

## 2024-02-12 RX ORDER — 0.9 % SODIUM CHLORIDE 0.9 %
VIAL (ML) INJECTION PRN
Status: CANCELLED | OUTPATIENT
Start: 2024-02-13

## 2024-02-12 RX ORDER — 0.9 % SODIUM CHLORIDE 0.9 %
VIAL (ML) INJECTION PRN
Status: CANCELLED | OUTPATIENT
Start: 2024-02-20

## 2024-02-12 RX ORDER — 0.9 % SODIUM CHLORIDE 0.9 %
10 VIAL (ML) INJECTION PRN
Status: CANCELLED | OUTPATIENT
Start: 2024-02-13

## 2024-02-12 RX ORDER — HEPARIN SODIUM (PORCINE) LOCK FLUSH IV SOLN 100 UNIT/ML 100 UNIT/ML
500 SOLUTION INTRAVENOUS PRN
Status: CANCELLED | OUTPATIENT
Start: 2024-02-13

## 2024-02-12 RX ORDER — 0.9 % SODIUM CHLORIDE 0.9 %
10 VIAL (ML) INJECTION PRN
Status: CANCELLED | OUTPATIENT
Start: 2024-02-27

## 2024-02-12 RX ORDER — HEPARIN SODIUM (PORCINE) LOCK FLUSH IV SOLN 100 UNIT/ML 100 UNIT/ML
500 SOLUTION INTRAVENOUS PRN
Status: CANCELLED | OUTPATIENT
Start: 2024-02-27

## 2024-02-12 RX ORDER — 0.9 % SODIUM CHLORIDE 0.9 %
3 VIAL (ML) INJECTION PRN
Status: CANCELLED | OUTPATIENT
Start: 2024-02-20

## 2024-02-12 RX ORDER — 0.9 % SODIUM CHLORIDE 0.9 %
3 VIAL (ML) INJECTION PRN
Status: CANCELLED | OUTPATIENT
Start: 2024-02-13

## 2024-02-12 RX ORDER — HEPARIN SODIUM (PORCINE) LOCK FLUSH IV SOLN 100 UNIT/ML 100 UNIT/ML
500 SOLUTION INTRAVENOUS PRN
Status: CANCELLED | OUTPATIENT
Start: 2024-02-20

## 2024-02-12 RX ORDER — 0.9 % SODIUM CHLORIDE 0.9 %
3 VIAL (ML) INJECTION PRN
Status: CANCELLED | OUTPATIENT
Start: 2024-03-26

## 2024-02-12 RX ORDER — 0.9 % SODIUM CHLORIDE 0.9 %
3 VIAL (ML) INJECTION PRN
Status: CANCELLED | OUTPATIENT
Start: 2024-02-27

## 2024-02-12 RX ORDER — 0.9 % SODIUM CHLORIDE 0.9 %
VIAL (ML) INJECTION PRN
Status: CANCELLED | OUTPATIENT
Start: 2024-03-12

## 2024-02-12 RX ORDER — HEPARIN SODIUM (PORCINE) LOCK FLUSH IV SOLN 100 UNIT/ML 100 UNIT/ML
500 SOLUTION INTRAVENOUS PRN
Status: CANCELLED | OUTPATIENT
Start: 2024-03-12

## 2024-02-12 RX ORDER — HEPARIN SODIUM (PORCINE) LOCK FLUSH IV SOLN 100 UNIT/ML 100 UNIT/ML
500 SOLUTION INTRAVENOUS PRN
OUTPATIENT
Start: 2024-04-02

## 2024-02-13 ENCOUNTER — OUTPATIENT INFUSION SERVICES (OUTPATIENT)
Dept: ONCOLOGY | Facility: MEDICAL CENTER | Age: 84
End: 2024-02-13
Attending: INTERNAL MEDICINE
Payer: MEDICARE

## 2024-02-13 VITALS
RESPIRATION RATE: 16 BRPM | DIASTOLIC BLOOD PRESSURE: 59 MMHG | HEART RATE: 83 BPM | SYSTOLIC BLOOD PRESSURE: 134 MMHG | HEIGHT: 67 IN | OXYGEN SATURATION: 95 % | BODY MASS INDEX: 27.54 KG/M2 | WEIGHT: 175.49 LBS | TEMPERATURE: 97.8 F

## 2024-02-13 DIAGNOSIS — D75.81 MYELOFIBROSIS (HCC): ICD-10-CM

## 2024-02-13 DIAGNOSIS — C80.1 ANEMIA ASSOCIATED WITH MALIGNANT NEOPLASTIC DISEASE (HCC): ICD-10-CM

## 2024-02-13 DIAGNOSIS — D63.0 ANEMIA ASSOCIATED WITH MALIGNANT NEOPLASTIC DISEASE (HCC): ICD-10-CM

## 2024-02-13 LAB
HCT VFR BLD AUTO: 31.3 % (ref 37–47)
HGB BLD-MCNC: 9.9 G/DL (ref 12–16)

## 2024-02-13 PROCEDURE — 700111 HCHG RX REV CODE 636 W/ 250 OVERRIDE (IP): Mod: JZ,JG | Performed by: INTERNAL MEDICINE

## 2024-02-13 PROCEDURE — 85014 HEMATOCRIT: CPT

## 2024-02-13 PROCEDURE — 96372 THER/PROPH/DIAG INJ SC/IM: CPT

## 2024-02-13 PROCEDURE — 85018 HEMOGLOBIN: CPT

## 2024-02-13 PROCEDURE — 36415 COLL VENOUS BLD VENIPUNCTURE: CPT

## 2024-02-13 RX ADMIN — EPOETIN ALFA-EPBX 40000 UNITS: 40000 INJECTION, SOLUTION INTRAVENOUS; SUBCUTANEOUS at 14:43

## 2024-02-13 ASSESSMENT — FIBROSIS 4 INDEX: FIB4 SCORE: .6712292723898204959

## 2024-02-14 NOTE — PROGRESS NOTES
Dedra presents to IS for labs/ possible Retacrit injection.  H&H collected via 23g butterfly to left AC, gauze and coban to site.  Hgb 9.9, parameters met for Retacrit.  Retacrit injection given to back right arm, adhesive dressing applied. Dedra tolerated well.  Discharged in NAD, next appointment confirmed.

## 2024-02-20 ENCOUNTER — OUTPATIENT INFUSION SERVICES (OUTPATIENT)
Dept: ONCOLOGY | Facility: MEDICAL CENTER | Age: 84
End: 2024-02-20
Attending: INTERNAL MEDICINE
Payer: MEDICARE

## 2024-02-20 VITALS
HEART RATE: 88 BPM | TEMPERATURE: 97.7 F | SYSTOLIC BLOOD PRESSURE: 118 MMHG | WEIGHT: 171.52 LBS | OXYGEN SATURATION: 95 % | DIASTOLIC BLOOD PRESSURE: 67 MMHG | HEIGHT: 67 IN | BODY MASS INDEX: 26.92 KG/M2 | RESPIRATION RATE: 18 BRPM

## 2024-02-20 DIAGNOSIS — C80.1 ANEMIA ASSOCIATED WITH MALIGNANT NEOPLASTIC DISEASE (HCC): ICD-10-CM

## 2024-02-20 DIAGNOSIS — D75.81 MYELOFIBROSIS (HCC): ICD-10-CM

## 2024-02-20 DIAGNOSIS — D63.0 ANEMIA ASSOCIATED WITH MALIGNANT NEOPLASTIC DISEASE (HCC): ICD-10-CM

## 2024-02-20 LAB
HCT VFR BLD AUTO: 31.5 % (ref 37–47)
HGB BLD-MCNC: 9.9 G/DL (ref 12–16)

## 2024-02-20 PROCEDURE — 85014 HEMATOCRIT: CPT

## 2024-02-20 PROCEDURE — 96372 THER/PROPH/DIAG INJ SC/IM: CPT

## 2024-02-20 PROCEDURE — 36415 COLL VENOUS BLD VENIPUNCTURE: CPT

## 2024-02-20 PROCEDURE — 700111 HCHG RX REV CODE 636 W/ 250 OVERRIDE (IP): Mod: JZ,JG | Performed by: INTERNAL MEDICINE

## 2024-02-20 PROCEDURE — 85018 HEMOGLOBIN: CPT

## 2024-02-20 RX ADMIN — EPOETIN ALFA-EPBX 40000 UNITS: 40000 INJECTION, SOLUTION INTRAVENOUS; SUBCUTANEOUS at 14:22

## 2024-02-20 ASSESSMENT — FIBROSIS 4 INDEX: FIB4 SCORE: .6712292723898204959

## 2024-02-20 NOTE — PROGRESS NOTES
Dedra arrived to the Infusion Center for labs and possible Retacrit injection for anemia. POC reviewed. 25 G butterfly used to draw labs from R AC/ gauze and coban applied. Lab results reviewed, Hgb 9.9, within parameters for treatment. Retacrit administered per MD order, R BA,Dedra tolerated well, bandaid applied. Confirmed next appointment and Dedra was discharged home in no acute distress.

## 2024-02-27 ENCOUNTER — OUTPATIENT INFUSION SERVICES (OUTPATIENT)
Dept: ONCOLOGY | Facility: MEDICAL CENTER | Age: 84
End: 2024-02-27
Attending: INTERNAL MEDICINE
Payer: MEDICARE

## 2024-02-27 VITALS
HEART RATE: 97 BPM | OXYGEN SATURATION: 96 % | RESPIRATION RATE: 18 BRPM | DIASTOLIC BLOOD PRESSURE: 60 MMHG | HEIGHT: 67 IN | BODY MASS INDEX: 27.2 KG/M2 | WEIGHT: 173.28 LBS | TEMPERATURE: 97.6 F | SYSTOLIC BLOOD PRESSURE: 119 MMHG

## 2024-02-27 DIAGNOSIS — C80.1 ANEMIA ASSOCIATED WITH MALIGNANT NEOPLASTIC DISEASE (HCC): ICD-10-CM

## 2024-02-27 DIAGNOSIS — D63.0 ANEMIA ASSOCIATED WITH MALIGNANT NEOPLASTIC DISEASE (HCC): ICD-10-CM

## 2024-02-27 DIAGNOSIS — D75.81 MYELOFIBROSIS (HCC): ICD-10-CM

## 2024-02-27 LAB
HCT VFR BLD AUTO: 29.7 % (ref 37–47)
HGB BLD-MCNC: 9.8 G/DL (ref 12–16)

## 2024-02-27 PROCEDURE — 36415 COLL VENOUS BLD VENIPUNCTURE: CPT

## 2024-02-27 PROCEDURE — 85014 HEMATOCRIT: CPT

## 2024-02-27 PROCEDURE — 700111 HCHG RX REV CODE 636 W/ 250 OVERRIDE (IP): Mod: JZ,JG | Performed by: INTERNAL MEDICINE

## 2024-02-27 PROCEDURE — 96372 THER/PROPH/DIAG INJ SC/IM: CPT

## 2024-02-27 PROCEDURE — 85018 HEMOGLOBIN: CPT

## 2024-02-27 RX ADMIN — EPOETIN ALFA-EPBX 40000 UNITS: 40000 INJECTION, SOLUTION INTRAVENOUS; SUBCUTANEOUS at 14:34

## 2024-02-27 ASSESSMENT — FIBROSIS 4 INDEX: FIB4 SCORE: .6712292723898204959

## 2024-03-05 ENCOUNTER — OUTPATIENT INFUSION SERVICES (OUTPATIENT)
Dept: ONCOLOGY | Facility: MEDICAL CENTER | Age: 84
End: 2024-03-05
Attending: INTERNAL MEDICINE
Payer: MEDICARE

## 2024-03-05 VITALS
RESPIRATION RATE: 16 BRPM | HEIGHT: 67 IN | SYSTOLIC BLOOD PRESSURE: 116 MMHG | DIASTOLIC BLOOD PRESSURE: 59 MMHG | OXYGEN SATURATION: 95 % | WEIGHT: 173.06 LBS | TEMPERATURE: 98.5 F | HEART RATE: 84 BPM | BODY MASS INDEX: 27.16 KG/M2

## 2024-03-05 DIAGNOSIS — D63.0 ANEMIA ASSOCIATED WITH MALIGNANT NEOPLASTIC DISEASE (HCC): ICD-10-CM

## 2024-03-05 DIAGNOSIS — C80.1 ANEMIA ASSOCIATED WITH MALIGNANT NEOPLASTIC DISEASE (HCC): ICD-10-CM

## 2024-03-05 DIAGNOSIS — D75.81 MYELOFIBROSIS (HCC): ICD-10-CM

## 2024-03-05 LAB
HCT VFR BLD AUTO: 33.9 % (ref 37–47)
HGB BLD-MCNC: 10.5 G/DL (ref 12–16)

## 2024-03-05 PROCEDURE — 85018 HEMOGLOBIN: CPT

## 2024-03-05 PROCEDURE — 85014 HEMATOCRIT: CPT

## 2024-03-05 PROCEDURE — 36415 COLL VENOUS BLD VENIPUNCTURE: CPT

## 2024-03-05 ASSESSMENT — FIBROSIS 4 INDEX: FIB4 SCORE: .6712292723898204959

## 2024-03-05 NOTE — PROGRESS NOTES
Dedra arrived to the Infusion Center for labs and possible Retacrit injection for anemia. POC reviewed. 25 G butterfly used to draw labs from L AC/ gauze and coban applied. Lab results reviewed, Hgb  10.5,  outside of  parameters for treatment.  Confirmed next appointment and Dedra was discharged home in no acute distress.

## 2024-03-12 ENCOUNTER — OUTPATIENT INFUSION SERVICES (OUTPATIENT)
Dept: ONCOLOGY | Facility: MEDICAL CENTER | Age: 84
End: 2024-03-12
Attending: INTERNAL MEDICINE
Payer: MEDICARE

## 2024-03-12 VITALS
BODY MASS INDEX: 26.92 KG/M2 | HEIGHT: 67 IN | SYSTOLIC BLOOD PRESSURE: 124 MMHG | WEIGHT: 171.52 LBS | OXYGEN SATURATION: 96 % | HEART RATE: 81 BPM | TEMPERATURE: 98 F | RESPIRATION RATE: 18 BRPM | DIASTOLIC BLOOD PRESSURE: 66 MMHG

## 2024-03-12 DIAGNOSIS — D63.0 ANEMIA ASSOCIATED WITH MALIGNANT NEOPLASTIC DISEASE (HCC): ICD-10-CM

## 2024-03-12 DIAGNOSIS — C80.1 ANEMIA ASSOCIATED WITH MALIGNANT NEOPLASTIC DISEASE (HCC): ICD-10-CM

## 2024-03-12 DIAGNOSIS — D75.81 MYELOFIBROSIS (HCC): ICD-10-CM

## 2024-03-12 LAB
HCT VFR BLD AUTO: 32.1 % (ref 37–47)
HGB BLD-MCNC: 10.5 G/DL (ref 12–16)

## 2024-03-12 PROCEDURE — 36415 COLL VENOUS BLD VENIPUNCTURE: CPT

## 2024-03-12 PROCEDURE — 85018 HEMOGLOBIN: CPT

## 2024-03-12 PROCEDURE — 85014 HEMATOCRIT: CPT

## 2024-03-12 ASSESSMENT — FIBROSIS 4 INDEX: FIB4 SCORE: .6712292723898204959

## 2024-03-12 NOTE — PROGRESS NOTES
Dedra is here for epoetin (Retacrit) injection. Labs drawn using 23 gauge butterfly to right AC; gauze/coban applied to site. Hgb = 10.5. Dedra does not meet parameters for Retacrit injection today. Updated Dedra on lab results.  Next appointment scheduled. Discharged to self care; no apparent distress noted.

## 2024-03-18 ENCOUNTER — HOSPITAL ENCOUNTER (OUTPATIENT)
Facility: MEDICAL CENTER | Age: 84
End: 2024-03-18
Attending: INTERNAL MEDICINE
Payer: MEDICARE

## 2024-03-18 LAB
ALBUMIN SERPL BCP-MCNC: 4 G/DL (ref 3.2–4.9)
ALBUMIN/GLOB SERPL: 1.9 G/DL
ALP SERPL-CCNC: 94 U/L (ref 30–99)
ALT SERPL-CCNC: 12 U/L (ref 2–50)
ANION GAP SERPL CALC-SCNC: 10 MMOL/L (ref 7–16)
AST SERPL-CCNC: 12 U/L (ref 12–45)
BILIRUB SERPL-MCNC: 0.7 MG/DL (ref 0.1–1.5)
BUN SERPL-MCNC: 14 MG/DL (ref 8–22)
CALCIUM ALBUM COR SERPL-MCNC: 9.3 MG/DL (ref 8.5–10.5)
CALCIUM SERPL-MCNC: 9.3 MG/DL (ref 8.5–10.5)
CHLORIDE SERPL-SCNC: 108 MMOL/L (ref 96–112)
CO2 SERPL-SCNC: 25 MMOL/L (ref 20–33)
CREAT SERPL-MCNC: 0.8 MG/DL (ref 0.5–1.4)
GFR SERPLBLD CREATININE-BSD FMLA CKD-EPI: 74 ML/MIN/1.73 M 2
GLOBULIN SER CALC-MCNC: 2.1 G/DL (ref 1.9–3.5)
GLUCOSE SERPL-MCNC: 93 MG/DL (ref 65–99)
POTASSIUM SERPL-SCNC: 4.5 MMOL/L (ref 3.6–5.5)
PROT SERPL-MCNC: 6.1 G/DL (ref 6–8.2)
SODIUM SERPL-SCNC: 143 MMOL/L (ref 135–145)

## 2024-03-18 PROCEDURE — 80053 COMPREHEN METABOLIC PANEL: CPT

## 2024-03-19 ENCOUNTER — OUTPATIENT INFUSION SERVICES (OUTPATIENT)
Dept: ONCOLOGY | Facility: MEDICAL CENTER | Age: 84
End: 2024-03-19
Attending: INTERNAL MEDICINE
Payer: MEDICARE

## 2024-03-19 VITALS
HEIGHT: 67 IN | SYSTOLIC BLOOD PRESSURE: 138 MMHG | HEART RATE: 92 BPM | BODY MASS INDEX: 27.44 KG/M2 | OXYGEN SATURATION: 95 % | RESPIRATION RATE: 18 BRPM | TEMPERATURE: 98.3 F | DIASTOLIC BLOOD PRESSURE: 56 MMHG | WEIGHT: 174.82 LBS

## 2024-03-19 DIAGNOSIS — C80.1 ANEMIA ASSOCIATED WITH MALIGNANT NEOPLASTIC DISEASE (HCC): ICD-10-CM

## 2024-03-19 DIAGNOSIS — D63.0 ANEMIA ASSOCIATED WITH MALIGNANT NEOPLASTIC DISEASE (HCC): ICD-10-CM

## 2024-03-19 DIAGNOSIS — D75.81 MYELOFIBROSIS (HCC): ICD-10-CM

## 2024-03-19 PROCEDURE — 96372 THER/PROPH/DIAG INJ SC/IM: CPT

## 2024-03-19 PROCEDURE — 700111 HCHG RX REV CODE 636 W/ 250 OVERRIDE (IP): Mod: JZ,JG | Performed by: INTERNAL MEDICINE

## 2024-03-19 RX ADMIN — EPOETIN ALFA-EPBX 40000 UNITS: 40000 INJECTION, SOLUTION INTRAVENOUS; SUBCUTANEOUS at 13:34

## 2024-03-19 ASSESSMENT — FIBROSIS 4 INDEX: FIB4 SCORE: 0.59

## 2024-03-19 NOTE — PROGRESS NOTES
Patient arrived to unit for Retacrit. She complains of baseline dyspnea with exertion. Otherwise, no other complaints. VSS. Labs from [facility] on 3/18 reviewed. Patient's Hgb came back at 9.6. Okay to proceed with treatment.    Retacrit administered subQ to CHAN.    Patient tolerated treatment without any adverse effects. Future appointments confirmed. Patient discharged home in stable condition.

## 2024-03-26 ENCOUNTER — OUTPATIENT INFUSION SERVICES (OUTPATIENT)
Dept: ONCOLOGY | Facility: MEDICAL CENTER | Age: 84
End: 2024-03-26
Attending: INTERNAL MEDICINE
Payer: MEDICARE

## 2024-03-26 VITALS
HEIGHT: 67 IN | DIASTOLIC BLOOD PRESSURE: 67 MMHG | SYSTOLIC BLOOD PRESSURE: 117 MMHG | OXYGEN SATURATION: 94 % | BODY MASS INDEX: 27.4 KG/M2 | HEART RATE: 89 BPM | RESPIRATION RATE: 18 BRPM | WEIGHT: 174.6 LBS | TEMPERATURE: 97.9 F

## 2024-03-26 DIAGNOSIS — D63.0 ANEMIA ASSOCIATED WITH MALIGNANT NEOPLASTIC DISEASE (HCC): ICD-10-CM

## 2024-03-26 DIAGNOSIS — C80.1 ANEMIA ASSOCIATED WITH MALIGNANT NEOPLASTIC DISEASE (HCC): ICD-10-CM

## 2024-03-26 DIAGNOSIS — D75.81 MYELOFIBROSIS (HCC): ICD-10-CM

## 2024-03-26 LAB
HCT VFR BLD AUTO: 29.6 % (ref 37–47)
HGB BLD-MCNC: 9.4 G/DL (ref 12–16)

## 2024-03-26 PROCEDURE — 700111 HCHG RX REV CODE 636 W/ 250 OVERRIDE (IP): Mod: JZ,JG | Performed by: INTERNAL MEDICINE

## 2024-03-26 PROCEDURE — 36415 COLL VENOUS BLD VENIPUNCTURE: CPT

## 2024-03-26 PROCEDURE — 85018 HEMOGLOBIN: CPT

## 2024-03-26 PROCEDURE — 85014 HEMATOCRIT: CPT

## 2024-03-26 PROCEDURE — 96372 THER/PROPH/DIAG INJ SC/IM: CPT

## 2024-03-26 RX ADMIN — EPOETIN ALFA-EPBX 40000 UNITS: 40000 INJECTION, SOLUTION INTRAVENOUS; SUBCUTANEOUS at 14:44

## 2024-03-26 ASSESSMENT — FIBROSIS 4 INDEX: FIB4 SCORE: 0.59

## 2024-03-26 NOTE — PROGRESS NOTES
Pt presents to [facility] for epoetin. Butterfly needle used in LAC; brisk blood return observed and pt tolerated well. Labs drawn and within treatable parameters. Epoetin injected into L back arm with no s/s of adverse reactions. Next appointment confirmed and education provided. Pt discharged to self care with all personal belongings and in NAD.

## 2024-04-02 ENCOUNTER — OUTPATIENT INFUSION SERVICES (OUTPATIENT)
Dept: ONCOLOGY | Facility: MEDICAL CENTER | Age: 84
End: 2024-04-02
Attending: INTERNAL MEDICINE
Payer: MEDICARE

## 2024-04-02 VITALS
TEMPERATURE: 97.2 F | DIASTOLIC BLOOD PRESSURE: 55 MMHG | SYSTOLIC BLOOD PRESSURE: 109 MMHG | RESPIRATION RATE: 18 BRPM | HEIGHT: 67 IN | OXYGEN SATURATION: 94 % | HEART RATE: 81 BPM | WEIGHT: 171.96 LBS | BODY MASS INDEX: 26.99 KG/M2

## 2024-04-02 DIAGNOSIS — C80.1 ANEMIA ASSOCIATED WITH MALIGNANT NEOPLASTIC DISEASE (HCC): ICD-10-CM

## 2024-04-02 DIAGNOSIS — D75.81 MYELOFIBROSIS (HCC): ICD-10-CM

## 2024-04-02 DIAGNOSIS — D63.0 ANEMIA ASSOCIATED WITH MALIGNANT NEOPLASTIC DISEASE (HCC): ICD-10-CM

## 2024-04-02 LAB
HCT VFR BLD AUTO: 29.9 % (ref 37–47)
HGB BLD-MCNC: 9.7 G/DL (ref 12–16)

## 2024-04-02 PROCEDURE — 96372 THER/PROPH/DIAG INJ SC/IM: CPT

## 2024-04-02 PROCEDURE — 85014 HEMATOCRIT: CPT

## 2024-04-02 PROCEDURE — 36415 COLL VENOUS BLD VENIPUNCTURE: CPT

## 2024-04-02 PROCEDURE — 700111 HCHG RX REV CODE 636 W/ 250 OVERRIDE (IP): Mod: JZ,JG | Performed by: INTERNAL MEDICINE

## 2024-04-02 PROCEDURE — 85018 HEMOGLOBIN: CPT

## 2024-04-02 RX ADMIN — EPOETIN ALFA-EPBX 40000 UNITS: 40000 INJECTION, SOLUTION INTRAVENOUS; SUBCUTANEOUS at 14:32

## 2024-04-02 ASSESSMENT — FIBROSIS 4 INDEX: FIB4 SCORE: 0.59

## 2024-04-02 NOTE — PROGRESS NOTES
Ms Lobo is here today for retacrit injection.    Venipuncture to her right AC. HGB today is 9.7.    Retacrit given SQ to the back of her right arm and was tolerated well.

## 2024-04-09 ENCOUNTER — OUTPATIENT INFUSION SERVICES (OUTPATIENT)
Dept: ONCOLOGY | Facility: MEDICAL CENTER | Age: 84
End: 2024-04-09
Attending: INTERNAL MEDICINE
Payer: MEDICARE

## 2024-04-09 VITALS
BODY MASS INDEX: 26.64 KG/M2 | WEIGHT: 169.75 LBS | SYSTOLIC BLOOD PRESSURE: 118 MMHG | OXYGEN SATURATION: 94 % | TEMPERATURE: 97 F | HEIGHT: 67 IN | HEART RATE: 89 BPM | RESPIRATION RATE: 18 BRPM | DIASTOLIC BLOOD PRESSURE: 60 MMHG

## 2024-04-09 DIAGNOSIS — D63.0 ANEMIA ASSOCIATED WITH MALIGNANT NEOPLASTIC DISEASE (HCC): ICD-10-CM

## 2024-04-09 DIAGNOSIS — D75.81 MYELOFIBROSIS (HCC): ICD-10-CM

## 2024-04-09 DIAGNOSIS — C80.1 ANEMIA ASSOCIATED WITH MALIGNANT NEOPLASTIC DISEASE (HCC): ICD-10-CM

## 2024-04-09 LAB
HCT VFR BLD AUTO: 31.9 % (ref 37–47)
HGB BLD-MCNC: 10.1 G/DL (ref 12–16)

## 2024-04-09 PROCEDURE — 36415 COLL VENOUS BLD VENIPUNCTURE: CPT

## 2024-04-09 PROCEDURE — 85014 HEMATOCRIT: CPT

## 2024-04-09 PROCEDURE — 85018 HEMOGLOBIN: CPT

## 2024-04-09 ASSESSMENT — FIBROSIS 4 INDEX: FIB4 SCORE: 0.59

## 2024-04-09 NOTE — PROGRESS NOTES
Patient arrived to unit for Retacrit. She denies any signs or symptoms. VSS.    Labs drawn peripherally using a butterfly needle. Site covered with gauze and coban.    Labs reviewed. Patient's Hgb came back at 10.1. Retacrit not indicated at this time.    Patient tolerated treatment without any adverse effects. Future appointments confirmed. Patient discharged home in stable condition.

## 2024-04-16 ENCOUNTER — OUTPATIENT INFUSION SERVICES (OUTPATIENT)
Dept: ONCOLOGY | Facility: MEDICAL CENTER | Age: 84
End: 2024-04-16
Attending: INTERNAL MEDICINE
Payer: MEDICARE

## 2024-04-16 VITALS
DIASTOLIC BLOOD PRESSURE: 62 MMHG | SYSTOLIC BLOOD PRESSURE: 131 MMHG | HEIGHT: 67 IN | TEMPERATURE: 98.1 F | RESPIRATION RATE: 18 BRPM | OXYGEN SATURATION: 95 % | WEIGHT: 172.84 LBS | BODY MASS INDEX: 27.13 KG/M2 | HEART RATE: 85 BPM

## 2024-04-16 DIAGNOSIS — C80.1 ANEMIA ASSOCIATED WITH MALIGNANT NEOPLASTIC DISEASE (HCC): ICD-10-CM

## 2024-04-16 DIAGNOSIS — D75.81 MYELOFIBROSIS (HCC): ICD-10-CM

## 2024-04-16 DIAGNOSIS — D63.0 ANEMIA ASSOCIATED WITH MALIGNANT NEOPLASTIC DISEASE (HCC): ICD-10-CM

## 2024-04-16 LAB
HCT VFR BLD AUTO: 32.4 % (ref 37–47)
HGB BLD-MCNC: 10 G/DL (ref 12–16)

## 2024-04-16 PROCEDURE — 700111 HCHG RX REV CODE 636 W/ 250 OVERRIDE (IP): Mod: JZ,JG | Performed by: INTERNAL MEDICINE

## 2024-04-16 PROCEDURE — 96372 THER/PROPH/DIAG INJ SC/IM: CPT

## 2024-04-16 PROCEDURE — 85018 HEMOGLOBIN: CPT

## 2024-04-16 PROCEDURE — 85014 HEMATOCRIT: CPT

## 2024-04-16 PROCEDURE — 36415 COLL VENOUS BLD VENIPUNCTURE: CPT

## 2024-04-16 RX ADMIN — EPOETIN ALFA-EPBX 40000 UNITS: 40000 INJECTION, SOLUTION INTRAVENOUS; SUBCUTANEOUS at 14:14

## 2024-04-16 ASSESSMENT — FIBROSIS 4 INDEX: FIB4 SCORE: 0.59

## 2024-04-16 NOTE — PROGRESS NOTES
Patient arrived to [facility] for labs and possible retacrit injection. Patient was ambulatory and does not report any pain or distress. Labs were collected by butterfly, labs reviewed Hgb 10.0 and Hct 33.9%. Retacrit was given on right back arm, refer to MAR, tolerated. Next appointment scheduled. Patient left [facility] and no signs of distress.

## 2024-04-23 ENCOUNTER — OUTPATIENT INFUSION SERVICES (OUTPATIENT)
Dept: ONCOLOGY | Facility: MEDICAL CENTER | Age: 84
End: 2024-04-23
Attending: INTERNAL MEDICINE
Payer: MEDICARE

## 2024-04-23 VITALS
RESPIRATION RATE: 18 BRPM | DIASTOLIC BLOOD PRESSURE: 67 MMHG | BODY MASS INDEX: 26.99 KG/M2 | WEIGHT: 171.96 LBS | HEIGHT: 67 IN | TEMPERATURE: 97.8 F | HEART RATE: 89 BPM | SYSTOLIC BLOOD PRESSURE: 137 MMHG | OXYGEN SATURATION: 98 %

## 2024-04-23 DIAGNOSIS — D75.81 MYELOFIBROSIS (HCC): ICD-10-CM

## 2024-04-23 DIAGNOSIS — C80.1 ANEMIA ASSOCIATED WITH MALIGNANT NEOPLASTIC DISEASE (HCC): ICD-10-CM

## 2024-04-23 DIAGNOSIS — D63.0 ANEMIA ASSOCIATED WITH MALIGNANT NEOPLASTIC DISEASE (HCC): ICD-10-CM

## 2024-04-23 LAB
HCT VFR BLD AUTO: 28.6 % (ref 37–47)
HGB BLD-MCNC: 9.1 G/DL (ref 12–16)

## 2024-04-23 PROCEDURE — 700111 HCHG RX REV CODE 636 W/ 250 OVERRIDE (IP): Mod: JZ,JG | Performed by: INTERNAL MEDICINE

## 2024-04-23 PROCEDURE — 96372 THER/PROPH/DIAG INJ SC/IM: CPT

## 2024-04-23 PROCEDURE — 36415 COLL VENOUS BLD VENIPUNCTURE: CPT

## 2024-04-23 PROCEDURE — 85018 HEMOGLOBIN: CPT

## 2024-04-23 PROCEDURE — 85014 HEMATOCRIT: CPT

## 2024-04-23 RX ADMIN — EPOETIN ALFA-EPBX 40000 UNITS: 40000 INJECTION, SOLUTION INTRAVENOUS; SUBCUTANEOUS at 15:11

## 2024-04-23 ASSESSMENT — FIBROSIS 4 INDEX: FIB4 SCORE: 0.59

## 2024-04-24 NOTE — PROGRESS NOTES
Dedra arrived to the Infusion Center for labs and possible Retacrit injection for anemia. Pt c/o of weakness from the waist down making it difficult to walk, needed assistance from a friend to sit. Pt reports this happened yesterday afternoon, and that she was doing yard work. Pt reports eating, staying hydrated.Pt denies falling. Episode resolved. Pt encouraged to  seek immediate medical attention if condition worsens, and to F/U with PCP.      POC reviewed. 25 G butterfly used to draw labs from L AC/ gauze and coban applied. Lab results reviewed, Hgb  9.1, within parameters for treatment. Retacrit administered per MD order, L BA, Dedra tolerated well/bandaid applied.   Confirmed next appointment and Dedra was discharged home in no acute distress.

## 2024-04-30 ENCOUNTER — OUTPATIENT INFUSION SERVICES (OUTPATIENT)
Dept: ONCOLOGY | Facility: MEDICAL CENTER | Age: 84
End: 2024-04-30
Attending: INTERNAL MEDICINE
Payer: MEDICARE

## 2024-04-30 VITALS
OXYGEN SATURATION: 93 % | HEIGHT: 67 IN | RESPIRATION RATE: 16 BRPM | BODY MASS INDEX: 27.13 KG/M2 | HEART RATE: 82 BPM | WEIGHT: 172.84 LBS | TEMPERATURE: 96.9 F | DIASTOLIC BLOOD PRESSURE: 57 MMHG | SYSTOLIC BLOOD PRESSURE: 108 MMHG

## 2024-04-30 DIAGNOSIS — C80.1 ANEMIA ASSOCIATED WITH MALIGNANT NEOPLASTIC DISEASE (HCC): ICD-10-CM

## 2024-04-30 DIAGNOSIS — D63.0 ANEMIA IN NEOPLASTIC DISEASE: ICD-10-CM

## 2024-04-30 DIAGNOSIS — D75.81 MYELOFIBROSIS (HCC): ICD-10-CM

## 2024-04-30 DIAGNOSIS — D63.0 ANEMIA ASSOCIATED WITH MALIGNANT NEOPLASTIC DISEASE (HCC): ICD-10-CM

## 2024-04-30 LAB
HCT VFR BLD AUTO: 31 % (ref 37–47)
HGB BLD-MCNC: 9.6 G/DL (ref 12–16)

## 2024-04-30 PROCEDURE — 700111 HCHG RX REV CODE 636 W/ 250 OVERRIDE (IP): Mod: JZ,JG | Performed by: INTERNAL MEDICINE

## 2024-04-30 PROCEDURE — 96372 THER/PROPH/DIAG INJ SC/IM: CPT

## 2024-04-30 PROCEDURE — 36591 DRAW BLOOD OFF VENOUS DEVICE: CPT

## 2024-04-30 RX ADMIN — EPOETIN ALFA-EPBX 40000 UNITS: 40000 INJECTION, SOLUTION INTRAVENOUS; SUBCUTANEOUS at 10:24

## 2024-04-30 ASSESSMENT — FIBROSIS 4 INDEX: FIB4 SCORE: 0.59

## 2024-04-30 NOTE — PROGRESS NOTES
Pt presented to Infusion for weekly Retacrit injection. POC discussed and pt verbalized understanding. Orders received from Dr. Foley. Pt denies pain or s/s of acute illness today. Labs drawn with 23G butterfly needle to RAC x1 attempt without difficulty; site covered with sterile gauze/coban and pt tolerated well. Hgb 9.6 and SBP <160. Retacrit  injection administered per MAR, band-aid applied to site and pt tolerated well. No s/s of reactions or complications noted. Future appts confirmed for pt and pt discharged to self care in [facility].

## 2024-05-07 ENCOUNTER — OUTPATIENT INFUSION SERVICES (OUTPATIENT)
Dept: ONCOLOGY | Facility: MEDICAL CENTER | Age: 84
End: 2024-05-07
Attending: INTERNAL MEDICINE
Payer: MEDICARE

## 2024-05-07 VITALS
BODY MASS INDEX: 27.13 KG/M2 | WEIGHT: 172.84 LBS | DIASTOLIC BLOOD PRESSURE: 60 MMHG | HEIGHT: 67 IN | SYSTOLIC BLOOD PRESSURE: 129 MMHG | OXYGEN SATURATION: 93 % | TEMPERATURE: 97.8 F | RESPIRATION RATE: 18 BRPM | HEART RATE: 89 BPM

## 2024-05-07 DIAGNOSIS — D75.81 MYELOFIBROSIS (HCC): ICD-10-CM

## 2024-05-07 DIAGNOSIS — D63.0 ANEMIA IN NEOPLASTIC DISEASE: ICD-10-CM

## 2024-05-07 DIAGNOSIS — C80.1 ANEMIA ASSOCIATED WITH MALIGNANT NEOPLASTIC DISEASE (HCC): ICD-10-CM

## 2024-05-07 DIAGNOSIS — D63.0 ANEMIA ASSOCIATED WITH MALIGNANT NEOPLASTIC DISEASE (HCC): ICD-10-CM

## 2024-05-07 LAB
HCT VFR BLD AUTO: 31.4 % (ref 37–47)
HGB BLD-MCNC: 10 G/DL (ref 12–16)

## 2024-05-07 RX ADMIN — EPOETIN ALFA-EPBX 40000 UNITS: 40000 INJECTION, SOLUTION INTRAVENOUS; SUBCUTANEOUS at 14:59

## 2024-05-07 ASSESSMENT — FIBROSIS 4 INDEX: FIB4 SCORE: 0.59

## 2024-05-07 NOTE — PROGRESS NOTES
Pt arrived ambulatory to  for Retacrit. POC discussed. Labs drawn from [facility], results reviewed, pt appropriate for treatment today. Retacrit injection given as ordered to back of L arm, pt tolerated well. Next appointment confirmed. Pt discharged from IS in NAD under self care.

## 2024-05-09 ENCOUNTER — DOCUMENTATION (OUTPATIENT)
Dept: HEALTH INFORMATION MANAGEMENT | Facility: OTHER | Age: 84
End: 2024-05-09
Payer: MEDICARE

## 2024-05-14 ENCOUNTER — OUTPATIENT INFUSION SERVICES (OUTPATIENT)
Dept: ONCOLOGY | Facility: MEDICAL CENTER | Age: 84
End: 2024-05-14
Attending: INTERNAL MEDICINE
Payer: MEDICARE

## 2024-05-14 VITALS
HEIGHT: 67 IN | WEIGHT: 168.43 LBS | TEMPERATURE: 98.1 F | BODY MASS INDEX: 26.44 KG/M2 | RESPIRATION RATE: 18 BRPM | SYSTOLIC BLOOD PRESSURE: 117 MMHG | OXYGEN SATURATION: 92 % | HEART RATE: 85 BPM | DIASTOLIC BLOOD PRESSURE: 58 MMHG

## 2024-05-14 DIAGNOSIS — D63.0 ANEMIA IN NEOPLASTIC DISEASE: ICD-10-CM

## 2024-05-14 DIAGNOSIS — D63.0 ANEMIA ASSOCIATED WITH MALIGNANT NEOPLASTIC DISEASE (HCC): ICD-10-CM

## 2024-05-14 DIAGNOSIS — C80.1 ANEMIA ASSOCIATED WITH MALIGNANT NEOPLASTIC DISEASE (HCC): ICD-10-CM

## 2024-05-14 DIAGNOSIS — D75.81 MYELOFIBROSIS (HCC): ICD-10-CM

## 2024-05-14 LAB
HCT VFR BLD AUTO: 30.8 % (ref 37–47)
HGB BLD-MCNC: 9.8 G/DL (ref 12–16)

## 2024-05-14 RX ADMIN — EPOETIN ALFA-EPBX 40000 UNITS: 40000 INJECTION, SOLUTION INTRAVENOUS; SUBCUTANEOUS at 14:15

## 2024-05-14 ASSESSMENT — FIBROSIS 4 INDEX: FIB4 SCORE: 0.59

## 2024-05-14 NOTE — PROGRESS NOTES
Patient arrived to [facility] for labs and possible retacrit injection. Patient was ambulatory and does not report any pain or distress. Labs were collected by butterfly, labs reviewed Hgb 9.8 and Hct 30.8%. Retacrit was given on right back arm, refer to MAR, tolerated. Next appointment scheduled. Patient left [facility] and no signs of distress.

## 2024-05-21 ENCOUNTER — OUTPATIENT INFUSION SERVICES (OUTPATIENT)
Dept: ONCOLOGY | Facility: MEDICAL CENTER | Age: 84
End: 2024-05-21
Attending: INTERNAL MEDICINE
Payer: MEDICARE

## 2024-05-21 VITALS
RESPIRATION RATE: 18 BRPM | TEMPERATURE: 97.4 F | SYSTOLIC BLOOD PRESSURE: 131 MMHG | HEIGHT: 67 IN | BODY MASS INDEX: 26.99 KG/M2 | WEIGHT: 171.96 LBS | HEART RATE: 90 BPM | DIASTOLIC BLOOD PRESSURE: 63 MMHG | OXYGEN SATURATION: 95 %

## 2024-05-21 DIAGNOSIS — D63.0 ANEMIA ASSOCIATED WITH MALIGNANT NEOPLASTIC DISEASE (HCC): ICD-10-CM

## 2024-05-21 DIAGNOSIS — D75.81 MYELOFIBROSIS (HCC): ICD-10-CM

## 2024-05-21 DIAGNOSIS — C80.1 ANEMIA ASSOCIATED WITH MALIGNANT NEOPLASTIC DISEASE (HCC): ICD-10-CM

## 2024-05-21 LAB
HCT VFR BLD AUTO: 31 % (ref 37–47)
HGB BLD-MCNC: 10.1 G/DL (ref 12–16)

## 2024-05-21 ASSESSMENT — FIBROSIS 4 INDEX: FIB4 SCORE: 0.59

## 2024-05-28 ENCOUNTER — OUTPATIENT INFUSION SERVICES (OUTPATIENT)
Dept: ONCOLOGY | Facility: MEDICAL CENTER | Age: 84
End: 2024-05-28
Attending: INTERNAL MEDICINE
Payer: MEDICARE

## 2024-05-28 VITALS
BODY MASS INDEX: 27.37 KG/M2 | OXYGEN SATURATION: 96 % | TEMPERATURE: 96.7 F | HEIGHT: 67 IN | DIASTOLIC BLOOD PRESSURE: 58 MMHG | SYSTOLIC BLOOD PRESSURE: 132 MMHG | HEART RATE: 94 BPM | WEIGHT: 174.38 LBS | RESPIRATION RATE: 16 BRPM

## 2024-05-28 DIAGNOSIS — D63.0 ANEMIA IN NEOPLASTIC DISEASE: ICD-10-CM

## 2024-05-28 DIAGNOSIS — D63.0 ANEMIA ASSOCIATED WITH MALIGNANT NEOPLASTIC DISEASE (HCC): ICD-10-CM

## 2024-05-28 DIAGNOSIS — D75.81 MYELOFIBROSIS (HCC): ICD-10-CM

## 2024-05-28 DIAGNOSIS — C80.1 ANEMIA ASSOCIATED WITH MALIGNANT NEOPLASTIC DISEASE (HCC): ICD-10-CM

## 2024-05-28 LAB
HCT VFR BLD AUTO: 30.3 % (ref 37–47)
HGB BLD-MCNC: 9.5 G/DL (ref 12–16)

## 2024-05-28 RX ADMIN — EPOETIN ALFA-EPBX 40000 UNITS: 40000 INJECTION, SOLUTION INTRAVENOUS; SUBCUTANEOUS at 14:32

## 2024-05-28 ASSESSMENT — FIBROSIS 4 INDEX: FIB4 SCORE: 0.59

## 2024-05-28 NOTE — PROGRESS NOTES
Patient arrived to [facility] for labs and possible retacrit injection. Patient was ambulatory and does not report any pain or distress. Labs were collected by butterfly, [facility], labs reviewed Hgb 9.5 and Hct 30.3%. Retacrit was given on left back arm, refer to MAR, tolerated. Next appointment scheduled. Patient left [facility] and no signs of distress.

## 2024-06-04 ENCOUNTER — OUTPATIENT INFUSION SERVICES (OUTPATIENT)
Dept: ONCOLOGY | Facility: MEDICAL CENTER | Age: 84
End: 2024-06-04
Attending: INTERNAL MEDICINE
Payer: MEDICARE

## 2024-06-04 VITALS
DIASTOLIC BLOOD PRESSURE: 61 MMHG | SYSTOLIC BLOOD PRESSURE: 139 MMHG | HEART RATE: 82 BPM | HEIGHT: 67 IN | OXYGEN SATURATION: 98 % | RESPIRATION RATE: 18 BRPM | BODY MASS INDEX: 27.3 KG/M2 | TEMPERATURE: 97 F | WEIGHT: 173.94 LBS

## 2024-06-04 DIAGNOSIS — D63.0 ANEMIA ASSOCIATED WITH MALIGNANT NEOPLASTIC DISEASE (HCC): ICD-10-CM

## 2024-06-04 DIAGNOSIS — D75.81 MYELOFIBROSIS (HCC): ICD-10-CM

## 2024-06-04 DIAGNOSIS — C80.1 ANEMIA ASSOCIATED WITH MALIGNANT NEOPLASTIC DISEASE (HCC): ICD-10-CM

## 2024-06-04 DIAGNOSIS — D63.0 ANEMIA IN NEOPLASTIC DISEASE: ICD-10-CM

## 2024-06-04 LAB
HCT VFR BLD AUTO: 29.9 % (ref 37–47)
HGB BLD-MCNC: 9.6 G/DL (ref 12–16)

## 2024-06-04 PROCEDURE — 85014 HEMATOCRIT: CPT

## 2024-06-04 PROCEDURE — 85018 HEMOGLOBIN: CPT

## 2024-06-04 PROCEDURE — 700111 HCHG RX REV CODE 636 W/ 250 OVERRIDE (IP): Mod: JZ,JG | Performed by: INTERNAL MEDICINE

## 2024-06-04 RX ADMIN — EPOETIN ALFA-EPBX 40000 UNITS: 40000 INJECTION, SOLUTION INTRAVENOUS; SUBCUTANEOUS at 15:24

## 2024-06-04 ASSESSMENT — FIBROSIS 4 INDEX: FIB4 SCORE: 0.59

## 2024-06-04 ASSESSMENT — PAIN DESCRIPTION - PAIN TYPE: TYPE: ACUTE PAIN

## 2024-06-04 NOTE — PROGRESS NOTES
Patient arrived to [facility] for labs and possible retacrit injection. Patient was ambulatory and does not report any pain or distress. Labs were collected by butterfly, [facility], labs reviewed Hgb 9.6 and Hct 29.9%. Retacrit was given on left back arm, refer to MAR, tolerated. Next appointment scheduled. Patient left [facility] and no signs of distress.

## 2024-06-11 ENCOUNTER — OUTPATIENT INFUSION SERVICES (OUTPATIENT)
Dept: ONCOLOGY | Facility: MEDICAL CENTER | Age: 84
End: 2024-06-11
Attending: INTERNAL MEDICINE
Payer: MEDICARE

## 2024-06-11 VITALS
OXYGEN SATURATION: 91 % | RESPIRATION RATE: 18 BRPM | DIASTOLIC BLOOD PRESSURE: 67 MMHG | HEART RATE: 85 BPM | SYSTOLIC BLOOD PRESSURE: 136 MMHG | WEIGHT: 174.16 LBS | HEIGHT: 67 IN | TEMPERATURE: 97 F | BODY MASS INDEX: 27.34 KG/M2

## 2024-06-11 DIAGNOSIS — I27.20 PULMONARY HYPERTENSION (HCC): ICD-10-CM

## 2024-06-11 DIAGNOSIS — D75.839 THROMBOCYTOSIS: ICD-10-CM

## 2024-06-11 DIAGNOSIS — M50.30 DDD (DEGENERATIVE DISC DISEASE), CERVICAL: ICD-10-CM

## 2024-06-11 DIAGNOSIS — D63.0 ANEMIA IN NEOPLASTIC DISEASE: ICD-10-CM

## 2024-06-11 DIAGNOSIS — K29.70 HELICOBACTER PYLORI GASTRITIS: ICD-10-CM

## 2024-06-11 DIAGNOSIS — D75.81 MYELOFIBROSIS (HCC): ICD-10-CM

## 2024-06-11 DIAGNOSIS — C80.1 ANEMIA ASSOCIATED WITH MALIGNANT NEOPLASTIC DISEASE (HCC): ICD-10-CM

## 2024-06-11 DIAGNOSIS — D49.4 NEOPLASM OF BLADDER: ICD-10-CM

## 2024-06-11 DIAGNOSIS — I50.32 CHRONIC HEART FAILURE WITH PRESERVED EJECTION FRACTION (HCC): ICD-10-CM

## 2024-06-11 DIAGNOSIS — R60.0 LOWER EXTREMITY EDEMA: ICD-10-CM

## 2024-06-11 DIAGNOSIS — D63.0 ANEMIA ASSOCIATED WITH MALIGNANT NEOPLASTIC DISEASE (HCC): ICD-10-CM

## 2024-06-11 DIAGNOSIS — B96.81 HELICOBACTER PYLORI GASTRITIS: ICD-10-CM

## 2024-06-11 DIAGNOSIS — K25.4 GASTROINTESTINAL HEMORRHAGE ASSOCIATED WITH GASTRIC ULCER: ICD-10-CM

## 2024-06-11 DIAGNOSIS — M79.3 LIPODERMATOSCLEROSIS OF BOTH LOWER EXTREMITIES: ICD-10-CM

## 2024-06-11 LAB
HCT VFR BLD AUTO: 29.5 % (ref 37–47)
HGB BLD-MCNC: 9.3 G/DL (ref 12–16)

## 2024-06-11 PROCEDURE — 96372 THER/PROPH/DIAG INJ SC/IM: CPT

## 2024-06-11 PROCEDURE — 700111 HCHG RX REV CODE 636 W/ 250 OVERRIDE (IP): Mod: JZ,JG | Performed by: INTERNAL MEDICINE

## 2024-06-11 PROCEDURE — 85018 HEMOGLOBIN: CPT

## 2024-06-11 PROCEDURE — 85014 HEMATOCRIT: CPT

## 2024-06-11 PROCEDURE — 36415 COLL VENOUS BLD VENIPUNCTURE: CPT

## 2024-06-11 RX ADMIN — EPOETIN ALFA-EPBX 40000 UNITS: 40000 INJECTION, SOLUTION INTRAVENOUS; SUBCUTANEOUS at 15:24

## 2024-06-11 ASSESSMENT — FIBROSIS 4 INDEX: FIB4 SCORE: 0.59

## 2024-06-11 NOTE — PROGRESS NOTES
Dedra arrives to [facility] for weekly Retacrit. Patient denies acute health concerns. C/o fatigue. H/H drawn via venipuncture from [facility] without difficulty. Hgb 9.3; Hct 29.5%, which DOES meet parameters to receive Retacrit today. Retacrit administered SQ to back of left arm by TM RN without issues. Patient tolerated injection well. Patient has her next appt. Discharged home to self care in no apparent distress

## 2024-06-18 ENCOUNTER — OUTPATIENT INFUSION SERVICES (OUTPATIENT)
Dept: ONCOLOGY | Facility: MEDICAL CENTER | Age: 84
End: 2024-06-18
Attending: INTERNAL MEDICINE
Payer: MEDICARE

## 2024-06-18 VITALS
OXYGEN SATURATION: 94 % | SYSTOLIC BLOOD PRESSURE: 160 MMHG | TEMPERATURE: 97.4 F | HEIGHT: 67 IN | HEART RATE: 90 BPM | DIASTOLIC BLOOD PRESSURE: 61 MMHG | BODY MASS INDEX: 26.92 KG/M2 | WEIGHT: 171.52 LBS | RESPIRATION RATE: 18 BRPM

## 2024-06-18 DIAGNOSIS — C80.1 ANEMIA ASSOCIATED WITH MALIGNANT NEOPLASTIC DISEASE (HCC): ICD-10-CM

## 2024-06-18 DIAGNOSIS — R60.0 LOWER EXTREMITY EDEMA: ICD-10-CM

## 2024-06-18 DIAGNOSIS — D63.0 ANEMIA IN NEOPLASTIC DISEASE: ICD-10-CM

## 2024-06-18 DIAGNOSIS — M79.3 LIPODERMATOSCLEROSIS OF BOTH LOWER EXTREMITIES: ICD-10-CM

## 2024-06-18 DIAGNOSIS — D75.81 MYELOFIBROSIS (HCC): ICD-10-CM

## 2024-06-18 DIAGNOSIS — D49.4 NEOPLASM OF BLADDER: ICD-10-CM

## 2024-06-18 DIAGNOSIS — D63.0 ANEMIA ASSOCIATED WITH MALIGNANT NEOPLASTIC DISEASE (HCC): ICD-10-CM

## 2024-06-18 DIAGNOSIS — K29.70 HELICOBACTER PYLORI GASTRITIS: ICD-10-CM

## 2024-06-18 DIAGNOSIS — B96.81 HELICOBACTER PYLORI GASTRITIS: ICD-10-CM

## 2024-06-18 DIAGNOSIS — I27.20 PULMONARY HYPERTENSION (HCC): ICD-10-CM

## 2024-06-18 DIAGNOSIS — I50.32 CHRONIC HEART FAILURE WITH PRESERVED EJECTION FRACTION (HCC): ICD-10-CM

## 2024-06-18 DIAGNOSIS — M50.30 DDD (DEGENERATIVE DISC DISEASE), CERVICAL: ICD-10-CM

## 2024-06-18 DIAGNOSIS — D75.839 THROMBOCYTOSIS: ICD-10-CM

## 2024-06-18 DIAGNOSIS — K25.4 GASTROINTESTINAL HEMORRHAGE ASSOCIATED WITH GASTRIC ULCER: ICD-10-CM

## 2024-06-18 LAB
HCT VFR BLD AUTO: 32.4 % (ref 37–47)
HGB BLD-MCNC: 10.2 G/DL (ref 12–16)

## 2024-06-18 PROCEDURE — 36415 COLL VENOUS BLD VENIPUNCTURE: CPT

## 2024-06-18 PROCEDURE — 85014 HEMATOCRIT: CPT

## 2024-06-18 PROCEDURE — 85018 HEMOGLOBIN: CPT

## 2024-06-18 ASSESSMENT — FIBROSIS 4 INDEX: FIB4 SCORE: 0.59

## 2024-06-18 NOTE — PROGRESS NOTES
Dedra arrives to [facility] for weekly Retacrit. Patient denies acute health concerns. C/o fatigue. H/H drawn via venipuncture from [facility] without difficulty. Hgb 10.2; Hct 32.4%, which DOES NOT meet parameters to receive Retacrit today. PPatient has her next appt. Discharged home to self care in no apparent distress

## 2024-06-25 ENCOUNTER — OUTPATIENT INFUSION SERVICES (OUTPATIENT)
Dept: ONCOLOGY | Facility: MEDICAL CENTER | Age: 84
End: 2024-06-25
Attending: INTERNAL MEDICINE
Payer: MEDICARE

## 2024-06-25 VITALS
RESPIRATION RATE: 18 BRPM | OXYGEN SATURATION: 98 % | BODY MASS INDEX: 26.64 KG/M2 | DIASTOLIC BLOOD PRESSURE: 62 MMHG | HEIGHT: 67 IN | TEMPERATURE: 97.8 F | WEIGHT: 169.75 LBS | HEART RATE: 98 BPM | SYSTOLIC BLOOD PRESSURE: 125 MMHG

## 2024-06-25 DIAGNOSIS — D75.81 MYELOFIBROSIS (HCC): ICD-10-CM

## 2024-06-25 DIAGNOSIS — C80.1 ANEMIA ASSOCIATED WITH MALIGNANT NEOPLASTIC DISEASE (HCC): ICD-10-CM

## 2024-06-25 DIAGNOSIS — D63.0 ANEMIA ASSOCIATED WITH MALIGNANT NEOPLASTIC DISEASE (HCC): ICD-10-CM

## 2024-06-25 DIAGNOSIS — D63.0 ANEMIA IN NEOPLASTIC DISEASE: ICD-10-CM

## 2024-06-25 LAB
HCT VFR BLD AUTO: 32.5 % (ref 37–47)
HGB BLD-MCNC: 10 G/DL (ref 12–16)

## 2024-06-25 PROCEDURE — 85018 HEMOGLOBIN: CPT

## 2024-06-25 PROCEDURE — 96372 THER/PROPH/DIAG INJ SC/IM: CPT

## 2024-06-25 PROCEDURE — 36415 COLL VENOUS BLD VENIPUNCTURE: CPT

## 2024-06-25 PROCEDURE — 700111 HCHG RX REV CODE 636 W/ 250 OVERRIDE (IP): Mod: JZ,JG | Performed by: INTERNAL MEDICINE

## 2024-06-25 PROCEDURE — 85014 HEMATOCRIT: CPT

## 2024-06-25 RX ADMIN — EPOETIN ALFA-EPBX 40000 UNITS: 40000 INJECTION, SOLUTION INTRAVENOUS; SUBCUTANEOUS at 16:19

## 2024-06-25 ASSESSMENT — FIBROSIS 4 INDEX: FIB4 SCORE: 0.59

## 2024-06-25 NOTE — PROGRESS NOTES
Dedra presents to IS for labs/ possible Retacrit injection.  H&H collected via 23g butterfly to left AC, gauze and coban to site.  Hgb 10.0, parameters met for Retacrit.  Retacrit injection given to back left arm, adhesive dressing applied. Dedra tolerated well.  Discharged in NAD, next appointment confirmed.

## 2024-07-02 ENCOUNTER — OUTPATIENT INFUSION SERVICES (OUTPATIENT)
Dept: ONCOLOGY | Facility: MEDICAL CENTER | Age: 84
End: 2024-07-02
Attending: INTERNAL MEDICINE
Payer: MEDICARE

## 2024-07-02 VITALS
SYSTOLIC BLOOD PRESSURE: 124 MMHG | OXYGEN SATURATION: 92 % | WEIGHT: 172.4 LBS | HEART RATE: 94 BPM | RESPIRATION RATE: 18 BRPM | HEIGHT: 67 IN | TEMPERATURE: 96.7 F | DIASTOLIC BLOOD PRESSURE: 61 MMHG | BODY MASS INDEX: 27.06 KG/M2

## 2024-07-02 DIAGNOSIS — D63.0 ANEMIA ASSOCIATED WITH MALIGNANT NEOPLASTIC DISEASE (HCC): ICD-10-CM

## 2024-07-02 DIAGNOSIS — D75.81 MYELOFIBROSIS (HCC): ICD-10-CM

## 2024-07-02 DIAGNOSIS — C80.1 ANEMIA ASSOCIATED WITH MALIGNANT NEOPLASTIC DISEASE (HCC): ICD-10-CM

## 2024-07-02 DIAGNOSIS — D63.0 ANEMIA IN NEOPLASTIC DISEASE: ICD-10-CM

## 2024-07-02 LAB
HCT VFR BLD AUTO: 29.2 % (ref 37–47)
HGB BLD-MCNC: 9.3 G/DL (ref 12–16)

## 2024-07-02 PROCEDURE — 85018 HEMOGLOBIN: CPT

## 2024-07-02 PROCEDURE — 96372 THER/PROPH/DIAG INJ SC/IM: CPT

## 2024-07-02 PROCEDURE — 36415 COLL VENOUS BLD VENIPUNCTURE: CPT

## 2024-07-02 PROCEDURE — 85014 HEMATOCRIT: CPT

## 2024-07-02 PROCEDURE — 700111 HCHG RX REV CODE 636 W/ 250 OVERRIDE (IP): Mod: JZ,JG | Performed by: INTERNAL MEDICINE

## 2024-07-02 RX ADMIN — EPOETIN ALFA-EPBX 40000 UNITS: 40000 INJECTION, SOLUTION INTRAVENOUS; SUBCUTANEOUS at 14:50

## 2024-07-02 ASSESSMENT — FIBROSIS 4 INDEX: FIB4 SCORE: 0.59

## 2024-07-09 ENCOUNTER — OUTPATIENT INFUSION SERVICES (OUTPATIENT)
Dept: ONCOLOGY | Facility: MEDICAL CENTER | Age: 84
End: 2024-07-09
Attending: INTERNAL MEDICINE
Payer: MEDICARE

## 2024-07-09 VITALS
BODY MASS INDEX: 27.44 KG/M2 | SYSTOLIC BLOOD PRESSURE: 125 MMHG | OXYGEN SATURATION: 98 % | RESPIRATION RATE: 18 BRPM | WEIGHT: 174.82 LBS | HEIGHT: 67 IN | HEART RATE: 94 BPM | DIASTOLIC BLOOD PRESSURE: 58 MMHG | TEMPERATURE: 97.4 F

## 2024-07-09 DIAGNOSIS — C80.1 ANEMIA ASSOCIATED WITH MALIGNANT NEOPLASTIC DISEASE (HCC): ICD-10-CM

## 2024-07-09 DIAGNOSIS — D75.81 MYELOFIBROSIS (HCC): ICD-10-CM

## 2024-07-09 DIAGNOSIS — D63.0 ANEMIA ASSOCIATED WITH MALIGNANT NEOPLASTIC DISEASE (HCC): ICD-10-CM

## 2024-07-09 DIAGNOSIS — D63.0 ANEMIA IN NEOPLASTIC DISEASE: ICD-10-CM

## 2024-07-09 LAB
HCT VFR BLD AUTO: 29 % (ref 37–47)
HGB BLD-MCNC: 9.3 G/DL (ref 12–16)

## 2024-07-09 PROCEDURE — 96372 THER/PROPH/DIAG INJ SC/IM: CPT

## 2024-07-09 PROCEDURE — 36415 COLL VENOUS BLD VENIPUNCTURE: CPT

## 2024-07-09 PROCEDURE — 85018 HEMOGLOBIN: CPT

## 2024-07-09 PROCEDURE — 700111 HCHG RX REV CODE 636 W/ 250 OVERRIDE (IP): Mod: JZ,JG | Performed by: INTERNAL MEDICINE

## 2024-07-09 PROCEDURE — 85014 HEMATOCRIT: CPT

## 2024-07-09 RX ADMIN — EPOETIN ALFA-EPBX 40000 UNITS: 40000 INJECTION, SOLUTION INTRAVENOUS; SUBCUTANEOUS at 14:29

## 2024-07-09 ASSESSMENT — FIBROSIS 4 INDEX: FIB4 SCORE: 0.59

## 2024-07-16 ENCOUNTER — OUTPATIENT INFUSION SERVICES (OUTPATIENT)
Dept: ONCOLOGY | Facility: MEDICAL CENTER | Age: 84
End: 2024-07-16
Attending: INTERNAL MEDICINE
Payer: MEDICARE

## 2024-07-16 VITALS
BODY MASS INDEX: 27.23 KG/M2 | RESPIRATION RATE: 16 BRPM | HEART RATE: 93 BPM | HEIGHT: 67 IN | OXYGEN SATURATION: 93 % | DIASTOLIC BLOOD PRESSURE: 52 MMHG | TEMPERATURE: 97 F | WEIGHT: 173.5 LBS | SYSTOLIC BLOOD PRESSURE: 120 MMHG

## 2024-07-16 DIAGNOSIS — C80.1 ANEMIA ASSOCIATED WITH MALIGNANT NEOPLASTIC DISEASE (HCC): ICD-10-CM

## 2024-07-16 DIAGNOSIS — D75.81 MYELOFIBROSIS (HCC): ICD-10-CM

## 2024-07-16 DIAGNOSIS — D63.0 ANEMIA IN NEOPLASTIC DISEASE: ICD-10-CM

## 2024-07-16 DIAGNOSIS — D63.0 ANEMIA ASSOCIATED WITH MALIGNANT NEOPLASTIC DISEASE (HCC): ICD-10-CM

## 2024-07-16 LAB
HCT VFR BLD AUTO: 29.5 % (ref 37–47)
HGB BLD-MCNC: 9.1 G/DL (ref 12–16)

## 2024-07-16 PROCEDURE — 85018 HEMOGLOBIN: CPT

## 2024-07-16 PROCEDURE — 36415 COLL VENOUS BLD VENIPUNCTURE: CPT

## 2024-07-16 PROCEDURE — 96372 THER/PROPH/DIAG INJ SC/IM: CPT

## 2024-07-16 PROCEDURE — 700111 HCHG RX REV CODE 636 W/ 250 OVERRIDE (IP): Mod: JZ,JG | Performed by: INTERNAL MEDICINE

## 2024-07-16 PROCEDURE — 85014 HEMATOCRIT: CPT

## 2024-07-16 RX ADMIN — EPOETIN ALFA-EPBX 40000 UNITS: 40000 INJECTION, SOLUTION INTRAVENOUS; SUBCUTANEOUS at 15:26

## 2024-07-16 ASSESSMENT — FIBROSIS 4 INDEX: FIB4 SCORE: 0.6

## 2024-07-23 ENCOUNTER — OUTPATIENT INFUSION SERVICES (OUTPATIENT)
Dept: ONCOLOGY | Facility: MEDICAL CENTER | Age: 84
End: 2024-07-23
Attending: INTERNAL MEDICINE
Payer: MEDICARE

## 2024-07-23 VITALS
RESPIRATION RATE: 18 BRPM | HEART RATE: 90 BPM | SYSTOLIC BLOOD PRESSURE: 145 MMHG | HEIGHT: 67 IN | OXYGEN SATURATION: 96 % | WEIGHT: 171.96 LBS | TEMPERATURE: 97.8 F | DIASTOLIC BLOOD PRESSURE: 67 MMHG | BODY MASS INDEX: 26.99 KG/M2

## 2024-07-23 DIAGNOSIS — D75.81 MYELOFIBROSIS (HCC): ICD-10-CM

## 2024-07-23 DIAGNOSIS — D63.0 ANEMIA IN NEOPLASTIC DISEASE: ICD-10-CM

## 2024-07-23 DIAGNOSIS — C80.1 ANEMIA ASSOCIATED WITH MALIGNANT NEOPLASTIC DISEASE (HCC): ICD-10-CM

## 2024-07-23 DIAGNOSIS — D63.0 ANEMIA ASSOCIATED WITH MALIGNANT NEOPLASTIC DISEASE (HCC): ICD-10-CM

## 2024-07-23 LAB
HCT VFR BLD AUTO: 30.6 % (ref 37–47)
HGB BLD-MCNC: 9.5 G/DL (ref 12–16)

## 2024-07-23 PROCEDURE — 36415 COLL VENOUS BLD VENIPUNCTURE: CPT

## 2024-07-23 PROCEDURE — 700111 HCHG RX REV CODE 636 W/ 250 OVERRIDE (IP): Mod: JZ,JG | Performed by: INTERNAL MEDICINE

## 2024-07-23 PROCEDURE — 85014 HEMATOCRIT: CPT

## 2024-07-23 PROCEDURE — 85018 HEMOGLOBIN: CPT

## 2024-07-23 PROCEDURE — 96372 THER/PROPH/DIAG INJ SC/IM: CPT

## 2024-07-23 RX ORDER — 0.9 % SODIUM CHLORIDE 0.9 %
3 VIAL (ML) INJECTION PRN
Status: CANCELLED | OUTPATIENT
Start: 2024-07-23

## 2024-07-23 RX ORDER — 0.9 % SODIUM CHLORIDE 0.9 %
10 VIAL (ML) INJECTION PRN
Status: CANCELLED | OUTPATIENT
Start: 2024-07-23

## 2024-07-23 RX ORDER — HEPARIN SODIUM (PORCINE) LOCK FLUSH IV SOLN 100 UNIT/ML 100 UNIT/ML
500 SOLUTION INTRAVENOUS PRN
Status: CANCELLED | OUTPATIENT
Start: 2024-07-23

## 2024-07-23 RX ORDER — 0.9 % SODIUM CHLORIDE 0.9 %
VIAL (ML) INJECTION PRN
Status: CANCELLED | OUTPATIENT
Start: 2024-07-23

## 2024-07-23 RX ADMIN — EPOETIN ALFA-EPBX 40000 UNITS: 40000 INJECTION, SOLUTION INTRAVENOUS; SUBCUTANEOUS at 14:20

## 2024-07-23 ASSESSMENT — FIBROSIS 4 INDEX: FIB4 SCORE: 0.6

## 2024-07-30 ENCOUNTER — OUTPATIENT INFUSION SERVICES (OUTPATIENT)
Dept: ONCOLOGY | Facility: MEDICAL CENTER | Age: 84
End: 2024-07-30
Attending: INTERNAL MEDICINE
Payer: MEDICARE

## 2024-07-30 VITALS
SYSTOLIC BLOOD PRESSURE: 123 MMHG | HEIGHT: 67 IN | TEMPERATURE: 97.7 F | DIASTOLIC BLOOD PRESSURE: 59 MMHG | RESPIRATION RATE: 18 BRPM | OXYGEN SATURATION: 95 % | HEART RATE: 100 BPM | BODY MASS INDEX: 26.99 KG/M2 | WEIGHT: 171.96 LBS

## 2024-07-30 DIAGNOSIS — D63.0 ANEMIA ASSOCIATED WITH MALIGNANT NEOPLASTIC DISEASE (HCC): ICD-10-CM

## 2024-07-30 DIAGNOSIS — C80.1 ANEMIA ASSOCIATED WITH MALIGNANT NEOPLASTIC DISEASE (HCC): ICD-10-CM

## 2024-07-30 DIAGNOSIS — D63.0 ANEMIA IN NEOPLASTIC DISEASE: ICD-10-CM

## 2024-07-30 DIAGNOSIS — D75.81 MYELOFIBROSIS (HCC): ICD-10-CM

## 2024-07-30 LAB
HCT VFR BLD AUTO: 30.1 % (ref 37–47)
HGB BLD-MCNC: 9.3 G/DL (ref 12–16)

## 2024-07-30 PROCEDURE — 700111 HCHG RX REV CODE 636 W/ 250 OVERRIDE (IP): Mod: JZ,JG | Performed by: INTERNAL MEDICINE

## 2024-07-30 PROCEDURE — 96372 THER/PROPH/DIAG INJ SC/IM: CPT

## 2024-07-30 PROCEDURE — 85018 HEMOGLOBIN: CPT

## 2024-07-30 PROCEDURE — 85014 HEMATOCRIT: CPT

## 2024-07-30 PROCEDURE — 36415 COLL VENOUS BLD VENIPUNCTURE: CPT

## 2024-07-30 RX ADMIN — EPOETIN ALFA-EPBX 40000 UNITS: 40000 INJECTION, SOLUTION INTRAVENOUS; SUBCUTANEOUS at 14:40

## 2024-07-30 ASSESSMENT — FIBROSIS 4 INDEX: FIB4 SCORE: 0.6

## 2024-08-06 ENCOUNTER — OUTPATIENT INFUSION SERVICES (OUTPATIENT)
Dept: ONCOLOGY | Facility: MEDICAL CENTER | Age: 84
End: 2024-08-06
Attending: INTERNAL MEDICINE
Payer: MEDICARE

## 2024-08-06 VITALS
DIASTOLIC BLOOD PRESSURE: 62 MMHG | WEIGHT: 178.57 LBS | SYSTOLIC BLOOD PRESSURE: 135 MMHG | BODY MASS INDEX: 28.03 KG/M2 | HEIGHT: 67 IN | HEART RATE: 87 BPM | TEMPERATURE: 98 F | RESPIRATION RATE: 18 BRPM | OXYGEN SATURATION: 98 %

## 2024-08-06 DIAGNOSIS — D63.0 ANEMIA ASSOCIATED WITH MALIGNANT NEOPLASTIC DISEASE (HCC): ICD-10-CM

## 2024-08-06 DIAGNOSIS — D63.0 ANEMIA IN NEOPLASTIC DISEASE: ICD-10-CM

## 2024-08-06 DIAGNOSIS — D75.81 MYELOFIBROSIS (HCC): ICD-10-CM

## 2024-08-06 DIAGNOSIS — C80.1 ANEMIA ASSOCIATED WITH MALIGNANT NEOPLASTIC DISEASE (HCC): ICD-10-CM

## 2024-08-06 LAB
HCT VFR BLD AUTO: 29.8 % (ref 37–47)
HGB BLD-MCNC: 9.4 G/DL (ref 12–16)

## 2024-08-06 PROCEDURE — 96372 THER/PROPH/DIAG INJ SC/IM: CPT

## 2024-08-06 PROCEDURE — 36415 COLL VENOUS BLD VENIPUNCTURE: CPT

## 2024-08-06 PROCEDURE — 85018 HEMOGLOBIN: CPT

## 2024-08-06 PROCEDURE — 85014 HEMATOCRIT: CPT

## 2024-08-06 PROCEDURE — 700111 HCHG RX REV CODE 636 W/ 250 OVERRIDE (IP): Mod: JZ,JG | Performed by: INTERNAL MEDICINE

## 2024-08-06 RX ADMIN — EPOETIN ALFA-EPBX 40000 UNITS: 40000 INJECTION, SOLUTION INTRAVENOUS; SUBCUTANEOUS at 14:28

## 2024-08-06 ASSESSMENT — FIBROSIS 4 INDEX: FIB4 SCORE: 0.6

## 2024-08-06 NOTE — PROGRESS NOTES
Pt arrived ambulatory for weekly lab draw and Retacrit, denies c/o, states she is tolerating treatment well, no new issues.  Labs drawn from [facility] with butterfly needle, gauze and coban applied. Results reviewed, meets parameters for Retacrit injection.  Retacrit given in left arm, bandaid applied.  Reviewed upcoming appts, Dc'd home without incident and will return as scheduled.

## 2024-08-07 ENCOUNTER — OFFICE VISIT (OUTPATIENT)
Dept: MEDICAL GROUP | Facility: MEDICAL CENTER | Age: 84
End: 2024-08-07
Payer: MEDICARE

## 2024-08-07 VITALS
BODY MASS INDEX: 27.78 KG/M2 | OXYGEN SATURATION: 93 % | HEART RATE: 92 BPM | SYSTOLIC BLOOD PRESSURE: 116 MMHG | TEMPERATURE: 98.4 F | HEIGHT: 67 IN | WEIGHT: 177 LBS | DIASTOLIC BLOOD PRESSURE: 60 MMHG

## 2024-08-07 DIAGNOSIS — I36.1 NONRHEUMATIC TRICUSPID VALVE REGURGITATION: ICD-10-CM

## 2024-08-07 DIAGNOSIS — D63.0 ANEMIA IN NEOPLASTIC DISEASE: ICD-10-CM

## 2024-08-07 DIAGNOSIS — D75.81 MYELOFIBROSIS (HCC): ICD-10-CM

## 2024-08-07 DIAGNOSIS — G45.9 TIA (TRANSIENT ISCHEMIC ATTACK): ICD-10-CM

## 2024-08-07 DIAGNOSIS — C67.9 MALIGNANT NEOPLASM OF URINARY BLADDER, UNSPECIFIED SITE (HCC): ICD-10-CM

## 2024-08-07 DIAGNOSIS — I50.32 CHRONIC HEART FAILURE WITH PRESERVED EJECTION FRACTION (HCC): ICD-10-CM

## 2024-08-07 DIAGNOSIS — D75.839 THROMBOCYTOSIS: ICD-10-CM

## 2024-08-07 DIAGNOSIS — R60.0 LOWER EXTREMITY EDEMA: ICD-10-CM

## 2024-08-07 PROBLEM — K29.70 HELICOBACTER PYLORI GASTRITIS: Status: RESOLVED | Noted: 2023-09-25 | Resolved: 2024-08-07

## 2024-08-07 PROBLEM — B96.81 HELICOBACTER PYLORI GASTRITIS: Status: RESOLVED | Noted: 2023-09-25 | Resolved: 2024-08-07

## 2024-08-07 PROBLEM — Z85.51 HISTORY OF PRIMARY MALIGNANT NEOPLASM OF URINARY BLADDER: Status: RESOLVED | Noted: 2018-05-17 | Resolved: 2024-08-07

## 2024-08-07 PROCEDURE — 99213 OFFICE O/P EST LOW 20 MIN: CPT | Performed by: FAMILY MEDICINE

## 2024-08-07 PROCEDURE — 3074F SYST BP LT 130 MM HG: CPT | Performed by: FAMILY MEDICINE

## 2024-08-07 PROCEDURE — 3078F DIAST BP <80 MM HG: CPT | Performed by: FAMILY MEDICINE

## 2024-08-07 ASSESSMENT — FIBROSIS 4 INDEX: FIB4 SCORE: 0.6

## 2024-08-07 NOTE — PROGRESS NOTES
Verbal consent was acquired by the patient to use [employer] ambient listening note generation during this visit    Subjective:     CC:  Diagnoses of TIA (transient ischemic attack), Myelofibrosis (HCC), Thrombocytosis, Anemia in neoplastic disease, Malignant neoplasm of urinary bladder, unspecified site (HCC), Chronic heart failure with preserved ejection fraction (HCC), Nonrheumatic tricuspid valve regurgitation, and Lower extremity edema were pertinent to this visit.    HISTORY OF THE PRESENT ILLNESS: Patient is a 84 y.o. female. This pleasant patient is here today to establish care and discuss chronic disease management and strange symptoms regarding loss of use of legs. His/her prior PCP was Dr. Boss.    History of Present Illness  The patient is an 84-year-old female who is coming in to establish care.    She is here as her previous primary care physician, Dr. Boss, has since left the practice. She has a history of secondary myelofibrosis diagnosed in 12/2022, high platelets, thrombocytosis identified in 04/2001, and a leaky heart valve. Her gallbladder was removed in 10/2001. She underwent a left hip replacement in 10/2006 and a right hip replacement in 03/2011. She had bladder cancer in 03/2015, with a recurrence in 08/2020, and again underwent surgery on 09/27/2023. She underwent neck surgery in 02/2016. She underwent a left knee replacement in 04/2019, an appendectomy in 01/2021, and a stomach ulcer in 09/2022. A leaky left valve was identified in 11/2022. She is scheduled for another echocardiogram in 11/2024. She underwent cataract surgery in 2021. She had H. pylori in 2023, which was treated with a 3-month course of antibiotics from 03/21/2023 to 09/26/2023, and was cleared on 01/24/2024. She takes torsemide 20 mg, but has not taken it for the past 3 days due to friends with cancer and other commitments. She has gained 5 pounds of water weight. She consumes banana milk every morning. She  experiences sudden weakness in her legs every 2 weeks, lasting about 5 minutes, and has experienced this three times. She finds it difficult to move her legs to walk. She suspects this may be related to her diuretic medication. She once had to pause for a moment before moving slowly. She needed assistance to lift her legs to walk. The first episode occurred about 6 weeks ago. She once felt a light tingle while sitting, which improved after lying on the floor. She denies any falls. She has had 5 blood transfusions, but since her EPO treatment, her hemoglobin has remained stable. There was a 3-week period when her hemoglobin was high, so she did not receive her injection. She had a year-long period of limited functionality, which she attributes to her bone marrow cancer, bladder cancer, and H. pylori. She has been trying to build her endurance, but struggles to do so due to a cold. She reports poor sleep. She has metal in her hips, but does not have a pacemaker or claustrophobia. Her leg swelling has improved. Her weight goal is 167 pounds. She has been eating a lot of fresh fruits this summer. She performs deep breathing exercises, which she believes help.    Supplemental Information  She receives EPO infusion every Tuesday for bone marrow cancer. She is anemic.    Current Outpatient Medications Ordered in Epic   Medication Sig Dispense Refill    torsemide (DEMADEX) 20 MG Tab Take 1 Tablet by mouth every day. 90 Tablet 3    epoetin (EPOGEN,PROCRIT) 72541 Unit/mL Solution Inject 10,000 Units under the skin every 7 days. Retacrit 40,000 units      acetaminophen (TYLENOL) 500 MG Tab Take 500-1,000 mg by mouth 1 time a day as needed for Mild Pain.      Carboxymethylcellulose Sod PF 0.5 % Solution Administer 1 Drop into both eyes every evening.      anagrelide (AGRYLIN) 0.5 MG Cap Take 1 Capsule by mouth 2 times a day.       No current [facility]-ordered facility-administered medications on file.       No Known  "Allergies    Past Medical History:   Diagnosis Date    Adverse effect of anesthesia     core body temp drops per hx.    Anemia     Anesthesia     \"core temperature drops\"    Arrhythmia     Arthritis     oa, neck and hips    Cancer (HCC) 2015    bladder    Cardiomegaly 10/06/2022    Incidental on CXR. Also has lower extremity pitting edema. Will obtain TTE    Cataract 03/21/2019    bilateral no surgery    GERD (gastroesophageal reflux disease)     Glaucoma 02/2015    \"beginning\"    Helicobacter pylori gastritis 09/25/2023    History of primary malignant neoplasm of urinary bladder 05/17/2018    Personal history of malignant neoplasm of bladder    Osteoporosis, unspecified     Pain     neck    Pneumonia 10/2001    Systolic murmur 10/06/2022    Thrombocytosis 09/29/2010    Ulcer     gastric ulcers    Unspecified disorder of thyroid     LOW THYROID LEVELS- TOOK  MEDS    Urinary bladder disorder 2015    bladder cancer    Urinary incontinence 03/21/2019    Vascular insufficiency of limb 2005    right lower extrematy    Vitamin d deficiency 09/29/2010       Past Surgical History:   Procedure Laterality Date    GA UPPER GI ENDOSCOPY,DIAGNOSIS N/A 10/1/2022    Procedure: GASTROSCOPY;  Surgeon: Ana Gaston D.O.;  Location: [facility];  Service: Gastroenterology    GA UPPER GI ENDOSCOPY,SCLER INJECT N/A 10/1/2022    Procedure: GASTROSCOPY, WITH SCLEROTHERAPY;  Surgeon: Ana Gaston D.O.;  Location: [facility];  Service: Gastroenterology    GA UPPER GI ENDOSCOPY,CTRL BLEED N/A 10/1/2022    Procedure: EGD, WITH CLIP PLACEMENT;  Surgeon: Ana Gaston D.O.;  Location: [facility];  Service: Gastroenterology    PB TOTAL KNEE ARTHROPLASTY Left 4/3/2019    Procedure: KNEE ARTHROPLASTY TOTAL;  Surgeon: Robert Vazquez M.D.;  Location: [facility];  Service: Orthopedics    CERVICAL DISK AND FUSION ANTERIOR  2/9/2016    Procedure: CERVICAL DISK AND FUSION ANTERIOR  C3-7;  Surgeon: Dusty NOBLES" "RANGEL Rao;  Location: SURGERY [facility];  Service:     CYSTOSCOPY  3/3/2015    Performed by Walt Olson M.D. at SURGERY [facility]    TRANS URETHRAL RESECTION BLADDER  3/3/2015    Performed by Walt Olson M.D. at SURGERY [facility]    HIP REPLACEMENT, TOTAL  2006    Dr. Vazquez    CHOLECYSTECTOMY  2003    LUMPECTOMY  1982    cyst    ARTHROPLASTY      bi lateral-Dr. Vazquez       Family History   Problem Relation Age of Onset    Cancer Mother         brain    Lung Disease Mother         smoker    Alcohol/Drug Father         etoh    Lung Disease Sister         COPD smokers    Alcohol/Drug Sister         etoh    Lung Disease Sister         smoker-copd    Alcohol/Drug Sister         etoh       Social History     Socioeconomic History    Marital status: Single   Tobacco Use    Smoking status: Never    Smokeless tobacco: Never   Vaping Use    Vaping status: Never Used   Substance and Sexual Activity    Alcohol use: No     Alcohol/week: 0.0 oz    Drug use: No    Sexual activity: Never       Health Maintenance: Completed    ROS:   ROS see HPI      Objective:       Exam: /60   Pulse 92   Temp 36.9 °C (98.4 °F) (Temporal)   Ht 1.702 m (5' 7\")   Wt 80.3 kg (177 lb)   SpO2 93%  Body mass index is 27.72 kg/m².    Physical Exam  Vitals reviewed.   Constitutional:       General: She is not in acute distress.     Appearance: Normal appearance.   HENT:      Head: Normocephalic and atraumatic.   Eyes:      Extraocular Movements: Extraocular movements intact.      Conjunctiva/sclera: Conjunctivae normal.      Pupils: Pupils are equal, round, and reactive to light.   Cardiovascular:      Rate and Rhythm: Normal rate and regular rhythm.      Heart sounds: Murmur heard.   Musculoskeletal:      Right lower leg: Edema present.      Left lower leg: Edema present.   Skin:     General: Skin is warm and dry.   Neurological:      Mental Status: She is alert. Mental status is at baseline.      Cranial Nerves: " Cranial nerves 2-12 are intact. No cranial nerve deficit or facial asymmetry.      Sensory: Sensation is intact.      Motor: Motor function is intact. No weakness, tremor, abnormal muscle tone or pronator drift.      Coordination: Coordination is intact. Romberg sign negative. Coordination normal. Finger-Nose-Finger Test and Heel to Shin Test normal.      Gait: Gait normal.   Psychiatric:         Mood and Affect: Mood normal.         Behavior: Behavior normal.         Results  Imaging  Echo from May 18, 2013 showed moderate tricuspid regurgitation and normal left-sided function and pressures.       Assessment & Plan:   84 y.o. female with the following -    1. TIA (transient ischemic attack)  - MR-BRAIN-W/O; Future    2. Myelofibrosis (HCC)    3. Thrombocytosis    4. Anemia in neoplastic disease    5. Malignant neoplasm of urinary bladder, unspecified site (HCC)    6. Chronic heart failure with preserved ejection fraction (HCC)  - Comp Metabolic Panel; Future    7. Nonrheumatic tricuspid valve regurgitation    8. Lower extremity edema  - Comp Metabolic Panel; Future      Assessment & Plan  Transient ischemic attack.  An echocardiogram from 05/18/2013 revealed moderate tricuspid regurgitation, but her heart appears to be functioning well with normal left-sided function and pressures. The leg weakness could be due to electrolyte imbalance, though I think this is less likely as I would not expected to correct that fast. This could be due to a transient event such as transient ischemic attack. She was advised to maintain a seated position for safety and not try to walk around when her legs are acting up. Electrolytes, kidney function, and liver function will be checked. An MRI of the brain was ordered. She was advised to monitor the symptoms and document them. Should the numbness/weakness persist for more than a few minutes, patient advised to seek immediate transport to ER for emergent workup and  treatment.    Myelofibrosis, thrombocytosis, anemia  Patient is followed by oncology for this she gets regular treatments with Epogen and is maintaining her blood counts so far.    Malignant neoplasm of urinary bladder  Chronic and stable patient is under active surveillance with urology with no evidence of recurrence.  Apparently for her third time is a charm.    CHF, tricuspid regurgitation, lower extremity edema  Chronic and relatively stable, patient is up in weight she was busy helping someone else out and did not take her diuretics as scheduled.  She plans on working on bringing this down.  She does not have any current significant shortness of breath.  Patient to continue follow-up with cardiology as directed.    Follow-up  A follow-up visit is scheduled in 3 months.        Return in about 3 months (around 11/7/2024), or if symptoms worsen or fail to improve, for Lab F/U, Imaging F/U.    Please note that this dictation was created using voice recognition software. I have made every reasonable attempt to correct obvious errors, but I expect that there are errors of grammar and possibly content that I did not discover before finalizing the note.

## 2024-08-13 ENCOUNTER — OUTPATIENT INFUSION SERVICES (OUTPATIENT)
Dept: ONCOLOGY | Facility: MEDICAL CENTER | Age: 84
End: 2024-08-13
Attending: INTERNAL MEDICINE
Payer: MEDICARE

## 2024-08-13 VITALS
RESPIRATION RATE: 18 BRPM | OXYGEN SATURATION: 93 % | DIASTOLIC BLOOD PRESSURE: 60 MMHG | SYSTOLIC BLOOD PRESSURE: 136 MMHG | BODY MASS INDEX: 26.37 KG/M2 | TEMPERATURE: 97.8 F | HEART RATE: 98 BPM | HEIGHT: 67 IN | WEIGHT: 167.99 LBS

## 2024-08-13 DIAGNOSIS — D63.0 ANEMIA ASSOCIATED WITH MALIGNANT NEOPLASTIC DISEASE (HCC): ICD-10-CM

## 2024-08-13 DIAGNOSIS — R60.0 LOWER EXTREMITY EDEMA: ICD-10-CM

## 2024-08-13 DIAGNOSIS — D63.0 ANEMIA IN NEOPLASTIC DISEASE: ICD-10-CM

## 2024-08-13 DIAGNOSIS — I50.32 CHRONIC HEART FAILURE WITH PRESERVED EJECTION FRACTION (HCC): ICD-10-CM

## 2024-08-13 DIAGNOSIS — D75.81 MYELOFIBROSIS (HCC): ICD-10-CM

## 2024-08-13 DIAGNOSIS — C80.1 ANEMIA ASSOCIATED WITH MALIGNANT NEOPLASTIC DISEASE (HCC): ICD-10-CM

## 2024-08-13 LAB
ALBUMIN SERPL BCP-MCNC: 3.8 G/DL (ref 3.2–4.9)
ALBUMIN/GLOB SERPL: 1.9 G/DL
ALP SERPL-CCNC: 89 U/L (ref 30–99)
ALT SERPL-CCNC: 13 U/L (ref 2–50)
ANION GAP SERPL CALC-SCNC: 13 MMOL/L (ref 7–16)
AST SERPL-CCNC: 20 U/L (ref 12–45)
BILIRUB SERPL-MCNC: 1.3 MG/DL (ref 0.1–1.5)
BUN SERPL-MCNC: 19 MG/DL (ref 8–22)
CALCIUM ALBUM COR SERPL-MCNC: 9.2 MG/DL (ref 8.5–10.5)
CALCIUM SERPL-MCNC: 9 MG/DL (ref 8.5–10.5)
CHLORIDE SERPL-SCNC: 106 MMOL/L (ref 96–112)
CO2 SERPL-SCNC: 25 MMOL/L (ref 20–33)
CREAT SERPL-MCNC: 0.77 MG/DL (ref 0.5–1.4)
GFR SERPLBLD CREATININE-BSD FMLA CKD-EPI: 77 ML/MIN/1.73 M 2
GLOBULIN SER CALC-MCNC: 2 G/DL (ref 1.9–3.5)
GLUCOSE SERPL-MCNC: 87 MG/DL (ref 65–99)
HCT VFR BLD AUTO: 32.6 % (ref 37–47)
HGB BLD-MCNC: 10 G/DL (ref 12–16)
POTASSIUM SERPL-SCNC: 4.3 MMOL/L (ref 3.6–5.5)
PROT SERPL-MCNC: 5.8 G/DL (ref 6–8.2)
SODIUM SERPL-SCNC: 144 MMOL/L (ref 135–145)

## 2024-08-13 PROCEDURE — 80053 COMPREHEN METABOLIC PANEL: CPT

## 2024-08-13 PROCEDURE — 36415 COLL VENOUS BLD VENIPUNCTURE: CPT

## 2024-08-13 PROCEDURE — 96372 THER/PROPH/DIAG INJ SC/IM: CPT

## 2024-08-13 PROCEDURE — 85014 HEMATOCRIT: CPT

## 2024-08-13 PROCEDURE — 85018 HEMOGLOBIN: CPT

## 2024-08-13 PROCEDURE — 700111 HCHG RX REV CODE 636 W/ 250 OVERRIDE (IP): Mod: JZ,JG | Performed by: INTERNAL MEDICINE

## 2024-08-13 RX ADMIN — EPOETIN ALFA-EPBX 40000 UNITS: 40000 INJECTION, SOLUTION INTRAVENOUS; SUBCUTANEOUS at 14:28

## 2024-08-13 ASSESSMENT — FIBROSIS 4 INDEX: FIB4 SCORE: 0.6

## 2024-08-13 NOTE — PROGRESS NOTES
Patient arrived to unit for Retacrit. She reports dyspnea with exertion. Otherwise, no other complaints. VSS.    Labs drawn peripherally using a butterfly needle. Site covered with gauze and coban.    Labs reviewed. Okay to proceed with treatment.    Retacrit administered subQ to DARREN. Site covered with bandaid.    Patient tolerated treatment without any adverse effects. Future appointments confirmed. Patient discharged home in stable condition.

## 2024-08-20 ENCOUNTER — OUTPATIENT INFUSION SERVICES (OUTPATIENT)
Dept: ONCOLOGY | Facility: MEDICAL CENTER | Age: 84
End: 2024-08-20
Attending: INTERNAL MEDICINE
Payer: MEDICARE

## 2024-08-20 VITALS
HEIGHT: 67 IN | DIASTOLIC BLOOD PRESSURE: 58 MMHG | BODY MASS INDEX: 26.64 KG/M2 | OXYGEN SATURATION: 94 % | SYSTOLIC BLOOD PRESSURE: 120 MMHG | HEART RATE: 68 BPM | RESPIRATION RATE: 18 BRPM | WEIGHT: 169.75 LBS | TEMPERATURE: 98.4 F

## 2024-08-20 DIAGNOSIS — D63.0 ANEMIA IN NEOPLASTIC DISEASE: ICD-10-CM

## 2024-08-20 DIAGNOSIS — D63.0 ANEMIA ASSOCIATED WITH MALIGNANT NEOPLASTIC DISEASE (HCC): ICD-10-CM

## 2024-08-20 DIAGNOSIS — C80.1 ANEMIA ASSOCIATED WITH MALIGNANT NEOPLASTIC DISEASE (HCC): ICD-10-CM

## 2024-08-20 DIAGNOSIS — D75.81 MYELOFIBROSIS (HCC): ICD-10-CM

## 2024-08-20 LAB
HCT VFR BLD AUTO: 32.8 % (ref 37–47)
HGB BLD-MCNC: 10.4 G/DL (ref 12–16)

## 2024-08-20 PROCEDURE — 85014 HEMATOCRIT: CPT

## 2024-08-20 PROCEDURE — 85018 HEMOGLOBIN: CPT

## 2024-08-20 PROCEDURE — 36415 COLL VENOUS BLD VENIPUNCTURE: CPT

## 2024-08-20 ASSESSMENT — FIBROSIS 4 INDEX: FIB4 SCORE: 0.96

## 2024-08-20 NOTE — PROGRESS NOTES
Dedra arrives to [facility] for weekly Retacrit. Patient denies acute health concerns. C/o fatigue. H/H drawn via venipuncture from [facility] without difficulty. Hgb 10.4; Hct 32.8%, which DOES NOT meet parameters to receive Retacrit today. Patient has her next appt. Discharged home to self care in no apparent distress

## 2024-08-27 ENCOUNTER — OUTPATIENT INFUSION SERVICES (OUTPATIENT)
Dept: ONCOLOGY | Facility: MEDICAL CENTER | Age: 84
End: 2024-08-27
Attending: INTERNAL MEDICINE
Payer: MEDICARE

## 2024-08-27 VITALS
HEART RATE: 81 BPM | DIASTOLIC BLOOD PRESSURE: 68 MMHG | BODY MASS INDEX: 27.02 KG/M2 | OXYGEN SATURATION: 97 % | SYSTOLIC BLOOD PRESSURE: 129 MMHG | RESPIRATION RATE: 18 BRPM | WEIGHT: 172.18 LBS | HEIGHT: 67 IN | TEMPERATURE: 98 F

## 2024-08-27 DIAGNOSIS — D63.0 ANEMIA IN NEOPLASTIC DISEASE: ICD-10-CM

## 2024-08-27 DIAGNOSIS — C80.1 ANEMIA ASSOCIATED WITH MALIGNANT NEOPLASTIC DISEASE (HCC): ICD-10-CM

## 2024-08-27 DIAGNOSIS — D63.0 ANEMIA ASSOCIATED WITH MALIGNANT NEOPLASTIC DISEASE (HCC): ICD-10-CM

## 2024-08-27 DIAGNOSIS — D75.81 MYELOFIBROSIS (HCC): ICD-10-CM

## 2024-08-27 LAB
HCT VFR BLD AUTO: 31.4 % (ref 37–47)
HGB BLD-MCNC: 9.6 G/DL (ref 12–16)

## 2024-08-27 PROCEDURE — 85018 HEMOGLOBIN: CPT

## 2024-08-27 PROCEDURE — 85014 HEMATOCRIT: CPT

## 2024-08-27 PROCEDURE — 36415 COLL VENOUS BLD VENIPUNCTURE: CPT

## 2024-08-27 PROCEDURE — 96372 THER/PROPH/DIAG INJ SC/IM: CPT

## 2024-08-27 PROCEDURE — 700111 HCHG RX REV CODE 636 W/ 250 OVERRIDE (IP): Mod: JZ,JG | Performed by: INTERNAL MEDICINE

## 2024-08-27 RX ADMIN — EPOETIN ALFA-EPBX 40000 UNITS: 40000 INJECTION, SOLUTION INTRAVENOUS; SUBCUTANEOUS at 14:54

## 2024-08-27 ASSESSMENT — FIBROSIS 4 INDEX: FIB4 SCORE: 0.96

## 2024-08-27 NOTE — PROGRESS NOTES
Pt presented to Infusion for weekly Retacrit injection. POC discussed and pt verbalized understanding. Orders received from Dr. Foley. Pt denies pain or s/s of acute illness today. Labs drawn with 23G butterfly needle to RAC x1 attempt without difficulty; site covered with sterile gauze/coban and pt tolerated well. Hgb 9.6 and SBP <160. Retacrit  injection administered per MAR, band-aid applied to site and pt tolerated well. No s/s of reactions or complications noted. Future appts confirmed for pt and pt discharged to self care in [facility].

## 2024-09-03 ENCOUNTER — OUTPATIENT INFUSION SERVICES (OUTPATIENT)
Dept: ONCOLOGY | Facility: MEDICAL CENTER | Age: 84
End: 2024-09-03
Attending: INTERNAL MEDICINE
Payer: MEDICARE

## 2024-09-03 VITALS
WEIGHT: 171.96 LBS | HEART RATE: 84 BPM | RESPIRATION RATE: 18 BRPM | TEMPERATURE: 98 F | OXYGEN SATURATION: 98 % | HEIGHT: 67 IN | DIASTOLIC BLOOD PRESSURE: 65 MMHG | BODY MASS INDEX: 26.99 KG/M2 | SYSTOLIC BLOOD PRESSURE: 131 MMHG

## 2024-09-03 DIAGNOSIS — D63.0 ANEMIA ASSOCIATED WITH MALIGNANT NEOPLASTIC DISEASE (HCC): ICD-10-CM

## 2024-09-03 DIAGNOSIS — C80.1 ANEMIA ASSOCIATED WITH MALIGNANT NEOPLASTIC DISEASE (HCC): ICD-10-CM

## 2024-09-03 DIAGNOSIS — D75.81 MYELOFIBROSIS (HCC): ICD-10-CM

## 2024-09-03 DIAGNOSIS — D63.0 ANEMIA IN NEOPLASTIC DISEASE: ICD-10-CM

## 2024-09-03 LAB
HCT VFR BLD AUTO: 30.9 % (ref 37–47)
HGB BLD-MCNC: 9.7 G/DL (ref 12–16)

## 2024-09-03 PROCEDURE — 85014 HEMATOCRIT: CPT

## 2024-09-03 PROCEDURE — 36415 COLL VENOUS BLD VENIPUNCTURE: CPT

## 2024-09-03 PROCEDURE — 96372 THER/PROPH/DIAG INJ SC/IM: CPT

## 2024-09-03 PROCEDURE — 85018 HEMOGLOBIN: CPT

## 2024-09-03 PROCEDURE — 700111 HCHG RX REV CODE 636 W/ 250 OVERRIDE (IP): Mod: JZ,JG | Performed by: INTERNAL MEDICINE

## 2024-09-03 RX ADMIN — EPOETIN ALFA-EPBX 40000 UNITS: 40000 INJECTION, SOLUTION INTRAVENOUS; SUBCUTANEOUS at 14:21

## 2024-09-03 ASSESSMENT — FIBROSIS 4 INDEX: FIB4 SCORE: 0.96

## 2024-09-03 NOTE — PROGRESS NOTES
Dedra presented to [facility]fusion [facility] for possible Retacrit injection. Labs drawn as ordered from [facility] with 23G butterfly needle, gauze and Coban dressing placed. Hemoglobin is 9.7 and meets parameters for Retacrit injection today. Ratacrit given to the back of the left upper arm. Bandaid applied to the site. Teresa has future appointments. Pt discharged to home in good condition.

## 2024-09-10 ENCOUNTER — OUTPATIENT INFUSION SERVICES (OUTPATIENT)
Dept: ONCOLOGY | Facility: MEDICAL CENTER | Age: 84
End: 2024-09-10
Attending: INTERNAL MEDICINE
Payer: MEDICARE

## 2024-09-10 VITALS
DIASTOLIC BLOOD PRESSURE: 67 MMHG | HEART RATE: 96 BPM | RESPIRATION RATE: 18 BRPM | BODY MASS INDEX: 26.85 KG/M2 | HEIGHT: 67 IN | SYSTOLIC BLOOD PRESSURE: 138 MMHG | WEIGHT: 171.08 LBS | TEMPERATURE: 97.6 F | OXYGEN SATURATION: 94 %

## 2024-09-10 DIAGNOSIS — D63.0 ANEMIA IN NEOPLASTIC DISEASE: ICD-10-CM

## 2024-09-10 DIAGNOSIS — D63.0 ANEMIA ASSOCIATED WITH MALIGNANT NEOPLASTIC DISEASE (HCC): ICD-10-CM

## 2024-09-10 DIAGNOSIS — D75.81 MYELOFIBROSIS (HCC): ICD-10-CM

## 2024-09-10 DIAGNOSIS — C80.1 ANEMIA ASSOCIATED WITH MALIGNANT NEOPLASTIC DISEASE (HCC): ICD-10-CM

## 2024-09-10 LAB
HCT VFR BLD AUTO: 31.8 % (ref 37–47)
HGB BLD-MCNC: 9.9 G/DL (ref 12–16)

## 2024-09-10 PROCEDURE — 85014 HEMATOCRIT: CPT

## 2024-09-10 PROCEDURE — 96372 THER/PROPH/DIAG INJ SC/IM: CPT

## 2024-09-10 PROCEDURE — 700111 HCHG RX REV CODE 636 W/ 250 OVERRIDE (IP): Mod: JZ,JG | Performed by: INTERNAL MEDICINE

## 2024-09-10 PROCEDURE — 85018 HEMOGLOBIN: CPT

## 2024-09-10 PROCEDURE — 36415 COLL VENOUS BLD VENIPUNCTURE: CPT

## 2024-09-10 RX ADMIN — EPOETIN ALFA-EPBX 40000 UNITS: 40000 INJECTION, SOLUTION INTRAVENOUS; SUBCUTANEOUS at 14:45

## 2024-09-10 ASSESSMENT — FIBROSIS 4 INDEX: FIB4 SCORE: 0.96

## 2024-09-17 ENCOUNTER — OUTPATIENT INFUSION SERVICES (OUTPATIENT)
Dept: ONCOLOGY | Facility: MEDICAL CENTER | Age: 84
End: 2024-09-17
Attending: INTERNAL MEDICINE
Payer: MEDICARE

## 2024-09-17 VITALS
TEMPERATURE: 97.9 F | SYSTOLIC BLOOD PRESSURE: 133 MMHG | RESPIRATION RATE: 18 BRPM | WEIGHT: 173.72 LBS | HEART RATE: 95 BPM | HEIGHT: 67 IN | DIASTOLIC BLOOD PRESSURE: 66 MMHG | BODY MASS INDEX: 27.27 KG/M2

## 2024-09-17 DIAGNOSIS — D63.0 ANEMIA IN NEOPLASTIC DISEASE: ICD-10-CM

## 2024-09-17 DIAGNOSIS — D75.81 MYELOFIBROSIS (HCC): ICD-10-CM

## 2024-09-17 DIAGNOSIS — D63.0 ANEMIA ASSOCIATED WITH MALIGNANT NEOPLASTIC DISEASE (HCC): ICD-10-CM

## 2024-09-17 DIAGNOSIS — C80.1 ANEMIA ASSOCIATED WITH MALIGNANT NEOPLASTIC DISEASE (HCC): ICD-10-CM

## 2024-09-17 LAB
HCT VFR BLD AUTO: 31.2 % (ref 37–47)
HGB BLD-MCNC: 9.7 G/DL (ref 12–16)

## 2024-09-17 PROCEDURE — 96372 THER/PROPH/DIAG INJ SC/IM: CPT

## 2024-09-17 PROCEDURE — 700111 HCHG RX REV CODE 636 W/ 250 OVERRIDE (IP): Mod: JZ,JG | Performed by: INTERNAL MEDICINE

## 2024-09-17 PROCEDURE — 85014 HEMATOCRIT: CPT

## 2024-09-17 PROCEDURE — 36415 COLL VENOUS BLD VENIPUNCTURE: CPT

## 2024-09-17 PROCEDURE — 85018 HEMOGLOBIN: CPT

## 2024-09-17 RX ADMIN — EPOETIN ALFA-EPBX 40000 UNITS: 40000 INJECTION, SOLUTION INTRAVENOUS; SUBCUTANEOUS at 14:20

## 2024-09-17 ASSESSMENT — FIBROSIS 4 INDEX: FIB4 SCORE: 0.96

## 2024-09-17 NOTE — PROGRESS NOTES
Dedra presented to [facility] for possible Retacrit injection. Labs drawn as ordered from [facility] with 23G butterfly needle, gauze and Coban dressing placed. Hemoglobin is 9.7 and  pt meets parameters for Retacrit injection today. Retacrit given to the back of the left upper arm. Bandaid applied to the site. Teresa has future appointments. Pt discharged to home in good condition.

## 2024-09-24 ENCOUNTER — OUTPATIENT INFUSION SERVICES (OUTPATIENT)
Dept: ONCOLOGY | Facility: MEDICAL CENTER | Age: 84
End: 2024-09-24
Attending: INTERNAL MEDICINE
Payer: MEDICARE

## 2024-09-24 VITALS
OXYGEN SATURATION: 93 % | WEIGHT: 173.5 LBS | RESPIRATION RATE: 16 BRPM | BODY MASS INDEX: 27.23 KG/M2 | HEART RATE: 85 BPM | TEMPERATURE: 97.3 F | SYSTOLIC BLOOD PRESSURE: 120 MMHG | DIASTOLIC BLOOD PRESSURE: 59 MMHG | HEIGHT: 67 IN

## 2024-09-24 DIAGNOSIS — C80.1 ANEMIA ASSOCIATED WITH MALIGNANT NEOPLASTIC DISEASE (HCC): ICD-10-CM

## 2024-09-24 DIAGNOSIS — D63.0 ANEMIA ASSOCIATED WITH MALIGNANT NEOPLASTIC DISEASE (HCC): ICD-10-CM

## 2024-09-24 DIAGNOSIS — D63.0 ANEMIA IN NEOPLASTIC DISEASE: ICD-10-CM

## 2024-09-24 DIAGNOSIS — D75.81 MYELOFIBROSIS (HCC): ICD-10-CM

## 2024-09-24 LAB
HCT VFR BLD AUTO: 30.4 % (ref 37–47)
HGB BLD-MCNC: 9.5 G/DL (ref 12–16)

## 2024-09-24 PROCEDURE — 85014 HEMATOCRIT: CPT

## 2024-09-24 PROCEDURE — 85018 HEMOGLOBIN: CPT

## 2024-09-24 PROCEDURE — 700111 HCHG RX REV CODE 636 W/ 250 OVERRIDE (IP): Mod: JZ,JG | Performed by: INTERNAL MEDICINE

## 2024-09-24 PROCEDURE — 36415 COLL VENOUS BLD VENIPUNCTURE: CPT

## 2024-09-24 PROCEDURE — 96372 THER/PROPH/DIAG INJ SC/IM: CPT

## 2024-09-24 RX ADMIN — EPOETIN ALFA-EPBX 40000 UNITS: 40000 INJECTION, SOLUTION INTRAVENOUS; SUBCUTANEOUS at 15:05

## 2024-09-24 ASSESSMENT — FIBROSIS 4 INDEX: FIB4 SCORE: 0.96

## 2024-09-24 NOTE — PROGRESS NOTES
Dedra arrived to the Infusion Center for labs and possible Rertacrit injection. Pt denies changes to health, medication or allergies. POC reviewed.    25G butterfly used to draw labs from [facility], brisk blood return noted/gauze and coban applied.     Lab results reviewed, Hgb 9.5, within parameters for treatment. Retacrit administered per MD order, R BA, Pt tolerated well/ bandaid applied.     Confirmed next appointment and Dedra was discharged home in no acute distress.

## 2024-10-01 ENCOUNTER — OUTPATIENT INFUSION SERVICES (OUTPATIENT)
Dept: ONCOLOGY | Facility: MEDICAL CENTER | Age: 84
End: 2024-10-01
Attending: INTERNAL MEDICINE
Payer: MEDICARE

## 2024-10-01 VITALS
RESPIRATION RATE: 18 BRPM | OXYGEN SATURATION: 93 % | SYSTOLIC BLOOD PRESSURE: 124 MMHG | WEIGHT: 174.16 LBS | HEIGHT: 67 IN | HEART RATE: 90 BPM | BODY MASS INDEX: 27.34 KG/M2 | TEMPERATURE: 97.4 F | DIASTOLIC BLOOD PRESSURE: 60 MMHG

## 2024-10-01 DIAGNOSIS — C80.1 ANEMIA ASSOCIATED WITH MALIGNANT NEOPLASTIC DISEASE (HCC): ICD-10-CM

## 2024-10-01 DIAGNOSIS — D63.0 ANEMIA IN NEOPLASTIC DISEASE: ICD-10-CM

## 2024-10-01 DIAGNOSIS — D63.0 ANEMIA ASSOCIATED WITH MALIGNANT NEOPLASTIC DISEASE (HCC): ICD-10-CM

## 2024-10-01 DIAGNOSIS — D75.81 MYELOFIBROSIS (HCC): ICD-10-CM

## 2024-10-01 LAB
HCT VFR BLD AUTO: 30.7 % (ref 37–47)
HGB BLD-MCNC: 9.6 G/DL (ref 12–16)

## 2024-10-01 PROCEDURE — 36415 COLL VENOUS BLD VENIPUNCTURE: CPT

## 2024-10-01 PROCEDURE — 85014 HEMATOCRIT: CPT

## 2024-10-01 PROCEDURE — 700111 HCHG RX REV CODE 636 W/ 250 OVERRIDE (IP): Mod: JZ,JG | Performed by: INTERNAL MEDICINE

## 2024-10-01 PROCEDURE — 85018 HEMOGLOBIN: CPT

## 2024-10-01 PROCEDURE — 96372 THER/PROPH/DIAG INJ SC/IM: CPT

## 2024-10-01 RX ADMIN — EPOETIN ALFA-EPBX 40000 UNITS: 40000 INJECTION, SOLUTION INTRAVENOUS; SUBCUTANEOUS at 14:35

## 2024-10-01 ASSESSMENT — FIBROSIS 4 INDEX: FIB4 SCORE: 0.96

## 2024-10-03 ENCOUNTER — HOSPITAL ENCOUNTER (OUTPATIENT)
Dept: RADIOLOGY | Facility: MEDICAL CENTER | Age: 84
End: 2024-10-03
Attending: FAMILY MEDICINE
Payer: MEDICARE

## 2024-10-03 DIAGNOSIS — G45.9 TIA (TRANSIENT ISCHEMIC ATTACK): ICD-10-CM

## 2024-10-03 PROCEDURE — 70551 MRI BRAIN STEM W/O DYE: CPT

## 2024-10-08 ENCOUNTER — OUTPATIENT INFUSION SERVICES (OUTPATIENT)
Dept: ONCOLOGY | Facility: MEDICAL CENTER | Age: 84
End: 2024-10-08
Attending: INTERNAL MEDICINE
Payer: MEDICARE

## 2024-10-08 VITALS
WEIGHT: 169.31 LBS | HEART RATE: 96 BPM | SYSTOLIC BLOOD PRESSURE: 116 MMHG | TEMPERATURE: 98 F | RESPIRATION RATE: 18 BRPM | DIASTOLIC BLOOD PRESSURE: 68 MMHG | HEIGHT: 67 IN | OXYGEN SATURATION: 91 % | BODY MASS INDEX: 26.57 KG/M2

## 2024-10-08 DIAGNOSIS — C80.1 ANEMIA ASSOCIATED WITH MALIGNANT NEOPLASTIC DISEASE (HCC): ICD-10-CM

## 2024-10-08 DIAGNOSIS — D63.0 ANEMIA ASSOCIATED WITH MALIGNANT NEOPLASTIC DISEASE (HCC): ICD-10-CM

## 2024-10-08 LAB
HCT VFR BLD AUTO: 32 % (ref 37–47)
HGB BLD-MCNC: 10.1 G/DL (ref 12–16)

## 2024-10-08 PROCEDURE — 36415 COLL VENOUS BLD VENIPUNCTURE: CPT

## 2024-10-08 PROCEDURE — 85014 HEMATOCRIT: CPT

## 2024-10-08 PROCEDURE — 85018 HEMOGLOBIN: CPT

## 2024-10-08 ASSESSMENT — FIBROSIS 4 INDEX: FIB4 SCORE: 0.96

## 2024-10-15 ENCOUNTER — OUTPATIENT INFUSION SERVICES (OUTPATIENT)
Dept: ONCOLOGY | Facility: MEDICAL CENTER | Age: 84
End: 2024-10-15
Attending: INTERNAL MEDICINE
Payer: MEDICARE

## 2024-10-15 VITALS
WEIGHT: 172.84 LBS | HEART RATE: 96 BPM | BODY MASS INDEX: 27.13 KG/M2 | HEIGHT: 67 IN | TEMPERATURE: 98.3 F | OXYGEN SATURATION: 92 % | DIASTOLIC BLOOD PRESSURE: 63 MMHG | SYSTOLIC BLOOD PRESSURE: 135 MMHG | RESPIRATION RATE: 18 BRPM

## 2024-10-15 DIAGNOSIS — D63.0 ANEMIA ASSOCIATED WITH MALIGNANT NEOPLASTIC DISEASE (HCC): ICD-10-CM

## 2024-10-15 DIAGNOSIS — C80.1 ANEMIA ASSOCIATED WITH MALIGNANT NEOPLASTIC DISEASE (HCC): ICD-10-CM

## 2024-10-15 DIAGNOSIS — D75.81 MYELOFIBROSIS (HCC): ICD-10-CM

## 2024-10-15 DIAGNOSIS — D63.0 ANEMIA IN NEOPLASTIC DISEASE: ICD-10-CM

## 2024-10-15 LAB
HCT VFR BLD AUTO: 30.6 % (ref 37–47)
HGB BLD-MCNC: 9.5 G/DL (ref 12–16)

## 2024-10-15 PROCEDURE — 85014 HEMATOCRIT: CPT

## 2024-10-15 PROCEDURE — 85018 HEMOGLOBIN: CPT

## 2024-10-15 PROCEDURE — 36415 COLL VENOUS BLD VENIPUNCTURE: CPT

## 2024-10-15 PROCEDURE — 700111 HCHG RX REV CODE 636 W/ 250 OVERRIDE (IP): Mod: JZ,JG | Performed by: INTERNAL MEDICINE

## 2024-10-15 RX ADMIN — EPOETIN ALFA-EPBX 40000 UNITS: 40000 INJECTION, SOLUTION INTRAVENOUS; SUBCUTANEOUS at 15:13

## 2024-10-15 ASSESSMENT — FIBROSIS 4 INDEX: FIB4 SCORE: 0.96

## 2024-10-22 ENCOUNTER — OUTPATIENT INFUSION SERVICES (OUTPATIENT)
Dept: ONCOLOGY | Facility: MEDICAL CENTER | Age: 84
End: 2024-10-22
Attending: INTERNAL MEDICINE
Payer: MEDICARE

## 2024-10-22 VITALS
OXYGEN SATURATION: 96 % | HEART RATE: 85 BPM | BODY MASS INDEX: 26.99 KG/M2 | SYSTOLIC BLOOD PRESSURE: 129 MMHG | WEIGHT: 171.96 LBS | RESPIRATION RATE: 18 BRPM | HEIGHT: 67 IN | TEMPERATURE: 97.1 F | DIASTOLIC BLOOD PRESSURE: 66 MMHG

## 2024-10-22 DIAGNOSIS — D75.81 MYELOFIBROSIS (HCC): ICD-10-CM

## 2024-10-22 DIAGNOSIS — C80.1 ANEMIA ASSOCIATED WITH MALIGNANT NEOPLASTIC DISEASE (HCC): ICD-10-CM

## 2024-10-22 DIAGNOSIS — D63.0 ANEMIA ASSOCIATED WITH MALIGNANT NEOPLASTIC DISEASE (HCC): ICD-10-CM

## 2024-10-22 DIAGNOSIS — D63.0 ANEMIA IN NEOPLASTIC DISEASE: ICD-10-CM

## 2024-10-22 LAB
HCT VFR BLD AUTO: 30.2 % (ref 37–47)
HGB BLD-MCNC: 9.5 G/DL (ref 12–16)

## 2024-10-22 PROCEDURE — 700111 HCHG RX REV CODE 636 W/ 250 OVERRIDE (IP): Mod: JZ,JG | Performed by: INTERNAL MEDICINE

## 2024-10-22 PROCEDURE — 85018 HEMOGLOBIN: CPT

## 2024-10-22 PROCEDURE — 36415 COLL VENOUS BLD VENIPUNCTURE: CPT

## 2024-10-22 PROCEDURE — 85014 HEMATOCRIT: CPT

## 2024-10-22 PROCEDURE — 96372 THER/PROPH/DIAG INJ SC/IM: CPT

## 2024-10-22 RX ADMIN — EPOETIN ALFA-EPBX 40000 UNITS: 40000 INJECTION, SOLUTION INTRAVENOUS; SUBCUTANEOUS at 14:19

## 2024-10-22 ASSESSMENT — FIBROSIS 4 INDEX: FIB4 SCORE: 0.96

## 2024-10-29 ENCOUNTER — OUTPATIENT INFUSION SERVICES (OUTPATIENT)
Dept: ONCOLOGY | Facility: MEDICAL CENTER | Age: 84
End: 2024-10-29
Attending: INTERNAL MEDICINE
Payer: MEDICARE

## 2024-10-29 VITALS
TEMPERATURE: 98 F | RESPIRATION RATE: 18 BRPM | WEIGHT: 171.08 LBS | DIASTOLIC BLOOD PRESSURE: 69 MMHG | HEART RATE: 90 BPM | BODY MASS INDEX: 26.85 KG/M2 | HEIGHT: 67 IN | SYSTOLIC BLOOD PRESSURE: 99 MMHG | OXYGEN SATURATION: 98 %

## 2024-10-29 DIAGNOSIS — D63.0 ANEMIA ASSOCIATED WITH MALIGNANT NEOPLASTIC DISEASE (HCC): ICD-10-CM

## 2024-10-29 DIAGNOSIS — D63.0 ANEMIA IN NEOPLASTIC DISEASE: ICD-10-CM

## 2024-10-29 DIAGNOSIS — D75.81 MYELOFIBROSIS (HCC): ICD-10-CM

## 2024-10-29 DIAGNOSIS — C80.1 ANEMIA ASSOCIATED WITH MALIGNANT NEOPLASTIC DISEASE (HCC): ICD-10-CM

## 2024-10-29 LAB
HCT VFR BLD AUTO: 29.7 % (ref 37–47)
HGB BLD-MCNC: 9.4 G/DL (ref 12–16)

## 2024-10-29 PROCEDURE — 36415 COLL VENOUS BLD VENIPUNCTURE: CPT

## 2024-10-29 PROCEDURE — 85018 HEMOGLOBIN: CPT

## 2024-10-29 PROCEDURE — 96372 THER/PROPH/DIAG INJ SC/IM: CPT

## 2024-10-29 PROCEDURE — 85014 HEMATOCRIT: CPT

## 2024-10-29 PROCEDURE — 700111 HCHG RX REV CODE 636 W/ 250 OVERRIDE (IP): Mod: JZ,JG | Performed by: INTERNAL MEDICINE

## 2024-10-29 RX ADMIN — EPOETIN ALFA-EPBX 40000 UNITS: 40000 INJECTION, SOLUTION INTRAVENOUS; SUBCUTANEOUS at 14:28

## 2024-10-29 ASSESSMENT — FIBROSIS 4 INDEX: FIB4 SCORE: 0.96

## 2024-11-05 ENCOUNTER — OUTPATIENT INFUSION SERVICES (OUTPATIENT)
Dept: ONCOLOGY | Facility: MEDICAL CENTER | Age: 84
End: 2024-11-05
Attending: INTERNAL MEDICINE
Payer: MEDICARE

## 2024-11-05 VITALS
TEMPERATURE: 97 F | HEART RATE: 98 BPM | OXYGEN SATURATION: 98 % | BODY MASS INDEX: 27.27 KG/M2 | SYSTOLIC BLOOD PRESSURE: 129 MMHG | HEIGHT: 67 IN | DIASTOLIC BLOOD PRESSURE: 64 MMHG | WEIGHT: 173.72 LBS | RESPIRATION RATE: 18 BRPM

## 2024-11-05 DIAGNOSIS — C80.1 ANEMIA ASSOCIATED WITH MALIGNANT NEOPLASTIC DISEASE (HCC): ICD-10-CM

## 2024-11-05 DIAGNOSIS — D63.0 ANEMIA ASSOCIATED WITH MALIGNANT NEOPLASTIC DISEASE (HCC): ICD-10-CM

## 2024-11-05 DIAGNOSIS — D63.0 ANEMIA IN NEOPLASTIC DISEASE: ICD-10-CM

## 2024-11-05 DIAGNOSIS — D75.81 MYELOFIBROSIS (HCC): ICD-10-CM

## 2024-11-05 LAB
HCT VFR BLD AUTO: 28.6 % (ref 37–47)
HGB BLD-MCNC: 9 G/DL (ref 12–16)

## 2024-11-05 PROCEDURE — 85014 HEMATOCRIT: CPT

## 2024-11-05 PROCEDURE — 36415 COLL VENOUS BLD VENIPUNCTURE: CPT

## 2024-11-05 PROCEDURE — 700111 HCHG RX REV CODE 636 W/ 250 OVERRIDE (IP): Mod: JZ,JG | Performed by: INTERNAL MEDICINE

## 2024-11-05 PROCEDURE — 85018 HEMOGLOBIN: CPT

## 2024-11-05 PROCEDURE — 96372 THER/PROPH/DIAG INJ SC/IM: CPT

## 2024-11-05 RX ADMIN — EPOETIN ALFA-EPBX 40000 UNITS: 40000 INJECTION, SOLUTION INTRAVENOUS; SUBCUTANEOUS at 14:51

## 2024-11-05 ASSESSMENT — FIBROSIS 4 INDEX: FIB4 SCORE: 0.96

## 2024-11-05 NOTE — PROGRESS NOTES
"Dedra presents to [facility] for weekly retacrit injection.  Pt denies any new or acute symptoms, \"everything is the same\".  Venipuncture to left AC for lab draw.   Hemoglobin 9.0, meets parameters for retacrit today.  Retacrit administered sub-q to back of right arm with out any adverse effects noted.  Pt discharged in stable condition, knows when to return for next appointment.     "

## 2024-11-07 ENCOUNTER — OFFICE VISIT (OUTPATIENT)
Dept: MEDICAL GROUP | Facility: MEDICAL CENTER | Age: 84
End: 2024-11-07
Payer: MEDICARE

## 2024-11-07 VITALS
HEART RATE: 92 BPM | SYSTOLIC BLOOD PRESSURE: 122 MMHG | DIASTOLIC BLOOD PRESSURE: 56 MMHG | OXYGEN SATURATION: 94 % | BODY MASS INDEX: 27.78 KG/M2 | HEIGHT: 67 IN | TEMPERATURE: 97.9 F | WEIGHT: 177 LBS

## 2024-11-07 DIAGNOSIS — I27.20 PULMONARY HYPERTENSION (HCC): ICD-10-CM

## 2024-11-07 DIAGNOSIS — D63.0 ANEMIA IN NEOPLASTIC DISEASE: ICD-10-CM

## 2024-11-07 DIAGNOSIS — I36.1 NONRHEUMATIC TRICUSPID VALVE REGURGITATION: ICD-10-CM

## 2024-11-07 DIAGNOSIS — C67.9 MALIGNANT NEOPLASM OF URINARY BLADDER, UNSPECIFIED SITE (HCC): ICD-10-CM

## 2024-11-07 DIAGNOSIS — G62.9 NEUROPATHY: ICD-10-CM

## 2024-11-07 DIAGNOSIS — R06.09 DYSPNEA ON EXERTION: ICD-10-CM

## 2024-11-07 PROCEDURE — 3074F SYST BP LT 130 MM HG: CPT | Performed by: FAMILY MEDICINE

## 2024-11-07 PROCEDURE — 99214 OFFICE O/P EST MOD 30 MIN: CPT | Performed by: FAMILY MEDICINE

## 2024-11-07 PROCEDURE — 3078F DIAST BP <80 MM HG: CPT | Performed by: FAMILY MEDICINE

## 2024-11-07 ASSESSMENT — FIBROSIS 4 INDEX: FIB4 SCORE: 0.96

## 2024-11-07 NOTE — PROGRESS NOTES
"Verbal consent was acquired by the patient to use [employer] ambient listening note generation during this visit    Subjective:     CC: \"Chronic condition discussion\"    History of Present Illness  The patient is an 84-year-old female who presents for evaluation of multiple medical concerns.    She has been receiving weekly injections for anemia t at the infusion center, her hemoglobin has been slowly declining with a recent level to 9. She is not concerned about her hemoglobin until it drops to 8 or 7, at which point she considers blood transfusions.     Her breathing remains stable, neither improving nor worsening. She experiences shortness of breath when climbing stairs but reports no associated chest pain. She manages this by taking breaks and adjusting her breathing. She had a brief episode of leg discomfort lasting 15 seconds about 6 weeks ago, which resolved spontaneously.    She has noticed early signs of neuropathy in her feet, characterized by numbness rather than pain. She discussed this with Dr. Foley, who suggested it could be age-related. She has tried different types of foot arches, which have significantly improved her symptoms. She experiences more numbness in her ankles and toes, particularly in the pads of her feet, which she finds bothersome.    She has been exposed to cold weather while preparing for winter, which has resulted in sinus-like symptoms.    She monitors her weight regularly, although she has not done so in the past few days. Her weight typically hovers around 168 pounds, and she can usually tell if she needs to lose 2 to 5 pounds.    She underwent a brain scan, the results of which are available. She is scheduled for her annual heart check-up with Dr. Waqas Morris on 11/14/2024. She has an upcoming appointment with Dr. Oseguera in 02/2025 for bladder cancer follow-up. She has had multiple health issues, including three instances of bladder cancer, H. pylori infection, high " "platelet count, and bone marrow cancer. She is proactive about her health and wishes to avoid stroke or heart attack. She believes her breathing and walking habits significantly impact her health. She is not interested in adding more medications unless absolutely necessary. She is unsure if she has a cholesterol problem as her lipid panel is managed by Dr. Foley. She occasionally takes a diuretic (water pill) as needed.    IMMUNIZATIONS  She is up to date on all her vaccines.          Objective:     Exam:  /56   Pulse 92   Temp 36.6 °C (97.9 °F) (Temporal)   Ht 1.702 m (5' 7\")   Wt 80.3 kg (177 lb)   SpO2 94%   BMI 27.72 kg/m²  Body mass index is 27.72 kg/m².    Physical Exam  Vitals reviewed.   Constitutional:       Appearance: Normal appearance.   HENT:      Head: Normocephalic and atraumatic.   Cardiovascular:      Rate and Rhythm: Normal rate and regular rhythm.      Heart sounds: Murmur heard.   Pulmonary:      Breath sounds: Normal breath sounds.   Musculoskeletal:      Right lower leg: Edema present.      Left lower leg: Edema present.      Comments: Mild pretibial and pedal edema   Skin:     General: Skin is warm and dry.   Neurological:      Mental Status: She is alert. Mental status is at baseline.   Psychiatric:         Mood and Affect: Mood normal.         Behavior: Behavior normal.             Results  Laboratory Studies  GFR was 77. Liver enzymes were appropriate. Electrolytes were fine. Blood sugar was 87.    Imaging  Brain scan showed mild cerebral atrophy, age appropriate, minimal supratentorial white matter disease, less than expected for patient's age. No evidence of infarct, hemorrhage or mass. Hardware in cervical spine.      Assessment & Plan:       1. Neuropathy    2. Dyspnea on exertion    3. Malignant neoplasm of urinary bladder, unspecified site (HCC)    4. Anemia in neoplastic disease    5. Pulmonary hypertension (HCC)    6. Nonrheumatic tricuspid valve " regurgitation      Assessment & Plan  1. Neuropathy.  She reports beginning neuropathy in her feet, characterized by numbness rather than pain. She has been using different arches in her shoes, which have made a significant difference. She was advised to continue using the arches and monitor her symptoms.    2. Shortness of Breath.  She experiences shortness of breath, particularly when walking upstairs, but does not have chest pain. She was advised to take it easy and modify her breathing as needed. She should continue to monitor her symptoms and rest when necessary.    3. Bladder Cancer.  She has an upcoming annual checkup with Dr. Oseguera in February for her bladder cancer. She should continue with her current treatment plan and follow up as scheduled.    4. Anemia.  She is receiving weekly infusions for anemia. Her hemoglobin levels are currently stable but will need monitoring to avoid blood transfusions. She should continue her weekly infusions and monitor her hemoglobin levels.    5. Pulmonary Hypertension.  Her blood pressure is currently well controlled at 122/56. She should continue to monitor her blood pressure regularly.    6. Heart Murmur.  She has an upcoming annual heart checkup with Waqas Morris on 11/14/2024. She should discuss any concerns and potential medications for heart failure with her cardiologist.    7. Health Maintenance.  Her MRI results indicate mild cerebral atrophy, which is age-appropriate, and minimal supratentorial white matter disease, less than expected for her age. There is no evidence of infarct, hemorrhage, or mass. She has hardware in her cervical spine. Her GFR was 77 in 08/2024, indicating stable kidney function. Liver enzymes, electrolytes, and blood sugar levels are within normal range. Her last lipid panel on record was conducted in 2015. She was advised to bring her lab results from Dr. Foley for review. The addition of a statin medication was discussed for  cholesterol control and stroke prevention.  Patient tells me she does not want to take a lot of medication but also tells me she does not want a heart attack or stroke.  She was encouraged to discuss this with her cardiologist before making a decision. She was also advised to consult her cardiologist about potential heart failure medications.  I discussed with her there are a lot of medications that she could take but may not be appropriate at this time and her age.  I did discuss with her the data often drops off after age 75.    Follow-up  Return in 3 months for follow up.         Return in about 3 months (around 2/7/2025), or if symptoms worsen or fail to improve, for Lab F/U, Lifestyle.      This note was created using voice recognition software (Dragon). The accuracy of the dictation is limited by the abilities of the software. I have reviewed the note prior to signing, however some errors in grammar and context are still possible. If you have any questions related to this note please do not hesitate to contact our office.

## 2024-11-12 ENCOUNTER — OUTPATIENT INFUSION SERVICES (OUTPATIENT)
Dept: ONCOLOGY | Facility: MEDICAL CENTER | Age: 84
End: 2024-11-12
Attending: INTERNAL MEDICINE
Payer: MEDICARE

## 2024-11-12 VITALS
WEIGHT: 173.72 LBS | SYSTOLIC BLOOD PRESSURE: 125 MMHG | TEMPERATURE: 97.4 F | BODY MASS INDEX: 27.27 KG/M2 | DIASTOLIC BLOOD PRESSURE: 57 MMHG | HEART RATE: 92 BPM | RESPIRATION RATE: 18 BRPM | OXYGEN SATURATION: 94 % | HEIGHT: 67 IN

## 2024-11-12 DIAGNOSIS — D75.81 MYELOFIBROSIS (HCC): ICD-10-CM

## 2024-11-12 DIAGNOSIS — D63.0 ANEMIA IN NEOPLASTIC DISEASE: ICD-10-CM

## 2024-11-12 DIAGNOSIS — D63.0 ANEMIA ASSOCIATED WITH MALIGNANT NEOPLASTIC DISEASE (HCC): ICD-10-CM

## 2024-11-12 DIAGNOSIS — C80.1 ANEMIA ASSOCIATED WITH MALIGNANT NEOPLASTIC DISEASE (HCC): ICD-10-CM

## 2024-11-12 LAB
HCT VFR BLD AUTO: 27.7 % (ref 37–47)
HGB BLD-MCNC: 8.8 G/DL (ref 12–16)

## 2024-11-12 PROCEDURE — 700111 HCHG RX REV CODE 636 W/ 250 OVERRIDE (IP): Mod: JZ,JG | Performed by: INTERNAL MEDICINE

## 2024-11-12 PROCEDURE — 85018 HEMOGLOBIN: CPT

## 2024-11-12 PROCEDURE — 36415 COLL VENOUS BLD VENIPUNCTURE: CPT

## 2024-11-12 PROCEDURE — 96372 THER/PROPH/DIAG INJ SC/IM: CPT

## 2024-11-12 PROCEDURE — 85014 HEMATOCRIT: CPT

## 2024-11-12 RX ADMIN — EPOETIN ALFA-EPBX 40000 UNITS: 40000 INJECTION, SOLUTION INTRAVENOUS; SUBCUTANEOUS at 14:30

## 2024-11-12 ASSESSMENT — FIBROSIS 4 INDEX: FIB4 SCORE: 0.96

## 2024-11-12 NOTE — PROGRESS NOTES
Dedra arrived to the Infusion Center for labs and possible Rertacrit injection. Pt denies changes to health, medication or allergies. POC reviewed.    25G butterfly used to draw labs from [facility], brisk blood return noted/gauze and coban applied.     Lab results reviewed, Hgb 8.8 , within parameters for treatment. Retacrit administered per MD order, L BA, Pt tolerated well/ bandaid applied.     Confirmed next appointment and Dedra was discharged home in no acute distress.

## 2024-11-14 ENCOUNTER — OFFICE VISIT (OUTPATIENT)
Dept: CARDIOLOGY | Facility: MEDICAL CENTER | Age: 84
End: 2024-11-14
Attending: NURSE PRACTITIONER
Payer: MEDICARE

## 2024-11-14 VITALS
SYSTOLIC BLOOD PRESSURE: 118 MMHG | RESPIRATION RATE: 17 BRPM | HEIGHT: 67 IN | DIASTOLIC BLOOD PRESSURE: 56 MMHG | OXYGEN SATURATION: 98 % | HEART RATE: 96 BPM | BODY MASS INDEX: 26.37 KG/M2 | WEIGHT: 168 LBS

## 2024-11-14 DIAGNOSIS — I50.32 CHRONIC HEART FAILURE WITH PRESERVED EJECTION FRACTION (HCC): ICD-10-CM

## 2024-11-14 PROCEDURE — 99214 OFFICE O/P EST MOD 30 MIN: CPT | Performed by: NURSE PRACTITIONER

## 2024-11-14 PROCEDURE — 3074F SYST BP LT 130 MM HG: CPT | Performed by: NURSE PRACTITIONER

## 2024-11-14 PROCEDURE — 99212 OFFICE O/P EST SF 10 MIN: CPT | Performed by: NURSE PRACTITIONER

## 2024-11-14 PROCEDURE — 3078F DIAST BP <80 MM HG: CPT | Performed by: NURSE PRACTITIONER

## 2024-11-14 ASSESSMENT — ENCOUNTER SYMPTOMS
BRUISES/BLEEDS EASILY: 0
NERVOUS/ANXIOUS: 0
PALPITATIONS: 0
ORTHOPNEA: 0
PND: 0
NAUSEA: 0
HALLUCINATIONS: 0
EYES NEGATIVE: 1
CONSTITUTIONAL NEGATIVE: 1
NEUROLOGICAL NEGATIVE: 1
RESPIRATORY NEGATIVE: 1
CLAUDICATION: 0
MUSCULOSKELETAL NEGATIVE: 1
WHEEZING: 0
DIZZINESS: 0
DEPRESSION: 0
GASTROINTESTINAL NEGATIVE: 1
SENSORY CHANGE: 0
SHORTNESS OF BREATH: 0

## 2024-11-14 ASSESSMENT — FIBROSIS 4 INDEX: FIB4 SCORE: 0.96

## 2024-11-14 NOTE — PROGRESS NOTES
"Cardiology Follow up Note    DOS: 11/14/2024   [id number]  Dedra Lobo    Chief complaint/Reason for consult: annual visit    HPI: Pt is a 83 y.o. female who presents to the clinic today in follow up for annual visit. Patient has a past medical history significant for but not limited to: HFpEF, myelofibrosis, pulmonary hypertension, H pylori, tricuspid regurgitation. Patient dealing with anemia and has been getting routine epogen injections weekly. She also has some fluid edema. WE will adjust her torsemide dosing and repeat labs. Will wait for stability to her fluid status and her anemia before proceeding with nay additional medications or procedure. Mild pulmonary hypertension, could consider right heart cath to determine pre or post capillary.      Past Medical History:   Diagnosis Date    Adverse effect of anesthesia     core body temp drops per hx.    Anemia     Anesthesia     \"core temperature drops\"    Arrhythmia     Arthritis     oa, neck and hips    Cancer (HCC) 2015    bladder    Cardiomegaly 10/06/2022    Incidental on CXR. Also has lower extremity pitting edema. Will obtain TTE    Cataract 03/21/2019    bilateral no surgery    GERD (gastroesophageal reflux disease)     Glaucoma 02/2015    \"beginning\"    Helicobacter pylori gastritis 09/25/2023    History of primary malignant neoplasm of urinary bladder 05/17/2018    Personal history of malignant neoplasm of bladder    Osteoporosis, unspecified     Pain     neck    Pneumonia 10/2001    Systolic murmur 10/06/2022    Thrombocytosis 09/29/2010    Ulcer     gastric ulcers    Unspecified disorder of thyroid     LOW THYROID LEVELS- TOOK  MEDS    Urinary bladder disorder 2015    bladder cancer    Urinary incontinence 03/21/2019    Vascular insufficiency of limb 2005    right lower extrematy    Vitamin d deficiency 09/29/2010       Past Surgical History:   Procedure Laterality Date    AK UPPER GI ENDOSCOPY,DIAGNOSIS N/A 10/1/2022    Procedure: GASTROSCOPY;  " Surgeon: Ana Gaston D.O.;  Location: SURGERY [facility];  Service: Gastroenterology    DE UPPER GI ENDOSCOPY,SCLER INJECT N/A 10/1/2022    Procedure: GASTROSCOPY, WITH SCLEROTHERAPY;  Surgeon: Ana Gaston D.O.;  Location: SURGERY [facility];  Service: Gastroenterology    DE UPPER GI ENDOSCOPY,CTRL BLEED N/A 10/1/2022    Procedure: EGD, WITH CLIP PLACEMENT;  Surgeon: Ana Gaston D.O.;  Location: SURGERY [facility];  Service: Gastroenterology    PB TOTAL KNEE ARTHROPLASTY Left 4/3/2019    Procedure: KNEE ARTHROPLASTY TOTAL;  Surgeon: Robert Vazquez M.D.;  Location: SURGERY [facility];  Service: Orthopedics    CERVICAL DISK AND FUSION ANTERIOR  2/9/2016    Procedure: CERVICAL DISK AND FUSION ANTERIOR  C3-7;  Surgeon: Dusty Rao M.D.;  Location: SURGERY [facility];  Service:     CYSTOSCOPY  3/3/2015    Performed by Walt Olson M.D. at SURGERY [facility]    TRANS URETHRAL RESECTION BLADDER  3/3/2015    Performed by Walt Olson M.D. at [facility]    HIP REPLACEMENT, TOTAL  2006    Dr. Vazquez    CHOLECYSTECTOMY  2003    LUMPECTOMY  1982    cyst    ARTHROPLASTY      bi lateral-Dr. Vazquez       Social History     Socioeconomic History    Marital status: Single     Spouse name: Not on file    Number of children: Not on file    Years of education: Not on file    Highest education level: Not on file   Occupational History    Not on file   Tobacco Use    Smoking status: Never    Smokeless tobacco: Never   Vaping Use    Vaping status: Never Used   Substance and Sexual Activity    Alcohol use: No     Alcohol/week: 0.0 oz    Drug use: No    Sexual activity: Never   Other Topics Concern    Not on file   Social History Narrative    Not on file     Social Drivers of Health     Financial Resource Strain: Not on file   Food Insecurity: Not on file   Transportation Needs: Not on file   Physical Activity: Not on file   Stress: Not on file   Social Connections: Not on file   Intimate  Partner Violence: Not on file   Housing Stability: Not on file       Family History   Problem Relation Age of Onset    Cancer Mother         brain    Lung Disease Mother         smoker    Alcohol/Drug Father         etoh    Lung Disease Sister         COPD smokers    Alcohol/Drug Sister         etoh    Lung Disease Sister         smoker-copd    Alcohol/Drug Sister         etoh       Not on File    Current Outpatient Medications   Medication Sig Dispense Refill    torsemide (DEMADEX) 20 MG Tab Take 1 Tablet by mouth every day. 90 Tablet 3    epoetin (EPOGEN,PROCRIT) 34753 Unit/mL Solution Inject 10,000 Units under the skin every 7 days. Retacrit 40,000 units      acetaminophen (TYLENOL) 500 MG Tab Take 500-1,000 mg by mouth 1 time a day as needed for Mild Pain.      Carboxymethylcellulose Sod PF 0.5 % Solution Administer 1 Drop into both eyes every evening.      anagrelide (AGRYLIN) 0.5 MG Cap Take 1 Capsule by mouth 2 times a day.       No current facility-administered medications for this visit.       Vitals:    11/14/24 1311   BP: 118/56   Pulse: 96   Resp: 17   SpO2: 98%         Review of Systems   Constitutional: Negative.  Negative for malaise/fatigue.   HENT: Negative.     Eyes: Negative.    Respiratory: Negative.  Negative for shortness of breath and wheezing.    Cardiovascular:  Negative for chest pain, palpitations, orthopnea, claudication, leg swelling and PND.   Gastrointestinal: Negative.  Negative for nausea.   Genitourinary: Negative.    Musculoskeletal: Negative.    Skin: Negative.    Neurological: Negative.  Negative for dizziness and sensory change.   Endo/Heme/Allergies: Negative.  Does not bruise/bleed easily.   Psychiatric/Behavioral:  Negative for depression and hallucinations. The patient is not nervous/anxious.             EKG interpreted by me: Sinus rhythm     Physical Exam  Constitutional:       Appearance: Normal appearance.   HENT:      Head: Normocephalic.   Eyes:      Pupils: Pupils are  "equal, round, and reactive to light.   Neck:      Vascular: No JVD.   Cardiovascular:      Rate and Rhythm: Normal rate and regular rhythm.      Pulses: Normal pulses.      Heart sounds: Normal heart sounds.   Pulmonary:      Effort: Pulmonary effort is normal.      Breath sounds: Normal breath sounds.   Abdominal:      General: Abdomen is flat.      Palpations: Abdomen is soft.   Musculoskeletal:      Cervical back: Normal range of motion.      Right lower leg: No edema.      Left lower leg: No edema.   Skin:     General: Skin is warm and dry.   Neurological:      Mental Status: She is alert and oriented to person, place, and time.   Psychiatric:         Mood and Affect: Mood normal.         Behavior: Behavior normal.          Data:  Lipids:   Lab Results   Component Value Date/Time    CHOLSTRLTOT 172 01/16/2015 09:03 AM    TRIGLYCERIDE 153 (H) 01/16/2015 09:03 AM    HDL 52 01/16/2015 09:03 AM    LDL 89 01/16/2015 09:03 AM        BMP:  Lab Results   Component Value Date/Time    SODIUM 142 09/19/2023 1400    POTASSIUM 4.3 09/19/2023 1400    CHLORIDE 108 09/19/2023 1400    CO2 25 09/19/2023 1400    GLUCOSE 92 09/19/2023 1400    BUN 13 09/19/2023 1400    CREATININE 0.66 09/19/2023 1400    CALCIUM 9.2 09/19/2023 1400    ANION 9.0 09/19/2023 1400       GFR:  Lab Results   Component Value Date/Time    IFAFRICA >60 02/22/2022 1531    IFNOTAFR >60 02/22/2022 1531        TSH:   Lab Results   Component Value Date/Time    TSHULTRASEN 4.820 10/10/2022 1119       MAGNESIUM:  Lab Results   Component Value Date/Time    MAGNESIUM 2.2 01/31/2023 1350    MAGNESIUM 1.9 10/01/2022 0932    MAGNESIUM 1.9 09/30/2022 1957        THYROXINE (T4):   No results found for: \"FREEDIR\"     CBC:   Lab Results   Component Value Date/Time    WBC 5.0 09/19/2023 01:45 PM    RBC 3.12 (L) 09/19/2023 01:45 PM    HEMOGLOBIN 8.8 (L) 11/12/2024 01:48 PM    HEMATOCRIT 27.7 (L) 11/12/2024 01:48 PM    MCV 98.7 (H) 09/19/2023 01:45 PM    MCH 31.1 09/19/2023 " 01:45 PM    MCHC 31.5 (L) 09/19/2023 01:45 PM    RDW 81.3 (H) 09/19/2023 01:45 PM    PLATELETCT 473 (H) 09/19/2023 01:45 PM    MPV 12.9 09/19/2023 01:45 PM    NEUTSPOLYS 72.40 (H) 09/19/2023 01:45 PM    LYMPHOCYTES 11.20 (L) 09/19/2023 01:45 PM    MONOCYTES 9.50 09/19/2023 01:45 PM    EOSINOPHILS 0.90 09/19/2023 01:45 PM    BASOPHILS 0.00 09/19/2023 01:45 PM    IMMGRAN 4.60 (H) 03/22/2022 02:45 PM    NRBC 0.40 (H) 09/19/2023 01:45 PM    NEUTS 3.92 09/19/2023 01:45 PM    LYMPHS 0.56 (L) 09/19/2023 01:45 PM    MONOS 0.48 09/19/2023 01:45 PM    EOS 0.05 09/19/2023 01:45 PM    BASO 0.00 09/19/2023 01:45 PM    IMMGRANAB 0.19 (H) 03/22/2022 02:45 PM    NRBCAB 0.02 09/19/2023 01:45 PM        CBC w/o DIFF  Lab Results   Component Value Date/Time    WBC 5.0 09/19/2023 01:45 PM    RBC 3.12 (L) 09/19/2023 01:45 PM    HEMOGLOBIN 8.8 (L) 11/12/2024 01:48 PM    MCV 98.7 (H) 09/19/2023 01:45 PM    MCH 31.1 09/19/2023 01:45 PM    MCHC 31.5 (L) 09/19/2023 01:45 PM    RDW 81.3 (H) 09/19/2023 01:45 PM    MPV 12.9 09/19/2023 01:45 PM       LIVER:  Lab Results   Component Value Date/Time    ALKPHOSPHAT 89 08/13/2024 01:30 PM    ASTSGOT 20 08/13/2024 01:30 PM    ALTSGPT 13 08/13/2024 01:30 PM    TBILIRUBIN 1.3 08/13/2024 01:30 PM       BNP:  Lab Results   Component Value Date/Time    BNPBTYPENAT 52 01/30/2016 02:55 PM       PT/INR:  Lab Results   Component Value Date/Time    PROTHROMBTM 17.5 (H) 09/30/2022 07:57 PM    PROTHROMBTM 13.3 02/01/2016 12:24 PM    PROTHROMBTM 12.7 02/23/2015 02:46 PM    INR 1.47 (H) 09/30/2022 07:57 PM    INR 1.01 02/01/2016 12:24 PM    INR 0.96 02/23/2015 02:46 PM             Impression/Plan:  Chronic heart failure with preserved ejection fraction (HCC)   - improvement on echo from last year from prior in 20222   - torsemide daily for 5 days until dry weight achieved and then half tablet daily for maintenance    -repeat labs in 2 weeks   - may need to consider right heart cath for pulmonary hypertension if  symptoms worsen   - continue Epogen injections   - return in 6 months             Waqas Morris AGACNP-EP  Cardiac Electrophysiology

## 2024-11-14 NOTE — ASSESSMENT & PLAN NOTE
- improvement on echo from last year from prior in 20222   - torsemide daily for 5 days until dry weight achieved and then half tablet daily for maintenance    -repeat labs in 2 weeks   - may need to consider right heart cath for pulmonary hypertension if symptoms worsen   - continue Epogen injections   - return in 6 months

## 2024-11-19 ENCOUNTER — OUTPATIENT INFUSION SERVICES (OUTPATIENT)
Dept: ONCOLOGY | Facility: MEDICAL CENTER | Age: 84
End: 2024-11-19
Attending: INTERNAL MEDICINE
Payer: MEDICARE

## 2024-11-19 VITALS
OXYGEN SATURATION: 92 % | BODY MASS INDEX: 26.33 KG/M2 | TEMPERATURE: 97.6 F | RESPIRATION RATE: 18 BRPM | HEART RATE: 84 BPM | HEIGHT: 67 IN | WEIGHT: 167.77 LBS | DIASTOLIC BLOOD PRESSURE: 64 MMHG | SYSTOLIC BLOOD PRESSURE: 125 MMHG

## 2024-11-19 DIAGNOSIS — D63.0 ANEMIA IN NEOPLASTIC DISEASE: ICD-10-CM

## 2024-11-19 DIAGNOSIS — D63.0 ANEMIA ASSOCIATED WITH MALIGNANT NEOPLASTIC DISEASE (HCC): ICD-10-CM

## 2024-11-19 DIAGNOSIS — C80.1 ANEMIA ASSOCIATED WITH MALIGNANT NEOPLASTIC DISEASE (HCC): ICD-10-CM

## 2024-11-19 DIAGNOSIS — D75.81 MYELOFIBROSIS (HCC): ICD-10-CM

## 2024-11-19 LAB
HCT VFR BLD AUTO: 31.4 % (ref 37–47)
HGB BLD-MCNC: 9.9 G/DL (ref 12–16)

## 2024-11-19 PROCEDURE — 85018 HEMOGLOBIN: CPT

## 2024-11-19 PROCEDURE — 700111 HCHG RX REV CODE 636 W/ 250 OVERRIDE (IP): Mod: JZ,JG | Performed by: INTERNAL MEDICINE

## 2024-11-19 PROCEDURE — 85014 HEMATOCRIT: CPT

## 2024-11-19 PROCEDURE — 96372 THER/PROPH/DIAG INJ SC/IM: CPT

## 2024-11-19 PROCEDURE — 36415 COLL VENOUS BLD VENIPUNCTURE: CPT

## 2024-11-19 RX ADMIN — EPOETIN ALFA-EPBX 40000 UNITS: 40000 INJECTION, SOLUTION INTRAVENOUS; SUBCUTANEOUS at 14:27

## 2024-11-19 ASSESSMENT — FIBROSIS 4 INDEX: FIB4 SCORE: 0.96

## 2024-11-19 NOTE — PROGRESS NOTES
Pt presented to IS for Reclast . Pt denied fever, signs or symptoms of infection or acute illness today. POC discussed and pt verbalized understanding.   23g butterfly used to obtain labs from the right AC; pt tolerated well and site covered with gauze and coban.   Hgb 9.9; pt meets parameter for injection.    Retacrit given in the back of the right arm; site covered with bandaid. Pt tolerated well.     Follow-up care discussed and next appointments confirmed. Pt discharged home to self care in no apparent distress at this time.

## 2024-11-26 ENCOUNTER — OUTPATIENT INFUSION SERVICES (OUTPATIENT)
Dept: ONCOLOGY | Facility: MEDICAL CENTER | Age: 84
End: 2024-11-26
Attending: INTERNAL MEDICINE
Payer: MEDICARE

## 2024-11-26 VITALS
RESPIRATION RATE: 16 BRPM | TEMPERATURE: 97.6 F | OXYGEN SATURATION: 97 % | HEIGHT: 67 IN | SYSTOLIC BLOOD PRESSURE: 117 MMHG | BODY MASS INDEX: 26.37 KG/M2 | DIASTOLIC BLOOD PRESSURE: 64 MMHG | WEIGHT: 167.99 LBS | HEART RATE: 91 BPM

## 2024-11-26 DIAGNOSIS — C80.1 ANEMIA ASSOCIATED WITH MALIGNANT NEOPLASTIC DISEASE (HCC): ICD-10-CM

## 2024-11-26 DIAGNOSIS — D63.0 ANEMIA IN NEOPLASTIC DISEASE: ICD-10-CM

## 2024-11-26 DIAGNOSIS — D63.0 ANEMIA ASSOCIATED WITH MALIGNANT NEOPLASTIC DISEASE (HCC): ICD-10-CM

## 2024-11-26 DIAGNOSIS — I50.32 CHRONIC HEART FAILURE WITH PRESERVED EJECTION FRACTION (HCC): ICD-10-CM

## 2024-11-26 DIAGNOSIS — D75.81 MYELOFIBROSIS (HCC): ICD-10-CM

## 2024-11-26 LAB
ALBUMIN SERPL BCP-MCNC: 3.7 G/DL (ref 3.2–4.9)
ALBUMIN/GLOB SERPL: 1.5 G/DL
ALP SERPL-CCNC: 90 U/L (ref 30–99)
ALT SERPL-CCNC: 18 U/L (ref 2–50)
ANION GAP SERPL CALC-SCNC: 12 MMOL/L (ref 7–16)
AST SERPL-CCNC: 22 U/L (ref 12–45)
BILIRUB SERPL-MCNC: 1.4 MG/DL (ref 0.1–1.5)
BUN SERPL-MCNC: 24 MG/DL (ref 8–22)
CALCIUM ALBUM COR SERPL-MCNC: 9.2 MG/DL (ref 8.5–10.5)
CALCIUM SERPL-MCNC: 9 MG/DL (ref 8.5–10.5)
CHLORIDE SERPL-SCNC: 106 MMOL/L (ref 96–112)
CHOLEST SERPL-MCNC: 94 MG/DL (ref 100–199)
CO2 SERPL-SCNC: 24 MMOL/L (ref 20–33)
CREAT SERPL-MCNC: 0.91 MG/DL (ref 0.5–1.4)
GFR SERPLBLD CREATININE-BSD FMLA CKD-EPI: 63 ML/MIN/1.73 M 2
GLOBULIN SER CALC-MCNC: 2.4 G/DL (ref 1.9–3.5)
GLUCOSE SERPL-MCNC: 89 MG/DL (ref 65–99)
HCT VFR BLD AUTO: 29.7 % (ref 37–47)
HDLC SERPL-MCNC: 36 MG/DL
HGB BLD-MCNC: 9.4 G/DL (ref 12–16)
LDLC SERPL CALC-MCNC: 37 MG/DL
POTASSIUM SERPL-SCNC: 4.3 MMOL/L (ref 3.6–5.5)
PROT SERPL-MCNC: 6.1 G/DL (ref 6–8.2)
SODIUM SERPL-SCNC: 142 MMOL/L (ref 135–145)
TRIGL SERPL-MCNC: 105 MG/DL (ref 0–149)

## 2024-11-26 PROCEDURE — 36415 COLL VENOUS BLD VENIPUNCTURE: CPT

## 2024-11-26 PROCEDURE — 80061 LIPID PANEL: CPT

## 2024-11-26 PROCEDURE — 700111 HCHG RX REV CODE 636 W/ 250 OVERRIDE (IP): Mod: JZ,JG | Performed by: INTERNAL MEDICINE

## 2024-11-26 PROCEDURE — 96372 THER/PROPH/DIAG INJ SC/IM: CPT

## 2024-11-26 PROCEDURE — 85018 HEMOGLOBIN: CPT

## 2024-11-26 PROCEDURE — 85014 HEMATOCRIT: CPT

## 2024-11-26 PROCEDURE — 80053 COMPREHEN METABOLIC PANEL: CPT

## 2024-11-26 RX ADMIN — EPOETIN ALFA-EPBX 40000 UNITS: 40000 INJECTION, SOLUTION INTRAVENOUS; SUBCUTANEOUS at 14:07

## 2024-11-26 ASSESSMENT — FIBROSIS 4 INDEX: FIB4 SCORE: 0.96

## 2024-11-26 NOTE — PROGRESS NOTES
Dedra arrives to [facility] for weekly Retacrit. Patient denies acute health concerns. H/H (and additional labs from open orders) drawn via venipuncture from [facility] without difficulty. Hgb 9.4; Hct 29.7%, which DOES meet parameters to receive Retacrit today. Retacrit administered SQ to back of left arm without issues. Patient has her next appt. Discharged home to self care in no apparent distress

## 2024-12-03 ENCOUNTER — OUTPATIENT INFUSION SERVICES (OUTPATIENT)
Dept: ONCOLOGY | Facility: MEDICAL CENTER | Age: 84
End: 2024-12-03
Attending: INTERNAL MEDICINE
Payer: MEDICARE

## 2024-12-03 VITALS
RESPIRATION RATE: 18 BRPM | HEIGHT: 67 IN | SYSTOLIC BLOOD PRESSURE: 134 MMHG | DIASTOLIC BLOOD PRESSURE: 61 MMHG | TEMPERATURE: 97 F | WEIGHT: 167.55 LBS | OXYGEN SATURATION: 97 % | BODY MASS INDEX: 26.3 KG/M2 | HEART RATE: 86 BPM

## 2024-12-03 DIAGNOSIS — C80.1 ANEMIA ASSOCIATED WITH MALIGNANT NEOPLASTIC DISEASE (HCC): ICD-10-CM

## 2024-12-03 DIAGNOSIS — D75.81 MYELOFIBROSIS (HCC): ICD-10-CM

## 2024-12-03 DIAGNOSIS — D63.0 ANEMIA IN NEOPLASTIC DISEASE: ICD-10-CM

## 2024-12-03 DIAGNOSIS — D63.0 ANEMIA ASSOCIATED WITH MALIGNANT NEOPLASTIC DISEASE (HCC): ICD-10-CM

## 2024-12-03 LAB
HCT VFR BLD AUTO: 32 % (ref 37–47)
HGB BLD-MCNC: 9.9 G/DL (ref 12–16)

## 2024-12-03 PROCEDURE — 36415 COLL VENOUS BLD VENIPUNCTURE: CPT

## 2024-12-03 PROCEDURE — 96372 THER/PROPH/DIAG INJ SC/IM: CPT

## 2024-12-03 PROCEDURE — 85014 HEMATOCRIT: CPT

## 2024-12-03 PROCEDURE — 700111 HCHG RX REV CODE 636 W/ 250 OVERRIDE (IP): Mod: JZ,JG | Performed by: INTERNAL MEDICINE

## 2024-12-03 PROCEDURE — 85018 HEMOGLOBIN: CPT

## 2024-12-03 RX ADMIN — EPOETIN ALFA-EPBX 40000 UNITS: 40000 INJECTION, SOLUTION INTRAVENOUS; SUBCUTANEOUS at 14:07

## 2024-12-03 ASSESSMENT — FIBROSIS 4 INDEX: FIB4 SCORE: .8989574582526650694

## 2024-12-03 NOTE — PROGRESS NOTES
Pt arrived ambulatory for weekly Retacrit, denies c/o, no changes since last appt. Labs drawn from LAC with butterfly needle, gauze and coban applied.  Results reviewed, Hgb 9.9, meets parameters for Retacrit.  Retacrit given in right arm, bandaid applied, Dc'd home without incident and will f/u as scheduled.

## 2024-12-10 ENCOUNTER — OUTPATIENT INFUSION SERVICES (OUTPATIENT)
Dept: ONCOLOGY | Facility: MEDICAL CENTER | Age: 84
End: 2024-12-10
Attending: INTERNAL MEDICINE
Payer: MEDICARE

## 2024-12-10 VITALS
HEIGHT: 67 IN | TEMPERATURE: 97.5 F | RESPIRATION RATE: 18 BRPM | OXYGEN SATURATION: 96 % | SYSTOLIC BLOOD PRESSURE: 127 MMHG | BODY MASS INDEX: 26.92 KG/M2 | DIASTOLIC BLOOD PRESSURE: 62 MMHG | HEART RATE: 100 BPM | WEIGHT: 171.52 LBS

## 2024-12-10 DIAGNOSIS — D75.81 MYELOFIBROSIS (HCC): ICD-10-CM

## 2024-12-10 DIAGNOSIS — C80.1 ANEMIA ASSOCIATED WITH MALIGNANT NEOPLASTIC DISEASE (HCC): ICD-10-CM

## 2024-12-10 DIAGNOSIS — D63.0 ANEMIA ASSOCIATED WITH MALIGNANT NEOPLASTIC DISEASE (HCC): ICD-10-CM

## 2024-12-10 DIAGNOSIS — D63.0 ANEMIA IN NEOPLASTIC DISEASE: ICD-10-CM

## 2024-12-10 LAB
HCT VFR BLD AUTO: 31 % (ref 37–47)
HGB BLD-MCNC: 9.8 G/DL (ref 12–16)

## 2024-12-10 PROCEDURE — 85018 HEMOGLOBIN: CPT

## 2024-12-10 PROCEDURE — 96372 THER/PROPH/DIAG INJ SC/IM: CPT

## 2024-12-10 PROCEDURE — 700111 HCHG RX REV CODE 636 W/ 250 OVERRIDE (IP): Mod: JZ,JG | Performed by: INTERNAL MEDICINE

## 2024-12-10 PROCEDURE — 36415 COLL VENOUS BLD VENIPUNCTURE: CPT

## 2024-12-10 PROCEDURE — 85014 HEMATOCRIT: CPT

## 2024-12-10 RX ADMIN — EPOETIN ALFA-EPBX 40000 UNITS: 40000 INJECTION, SOLUTION INTRAVENOUS; SUBCUTANEOUS at 14:11

## 2024-12-10 ASSESSMENT — FIBROSIS 4 INDEX: FIB4 SCORE: .8989574582526650694

## 2024-12-10 NOTE — PROGRESS NOTES
Ms Lobo is her today for retacrit injection.   Venipuncture to her right arm.   HGB 9.8   40,000 units of retacrit given sq to the back of her left arm.     Pt discharged in stable condition.

## 2024-12-17 ENCOUNTER — OUTPATIENT INFUSION SERVICES (OUTPATIENT)
Dept: ONCOLOGY | Facility: MEDICAL CENTER | Age: 84
End: 2024-12-17
Attending: INTERNAL MEDICINE
Payer: MEDICARE

## 2024-12-17 VITALS
TEMPERATURE: 97 F | WEIGHT: 167.55 LBS | HEIGHT: 67 IN | SYSTOLIC BLOOD PRESSURE: 130 MMHG | OXYGEN SATURATION: 98 % | HEART RATE: 90 BPM | BODY MASS INDEX: 26.3 KG/M2 | DIASTOLIC BLOOD PRESSURE: 58 MMHG | RESPIRATION RATE: 18 BRPM

## 2024-12-17 DIAGNOSIS — C80.1 ANEMIA ASSOCIATED WITH MALIGNANT NEOPLASTIC DISEASE (HCC): ICD-10-CM

## 2024-12-17 DIAGNOSIS — D63.0 ANEMIA ASSOCIATED WITH MALIGNANT NEOPLASTIC DISEASE (HCC): ICD-10-CM

## 2024-12-17 DIAGNOSIS — D75.81 MYELOFIBROSIS (HCC): ICD-10-CM

## 2024-12-17 DIAGNOSIS — D63.0 ANEMIA IN NEOPLASTIC DISEASE: ICD-10-CM

## 2024-12-17 LAB
HCT VFR BLD AUTO: 31 % (ref 37–47)
HGB BLD-MCNC: 9.6 G/DL (ref 12–16)

## 2024-12-17 PROCEDURE — 36415 COLL VENOUS BLD VENIPUNCTURE: CPT

## 2024-12-17 PROCEDURE — 700111 HCHG RX REV CODE 636 W/ 250 OVERRIDE (IP): Mod: JZ,JG | Performed by: INTERNAL MEDICINE

## 2024-12-17 PROCEDURE — 96372 THER/PROPH/DIAG INJ SC/IM: CPT

## 2024-12-17 PROCEDURE — 85018 HEMOGLOBIN: CPT

## 2024-12-17 PROCEDURE — 85014 HEMATOCRIT: CPT

## 2024-12-17 RX ADMIN — EPOETIN ALFA-EPBX 40000 UNITS: 40000 INJECTION, SOLUTION INTRAVENOUS; SUBCUTANEOUS at 14:15

## 2024-12-17 ASSESSMENT — FIBROSIS 4 INDEX: FIB4 SCORE: .8989574582526650694

## 2024-12-17 NOTE — PROGRESS NOTES
Pt arrived ambulatory to IS for retacrit injection. POC discussed. Labs drawn as ordered, results reviewed, pt meets parameters for injection today. Retacrit given as ordered to back of R arm, pt tolerated well. Next appointment confirmed.  Pt discharged from IS in NAD under self care.

## 2024-12-24 ENCOUNTER — OUTPATIENT INFUSION SERVICES (OUTPATIENT)
Dept: ONCOLOGY | Facility: MEDICAL CENTER | Age: 84
End: 2024-12-24
Attending: INTERNAL MEDICINE
Payer: MEDICARE

## 2024-12-24 VITALS
SYSTOLIC BLOOD PRESSURE: 122 MMHG | WEIGHT: 170.19 LBS | RESPIRATION RATE: 18 BRPM | HEIGHT: 67 IN | BODY MASS INDEX: 26.71 KG/M2 | HEART RATE: 94 BPM | TEMPERATURE: 97 F | DIASTOLIC BLOOD PRESSURE: 65 MMHG | OXYGEN SATURATION: 95 %

## 2024-12-24 DIAGNOSIS — D63.0 ANEMIA ASSOCIATED WITH MALIGNANT NEOPLASTIC DISEASE (HCC): ICD-10-CM

## 2024-12-24 DIAGNOSIS — C80.1 ANEMIA ASSOCIATED WITH MALIGNANT NEOPLASTIC DISEASE (HCC): ICD-10-CM

## 2024-12-24 DIAGNOSIS — D63.0 ANEMIA IN NEOPLASTIC DISEASE: ICD-10-CM

## 2024-12-24 DIAGNOSIS — D75.81 MYELOFIBROSIS (HCC): ICD-10-CM

## 2024-12-24 LAB
HCT VFR BLD AUTO: 29.7 % (ref 37–47)
HGB BLD-MCNC: 9.3 G/DL (ref 12–16)

## 2024-12-24 PROCEDURE — 700111 HCHG RX REV CODE 636 W/ 250 OVERRIDE (IP): Mod: JZ,JG | Performed by: INTERNAL MEDICINE

## 2024-12-24 PROCEDURE — 85014 HEMATOCRIT: CPT

## 2024-12-24 PROCEDURE — 96372 THER/PROPH/DIAG INJ SC/IM: CPT

## 2024-12-24 PROCEDURE — 36415 COLL VENOUS BLD VENIPUNCTURE: CPT

## 2024-12-24 PROCEDURE — 85018 HEMOGLOBIN: CPT

## 2024-12-24 RX ADMIN — EPOETIN ALFA-EPBX 40000 UNITS: 40000 INJECTION, SOLUTION INTRAVENOUS; SUBCUTANEOUS at 14:33

## 2024-12-24 ASSESSMENT — FIBROSIS 4 INDEX: FIB4 SCORE: .8989574582526650694

## 2024-12-24 NOTE — PROGRESS NOTES
Dedra presents to IS for labs/ possible Retacrit injection.  H&H collected via 23g butterfly to left AC, gauze and coban to site.  Hgb 9.3, parameters met for Retacrit.  Retacrit injection given to back left arm, adhesive dressing applied. Derda tolerated well.  Discharged in NAD, next appointment confirmed.

## 2024-12-31 ENCOUNTER — OUTPATIENT INFUSION SERVICES (OUTPATIENT)
Dept: ONCOLOGY | Facility: MEDICAL CENTER | Age: 84
End: 2024-12-31
Attending: INTERNAL MEDICINE
Payer: MEDICARE

## 2024-12-31 VITALS
OXYGEN SATURATION: 93 % | HEART RATE: 89 BPM | WEIGHT: 171.3 LBS | SYSTOLIC BLOOD PRESSURE: 112 MMHG | HEIGHT: 67 IN | DIASTOLIC BLOOD PRESSURE: 56 MMHG | BODY MASS INDEX: 26.89 KG/M2 | RESPIRATION RATE: 18 BRPM | TEMPERATURE: 96.8 F

## 2024-12-31 DIAGNOSIS — C80.1 ANEMIA ASSOCIATED WITH MALIGNANT NEOPLASTIC DISEASE (HCC): ICD-10-CM

## 2024-12-31 DIAGNOSIS — D75.81 MYELOFIBROSIS (HCC): ICD-10-CM

## 2024-12-31 DIAGNOSIS — D63.0 ANEMIA ASSOCIATED WITH MALIGNANT NEOPLASTIC DISEASE (HCC): ICD-10-CM

## 2024-12-31 DIAGNOSIS — D63.0 ANEMIA IN NEOPLASTIC DISEASE: ICD-10-CM

## 2024-12-31 LAB
HCT VFR BLD AUTO: 29.1 % (ref 37–47)
HGB BLD-MCNC: 9 G/DL (ref 12–16)

## 2024-12-31 PROCEDURE — 700111 HCHG RX REV CODE 636 W/ 250 OVERRIDE (IP): Mod: JZ,JG | Performed by: INTERNAL MEDICINE

## 2024-12-31 PROCEDURE — 96372 THER/PROPH/DIAG INJ SC/IM: CPT

## 2024-12-31 PROCEDURE — 36415 COLL VENOUS BLD VENIPUNCTURE: CPT

## 2024-12-31 RX ADMIN — EPOETIN ALFA-EPBX 40000 UNITS: 40000 INJECTION, SOLUTION INTRAVENOUS; SUBCUTANEOUS at 14:16

## 2024-12-31 ASSESSMENT — FIBROSIS 4 INDEX: FIB4 SCORE: .8989574582526650694

## 2025-01-01 NOTE — PROGRESS NOTES
Dedra to infusion for weekly labs and possible Retacrit injection. Reports feeling okay today, had a cold last week which she is still getting over. Labs drawn via venipuncture to L AC.    Labs reviewed by RN, Hgb: 9.0, within parameters for treatment today.    Retacrit given SQ in [place]ARR[place], patient tolerated well with no adverse effects.    Patient left in stable condition, knows when to return for next appt.

## 2025-01-07 ENCOUNTER — OUTPATIENT INFUSION SERVICES (OUTPATIENT)
Dept: ONCOLOGY | Facility: MEDICAL CENTER | Age: 85
End: 2025-01-07
Attending: INTERNAL MEDICINE
Payer: MEDICARE

## 2025-01-07 VITALS
SYSTOLIC BLOOD PRESSURE: 134 MMHG | DIASTOLIC BLOOD PRESSURE: 64 MMHG | BODY MASS INDEX: 26.64 KG/M2 | WEIGHT: 169.75 LBS | HEIGHT: 67 IN | TEMPERATURE: 98.1 F | RESPIRATION RATE: 18 BRPM | HEART RATE: 78 BPM | OXYGEN SATURATION: 98 %

## 2025-01-07 DIAGNOSIS — D75.81 MYELOFIBROSIS (HCC): ICD-10-CM

## 2025-01-07 DIAGNOSIS — D63.0 ANEMIA ASSOCIATED WITH MALIGNANT NEOPLASTIC DISEASE (HCC): ICD-10-CM

## 2025-01-07 DIAGNOSIS — C80.1 ANEMIA ASSOCIATED WITH MALIGNANT NEOPLASTIC DISEASE (HCC): ICD-10-CM

## 2025-01-07 DIAGNOSIS — D63.0 ANEMIA IN NEOPLASTIC DISEASE: ICD-10-CM

## 2025-01-07 LAB
HCT VFR BLD AUTO: 28.5 % (ref 37–47)
HGB BLD-MCNC: 9 G/DL (ref 12–16)

## 2025-01-07 PROCEDURE — 36415 COLL VENOUS BLD VENIPUNCTURE: CPT

## 2025-01-07 PROCEDURE — 96372 THER/PROPH/DIAG INJ SC/IM: CPT

## 2025-01-07 PROCEDURE — 85014 HEMATOCRIT: CPT

## 2025-01-07 PROCEDURE — 700111 HCHG RX REV CODE 636 W/ 250 OVERRIDE (IP): Mod: JZ,TB | Performed by: INTERNAL MEDICINE

## 2025-01-07 PROCEDURE — 85018 HEMOGLOBIN: CPT

## 2025-01-07 RX ADMIN — EPOETIN ALFA-EPBX 40000 UNITS: 40000 INJECTION, SOLUTION INTRAVENOUS; SUBCUTANEOUS at 14:23

## 2025-01-07 ASSESSMENT — FIBROSIS 4 INDEX: FIB4 SCORE: .8989574582526650694

## 2025-01-07 NOTE — PROGRESS NOTES
Pt presented to Infusion for weekly Retacrit injection. POC discussed and pt verbalized understanding. Orders received from Dr. Carter, covering provider for Dr. Foley. Pt denies pain or s/s of acute illness today. Labs drawn with 25G butterfly needle to RAC x1 attempt without difficulty; site covered with sterile gauze/coban and pt tolerated well. Hgb 9 and SBP <160. Retacrit  injection administered per MAR, band-aid applied to site and pt tolerated well. No s/s of reactions or complications noted. Future appts confirmed for pt and pt discharged to self care in [facility].

## 2025-01-14 ENCOUNTER — OUTPATIENT INFUSION SERVICES (OUTPATIENT)
Dept: ONCOLOGY | Facility: MEDICAL CENTER | Age: 85
End: 2025-01-14
Attending: INTERNAL MEDICINE
Payer: MEDICARE

## 2025-01-14 VITALS
HEIGHT: 67 IN | BODY MASS INDEX: 26.64 KG/M2 | RESPIRATION RATE: 18 BRPM | WEIGHT: 169.75 LBS | SYSTOLIC BLOOD PRESSURE: 133 MMHG | DIASTOLIC BLOOD PRESSURE: 63 MMHG | HEART RATE: 90 BPM | OXYGEN SATURATION: 94 % | TEMPERATURE: 98 F

## 2025-01-14 DIAGNOSIS — C80.1 ANEMIA ASSOCIATED WITH MALIGNANT NEOPLASTIC DISEASE (HCC): ICD-10-CM

## 2025-01-14 DIAGNOSIS — D63.0 ANEMIA IN NEOPLASTIC DISEASE: ICD-10-CM

## 2025-01-14 DIAGNOSIS — D63.0 ANEMIA ASSOCIATED WITH MALIGNANT NEOPLASTIC DISEASE (HCC): ICD-10-CM

## 2025-01-14 DIAGNOSIS — D75.81 MYELOFIBROSIS (HCC): ICD-10-CM

## 2025-01-14 LAB
HCT VFR BLD AUTO: 28.4 % (ref 37–47)
HGB BLD-MCNC: 8.8 G/DL (ref 12–16)

## 2025-01-14 PROCEDURE — 85018 HEMOGLOBIN: CPT

## 2025-01-14 PROCEDURE — 36415 COLL VENOUS BLD VENIPUNCTURE: CPT

## 2025-01-14 PROCEDURE — 96372 THER/PROPH/DIAG INJ SC/IM: CPT

## 2025-01-14 PROCEDURE — 85014 HEMATOCRIT: CPT

## 2025-01-14 PROCEDURE — 700111 HCHG RX REV CODE 636 W/ 250 OVERRIDE (IP): Mod: JZ,TB | Performed by: INTERNAL MEDICINE

## 2025-01-14 RX ADMIN — EPOETIN ALFA-EPBX 40000 UNITS: 40000 INJECTION, SOLUTION INTRAVENOUS; SUBCUTANEOUS at 14:49

## 2025-01-14 ASSESSMENT — FIBROSIS 4 INDEX: FIB4 SCORE: .8989574582526650694

## 2025-01-14 NOTE — PROGRESS NOTES
Dedra into [facility]fusion Services for weekly epoetin injection.  Venipuncture 2x to left AC, unable to get labs; right AC 1x venipuncture by TM for lab draw.  Labs reviewed, hgb 8.8. Pt meets parameters for injection today.   Retacrit administered sub-q to left back arm, bandaid applied.  Dedra off unit in [facility], verified next appointment.

## 2025-01-21 ENCOUNTER — OUTPATIENT INFUSION SERVICES (OUTPATIENT)
Dept: ONCOLOGY | Facility: MEDICAL CENTER | Age: 85
End: 2025-01-21
Attending: INTERNAL MEDICINE
Payer: MEDICARE

## 2025-01-21 VITALS
WEIGHT: 165.34 LBS | RESPIRATION RATE: 18 BRPM | TEMPERATURE: 97 F | BODY MASS INDEX: 25.95 KG/M2 | HEIGHT: 67 IN | DIASTOLIC BLOOD PRESSURE: 74 MMHG | HEART RATE: 77 BPM | SYSTOLIC BLOOD PRESSURE: 137 MMHG | OXYGEN SATURATION: 98 %

## 2025-01-21 DIAGNOSIS — D63.0 ANEMIA IN NEOPLASTIC DISEASE: ICD-10-CM

## 2025-01-21 DIAGNOSIS — D63.0 ANEMIA ASSOCIATED WITH MALIGNANT NEOPLASTIC DISEASE (HCC): ICD-10-CM

## 2025-01-21 DIAGNOSIS — C80.1 ANEMIA ASSOCIATED WITH MALIGNANT NEOPLASTIC DISEASE (HCC): ICD-10-CM

## 2025-01-21 DIAGNOSIS — D75.81 MYELOFIBROSIS (HCC): ICD-10-CM

## 2025-01-21 LAB
HCT VFR BLD AUTO: 30.8 % (ref 37–47)
HGB BLD-MCNC: 9.3 G/DL (ref 12–16)

## 2025-01-21 PROCEDURE — 85014 HEMATOCRIT: CPT

## 2025-01-21 PROCEDURE — 96372 THER/PROPH/DIAG INJ SC/IM: CPT

## 2025-01-21 PROCEDURE — 700111 HCHG RX REV CODE 636 W/ 250 OVERRIDE (IP): Mod: JZ,TB | Performed by: INTERNAL MEDICINE

## 2025-01-21 PROCEDURE — 85018 HEMOGLOBIN: CPT

## 2025-01-21 PROCEDURE — 36415 COLL VENOUS BLD VENIPUNCTURE: CPT

## 2025-01-21 RX ADMIN — EPOETIN ALFA-EPBX 40000 UNITS: 40000 INJECTION, SOLUTION INTRAVENOUS; SUBCUTANEOUS at 14:07

## 2025-01-21 ASSESSMENT — FIBROSIS 4 INDEX: FIB4 SCORE: .8989574582526650694

## 2025-01-21 NOTE — PROGRESS NOTES
Pt arrived ambulatory for weekly Retacrit injection, states she is continuing to recover from recent upper respiratory infection and doing well.  Labs drawn via butterfly needle from RAC, gauze and Coban applied.  Results reviewed, Hgb 9.3, meets parameters for Retacrit today.  Retacrit injection given in right arm, bandaid applied, tolerated well.  Pt Dc'd home without incident and will return next week as scheduled.

## 2025-01-28 ENCOUNTER — OUTPATIENT INFUSION SERVICES (OUTPATIENT)
Dept: ONCOLOGY | Facility: MEDICAL CENTER | Age: 85
End: 2025-01-28
Attending: INTERNAL MEDICINE
Payer: MEDICARE

## 2025-01-28 VITALS
BODY MASS INDEX: 26.12 KG/M2 | OXYGEN SATURATION: 91 % | TEMPERATURE: 97.8 F | SYSTOLIC BLOOD PRESSURE: 114 MMHG | HEIGHT: 67 IN | RESPIRATION RATE: 18 BRPM | WEIGHT: 166.45 LBS | DIASTOLIC BLOOD PRESSURE: 58 MMHG | HEART RATE: 108 BPM

## 2025-01-28 DIAGNOSIS — D63.0 ANEMIA IN NEOPLASTIC DISEASE: ICD-10-CM

## 2025-01-28 DIAGNOSIS — C80.1 ANEMIA ASSOCIATED WITH MALIGNANT NEOPLASTIC DISEASE (HCC): ICD-10-CM

## 2025-01-28 DIAGNOSIS — D75.81 MYELOFIBROSIS (HCC): ICD-10-CM

## 2025-01-28 DIAGNOSIS — D63.0 ANEMIA ASSOCIATED WITH MALIGNANT NEOPLASTIC DISEASE (HCC): ICD-10-CM

## 2025-01-28 LAB
HCT VFR BLD AUTO: 28.7 % (ref 37–47)
HGB BLD-MCNC: 8.9 G/DL (ref 12–16)

## 2025-01-28 PROCEDURE — 36415 COLL VENOUS BLD VENIPUNCTURE: CPT

## 2025-01-28 PROCEDURE — 85014 HEMATOCRIT: CPT

## 2025-01-28 PROCEDURE — 700111 HCHG RX REV CODE 636 W/ 250 OVERRIDE (IP): Mod: JZ,TB | Performed by: INTERNAL MEDICINE

## 2025-01-28 PROCEDURE — 85018 HEMOGLOBIN: CPT

## 2025-01-28 PROCEDURE — 96372 THER/PROPH/DIAG INJ SC/IM: CPT

## 2025-01-28 RX ADMIN — EPOETIN ALFA-EPBX 40000 UNITS: 40000 INJECTION, SOLUTION INTRAVENOUS; SUBCUTANEOUS at 15:00

## 2025-01-28 ASSESSMENT — FIBROSIS 4 INDEX: FIB4 SCORE: .8989574582526650694

## 2025-01-28 NOTE — PROGRESS NOTES
Dedra presents to [facility] for labs/ possible Retacrit injection.  H&H collected via 23g butterfly to right AC, gauze and coban to site.  Hgb 8.9, /58. Pt appropriate to receive Retacrit.  Retacrit injection given to back of right arm, adhesive dressing applied. Dedra tolerated well.  Discharged in NAD, next appointment confirmed for 2/4/25 at 1400.

## 2025-02-04 ENCOUNTER — OUTPATIENT INFUSION SERVICES (OUTPATIENT)
Dept: ONCOLOGY | Facility: MEDICAL CENTER | Age: 85
End: 2025-02-04
Attending: INTERNAL MEDICINE
Payer: MEDICARE

## 2025-02-04 VITALS
HEART RATE: 97 BPM | DIASTOLIC BLOOD PRESSURE: 65 MMHG | RESPIRATION RATE: 18 BRPM | HEIGHT: 67 IN | WEIGHT: 170.64 LBS | OXYGEN SATURATION: 91 % | BODY MASS INDEX: 26.78 KG/M2 | TEMPERATURE: 98.5 F | SYSTOLIC BLOOD PRESSURE: 127 MMHG

## 2025-02-04 DIAGNOSIS — D75.81 MYELOFIBROSIS (HCC): ICD-10-CM

## 2025-02-04 DIAGNOSIS — D63.0 ANEMIA ASSOCIATED WITH MALIGNANT NEOPLASTIC DISEASE (HCC): ICD-10-CM

## 2025-02-04 DIAGNOSIS — D63.0 ANEMIA IN NEOPLASTIC DISEASE: ICD-10-CM

## 2025-02-04 DIAGNOSIS — C80.1 ANEMIA ASSOCIATED WITH MALIGNANT NEOPLASTIC DISEASE (HCC): ICD-10-CM

## 2025-02-04 LAB
HCT VFR BLD AUTO: 27.4 % (ref 37–47)
HGB BLD-MCNC: 8.6 G/DL (ref 12–16)

## 2025-02-04 PROCEDURE — 96372 THER/PROPH/DIAG INJ SC/IM: CPT

## 2025-02-04 PROCEDURE — 700111 HCHG RX REV CODE 636 W/ 250 OVERRIDE (IP): Mod: JZ,TB | Performed by: INTERNAL MEDICINE

## 2025-02-04 PROCEDURE — 85018 HEMOGLOBIN: CPT

## 2025-02-04 PROCEDURE — 36415 COLL VENOUS BLD VENIPUNCTURE: CPT

## 2025-02-04 PROCEDURE — 85014 HEMATOCRIT: CPT

## 2025-02-04 RX ADMIN — EPOETIN ALFA-EPBX 40000 UNITS: 40000 INJECTION, SOLUTION INTRAVENOUS; SUBCUTANEOUS at 15:08

## 2025-02-04 ASSESSMENT — FIBROSIS 4 INDEX: FIB4 SCORE: .8989574582526650694

## 2025-02-05 NOTE — PROGRESS NOTES
Pt presented to Infusion for weekly Retacrit injection. POC discussed and pt verbalized understanding. Pt denies pain or s/s of acute illness today. Labs drawn with 25G butterfly needle to RAC x1 attempt without difficulty; site covered with sterile gauze/coban and pt tolerated well. Hgb 8.6 and SBP <160. Retacrit  injection administered per MAR, band-aid applied to site and pt tolerated well. No s/s of reactions or complications noted. Future appts confirmed for pt and pt discharged to self care in [facility].

## 2025-02-06 ENCOUNTER — HOSPITAL ENCOUNTER (OUTPATIENT)
Dept: RADIOLOGY | Facility: MEDICAL CENTER | Age: 85
End: 2025-02-06
Attending: FAMILY MEDICINE
Payer: MEDICARE

## 2025-02-06 ENCOUNTER — OFFICE VISIT (OUTPATIENT)
Dept: MEDICAL GROUP | Facility: MEDICAL CENTER | Age: 85
End: 2025-02-06
Payer: MEDICARE

## 2025-02-06 VITALS
OXYGEN SATURATION: 95 % | BODY MASS INDEX: 27.15 KG/M2 | WEIGHT: 173 LBS | RESPIRATION RATE: 16 BRPM | DIASTOLIC BLOOD PRESSURE: 70 MMHG | HEIGHT: 67 IN | HEART RATE: 97 BPM | TEMPERATURE: 98 F | SYSTOLIC BLOOD PRESSURE: 102 MMHG

## 2025-02-06 DIAGNOSIS — I50.32 CHRONIC HEART FAILURE WITH PRESERVED EJECTION FRACTION (HCC): ICD-10-CM

## 2025-02-06 DIAGNOSIS — I27.20 PULMONARY HYPERTENSION (HCC): ICD-10-CM

## 2025-02-06 DIAGNOSIS — R06.02 SHORTNESS OF BREATH: ICD-10-CM

## 2025-02-06 DIAGNOSIS — C67.9 MALIGNANT NEOPLASM OF URINARY BLADDER, UNSPECIFIED SITE (HCC): ICD-10-CM

## 2025-02-06 DIAGNOSIS — D75.81 MYELOFIBROSIS (HCC): ICD-10-CM

## 2025-02-06 DIAGNOSIS — D63.0 ANEMIA IN NEOPLASTIC DISEASE: ICD-10-CM

## 2025-02-06 DIAGNOSIS — R05.2 SUBACUTE COUGH: ICD-10-CM

## 2025-02-06 DIAGNOSIS — R10.9 LEFT LATERAL ABDOMINAL PAIN: ICD-10-CM

## 2025-02-06 PROCEDURE — 99214 OFFICE O/P EST MOD 30 MIN: CPT | Performed by: FAMILY MEDICINE

## 2025-02-06 PROCEDURE — 3078F DIAST BP <80 MM HG: CPT | Performed by: FAMILY MEDICINE

## 2025-02-06 PROCEDURE — 3074F SYST BP LT 130 MM HG: CPT | Performed by: FAMILY MEDICINE

## 2025-02-06 PROCEDURE — 71046 X-RAY EXAM CHEST 2 VIEWS: CPT

## 2025-02-06 RX ORDER — AZITHROMYCIN 250 MG/1
TABLET, FILM COATED ORAL
Qty: 6 TABLET | Refills: 0 | Status: SHIPPED | OUTPATIENT
Start: 2025-02-06

## 2025-02-06 ASSESSMENT — PATIENT HEALTH QUESTIONNAIRE - PHQ9: CLINICAL INTERPRETATION OF PHQ2 SCORE: 0

## 2025-02-06 ASSESSMENT — FIBROSIS 4 INDEX: FIB4 SCORE: .8989574582526650694

## 2025-02-06 NOTE — PROGRESS NOTES
"Verbal consent was acquired by the patient to use [employer] ambient listening note generation during this visit    Subjective:     CC: \"left sided pain\"    History of Present Illness  The patient is an 84-year-old female who presents for evaluation of left-sided pain, cough, bone marrow cancer, and shortness of breath.    She experienced a combination of pain and ache on the left side of her body, extending from the spine to the area under the ribs, which began last Friday afternoon or early evening. The pain was described as more of an ache, with no associated tightness or pressure. She did not experience any pain during her infusion session on Tuesday. The pain resolved spontaneously at 1:00 AM on Monday. She reports feeling fatigued today and has been unable to take her diuretic due to urinary incontinence. She also reports swelling, which she manages effectively. She has a scheduled appointment with Dr. Foley in 2 weeks. She has a follow-up appointment with her vascular specialist in a few months. She saw her cardiologist, Waqas Morris, on 11/14/2024 and is scheduled for a follow-up visit in 6 months. She reports feeling better overall compared to the previous weekend but experiences back pain if she sits for extended periods.    She has been experiencing difficulty breathing when lying down, but not when sitting up. She uses a small fan at night but is concerned about catching a cold. Her oxygen levels have remained stable. She has been experiencing a cough for the past 3 months, which is intermittent and can last up to 2 minutes. She does not report any associated pain or bleeding. She has been unable to exercise and can only walk short distances before needing to rest. She is considering the use of portable oxygen at night. She had a cold with phlegm for about 6 weeks.    She has been experiencing a cough for the past 3 months, which is intermittent and can last up to 2 minutes. She does not report any " "associated pain or bleeding.     Supplemental Information  She has a history of H. pylori infection, which required 3 rounds of antibiotics. She is currently under surveillance for bladder cancer and has a scheduled appointment with Dr. Oseguera next week.          Objective:     Exam:  /70   Pulse 97   Temp 36.7 °C (98 °F) (Temporal)   Resp 16   Ht 1.702 m (5' 7\")   Wt 78.5 kg (173 lb)   SpO2 95%   BMI 27.10 kg/m²  Body mass index is 27.1 kg/m².    Physical Exam  Vitals reviewed.   Constitutional:       General: She is not in acute distress.     Appearance: Normal appearance. She is ill-appearing. She is not toxic-appearing.   HENT:      Head: Normocephalic and atraumatic.   Cardiovascular:      Rate and Rhythm: Regular rhythm. Tachycardia present.      Heart sounds: Normal heart sounds.   Pulmonary:      Effort: Pulmonary effort is normal. No respiratory distress.      Breath sounds: Normal breath sounds. No wheezing.   Musculoskeletal:      Right lower leg: Edema present.      Left lower leg: Edema present.   Skin:     General: Skin is warm and dry.      Coloration: Skin is pale.   Neurological:      Mental Status: She is alert.      Gait: Gait normal.   Psychiatric:         Mood and Affect: Mood normal.         Behavior: Behavior normal.       6-minute walk test  1 min 90%   2 min 90%  3 min 91%  4 min 90%  5 min 90%   6 min 91%       Results  Laboratory Studies  Hemoglobin was 8.6.      Assessment & Plan:       1. Left lateral abdominal pain  - Comp Metabolic Panel; Future  - CBC WITH DIFFERENTIAL; Future    2. Subacute cough  - azithromycin (ZITHROMAX) 250 MG Tab; Take 2 tablets on day 1, then 1 tablet daily thereafter until gone  Dispense: 6 Tablet; Refill: 0  - Comp Metabolic Panel; Future  - CBC WITH DIFFERENTIAL; Future    3. Myelofibrosis (HCC)  - Referral to Palliative Care  - CBC WITH DIFFERENTIAL; Future    4. 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 Malignant neoplasm of urinary bladder, unspecified site (HCC)  - Referral to Palliative Care    7. Chronic heart failure with preserved ejection fraction (HCC)  - Referral to Palliative Care  - DX-CHEST-2 VIEWS; Future    8. Pulmonary hypertension (HCC)  - Referral to Palliative Care  - Comp Metabolic Panel; Future  - CBC WITH DIFFERENTIAL; Future  - DX-CHEST-2 VIEWS; Future      Assessment & Plan  1. Left-sided pain.  The patient reported left-sided pain from the spine to under the ribs, which resolved on its own. Differential diagnosis includes cardiac issues and bone marrow cancer-related pain. A chest x-ray has been ordered to rule out any underlying conditions. Blood work, including kidney and liver function tests, as well as a complete blood count, will be conducted during her next infusion session.    2. Cough.  The patient has had a persistent cough for the last 3 months, which comes and goes. Differential diagnosis includes pertussis and atypical pneumonia. A prescription for azithromycin has been sent to her pharmacy at [facility] to address the cough.    3. Bone marrow cancer.  The patient's hemoglobin levels have been dropping, with the most recent level at 8.6. She is under the care of Dr. Foley and receives weekly infusions. Continued monitoring of hemoglobin levels is necessary.    4. Shortness of breath.  The patient experiences shortness of breath, particularly when lying down. Her oxygen saturation is 90% at rest. A 6-minute walk test will be performed to assess for any drop in oxygen levels during exertion. If her oxygen levels drop below 88% during the test, an order for  nocturnal oxygen therapy will be placed. A referral for palliative care has been made to assist with symptom management.    Follow-up  The patient will follow up in 3 months.         Return in about 3 months (around 5/6/2025), or if symptoms worsen or fail to improve, for Lifestyle, Medication F/U, Lab F/U.      This note was created using voice recognition software (Dragon). The accuracy of the dictation is limited by the abilities of the software. I have reviewed the note prior to signing, however some errors in grammar and context are still possible. If you have any questions related to this note please do not hesitate to contact our office.

## 2025-02-11 ENCOUNTER — OUTPATIENT INFUSION SERVICES (OUTPATIENT)
Dept: ONCOLOGY | Facility: MEDICAL CENTER | Age: 85
End: 2025-02-11
Attending: INTERNAL MEDICINE
Payer: MEDICARE

## 2025-02-11 VITALS
OXYGEN SATURATION: 95 % | WEIGHT: 176.37 LBS | HEART RATE: 105 BPM | RESPIRATION RATE: 20 BRPM | SYSTOLIC BLOOD PRESSURE: 125 MMHG | TEMPERATURE: 97 F | DIASTOLIC BLOOD PRESSURE: 85 MMHG | HEIGHT: 67 IN | BODY MASS INDEX: 27.68 KG/M2

## 2025-02-11 DIAGNOSIS — D75.81 MYELOFIBROSIS (HCC): ICD-10-CM

## 2025-02-11 DIAGNOSIS — C80.1 ANEMIA ASSOCIATED WITH MALIGNANT NEOPLASTIC DISEASE (HCC): ICD-10-CM

## 2025-02-11 DIAGNOSIS — D63.0 ANEMIA IN NEOPLASTIC DISEASE: ICD-10-CM

## 2025-02-11 DIAGNOSIS — D63.0 ANEMIA ASSOCIATED WITH MALIGNANT NEOPLASTIC DISEASE (HCC): ICD-10-CM

## 2025-02-11 LAB
HCT VFR BLD AUTO: 26.6 % (ref 37–47)
HGB BLD-MCNC: 8.2 G/DL (ref 12–16)

## 2025-02-11 PROCEDURE — 36415 COLL VENOUS BLD VENIPUNCTURE: CPT

## 2025-02-11 PROCEDURE — 85014 HEMATOCRIT: CPT

## 2025-02-11 PROCEDURE — 700111 HCHG RX REV CODE 636 W/ 250 OVERRIDE (IP): Mod: JZ,TB | Performed by: INTERNAL MEDICINE

## 2025-02-11 PROCEDURE — 85018 HEMOGLOBIN: CPT

## 2025-02-11 PROCEDURE — 96372 THER/PROPH/DIAG INJ SC/IM: CPT

## 2025-02-11 RX ADMIN — EPOETIN ALFA-EPBX 40000 UNITS: 40000 INJECTION, SOLUTION INTRAVENOUS; SUBCUTANEOUS at 14:59

## 2025-02-11 ASSESSMENT — FIBROSIS 4 INDEX: FIB4 SCORE: .8989574582526650694

## 2025-02-11 NOTE — PROGRESS NOTES
Pt arrives to IS for Retacrit injection.  Pt reports chronic fatigue.  Labs drawn via 23g butterfly needle to L-AC.  Hemoglobin is 8.2 today.  Results meet MD parameters for Retacrit.  Retacrit given SC to back of LUE.  Confirmed next appt time on 2/18/25 with pt.  Pt dc home to self care.

## 2025-02-12 ENCOUNTER — TELEPHONE (OUTPATIENT)
Dept: MEDICAL GROUP | Facility: MEDICAL CENTER | Age: 85
End: 2025-02-12
Payer: MEDICARE

## 2025-02-12 NOTE — TELEPHONE ENCOUNTER
Called pt and to let pt know that Dr. Tsang did not call pt and it was probably Palliative care calling. Pt confirmed it was [facility] and she was able to schedule an appointment at the end of the month

## 2025-02-18 ENCOUNTER — OUTPATIENT INFUSION SERVICES (OUTPATIENT)
Dept: ONCOLOGY | Facility: MEDICAL CENTER | Age: 85
End: 2025-02-18
Attending: INTERNAL MEDICINE
Payer: MEDICARE

## 2025-02-18 VITALS
BODY MASS INDEX: 27.34 KG/M2 | DIASTOLIC BLOOD PRESSURE: 61 MMHG | HEART RATE: 108 BPM | RESPIRATION RATE: 18 BRPM | TEMPERATURE: 98 F | OXYGEN SATURATION: 92 % | HEIGHT: 67 IN | WEIGHT: 174.16 LBS | SYSTOLIC BLOOD PRESSURE: 115 MMHG

## 2025-02-18 DIAGNOSIS — D63.0 ANEMIA IN NEOPLASTIC DISEASE: ICD-10-CM

## 2025-02-18 DIAGNOSIS — D75.81 MYELOFIBROSIS (HCC): ICD-10-CM

## 2025-02-18 DIAGNOSIS — C80.1 ANEMIA ASSOCIATED WITH MALIGNANT NEOPLASTIC DISEASE (HCC): ICD-10-CM

## 2025-02-18 DIAGNOSIS — D63.0 ANEMIA ASSOCIATED WITH MALIGNANT NEOPLASTIC DISEASE (HCC): ICD-10-CM

## 2025-02-18 LAB
HCT VFR BLD AUTO: 27.9 % (ref 37–47)
HGB BLD-MCNC: 8.5 G/DL (ref 12–16)

## 2025-02-18 PROCEDURE — 85018 HEMOGLOBIN: CPT

## 2025-02-18 PROCEDURE — 96372 THER/PROPH/DIAG INJ SC/IM: CPT

## 2025-02-18 PROCEDURE — 700111 HCHG RX REV CODE 636 W/ 250 OVERRIDE (IP): Mod: JZ,TB | Performed by: INTERNAL MEDICINE

## 2025-02-18 PROCEDURE — 36415 COLL VENOUS BLD VENIPUNCTURE: CPT

## 2025-02-18 PROCEDURE — 85014 HEMATOCRIT: CPT

## 2025-02-18 RX ADMIN — EPOETIN ALFA-EPBX 40000 UNITS: 40000 INJECTION, SOLUTION INTRAVENOUS; SUBCUTANEOUS at 15:25

## 2025-02-18 ASSESSMENT — FIBROSIS 4 INDEX: FIB4 SCORE: .8989574582526650694

## 2025-02-18 NOTE — PROGRESS NOTES
Pt arrives to IS for Retacrit injection.  Pt reports chronic fatigue and weakness to BLE.  Labs drawn via 23g butterfly needle to L-AC.  Hemoglobin is 8.5 today.  Results meet MD parameters for Retacrit.  Retacrit given SC to back of [facility].  Confirmed next appt time on 2/25/2025 with pt.  Pt dc home to self care.

## 2025-02-25 ENCOUNTER — OUTPATIENT INFUSION SERVICES (OUTPATIENT)
Dept: ONCOLOGY | Facility: MEDICAL CENTER | Age: 85
End: 2025-02-25
Attending: INTERNAL MEDICINE
Payer: MEDICARE

## 2025-02-25 VITALS
RESPIRATION RATE: 18 BRPM | DIASTOLIC BLOOD PRESSURE: 68 MMHG | TEMPERATURE: 98 F | HEART RATE: 78 BPM | BODY MASS INDEX: 27.34 KG/M2 | SYSTOLIC BLOOD PRESSURE: 125 MMHG | HEIGHT: 67 IN | OXYGEN SATURATION: 98 % | WEIGHT: 174.16 LBS

## 2025-02-25 DIAGNOSIS — D63.0 ANEMIA IN NEOPLASTIC DISEASE: ICD-10-CM

## 2025-02-25 DIAGNOSIS — D63.0 ANEMIA ASSOCIATED WITH MALIGNANT NEOPLASTIC DISEASE (HCC): ICD-10-CM

## 2025-02-25 DIAGNOSIS — C80.1 ANEMIA ASSOCIATED WITH MALIGNANT NEOPLASTIC DISEASE (HCC): ICD-10-CM

## 2025-02-25 DIAGNOSIS — D75.81 MYELOFIBROSIS (HCC): ICD-10-CM

## 2025-02-25 LAB
HCT VFR BLD AUTO: 28.2 % (ref 37–47)
HGB BLD-MCNC: 8.6 G/DL (ref 12–16)

## 2025-02-25 PROCEDURE — 85014 HEMATOCRIT: CPT

## 2025-02-25 PROCEDURE — 85018 HEMOGLOBIN: CPT

## 2025-02-25 PROCEDURE — 96372 THER/PROPH/DIAG INJ SC/IM: CPT

## 2025-02-25 PROCEDURE — 700111 HCHG RX REV CODE 636 W/ 250 OVERRIDE (IP): Mod: JZ,TB | Performed by: INTERNAL MEDICINE

## 2025-02-25 PROCEDURE — 36415 COLL VENOUS BLD VENIPUNCTURE: CPT

## 2025-02-25 RX ADMIN — EPOETIN ALFA-EPBX 40000 UNITS: 40000 INJECTION, SOLUTION INTRAVENOUS; SUBCUTANEOUS at 15:09

## 2025-02-25 ASSESSMENT — FIBROSIS 4 INDEX: FIB4 SCORE: .8989574582526650694

## 2025-02-25 NOTE — PROGRESS NOTES
Pt arrived ambulatory for weekly Retacrit injection, states she saw Dr. Foley last week, no changes to treatment plan.  Labs drawn via butterfly needle from RAC, gauze and coban applied. Results reviewed, meets parameters for Retacrit injection, which was given in left arm, bandaid applied.  Dc'd home without incident and will f/u as scheduled.

## 2025-02-28 ENCOUNTER — OFF SITE VISIT (OUTPATIENT)
Dept: PALLIATIVE MEDICINE | Facility: HOSPICE | Age: 85
End: 2025-02-28
Payer: MEDICARE

## 2025-02-28 DIAGNOSIS — D63.0 ANEMIA IN NEOPLASTIC DISEASE: ICD-10-CM

## 2025-02-28 PROCEDURE — G0318 PR PROLONG HOME E&M ADDL 15 MIN: HCPCS

## 2025-02-28 PROCEDURE — 99350 HOME/RES VST EST HIGH MDM 60: CPT | Mod: 25

## 2025-02-28 PROCEDURE — 99497 ADVNCD CARE PLAN 30 MIN: CPT

## 2025-02-28 RX ORDER — CETIRIZINE HYDROCHLORIDE 10 MG/1
10 TABLET ORAL DAILY
Qty: 30 TABLET | Refills: 2 | Status: SHIPPED | OUTPATIENT
Start: 2025-02-28

## 2025-02-28 RX ORDER — MORPHINE SULFATE 15 MG/1
7.5-15 TABLET ORAL EVERY 4 HOURS PRN
Qty: 30 TABLET | Refills: 0 | Status: SHIPPED | OUTPATIENT
Start: 2025-02-28 | End: 2025-03-05

## 2025-03-03 NOTE — PROGRESS NOTES
In-Home Palliative Medicine Evaluation      Dedra Lobo  84 y.o.  Female  MRN [id number]  PCP Jay Tsang D.O.  Referral Source: PCP  Location: Patient's residence    Reason for palliative medicine consultation and/or visit: Goals of care and symptom management    Assessment and Plan:    Summary: Dedra is an 83 y/o woman who lives with a roommate and friend of many years. She has been diagnosed with myelofibrosis and suffers from anemia and shortness of breath, edema. Discussed goals of care, symptom management including opiate therapy. Will f/u in 30 days     Primary diagnosis: Myelofibrosis, anemia in neoplastic disease.      Prognosis: PPS 60-70%    Physical aspects of care:    Pain: Denies pain or discomfort at this time.     SOB: Significant dyspnea with exertion. Takes 3-5 minutes to recover after walking up stairs. She cannot participate in activities with friends that require walking more than ~50 yards without having to stop and sit down. She recently completed a 6 minute walk test that showed her 02 saturation decreased to 90-91% and did not qualify for oxygen.     Orthopnea: Significant orthopnea, she cannot sleep while lying down flat and has to sit up in her recliner when she does sleep. She has had echocardiograms, x-rays and followed up with her cardiologist and does not seem to have a clear etiology for her orthopnea. She was encouraged to follow up with her primary care provider for a nighttime sleep study.     Fatigue: Significant fatigue all the time. 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     Cough: Dedra has a debilitating cough that limits her ability to participate in activities. After sitting up for several hours she may have a coughing episode that last several minutes causing her to leave her activity or outing. She had a chest x-ray ordered by her PCP on 2/6/25 that was unremarkable and clear. Start with cetirizine, discussed opiate therapy for cough.      Edema:  BLE edema, 2+. Currently on lasix per Dr. Foley, discussed other ways to reduce edema.     Psychological aspects of care:    Anxiety, depression: Melissa, has good social support through her Zoroastrian and friend groups.     Social aspects of care:  Dedra has good friend support, she has a roommate of many years named Anna who is able to help with some ADL's if needed and excellent support from [employer]rian [employer].     Spiritual aspects of care:  Jethro, participates in weekly Zoroastrian activities and studies. Would like to discuss opiate therapy with her Dudley.     Goals of care:  She understands this is a terminal disease. She would like to manage her symptoms with medication however she is concerned about opiate therapy interfering with her Zoroastrian goals. She would like to continue her infusion therapy consisting of anagrelide and epoetin. Will continue to discuss goals at future appts.     Other Pertinent Medical History OR Surgery Not Listed Above:   Vit. D deficiency, Thrombocytosis, degenerative disc disease, pulmonary hypertension, chronic heart failure with preserved ejection fraction, tricuspid regurgitation, lipodermatosclerosis of both lower extremities,     Physical Exam  Constitutional:       General: She is not in acute distress.     Appearance: She is not ill-appearing.   Pulmonary:      Effort: Respiratory distress present.   Abdominal:      General: Abdomen is flat. Bowel sounds are normal. There is no distension.      Palpations: Abdomen is soft.      Tenderness: There is no abdominal tenderness. There is no guarding.   Musculoskeletal:         General: Swelling present.      Right lower leg: Edema present.      Left lower leg: Edema present.   Skin:     General: Skin is warm and dry.      Coloration: Skin is not jaundiced or pale.   Neurological:      Mental Status: She is alert.      Motor: Weakness present.   Psychiatric:         Mood and Affect: Mood normal.         Behavior:  Behavior normal.         Thought Content: Thought content normal.         Judgment: Judgment normal.         Advance care planning:    The patient and/or legal decision maker has provided voluntary consent to discuss advance care planning. We discussed goals of care. Total time spent in ACP discussion 30 minutes, which is separate from the time spent completing the evaluation and management visit.       Current Medications:    Current Outpatient Medications:     morphine (MS IR) 15 MG tablet, Take 0.5-1 Tablets by mouth every four hours as needed (for coughing or shortness of breath.) for up to 7 days., Disp: 30 Tablet, Rfl: 0    cetirizine (ZYRTEC) 10 MG Tab, Take 1 Tablet by mouth every day., Disp: 30 Tablet, Rfl: 2    azithromycin (ZITHROMAX) 250 MG Tab, Take 2 tablets on day 1, then 1 tablet daily thereafter until gone, Disp: 6 Tablet, Rfl: 0    torsemide (DEMADEX) 20 MG Tab, Take 1 Tablet by mouth every day., Disp: 90 Tablet, Rfl: 3    epoetin (EPOGEN,PROCRIT) 95334 Unit/mL Solution, Inject 10,000 Units under the skin every 7 days. Retacrit 40,000 units, Disp: , Rfl:     acetaminophen (TYLENOL) 500 MG Tab, Take 500-1,000 mg by mouth 1 time a day as needed for Mild Pain., Disp: , Rfl:     Carboxymethylcellulose Sod PF 0.5 % Solution, Administer 1 Drop into both eyes every evening., Disp: , Rfl:     anagrelide (AGRYLIN) 0.5 MG Cap, Take 1 Capsule by mouth 2 times a day., Disp: , Rfl:     Medication Allergies:  Patient has no allergy information on record.    Thank you for allowing me the opportunity to participate in the care of Dedra Lobo    I spent a total of 120 minutes reviewing medical records, direct face-to-face time with the patient and/or family, documentation and coordination of care. This is separate from the time spent on advance care planning, which is documented above.       ERNESTO Monson  Home Health and Palliative Medicine  [address]  [id number]  P:  [phone]  F: [phone]

## 2025-03-04 ENCOUNTER — OUTPATIENT INFUSION SERVICES (OUTPATIENT)
Dept: ONCOLOGY | Facility: MEDICAL CENTER | Age: 85
End: 2025-03-04
Attending: INTERNAL MEDICINE
Payer: MEDICARE

## 2025-03-04 VITALS
RESPIRATION RATE: 18 BRPM | WEIGHT: 171.96 LBS | DIASTOLIC BLOOD PRESSURE: 61 MMHG | SYSTOLIC BLOOD PRESSURE: 116 MMHG | HEART RATE: 96 BPM | HEIGHT: 67 IN | BODY MASS INDEX: 26.99 KG/M2 | TEMPERATURE: 98 F | OXYGEN SATURATION: 96 %

## 2025-03-04 DIAGNOSIS — D63.0 ANEMIA ASSOCIATED WITH MALIGNANT NEOPLASTIC DISEASE (HCC): ICD-10-CM

## 2025-03-04 DIAGNOSIS — D63.0 ANEMIA IN NEOPLASTIC DISEASE: ICD-10-CM

## 2025-03-04 DIAGNOSIS — C80.1 ANEMIA ASSOCIATED WITH MALIGNANT NEOPLASTIC DISEASE (HCC): ICD-10-CM

## 2025-03-04 DIAGNOSIS — D75.81 MYELOFIBROSIS (HCC): ICD-10-CM

## 2025-03-04 LAB
HCT VFR BLD AUTO: 28.2 % (ref 37–47)
HGB BLD-MCNC: 8.7 G/DL (ref 12–16)

## 2025-03-04 PROCEDURE — 36415 COLL VENOUS BLD VENIPUNCTURE: CPT

## 2025-03-04 PROCEDURE — 96372 THER/PROPH/DIAG INJ SC/IM: CPT

## 2025-03-04 PROCEDURE — 700111 HCHG RX REV CODE 636 W/ 250 OVERRIDE (IP): Mod: JZ,TB | Performed by: INTERNAL MEDICINE

## 2025-03-04 PROCEDURE — 85014 HEMATOCRIT: CPT

## 2025-03-04 PROCEDURE — 85018 HEMOGLOBIN: CPT

## 2025-03-04 RX ADMIN — EPOETIN ALFA-EPBX 40000 UNITS: 40000 INJECTION, SOLUTION INTRAVENOUS; SUBCUTANEOUS at 14:34

## 2025-03-04 ASSESSMENT — FIBROSIS 4 INDEX: FIB4 SCORE: .8989574582526650694

## 2025-03-04 NOTE — PROGRESS NOTES
Dedra presents to IS for labs/ possible Retacrit injection.  H&H collected via 23g butterfly to left AC, gauze and coban to site.  Hgb 8.7, parameters met for Retacrit.  Retacrit injection given to back right arm, adhesive dressing applied. Dedra tolerated well.  Discharged in NAD, next appointment confirmed.

## 2025-03-05 DIAGNOSIS — D63.0 ANEMIA IN NEOPLASTIC DISEASE: ICD-10-CM

## 2025-03-05 DIAGNOSIS — I50.32 CHRONIC HEART FAILURE WITH PRESERVED EJECTION FRACTION (HCC): ICD-10-CM

## 2025-03-05 DIAGNOSIS — D75.81 MYELOFIBROSIS (HCC): ICD-10-CM

## 2025-03-05 RX ORDER — OXYCODONE HYDROCHLORIDE 5 MG/1
2.5-5 TABLET ORAL EVERY 4 HOURS PRN
Qty: 30 TABLET | Refills: 0 | Status: SHIPPED | OUTPATIENT
Start: 2025-03-05 | End: 2025-04-04

## 2025-03-05 RX ORDER — BENZONATATE 100 MG/1
100 CAPSULE ORAL 3 TIMES DAILY PRN
Qty: 60 CAPSULE | Refills: 0 | Status: SHIPPED | OUTPATIENT
Start: 2025-03-05

## 2025-03-11 ENCOUNTER — OUTPATIENT INFUSION SERVICES (OUTPATIENT)
Dept: ONCOLOGY | Facility: MEDICAL CENTER | Age: 85
End: 2025-03-11
Attending: INTERNAL MEDICINE
Payer: MEDICARE

## 2025-03-11 VITALS
HEIGHT: 67 IN | SYSTOLIC BLOOD PRESSURE: 132 MMHG | TEMPERATURE: 97 F | WEIGHT: 169.75 LBS | OXYGEN SATURATION: 92 % | RESPIRATION RATE: 18 BRPM | DIASTOLIC BLOOD PRESSURE: 72 MMHG | HEART RATE: 86 BPM | BODY MASS INDEX: 26.64 KG/M2

## 2025-03-11 DIAGNOSIS — D63.0 ANEMIA ASSOCIATED WITH MALIGNANT NEOPLASTIC DISEASE (HCC): ICD-10-CM

## 2025-03-11 DIAGNOSIS — D75.81 MYELOFIBROSIS (HCC): ICD-10-CM

## 2025-03-11 DIAGNOSIS — C80.1 ANEMIA ASSOCIATED WITH MALIGNANT NEOPLASTIC DISEASE (HCC): ICD-10-CM

## 2025-03-11 DIAGNOSIS — D63.0 ANEMIA IN NEOPLASTIC DISEASE: ICD-10-CM

## 2025-03-11 LAB
HCT VFR BLD AUTO: 28.6 % (ref 37–47)
HGB BLD-MCNC: 8.9 G/DL (ref 12–16)

## 2025-03-11 PROCEDURE — 96372 THER/PROPH/DIAG INJ SC/IM: CPT

## 2025-03-11 PROCEDURE — 36415 COLL VENOUS BLD VENIPUNCTURE: CPT

## 2025-03-11 PROCEDURE — 85014 HEMATOCRIT: CPT

## 2025-03-11 PROCEDURE — 700111 HCHG RX REV CODE 636 W/ 250 OVERRIDE (IP): Mod: JZ,TB | Performed by: INTERNAL MEDICINE

## 2025-03-11 PROCEDURE — 85018 HEMOGLOBIN: CPT

## 2025-03-11 RX ADMIN — EPOETIN ALFA-EPBX 40000 UNITS: 40000 INJECTION, SOLUTION INTRAVENOUS; SUBCUTANEOUS at 14:46

## 2025-03-11 ASSESSMENT — FIBROSIS 4 INDEX: FIB4 SCORE: .8989574582526650694

## 2025-03-11 NOTE — PROGRESS NOTES
Pt presented to IS for weekly Retacrit injection. Pt denied fever, signs or symptoms of infection or acute illness today. POC discussed and pt verbalized understanding.   23G butterfly used to obtain labs from the right AC; pt tolerated well and site covered with gauze and coban.   Pt meets parameters for Retacrit.  Retacrit injection given per eMAR in the back of the right arm; site covered with a bandaid and pt tolerated well.   No signs or symptoms of reaction or complications noted.      Follow-up care discussed and next appointments confirmed. Pt discharged home to self care in no apparent distress at this time.

## 2025-03-14 ENCOUNTER — TELEPHONE (OUTPATIENT)
Dept: MEDICAL GROUP | Facility: MEDICAL CENTER | Age: 85
End: 2025-03-14
Payer: MEDICARE

## 2025-03-14 DIAGNOSIS — I50.32 CHRONIC HEART FAILURE WITH PRESERVED EJECTION FRACTION (HCC): ICD-10-CM

## 2025-03-14 DIAGNOSIS — I27.20 PULMONARY HYPERTENSION (HCC): ICD-10-CM

## 2025-03-18 ENCOUNTER — OUTPATIENT INFUSION SERVICES (OUTPATIENT)
Dept: ONCOLOGY | Facility: MEDICAL CENTER | Age: 85
End: 2025-03-18
Attending: INTERNAL MEDICINE
Payer: MEDICARE

## 2025-03-18 VITALS
TEMPERATURE: 97.5 F | WEIGHT: 173.5 LBS | RESPIRATION RATE: 18 BRPM | DIASTOLIC BLOOD PRESSURE: 67 MMHG | OXYGEN SATURATION: 92 % | BODY MASS INDEX: 27.23 KG/M2 | HEIGHT: 67 IN | HEART RATE: 100 BPM | SYSTOLIC BLOOD PRESSURE: 119 MMHG

## 2025-03-18 DIAGNOSIS — C80.1 ANEMIA ASSOCIATED WITH MALIGNANT NEOPLASTIC DISEASE (HCC): ICD-10-CM

## 2025-03-18 DIAGNOSIS — D63.0 ANEMIA ASSOCIATED WITH MALIGNANT NEOPLASTIC DISEASE (HCC): ICD-10-CM

## 2025-03-18 DIAGNOSIS — D63.0 ANEMIA IN NEOPLASTIC DISEASE: ICD-10-CM

## 2025-03-18 DIAGNOSIS — D75.81 MYELOFIBROSIS (HCC): ICD-10-CM

## 2025-03-18 LAB
HCT VFR BLD AUTO: 29.1 % (ref 37–47)
HGB BLD-MCNC: 9.1 G/DL (ref 12–16)

## 2025-03-18 PROCEDURE — 700111 HCHG RX REV CODE 636 W/ 250 OVERRIDE (IP): Mod: JZ,TB | Performed by: INTERNAL MEDICINE

## 2025-03-18 PROCEDURE — 85018 HEMOGLOBIN: CPT

## 2025-03-18 PROCEDURE — 96372 THER/PROPH/DIAG INJ SC/IM: CPT

## 2025-03-18 PROCEDURE — 36415 COLL VENOUS BLD VENIPUNCTURE: CPT

## 2025-03-18 PROCEDURE — 85014 HEMATOCRIT: CPT

## 2025-03-18 RX ADMIN — EPOETIN ALFA-EPBX 40000 UNITS: 40000 INJECTION, SOLUTION INTRAVENOUS; SUBCUTANEOUS at 14:30

## 2025-03-18 ASSESSMENT — FIBROSIS 4 INDEX: FIB4 SCORE: .8989574582526650694

## 2025-03-18 NOTE — PROGRESS NOTES
Pt presented to Infusion for weekly Retacrit injection. POC discussed and pt verbalized understanding. Pt denies pain or s/s of acute illness today. Labs drawn with 25G butterfly needle to RAC x1 attempt without difficulty; site covered with sterile gauze/coban and pt tolerated well. Hgb 9.1 and SBP <160. Retacrit injection administered per MAR, band-aid applied to site and pt tolerated well. No s/s of reactions or complications noted. Future appts confirmed for pt and pt discharged to self care in [facility].

## 2025-03-18 NOTE — TELEPHONE ENCOUNTER
I placed an order for a pulse oximeter it will have to go to the [facility].  I am uncertain if it will be covered by insurance.  If she is wanting to do an overnight study to formally check her oxygen and pulse that is a different order.

## 2025-03-20 ENCOUNTER — TELEPHONE (OUTPATIENT)
Dept: MEDICAL GROUP | Facility: MEDICAL CENTER | Age: 85
End: 2025-03-20

## 2025-03-20 ENCOUNTER — OFF SITE VISIT (OUTPATIENT)
Dept: PALLIATIVE MEDICINE | Facility: HOSPICE | Age: 85
End: 2025-03-20
Payer: MEDICARE

## 2025-03-20 DIAGNOSIS — D75.81 MYELOFIBROSIS (HCC): ICD-10-CM

## 2025-03-20 PROCEDURE — G0318 PR PROLONG HOME E&M ADDL 15 MIN: HCPCS

## 2025-03-20 PROCEDURE — 99350 HOME/RES VST EST HIGH MDM 60: CPT

## 2025-03-21 NOTE — PROGRESS NOTES
In-Home Palliative Medicine Evaluation      Dedra Lobo  84 y.o.  Female  MRN [id number]  PCP Jay Tsang D.O.  Referral Source: PCP  Location: Patient's residence    Reason for palliative medicine consultation and/or visit: Goals of care and symptom management    Assessment and Plan:    Summary: Dedra is an 83 y/o woman who lives with a roommate and friend of many years. She has been diagnosed with myelofibrosis and suffers from anemia and shortness of breath, edema. Discussed goals of care, symptom management including opiate therapy. Will f/u in 30 days     3/20/25: No major health changes since last visit. Dedra states she has been using the oxycodone 1/2 tab (2.5 mg) occasionally at night when she starts experiencing the orthopnea and agitation and it helps her sleep. She continues to receive weekly erythropeotin injections at the infusion center for her anemia. Will follow up in 6 weeks.     Primary diagnosis: Myelofibrosis, anemia in neoplastic disease.      Prognosis: PPS 60-70%    Physical aspects of care:    Pain: Denies pain or discomfort at this time.     SOB: Significant dyspnea with exertion. Takes 3-5 minutes to recover after walking up stairs. She cannot participate in activities with friends that require walking more than ~50 yards without having to stop and sit down. She recently completed a 6 minute walk test that showed her 02 saturation decreased to 90-91% and did not qualify for oxygen.         Orthopnea: Significant orthopnea, she cannot sleep while lying down flat and has to sit up in her recliner when she does sleep. She has had echocardiograms, x-rays and followed up with her cardiologist and does not seem to have a clear etiology for her orthopnea. She was encouraged to follow up with her primary care provider for a nighttime sleep study.   3/20/25: Oxycodone 2.5 mg helping at night with SOB. Continue PRN, reminded patient of risk of constipation.     Fatigue: Significant  fatigue all the time. 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     Cough: Dedra has a debilitating cough that limits her ability to participate in activities. After sitting up for several hours she may have a coughing episode that last several minutes causing her to leave her activity or outing. She had a chest x-ray ordered by her PCP on 2/6/25 that was unremarkable and clear. Start with cetirizine, discussed opiate therapy for cough.      Edema: BLE edema, 2+. Currently on lasix per Dr. Foley, discussed other ways to reduce edema.     Psychological aspects of care:    Anxiety, depression: Melissa, has good social support through her Episcopal and friend groups.     Social aspects of care:  Dedra has good friend support, she has a roommate of many years named Anna who is able to help with some ADL's if needed and excellent support from [employer]iscopal [employer].     Spiritual aspects of care:  Jethro, participates in weekly Episcopal activities and studies. Would like to discuss opiate therapy with her Dudley.     Goals of care:  She understands this is a terminal disease. She would like to manage her symptoms with medication however she is concerned about opiate therapy interfering with her Episcopal goals. She would like to continue her infusion therapy consisting of anagrelide and epoetin. Will continue to discuss goals at future appts.     Other Pertinent Medical History OR Surgery Not Listed Above:   Vit. D deficiency, Thrombocytosis, degenerative disc disease, pulmonary hypertension, chronic heart failure with preserved ejection fraction, tricuspid regurgitation, lipodermatosclerosis of both lower extremities,       Physical Exam  Constitutional:       General: She is not in acute distress.     Appearance: She is not ill-appearing.   Pulmonary:      Effort: Respiratory distress present.   Abdominal:      General: Abdomen is flat. Bowel sounds are normal. There is no distension.       Palpations: Abdomen is soft.      Tenderness: There is no abdominal tenderness. There is no guarding.   Musculoskeletal:         General: Swelling present.      Right lower leg: Edema present.      Left lower leg: Edema present.   Skin:     General: Skin is warm and dry.      Coloration: Skin is not jaundiced or pale.   Neurological:      Mental Status: She is alert.      Motor: Weakness present.   Psychiatric:         Mood and Affect: Mood normal.         Behavior: Behavior normal.         Thought Content: Thought content normal.         Judgment: Judgment normal.             Current Medications:    Current Outpatient Medications:     oxyCODONE immediate-release (ROXICODONE) 5 MG Tab, Take 0.5-1 Tablets by mouth every four hours as needed for Severe Pain (for shortness of breath or coughing.) for up to 30 days., Disp: 30 Tablet, Rfl: 0    benzonatate (TESSALON) 100 MG Cap, Take 1 Capsule by mouth 3 times a day as needed for Cough., Disp: 60 Capsule, Rfl: 0    cetirizine (ZYRTEC) 10 MG Tab, Take 1 Tablet by mouth every day., Disp: 30 Tablet, Rfl: 2    azithromycin (ZITHROMAX) 250 MG Tab, Take 2 tablets on day 1, then 1 tablet daily thereafter until gone, Disp: 6 Tablet, Rfl: 0    torsemide (DEMADEX) 20 MG Tab, Take 1 Tablet by mouth every day., Disp: 90 Tablet, Rfl: 3    epoetin (EPOGEN,PROCRIT) 48530 Unit/mL Solution, Inject 10,000 Units under the skin every 7 days. Retacrit 40,000 units, Disp: , Rfl:     acetaminophen (TYLENOL) 500 MG Tab, Take 500-1,000 mg by mouth 1 time a day as needed for Mild Pain., Disp: , Rfl:     Carboxymethylcellulose Sod PF 0.5 % Solution, Administer 1 Drop into both eyes every evening., Disp: , Rfl:     anagrelide (AGRYLIN) 0.5 MG Cap, Take 1 Capsule by mouth 2 times a day., Disp: , Rfl:     Medication Allergies:  Patient has no allergy information on record.    Thank you for allowing me the opportunity to participate in the care of Dedra Will Yamil    I spent a total of 120 minutes  reviewing medical records, direct face-to-face time with the patient and/or family, documentation and coordination of care. This is separate from the time spent on advance care planning, which is documented above.       ERNESTO Monson  Home Health and Palliative Medicine  [address]  [id number]  P: [phone]  F: [phone]

## 2025-03-25 ENCOUNTER — OUTPATIENT INFUSION SERVICES (OUTPATIENT)
Dept: ONCOLOGY | Facility: MEDICAL CENTER | Age: 85
End: 2025-03-25
Attending: INTERNAL MEDICINE
Payer: MEDICARE

## 2025-03-25 VITALS
TEMPERATURE: 97.8 F | WEIGHT: 177.69 LBS | HEART RATE: 109 BPM | OXYGEN SATURATION: 93 % | RESPIRATION RATE: 18 BRPM | HEIGHT: 67 IN | SYSTOLIC BLOOD PRESSURE: 121 MMHG | BODY MASS INDEX: 27.89 KG/M2 | DIASTOLIC BLOOD PRESSURE: 61 MMHG

## 2025-03-25 DIAGNOSIS — C80.1 ANEMIA ASSOCIATED WITH MALIGNANT NEOPLASTIC DISEASE (HCC): ICD-10-CM

## 2025-03-25 DIAGNOSIS — D75.81 MYELOFIBROSIS (HCC): ICD-10-CM

## 2025-03-25 DIAGNOSIS — D63.0 ANEMIA ASSOCIATED WITH MALIGNANT NEOPLASTIC DISEASE (HCC): ICD-10-CM

## 2025-03-25 DIAGNOSIS — D63.0 ANEMIA IN NEOPLASTIC DISEASE: ICD-10-CM

## 2025-03-25 LAB
HCT VFR BLD AUTO: 28.9 % (ref 37–47)
HGB BLD-MCNC: 8.9 G/DL (ref 12–16)

## 2025-03-25 PROCEDURE — 85018 HEMOGLOBIN: CPT

## 2025-03-25 PROCEDURE — 85014 HEMATOCRIT: CPT

## 2025-03-25 PROCEDURE — 700111 HCHG RX REV CODE 636 W/ 250 OVERRIDE (IP): Mod: JZ,TB | Performed by: INTERNAL MEDICINE

## 2025-03-25 PROCEDURE — 36415 COLL VENOUS BLD VENIPUNCTURE: CPT

## 2025-03-25 PROCEDURE — 96372 THER/PROPH/DIAG INJ SC/IM: CPT

## 2025-03-25 RX ADMIN — EPOETIN ALFA-EPBX 40000 UNITS: 40000 INJECTION, SOLUTION INTRAVENOUS; SUBCUTANEOUS at 14:21

## 2025-03-25 ASSESSMENT — FIBROSIS 4 INDEX: FIB4 SCORE: .8989574582526650694

## 2025-03-25 NOTE — PROGRESS NOTES
Dedra presents to IS for labs/ possible Retacrit injection.  H&H collected via 23g butterfly to left AC, gauze and coban to site.  Hgb 8.9, parameters met for Retacrit.  Retacrit injection given to back right arm, adhesive dressing applied. Dedra tolerated well.  Discharged in NAD, next appointment confirmed.

## 2025-04-01 ENCOUNTER — OUTPATIENT INFUSION SERVICES (OUTPATIENT)
Dept: ONCOLOGY | Facility: MEDICAL CENTER | Age: 85
End: 2025-04-01
Attending: INTERNAL MEDICINE
Payer: MEDICARE

## 2025-04-01 VITALS
BODY MASS INDEX: 28.3 KG/M2 | TEMPERATURE: 96.9 F | SYSTOLIC BLOOD PRESSURE: 138 MMHG | DIASTOLIC BLOOD PRESSURE: 72 MMHG | WEIGHT: 180.34 LBS | OXYGEN SATURATION: 92 % | HEART RATE: 100 BPM | RESPIRATION RATE: 18 BRPM | HEIGHT: 67 IN

## 2025-04-01 DIAGNOSIS — C80.1 ANEMIA ASSOCIATED WITH MALIGNANT NEOPLASTIC DISEASE (HCC): ICD-10-CM

## 2025-04-01 DIAGNOSIS — D63.0 ANEMIA IN NEOPLASTIC DISEASE: ICD-10-CM

## 2025-04-01 DIAGNOSIS — D75.81 MYELOFIBROSIS (HCC): ICD-10-CM

## 2025-04-01 DIAGNOSIS — D63.0 ANEMIA ASSOCIATED WITH MALIGNANT NEOPLASTIC DISEASE (HCC): ICD-10-CM

## 2025-04-01 LAB
HCT VFR BLD AUTO: 30.3 % (ref 37–47)
HGB BLD-MCNC: 9.5 G/DL (ref 12–16)

## 2025-04-01 PROCEDURE — 36415 COLL VENOUS BLD VENIPUNCTURE: CPT

## 2025-04-01 PROCEDURE — 700111 HCHG RX REV CODE 636 W/ 250 OVERRIDE (IP): Mod: JZ,TB | Performed by: INTERNAL MEDICINE

## 2025-04-01 PROCEDURE — 85014 HEMATOCRIT: CPT

## 2025-04-01 PROCEDURE — 85018 HEMOGLOBIN: CPT

## 2025-04-01 PROCEDURE — 96372 THER/PROPH/DIAG INJ SC/IM: CPT

## 2025-04-01 RX ADMIN — EPOETIN ALFA-EPBX 40000 UNITS: 40000 INJECTION, SOLUTION INTRAVENOUS; SUBCUTANEOUS at 14:15

## 2025-04-01 ASSESSMENT — FIBROSIS 4 INDEX: FIB4 SCORE: .8989574582526650694

## 2025-04-01 NOTE — PROGRESS NOTES
"Pt arrived ambulatory for weekly Retacrit injection, states she still feels like she's \"retaining fluid\", but improving on increased diuretic dose.  Labs drawn via butterfly needle from LAC, gauze and coban applied. Results reviewed, meets parameters for Retacrit injection, which was given in right arm, bandaid applied.  Dc'd home without incident and will f/u as scheduled.      "

## 2025-04-08 ENCOUNTER — OUTPATIENT INFUSION SERVICES (OUTPATIENT)
Dept: ONCOLOGY | Facility: MEDICAL CENTER | Age: 85
End: 2025-04-08
Attending: INTERNAL MEDICINE
Payer: MEDICARE

## 2025-04-08 VITALS
TEMPERATURE: 99 F | SYSTOLIC BLOOD PRESSURE: 119 MMHG | BODY MASS INDEX: 27.34 KG/M2 | OXYGEN SATURATION: 90 % | WEIGHT: 174.16 LBS | HEIGHT: 67 IN | HEART RATE: 111 BPM | DIASTOLIC BLOOD PRESSURE: 66 MMHG | RESPIRATION RATE: 18 BRPM

## 2025-04-08 DIAGNOSIS — D63.0 ANEMIA IN NEOPLASTIC DISEASE: ICD-10-CM

## 2025-04-08 DIAGNOSIS — D75.81 MYELOFIBROSIS (HCC): ICD-10-CM

## 2025-04-08 DIAGNOSIS — D63.0 ANEMIA ASSOCIATED WITH MALIGNANT NEOPLASTIC DISEASE (HCC): ICD-10-CM

## 2025-04-08 DIAGNOSIS — C80.1 ANEMIA ASSOCIATED WITH MALIGNANT NEOPLASTIC DISEASE (HCC): ICD-10-CM

## 2025-04-08 LAB
HCT VFR BLD AUTO: 31.1 % (ref 37–47)
HGB BLD-MCNC: 9.9 G/DL (ref 12–16)

## 2025-04-08 PROCEDURE — 700111 HCHG RX REV CODE 636 W/ 250 OVERRIDE (IP): Mod: JZ,TB | Performed by: INTERNAL MEDICINE

## 2025-04-08 PROCEDURE — 96372 THER/PROPH/DIAG INJ SC/IM: CPT

## 2025-04-08 PROCEDURE — 85018 HEMOGLOBIN: CPT

## 2025-04-08 PROCEDURE — 36415 COLL VENOUS BLD VENIPUNCTURE: CPT

## 2025-04-08 PROCEDURE — 85014 HEMATOCRIT: CPT

## 2025-04-08 RX ADMIN — EPOETIN ALFA-EPBX 40000 UNITS: 40000 INJECTION, SOLUTION INTRAVENOUS; SUBCUTANEOUS at 14:11

## 2025-04-08 ASSESSMENT — FIBROSIS 4 INDEX: FIB4 SCORE: .8989574582526650694

## 2025-04-08 NOTE — PROGRESS NOTES
Ms Lobo is here today for retacrit injection.     No new concerns, reports feeling like fluid retention is improving, especially to the left leg.   Continues taking diuretic.       Venipuncture to her right AC, labs drawn and results are within parameters for treatment.     Retacrit given SQ to the back of her left arm.    Discharged in stable condition.

## 2025-04-15 ENCOUNTER — OUTPATIENT INFUSION SERVICES (OUTPATIENT)
Dept: ONCOLOGY | Facility: MEDICAL CENTER | Age: 85
End: 2025-04-15
Attending: INTERNAL MEDICINE
Payer: MEDICARE

## 2025-04-15 VITALS
BODY MASS INDEX: 27.13 KG/M2 | HEART RATE: 107 BPM | HEIGHT: 67 IN | SYSTOLIC BLOOD PRESSURE: 115 MMHG | DIASTOLIC BLOOD PRESSURE: 70 MMHG | WEIGHT: 172.84 LBS | OXYGEN SATURATION: 89 % | RESPIRATION RATE: 18 BRPM | TEMPERATURE: 98.2 F

## 2025-04-15 DIAGNOSIS — D75.81 MYELOFIBROSIS (HCC): ICD-10-CM

## 2025-04-15 DIAGNOSIS — D63.0 ANEMIA ASSOCIATED WITH MALIGNANT NEOPLASTIC DISEASE (HCC): ICD-10-CM

## 2025-04-15 DIAGNOSIS — D63.0 ANEMIA IN NEOPLASTIC DISEASE: ICD-10-CM

## 2025-04-15 DIAGNOSIS — C80.1 ANEMIA ASSOCIATED WITH MALIGNANT NEOPLASTIC DISEASE (HCC): ICD-10-CM

## 2025-04-15 LAB
HCT VFR BLD AUTO: 31.1 % (ref 37–47)
HGB BLD-MCNC: 9.8 G/DL (ref 12–16)

## 2025-04-15 PROCEDURE — 36415 COLL VENOUS BLD VENIPUNCTURE: CPT

## 2025-04-15 PROCEDURE — 85014 HEMATOCRIT: CPT

## 2025-04-15 PROCEDURE — 96372 THER/PROPH/DIAG INJ SC/IM: CPT

## 2025-04-15 PROCEDURE — 85018 HEMOGLOBIN: CPT

## 2025-04-15 PROCEDURE — 700111 HCHG RX REV CODE 636 W/ 250 OVERRIDE (IP): Mod: JZ,TB | Performed by: INTERNAL MEDICINE

## 2025-04-15 RX ADMIN — EPOETIN ALFA-EPBX 40000 UNITS: 40000 INJECTION, SOLUTION INTRAVENOUS; SUBCUTANEOUS at 14:07

## 2025-04-15 ASSESSMENT — FIBROSIS 4 INDEX: FIB4 SCORE: .8989574582526650694

## 2025-04-15 NOTE — PROGRESS NOTES
Dedra presented to [facility] for possible Retacrit injection. Labs drawn as ordered from [facility] with 23G butterfly needle, gauze and Coban dressing placed. Hemoglobin is 9.8. Pt meets parameters for Retacrit injection today. Retacrit given to the back of the right upper arm. Bandaid applied to the site. Dedra has future appointments. Pt discharged to home in good condition.

## 2025-04-22 ENCOUNTER — OUTPATIENT INFUSION SERVICES (OUTPATIENT)
Dept: ONCOLOGY | Facility: MEDICAL CENTER | Age: 85
End: 2025-04-22
Attending: INTERNAL MEDICINE
Payer: MEDICARE

## 2025-04-22 VITALS
WEIGHT: 174.16 LBS | DIASTOLIC BLOOD PRESSURE: 63 MMHG | OXYGEN SATURATION: 98 % | HEART RATE: 110 BPM | SYSTOLIC BLOOD PRESSURE: 126 MMHG | HEIGHT: 67 IN | BODY MASS INDEX: 27.34 KG/M2 | RESPIRATION RATE: 18 BRPM | TEMPERATURE: 98 F

## 2025-04-22 DIAGNOSIS — D75.81 MYELOFIBROSIS (HCC): ICD-10-CM

## 2025-04-22 DIAGNOSIS — C80.1 ANEMIA ASSOCIATED WITH MALIGNANT NEOPLASTIC DISEASE (HCC): ICD-10-CM

## 2025-04-22 DIAGNOSIS — D63.0 ANEMIA IN NEOPLASTIC DISEASE: ICD-10-CM

## 2025-04-22 DIAGNOSIS — D63.0 ANEMIA ASSOCIATED WITH MALIGNANT NEOPLASTIC DISEASE (HCC): ICD-10-CM

## 2025-04-22 LAB
HCT VFR BLD AUTO: 30.6 % (ref 37–47)
HGB BLD-MCNC: 9.2 G/DL (ref 12–16)

## 2025-04-22 PROCEDURE — 96372 THER/PROPH/DIAG INJ SC/IM: CPT

## 2025-04-22 PROCEDURE — 85014 HEMATOCRIT: CPT

## 2025-04-22 PROCEDURE — 700111 HCHG RX REV CODE 636 W/ 250 OVERRIDE (IP): Mod: JZ,TB | Performed by: INTERNAL MEDICINE

## 2025-04-22 PROCEDURE — 85018 HEMOGLOBIN: CPT

## 2025-04-22 PROCEDURE — 36415 COLL VENOUS BLD VENIPUNCTURE: CPT

## 2025-04-22 RX ADMIN — EPOETIN ALFA-EPBX 40000 UNITS: 40000 INJECTION, SOLUTION INTRAVENOUS; SUBCUTANEOUS at 14:32

## 2025-04-22 ASSESSMENT — FIBROSIS 4 INDEX: FIB4 SCORE: .8989574582526650694

## 2025-04-22 NOTE — PROGRESS NOTES
Pt arrives to IS for Retacrit injection.  Pt reports chronic fatigue and weakness to BLE.  Labs drawn via 23g butterfly needle to L-AC.  Hemoglobin is 9.2 today.  Results meet MD parameters for Retacrit.  Retacrit given SC to back of LAVONNE.  Confirmed next appt time on 4/29/2025 with pt.  Pt dc home to self care.

## 2025-04-29 ENCOUNTER — OUTPATIENT INFUSION SERVICES (OUTPATIENT)
Dept: ONCOLOGY | Facility: MEDICAL CENTER | Age: 85
End: 2025-04-29
Attending: INTERNAL MEDICINE
Payer: MEDICARE

## 2025-04-29 VITALS
BODY MASS INDEX: 27.51 KG/M2 | TEMPERATURE: 97.1 F | WEIGHT: 175.27 LBS | DIASTOLIC BLOOD PRESSURE: 68 MMHG | RESPIRATION RATE: 18 BRPM | HEART RATE: 89 BPM | SYSTOLIC BLOOD PRESSURE: 126 MMHG | OXYGEN SATURATION: 98 % | HEIGHT: 67 IN

## 2025-04-29 DIAGNOSIS — C80.1 ANEMIA ASSOCIATED WITH MALIGNANT NEOPLASTIC DISEASE (HCC): ICD-10-CM

## 2025-04-29 DIAGNOSIS — D63.0 ANEMIA IN NEOPLASTIC DISEASE: ICD-10-CM

## 2025-04-29 DIAGNOSIS — D75.81 MYELOFIBROSIS (HCC): ICD-10-CM

## 2025-04-29 DIAGNOSIS — D63.0 ANEMIA ASSOCIATED WITH MALIGNANT NEOPLASTIC DISEASE (HCC): ICD-10-CM

## 2025-04-29 LAB
HCT VFR BLD AUTO: 30.2 % (ref 37–47)
HGB BLD-MCNC: 9.4 G/DL (ref 12–16)

## 2025-04-29 PROCEDURE — 96372 THER/PROPH/DIAG INJ SC/IM: CPT

## 2025-04-29 PROCEDURE — 85018 HEMOGLOBIN: CPT

## 2025-04-29 PROCEDURE — 85014 HEMATOCRIT: CPT

## 2025-04-29 PROCEDURE — 36415 COLL VENOUS BLD VENIPUNCTURE: CPT

## 2025-04-29 PROCEDURE — 700111 HCHG RX REV CODE 636 W/ 250 OVERRIDE (IP): Mod: JZ,TB | Performed by: INTERNAL MEDICINE

## 2025-04-29 RX ADMIN — EPOETIN ALFA-EPBX 40000 UNITS: 40000 INJECTION, SOLUTION INTRAVENOUS; SUBCUTANEOUS at 14:55

## 2025-04-29 ASSESSMENT — FIBROSIS 4 INDEX: FIB4 SCORE: .8989574582526650694

## 2025-04-30 NOTE — PROGRESS NOTES
Pt arrived ambulatory for weekly Retacrit injection, denies complaints, states she has been healthy since last week.  Labs drawn via butterfly needle from RAC, gauze and coban applied. Results reviewed, meets parameters for Retacrit injection, which was given in right arm, bandaid applied.  Dc'd home without incident and will f/u as scheduled.

## 2025-05-06 ENCOUNTER — OUTPATIENT INFUSION SERVICES (OUTPATIENT)
Dept: ONCOLOGY | Facility: MEDICAL CENTER | Age: 85
End: 2025-05-06
Attending: INTERNAL MEDICINE
Payer: MEDICARE

## 2025-05-06 VITALS
BODY MASS INDEX: 26.85 KG/M2 | RESPIRATION RATE: 18 BRPM | WEIGHT: 171.08 LBS | HEART RATE: 94 BPM | OXYGEN SATURATION: 93 % | SYSTOLIC BLOOD PRESSURE: 112 MMHG | HEIGHT: 67 IN | TEMPERATURE: 97.5 F | DIASTOLIC BLOOD PRESSURE: 63 MMHG

## 2025-05-06 DIAGNOSIS — D63.0 ANEMIA ASSOCIATED WITH MALIGNANT NEOPLASTIC DISEASE (HCC): ICD-10-CM

## 2025-05-06 DIAGNOSIS — C80.1 ANEMIA ASSOCIATED WITH MALIGNANT NEOPLASTIC DISEASE (HCC): ICD-10-CM

## 2025-05-06 DIAGNOSIS — D63.0 ANEMIA IN NEOPLASTIC DISEASE: ICD-10-CM

## 2025-05-06 DIAGNOSIS — D75.81 MYELOFIBROSIS (HCC): ICD-10-CM

## 2025-05-06 LAB
HCT VFR BLD AUTO: 30 % (ref 37–47)
HGB BLD-MCNC: 9.5 G/DL (ref 12–16)

## 2025-05-06 PROCEDURE — 36415 COLL VENOUS BLD VENIPUNCTURE: CPT

## 2025-05-06 PROCEDURE — 96372 THER/PROPH/DIAG INJ SC/IM: CPT

## 2025-05-06 PROCEDURE — 700111 HCHG RX REV CODE 636 W/ 250 OVERRIDE (IP): Mod: JZ,TB | Performed by: INTERNAL MEDICINE

## 2025-05-06 PROCEDURE — 85014 HEMATOCRIT: CPT

## 2025-05-06 PROCEDURE — 85018 HEMOGLOBIN: CPT

## 2025-05-06 RX ADMIN — EPOETIN ALFA-EPBX 40000 UNITS: 40000 INJECTION, SOLUTION INTRAVENOUS; SUBCUTANEOUS at 14:29

## 2025-05-06 ASSESSMENT — FIBROSIS 4 INDEX: FIB4 SCORE: .8989574582526650694

## 2025-05-06 NOTE — PROGRESS NOTES
Dedra presents to IS for labs/ possible Retacrit injection.  H&H collected via 23g butterfly to left AC, gauze and coban to site.  Hgb 9.5, parameters met for Retacrit.  Retacrit injection given to back right arm, adhesive dressing applied. Dedra tolerated well.  Discharged in NAD, next appointment confirmed.

## 2025-05-08 ENCOUNTER — OFF SITE VISIT (OUTPATIENT)
Dept: PALLIATIVE MEDICINE | Facility: HOSPICE | Age: 85
End: 2025-05-08
Payer: MEDICARE

## 2025-05-08 DIAGNOSIS — I50.32 CHRONIC HEART FAILURE WITH PRESERVED EJECTION FRACTION (HCC): ICD-10-CM

## 2025-05-08 DIAGNOSIS — D63.0 ANEMIA IN NEOPLASTIC DISEASE: ICD-10-CM

## 2025-05-08 PROCEDURE — 99350 HOME/RES VST EST HIGH MDM 60: CPT

## 2025-05-08 PROCEDURE — G0318 PR PROLONG HOME E&M ADDL 15 MIN: HCPCS

## 2025-05-12 NOTE — PROGRESS NOTES
In-Home Palliative Medicine Evaluation      Dedra Lobo  84 y.o.  Female  MRN [id number]  PCP Jay Tsang D.O.  Referral Source: PCP  Location: Patient's residence    Reason for palliative medicine consultation and/or visit: Goals of care and symptom management    Assessment and Plan:    Summary: POLST in home and EMR: DNR, comfort focused treatment oinly, NO artifical nutrition or tube feed. OK with IV fluids for 72 hours only.     Dedra is an 83 y/o woman who lives with a roommate and friend of many years. She has been diagnosed with myelofibrosis and suffers from anemia and shortness of breath, edema. Discussed goals of care, symptom management including opiate therapy.     5/8/25: Patient has been doing well the last few months. She has been able to sleep better than previosuly, although she did have a rough night the other day where she was awake for several hours and feeling short of breath. Reminded Dedra how the oxycodone can help with her shortness of breath. No other changes, will continue to follow up and     3/20/25: No major health changes since last visit. Dedra states she has been using the oxycodone 1/2 tab (2.5 mg) occasionally at night when she starts experiencing the orthopnea and agitation and it helps her sleep. She continues to receive weekly erythropeotin injections at the infusion center for her anemia. Will follow up in 6 weeks.     Primary diagnosis: Myelofibrosis, anemia in neoplastic disease.      Prognosis: PPS 60-70%    Physical aspects of care:    Pain: Denies pain or discomfort at this time.     SOB: Significant dyspnea with exertion. Takes 3-5 minutes to recover after walking up stairs. She cannot participate in activities with friends that require walking more than ~50 yards without having to stop and sit down. She recently completed a 6 minute walk test that showed her 02 saturation decreased to 90-91% and did not qualify for oxygen.    5/8/25: Has oxycodone, has  used it sparingly but understands she has it and when to use it for symptom management.        Orthopnea: Significant orthopnea, she cannot sleep while lying down flat and has to sit up in her recliner when she does sleep. She has had echocardiograms, x-rays and followed up with her cardiologist and does not seem to have a clear etiology for her orthopnea. She was encouraged to follow up with her primary care provider for a nighttime sleep study.     3/20/25: Oxycodone 2.5 mg helping at night with SOB. Continue PRN, reminded patient of risk of constipation.     Fatigue: Significant fatigue all the time. 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     Cough: Dedra has a debilitating cough that limits her ability to participate in activities. After sitting up for several hours she may have a coughing episode that last several minutes causing her to leave her activity or outing. She had a chest x-ray ordered by her PCP on 2/6/25 that was unremarkable and clear. Start with cetirizine, discussed opiate therapy for cough.      Edema: BLE edema, 2+. Currently on lasix per Dr. Foley, discussed other ways to reduce edema such as increasing protein, compression and leg exercises.     Psychological aspects of care:    Anxiety, depression: Melissa, has good social support through her Taoist and friend groups.     Social aspects of care:  Dedra has good friend support, she has a roommate of many years named Anna who is able to help with some ADL's if needed and excellent support from [employer]ist [employer].     Spiritual aspects of care:  Jethro, participates in weekly Taoist activities and studies. Would like to discuss opiate therapy with her Dudley.     Goals of care:  She understands this is a terminal disease. She would like to manage her symptoms with medication however she is concerned about opiate therapy interfering with her Taoist goals. She would like to continue her infusion therapy  consisting of anagrelide and epoetin. Will continue to discuss goals at future appts.     Physical Exam  Constitutional:       General: She is not in acute distress.     Appearance: She is not ill-appearing.   Abdominal:      General: Abdomen is flat. Bowel sounds are normal. There is no distension.      Palpations: Abdomen is soft.      Tenderness: There is no abdominal tenderness. There is no guarding.   Musculoskeletal:         General: Swelling present.      Right lower leg: Edema present.      Left lower leg: Edema present.   Skin:     General: Skin is warm and dry.      Coloration: Skin is not jaundiced or pale.   Neurological:      Mental Status: She is alert.      Motor: Weakness present.   Psychiatric:         Mood and Affect: Mood normal.         Behavior: Behavior normal.         Thought Content: Thought content normal.         Judgment: Judgment normal.         Current Medications:    Current Outpatient Medications:     benzonatate (TESSALON) 100 MG Cap, Take 1 Capsule by mouth 3 times a day as needed for Cough., Disp: 60 Capsule, Rfl: 0    cetirizine (ZYRTEC) 10 MG Tab, Take 1 Tablet by mouth every day., Disp: 30 Tablet, Rfl: 2    torsemide (DEMADEX) 20 MG Tab, Take 1 Tablet by mouth every day. (Patient taking differently: Take 30 mg by mouth every day.), Disp: 90 Tablet, Rfl: 3    epoetin (EPOGEN,PROCRIT) 61160 Unit/mL Solution, Inject 10,000 Units under the skin every 7 days. Retacrit 40,000 units, Disp: , Rfl:     acetaminophen (TYLENOL) 500 MG Tab, Take 500-1,000 mg by mouth 1 time a day as needed for Mild Pain., Disp: , Rfl:     Carboxymethylcellulose Sod PF 0.5 % Solution, Administer 1 Drop into both eyes every evening., Disp: , Rfl:     anagrelide (AGRYLIN) 0.5 MG Cap, Take 1 Capsule by mouth 2 times a day., Disp: , Rfl:     Medication Allergies:  Patient has no allergy information on record.    Thank you for allowing me the opportunity to participate in the care of Dedra Lobo    I spent  a total of 90 minutes reviewing medical records, direct face-to-face time with the patient and/or family, documentation and coordination of care. This is separate from the time spent on advance care planning, which is documented above.       Chas Stein, ERNESTO  Home Health and Palliative Medicine  [address]  [id number]  P: [phone]  F: [phone]

## 2025-05-13 ENCOUNTER — OUTPATIENT INFUSION SERVICES (OUTPATIENT)
Dept: ONCOLOGY | Facility: MEDICAL CENTER | Age: 85
End: 2025-05-13
Attending: INTERNAL MEDICINE
Payer: MEDICARE

## 2025-05-13 VITALS
TEMPERATURE: 98 F | DIASTOLIC BLOOD PRESSURE: 80 MMHG | SYSTOLIC BLOOD PRESSURE: 131 MMHG | BODY MASS INDEX: 26.89 KG/M2 | WEIGHT: 171.3 LBS | HEIGHT: 67 IN | RESPIRATION RATE: 18 BRPM | OXYGEN SATURATION: 98 % | HEART RATE: 102 BPM

## 2025-05-13 DIAGNOSIS — R10.9 LEFT LATERAL ABDOMINAL PAIN: ICD-10-CM

## 2025-05-13 DIAGNOSIS — R06.02 SHORTNESS OF BREATH: ICD-10-CM

## 2025-05-13 DIAGNOSIS — D75.81 MYELOFIBROSIS (HCC): ICD-10-CM

## 2025-05-13 DIAGNOSIS — D63.0 ANEMIA ASSOCIATED WITH MALIGNANT NEOPLASTIC DISEASE (HCC): ICD-10-CM

## 2025-05-13 DIAGNOSIS — R05.2 SUBACUTE COUGH: ICD-10-CM

## 2025-05-13 DIAGNOSIS — I27.20 PULMONARY HYPERTENSION (HCC): ICD-10-CM

## 2025-05-13 DIAGNOSIS — C80.1 ANEMIA ASSOCIATED WITH MALIGNANT NEOPLASTIC DISEASE (HCC): ICD-10-CM

## 2025-05-13 DIAGNOSIS — D63.0 ANEMIA IN NEOPLASTIC DISEASE: ICD-10-CM

## 2025-05-13 LAB
ALBUMIN SERPL BCP-MCNC: 3.8 G/DL (ref 3.2–4.9)
ALBUMIN/GLOB SERPL: 1.8 G/DL
ALP SERPL-CCNC: 81 U/L (ref 30–99)
ALT SERPL-CCNC: 12 U/L (ref 2–50)
ANION GAP SERPL CALC-SCNC: 10 MMOL/L (ref 7–16)
ANISOCYTOSIS BLD QL SMEAR: ABNORMAL
AST SERPL-CCNC: 21 U/L (ref 12–45)
BASOPHILS # BLD AUTO: 0 % (ref 0–1.8)
BASOPHILS # BLD: 0 K/UL (ref 0–0.12)
BILIRUB SERPL-MCNC: 1.3 MG/DL (ref 0.1–1.5)
BUN SERPL-MCNC: 31 MG/DL (ref 8–22)
CALCIUM ALBUM COR SERPL-MCNC: 9.3 MG/DL (ref 8.5–10.5)
CALCIUM SERPL-MCNC: 9.1 MG/DL (ref 8.5–10.5)
CHLORIDE SERPL-SCNC: 107 MMOL/L (ref 96–112)
CO2 SERPL-SCNC: 25 MMOL/L (ref 20–33)
CREAT SERPL-MCNC: 1.19 MG/DL (ref 0.5–1.4)
DACRYOCYTES BLD QL SMEAR: NORMAL
EOSINOPHIL # BLD AUTO: 0.05 K/UL (ref 0–0.51)
EOSINOPHIL NFR BLD: 0.9 % (ref 0–6.9)
ERYTHROCYTE [DISTWIDTH] IN BLOOD BY AUTOMATED COUNT: 87.8 FL (ref 35.9–50)
GFR SERPLBLD CREATININE-BSD FMLA CKD-EPI: 45 ML/MIN/1.73 M 2
GLOBULIN SER CALC-MCNC: 2.1 G/DL (ref 1.9–3.5)
GLUCOSE SERPL-MCNC: 92 MG/DL (ref 65–99)
HCT VFR BLD AUTO: 30.1 % (ref 37–47)
HGB BLD-MCNC: 9.3 G/DL (ref 12–16)
HYPOCHROMIA BLD QL SMEAR: ABNORMAL
LYMPHOCYTES # BLD AUTO: 1.25 K/UL (ref 1–4.8)
LYMPHOCYTES NFR BLD: 21.9 % (ref 22–41)
MACROCYTES BLD QL SMEAR: ABNORMAL
MANUAL DIFF BLD: NORMAL
MCH RBC QN AUTO: 32.5 PG (ref 27–33)
MCHC RBC AUTO-ENTMCNC: 30.9 G/DL (ref 32.2–35.5)
MCV RBC AUTO: 105.2 FL (ref 81.4–97.8)
METAMYELOCYTES NFR BLD MANUAL: 1.8 %
MICROCYTES BLD QL SMEAR: ABNORMAL
MONOCYTES # BLD AUTO: 0.4 K/UL (ref 0–0.85)
MONOCYTES NFR BLD AUTO: 7 % (ref 0–13.4)
MORPHOLOGY BLD-IMP: NORMAL
MYELOCYTES NFR BLD MANUAL: 2.6 %
NEUTROPHILS # BLD AUTO: 3.5 K/UL (ref 1.82–7.42)
NEUTROPHILS NFR BLD: 61.4 % (ref 44–72)
NRBC # BLD AUTO: 0.12 K/UL
NRBC BLD-RTO: 2.1 /100 WBC (ref 0–0.2)
OVALOCYTES BLD QL SMEAR: NORMAL
PLATELET # BLD AUTO: 604 K/UL (ref 164–446)
PLATELET BLD QL SMEAR: NORMAL
PMV BLD AUTO: 11.9 FL (ref 9–12.9)
POIKILOCYTOSIS BLD QL SMEAR: NORMAL
POTASSIUM SERPL-SCNC: 4.4 MMOL/L (ref 3.6–5.5)
PROT SERPL-MCNC: 5.9 G/DL (ref 6–8.2)
RBC # BLD AUTO: 2.86 M/UL (ref 4.2–5.4)
RBC BLD AUTO: PRESENT
SCHISTOCYTES BLD QL SMEAR: NORMAL
SODIUM SERPL-SCNC: 142 MMOL/L (ref 135–145)
TARGETS BLD QL SMEAR: NORMAL
WBC # BLD AUTO: 5.7 K/UL (ref 4.8–10.8)
WBC OTHER NFR BLD MANUAL: 4.4 %

## 2025-05-13 PROCEDURE — 36415 COLL VENOUS BLD VENIPUNCTURE: CPT

## 2025-05-13 PROCEDURE — 96372 THER/PROPH/DIAG INJ SC/IM: CPT

## 2025-05-13 PROCEDURE — 85007 BL SMEAR W/DIFF WBC COUNT: CPT

## 2025-05-13 PROCEDURE — 700111 HCHG RX REV CODE 636 W/ 250 OVERRIDE (IP): Mod: JZ,TB | Performed by: INTERNAL MEDICINE

## 2025-05-13 PROCEDURE — 80503 PATH CLIN CONSLTJ SF 5-20: CPT

## 2025-05-13 PROCEDURE — 80053 COMPREHEN METABOLIC PANEL: CPT

## 2025-05-13 PROCEDURE — 85027 COMPLETE CBC AUTOMATED: CPT

## 2025-05-13 RX ADMIN — EPOETIN ALFA-EPBX 40000 UNITS: 40000 INJECTION, SOLUTION INTRAVENOUS; SUBCUTANEOUS at 15:23

## 2025-05-13 ASSESSMENT — FIBROSIS 4 INDEX: FIB4 SCORE: .8989574582526650694

## 2025-05-13 NOTE — PROGRESS NOTES
Pt arrived ambulatory to IS for labs/Retacrit. POC discussed. Labs drawn as ordered, results reviewed, parameters met for injection. Retacrit given as ordered to back of R ar, pt tolerated well. Next appointment confirmed. Pt discharged from IS in NAD under self care.

## 2025-05-14 LAB
PATH REV: NORMAL
PATH REV: NORMAL

## 2025-05-15 ENCOUNTER — OFFICE VISIT (OUTPATIENT)
Dept: MEDICAL GROUP | Facility: MEDICAL CENTER | Age: 85
End: 2025-05-15
Payer: MEDICARE

## 2025-05-15 ENCOUNTER — OFFICE VISIT (OUTPATIENT)
Dept: CARDIOLOGY | Facility: MEDICAL CENTER | Age: 85
End: 2025-05-15
Attending: NURSE PRACTITIONER
Payer: MEDICARE

## 2025-05-15 VITALS
OXYGEN SATURATION: 94 % | BODY MASS INDEX: 26.65 KG/M2 | HEIGHT: 67 IN | SYSTOLIC BLOOD PRESSURE: 112 MMHG | TEMPERATURE: 97.5 F | RESPIRATION RATE: 18 BRPM | HEART RATE: 100 BPM | DIASTOLIC BLOOD PRESSURE: 50 MMHG | WEIGHT: 169.8 LBS

## 2025-05-15 VITALS
DIASTOLIC BLOOD PRESSURE: 62 MMHG | BODY MASS INDEX: 26.68 KG/M2 | HEIGHT: 67 IN | HEART RATE: 102 BPM | RESPIRATION RATE: 16 BRPM | WEIGHT: 170 LBS | SYSTOLIC BLOOD PRESSURE: 110 MMHG | OXYGEN SATURATION: 94 %

## 2025-05-15 DIAGNOSIS — D63.0 ANEMIA IN NEOPLASTIC DISEASE: ICD-10-CM

## 2025-05-15 DIAGNOSIS — R60.0 LOWER EXTREMITY EDEMA: Primary | ICD-10-CM

## 2025-05-15 DIAGNOSIS — D75.839 THROMBOCYTOSIS: ICD-10-CM

## 2025-05-15 DIAGNOSIS — I50.32 CHRONIC HEART FAILURE WITH PRESERVED EJECTION FRACTION (HCC): Primary | ICD-10-CM

## 2025-05-15 DIAGNOSIS — R23.8 OTHER SKIN CHANGES: ICD-10-CM

## 2025-05-15 PROBLEM — Z85.51 HISTORY OF MALIGNANT NEOPLASM OF BLADDER: Status: RESOLVED | Noted: 2018-05-17 | Resolved: 2025-05-15

## 2025-05-15 LAB — EKG IMPRESSION: NORMAL

## 2025-05-15 PROCEDURE — 93005 ELECTROCARDIOGRAM TRACING: CPT | Mod: TC | Performed by: NURSE PRACTITIONER

## 2025-05-15 PROCEDURE — 3074F SYST BP LT 130 MM HG: CPT | Performed by: FAMILY MEDICINE

## 2025-05-15 PROCEDURE — 99213 OFFICE O/P EST LOW 20 MIN: CPT | Performed by: NURSE PRACTITIONER

## 2025-05-15 PROCEDURE — 3078F DIAST BP <80 MM HG: CPT | Performed by: NURSE PRACTITIONER

## 2025-05-15 PROCEDURE — 3074F SYST BP LT 130 MM HG: CPT | Performed by: NURSE PRACTITIONER

## 2025-05-15 PROCEDURE — 99214 OFFICE O/P EST MOD 30 MIN: CPT | Performed by: FAMILY MEDICINE

## 2025-05-15 PROCEDURE — 3078F DIAST BP <80 MM HG: CPT | Performed by: FAMILY MEDICINE

## 2025-05-15 PROCEDURE — 99214 OFFICE O/P EST MOD 30 MIN: CPT | Performed by: NURSE PRACTITIONER

## 2025-05-15 ASSESSMENT — ENCOUNTER SYMPTOMS
BRUISES/BLEEDS EASILY: 0
SENSORY CHANGE: 0
RESPIRATORY NEGATIVE: 1
DEPRESSION: 0
ORTHOPNEA: 0
WHEEZING: 0
NEUROLOGICAL NEGATIVE: 1
CLAUDICATION: 0
NERVOUS/ANXIOUS: 0
DIZZINESS: 0
PALPITATIONS: 0
CONSTITUTIONAL NEGATIVE: 1
HALLUCINATIONS: 0
NAUSEA: 0
EYES NEGATIVE: 1
MUSCULOSKELETAL NEGATIVE: 1
GASTROINTESTINAL NEGATIVE: 1
PND: 0
SHORTNESS OF BREATH: 0

## 2025-05-15 ASSESSMENT — FIBROSIS 4 INDEX
FIB4 SCORE: 0.82
FIB4 SCORE: 0.82

## 2025-05-15 NOTE — PROGRESS NOTES
"Verbal consent was acquired by the patient to use [employer] Software ambient listening note generation during this visit    Subjective:     CC: \"lab follow up and leg swelling\"    History of Present Illness  The patient presents for evaluation of leg swelling, rash, and anemia.    General Condition  She reports a general improvement in her condition since the last visit, with enhanced respiratory function and improved sleep quality. Her cough has significantly subsided but persists to some extent. She is currently under palliative care due to a cancer diagnosis but anticipates discontinuing this service soon. She has been more sedentary recently.    Leg Swelling  She has been experiencing bilateral leg swelling for approximately 2 months, with the right leg being more affected than the left. The swelling, which was previously localized to the ankle during her last visit in February, has now extended to the calf and occasionally the thigh. The affected area alternates between feeling hard and solid, akin to calcification, and soft, resembling water retention. She also reports soreness in the swollen leg. The swelling is most pronounced later in the day and appears to be dependent, as it subsides when her feet are elevated. She has been self-adjusting her torsemide dosage based on her weight and swelling, increasing it from 20 mg to 30 mg when necessary. She reports no redness or vein congestion in the affected area. She has attempted to use compression socks but found them uncomfortable. She has tried using an Ace bandage but found the slide down her leg as her calves are much larger than her ankles. She performs daily self-massage on her legs, which provides some relief.  - Onset: Approximately 2 months ago.  - Location: Bilateral legs, more affected on the right; swelling extends from ankle to calf and occasionally thigh.  - Duration: Persistent for 2 months.  - Character: Alternates between hard and solid (calcification) " "and soft (water retention); soreness in the swollen leg.  - Alleviating/Aggravating Factors: Subsides with feet elevation; self-adjusting torsemide dosage; compression socks uncomfortable; Ace bandage too slippery; daily self-massage provides some relief.  - Timing: Most pronounced upon waking in the morning.  - Severity: No redness or vein congestion; discomfort with compression socks and Ace bandage. Tenderness to lateral right calf.    Rash  She has developed a rash on her back, which she describes as a cross between a rash and hives, accompanied by the sudden appearance of numerous moles. She plans to consult a dermatologist for further evaluation. The rash was initially itchy but has since resolved. She has been using lanolin pads on her face, which have helped to dry out the rash.  - Onset: Recent development.  - Location: Back.  - Character: Cross between a rash and hives; sudden appearance of numerous moles; initially itchy but has since resolved.  - Alleviating Factors: Lanolin pads on face helped to dry out the rash.    Anemia  She is currently on anagrelide for elevated platelet levels.    Supplemental Information  She has a history of a leaky valve and has not had any follow-up on that. The last evaluation indicated improvement compared to the previous one. She has an appointment with Dr. Morris at 1:00 PM today.          Objective:     Exam:  /50   Pulse 100   Temp 36.4 °C (97.5 °F) (Temporal)   Resp 18   Ht 1.71 m (5' 7.32\")   Wt 77 kg (169 lb 12.8 oz)   SpO2 94%   BMI 26.34 kg/m²  Body mass index is 26.34 kg/m².    Physical Exam  General Appearance: Normal.  Vital signs: Within normal limits.  HEENT: Within normal limits.  Respiratory: Within normal limits.  Extremities: swelling noted in the right leg compared to the left leg. No redness or vein congestion observed. Right leg with lateral calf tenderness.  Skin: Warm and dry, no rash.  Neurological: Normal.  Physical " Exam          Results  Labs   - Hemoglobin: 9.3 g/dL   - GFR: 45 mL/min/1.73m²   - Platelets: 604 x10^9/L      Assessment & Plan:       1. 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 Other skin changes    4. Thrombocytosis      Assessment & Plan  1. Bilateral lower extremity edema: Chronic.  - Weight decreased from 173 to 169, indicating no significant fluid retention.  - Edema appears to be dependent, resolving when the feet are elevated. No evidence of venous congestion or thrombosis.  - Elevated coagulopathy risk with cancer diagnosis and thrombocytosis  - Stat ultrasound will be ordered to rule out deep vein thrombosis (DVT). If ultrasound reveals DVT, anticoagulation therapy with Eliquis or Xarelto will be initiated.  - Continue with torsemide and leg elevation.  - Referral to the lymphedema clinic for long-term management.    2. Anemia: Stable.  - Hemoglobin level stable at 9.3.  - Blood count showed fairly stable anemia for the last few weeks.  - Continue follow-up with hematologist, Dr. Foley, next week.  - Recent lab results reviewed.    3. Rash and new moles.  - Reports rash and new moles on the back.  - Rash initially itchy but now drying out with lanolin pads.  - Follow up with dermatologist for further evaluation.  - No changes in diet or significant triggers identified.    4. Thrombocytosis: Stable.  - Platelet count slightly elevated at 604.  - Currently on anagrelide for thrombocytosis and will continue this medication.  - History of dangerously high platelet counts previously managed with anagrelide.  - No new symptoms related to thrombocytosis reported.    Follow-up  - Follow up in 3 months.         Return in about 3 months (around 8/15/2025), or if symptoms worsen or fail to improve, for Imaging F/U.      This note was created using voice recognition software (Dragon). The accuracy of the  dictation is limited by the abilities of the software. I have reviewed the note prior to signing, however some errors in grammar and context are still possible. If you have any questions related to this note please do not hesitate to contact our office.

## 2025-05-15 NOTE — PROGRESS NOTES
"Cardiology Follow up Note    DOS: 5/15/2025   [id number]  Dedra Will Yamil    Chief complaint/Reason for consult: annual visit    HPI: Pt is a 84 y.o. female who presents to the clinic today in follow up for annual visit. Patient has a past medical history significant for but not limited to: HFpEF, myelofibrosis, pulmonary hypertension, H pylori, tricuspid regurgitation. Patient doing well with anemia injections. Swelling more unilateral in lower right leg. Can be hard and soft. Primary ordered stat ultrasound for DVT rule out. Referred to lymphedema clinic. Using torsemide with option higher dose if needed. Blood pressure stable. EKG sinus. Repeat echocardiogram to see if pulmonary hypertension has progressed.     Past Medical History:   Diagnosis Date    Adverse effect of anesthesia     core body temp drops per hx.    Anemia     Anesthesia     \"core temperature drops\"    Arrhythmia     Arthritis     oa, neck and hips    Cancer (HCC) 2015    bladder    Cardiomegaly 10/06/2022    Incidental on CXR. Also has lower extremity pitting edema. Will obtain TTE    Cataract 03/21/2019    bilateral no surgery    GERD (gastroesophageal reflux disease)     Glaucoma 02/2015    \"beginning\"    Helicobacter pylori gastritis 09/25/2023    History of malignant neoplasm of bladder 05/17/2018    Personal history of malignant neoplasm of bladder      History of primary malignant neoplasm of urinary bladder 05/17/2018    Personal history of malignant neoplasm of bladder    Osteoporosis, unspecified     Pain     neck    Pneumonia 10/2001    Systolic murmur 10/06/2022    Thrombocytosis 09/29/2010    Ulcer     gastric ulcers    Unspecified disorder of thyroid     LOW THYROID LEVELS- TOOK  MEDS    Urinary bladder disorder 2015    bladder cancer    Urinary incontinence 03/21/2019    Vascular insufficiency of limb 2005    right lower extrematy    Vitamin d deficiency 09/29/2010       Past Surgical History:   Procedure Laterality Date    [place] " UPPER GI ENDOSCOPY,DIAGNOSIS N/A 10/1/2022    Procedure: GASTROSCOPY;  Surgeon: Ana Gaston D.O.;  Location: SURGERY [facility];  Service: Gastroenterology    MS UPPER GI ENDOSCOPY,SCLER INJECT N/A 10/1/2022    Procedure: GASTROSCOPY, WITH SCLEROTHERAPY;  Surgeon: Ana Gaston D.O.;  Location: SURGERY [facility];  Service: Gastroenterology    MS UPPER GI ENDOSCOPY,CTRL BLEED N/A 10/1/2022    Procedure: EGD, WITH CLIP PLACEMENT;  Surgeon: Ana Gaston D.O.;  Location: SURGERY [facility];  Service: Gastroenterology    PB TOTAL KNEE ARTHROPLASTY Left 4/3/2019    Procedure: KNEE ARTHROPLASTY TOTAL;  Surgeon: Robert Vazquez M.D.;  Location: SURGERY [facility];  Service: Orthopedics    CERVICAL DISK AND FUSION ANTERIOR  2/9/2016    Procedure: CERVICAL DISK AND FUSION ANTERIOR  C3-7;  Surgeon: Dusty Rao M.D.;  Location: SURGERY [facility];  Service:     CYSTOSCOPY  3/3/2015    Performed by Walt Olson M.D. at SURGERY [facility]    TRANS URETHRAL RESECTION BLADDER  3/3/2015    Performed by Walt Olson M.D. at SURGERY [facility]    HIP REPLACEMENT, TOTAL  2006    Dr. Vazquez    CHOLECYSTECTOMY  2003    LUMPECTOMY  1982    cyst    ARTHROPLASTY      bi lateral-Dr. Vazquez       Social History     Socioeconomic History    Marital status: Single     Spouse name: Not on file    Number of children: Not on file    Years of education: Not on file    Highest education level: Not on file   Occupational History    Not on file   Tobacco Use    Smoking status: Never    Smokeless tobacco: Never   Vaping Use    Vaping status: Never Used   Substance and Sexual Activity    Alcohol use: No     Alcohol/week: 0.0 oz    Drug use: No    Sexual activity: Never   Other Topics Concern    Not on file   Social History Narrative    Not on file     Social Drivers of Health     Financial Resource Strain: Not on file   Food Insecurity: Not on file   Transportation Needs: Not on file   Physical Activity: Not on file    Stress: Not on file   Social Connections: Not on file   Intimate Partner Violence: Not on file   Housing Stability: Not on file       Family History   Problem Relation Age of Onset    Cancer Mother         brain    Lung Disease Mother         smoker    Alcohol/Drug Father         etoh    Lung Disease Sister         COPD smokers    Alcohol/Drug Sister         etoh    Lung Disease Sister         smoker-copd    Alcohol/Drug Sister         etoh       Not on File    Current Outpatient Medications   Medication Sig Dispense Refill    benzonatate (TESSALON) 100 MG Cap Take 1 Capsule by mouth 3 times a day as needed for Cough. 60 Capsule 0    cetirizine (ZYRTEC) 10 MG Tab Take 1 Tablet by mouth every day. (Patient taking differently: Take 10 mg by mouth every day. Taking as needed) 30 Tablet 2    torsemide (DEMADEX) 20 MG Tab Take 1 Tablet by mouth every day. (Patient taking differently: Take 30 mg by mouth every day.) 90 Tablet 3    epoetin (EPOGEN,PROCRIT) 45291 Unit/mL Solution Inject 10,000 Units under the skin every 7 days. Retacrit 40,000 units      acetaminophen (TYLENOL) 500 MG Tab Take 500-1,000 mg by mouth 1 time a day as needed for Mild Pain.      Carboxymethylcellulose Sod PF 0.5 % Solution Administer 1 Drop into both eyes every evening. (Patient taking differently: Administer 1 Drop into both eyes every evening. Taking as needed)      anagrelide (AGRYLIN) 0.5 MG Cap Take 1 Capsule by mouth 2 times a day.       No current facility-administered medications for this visit.       Vitals:    05/15/25 1302   BP: 110/62   Pulse: (!) 102   Resp: 16   SpO2: 94%         Review of Systems   Constitutional: Negative.  Negative for malaise/fatigue.   HENT: Negative.     Eyes: Negative.    Respiratory: Negative.  Negative for shortness of breath and wheezing.    Cardiovascular:  Negative for chest pain, palpitations, orthopnea, claudication, leg swelling and PND.   Gastrointestinal: Negative.  Negative for nausea.    Genitourinary: Negative.    Musculoskeletal: Negative.    Skin: Negative.    Neurological: Negative.  Negative for dizziness and sensory change.   Endo/Heme/Allergies: Negative.  Does not bruise/bleed easily.   Psychiatric/Behavioral:  Negative for depression and hallucinations. The patient is not nervous/anxious.             EKG interpreted by me: Sinus rhythm     Physical Exam  Constitutional:       Appearance: Normal appearance.   HENT:      Head: Normocephalic.   Eyes:      Pupils: Pupils are equal, round, and reactive to light.   Neck:      Vascular: No JVD.   Cardiovascular:      Rate and Rhythm: Normal rate and regular rhythm.      Pulses: Normal pulses.      Heart sounds: Normal heart sounds.   Pulmonary:      Effort: Pulmonary effort is normal.      Breath sounds: Normal breath sounds.   Abdominal:      General: Abdomen is flat.      Palpations: Abdomen is soft.   Musculoskeletal:      Cervical back: Normal range of motion.      Right lower leg: No edema.      Left lower leg: No edema.   Skin:     General: Skin is warm and dry.   Neurological:      Mental Status: She is alert and oriented to person, place, and time.   Psychiatric:         Mood and Affect: Mood normal.         Behavior: Behavior normal.          Data:  Lipids:   Lab Results   Component Value Date/Time    CHOLSTRLTOT 94 (L) 11/26/2024 01:41 PM    TRIGLYCERIDE 105 11/26/2024 01:41 PM    HDL 36 (A) 11/26/2024 01:41 PM    LDL 37 11/26/2024 01:41 PM        BMP:  Lab Results   Component Value Date/Time    SODIUM 142 09/19/2023 1400    POTASSIUM 4.3 09/19/2023 1400    CHLORIDE 108 09/19/2023 1400    CO2 25 09/19/2023 1400    GLUCOSE 92 09/19/2023 1400    BUN 13 09/19/2023 1400    CREATININE 0.66 09/19/2023 1400    CALCIUM 9.2 09/19/2023 1400    ANION 9.0 09/19/2023 1400       GFR:  Lab Results   Component Value Date/Time    IFAFRICA >60 02/22/2022 1531    IFNOTAFR >60 02/22/2022 1531        TSH:   Lab Results   Component Value Date/Time     "TSHULTRASEN 4.820 10/10/2022 1119       MAGNESIUM:  Lab Results   Component Value Date/Time    MAGNESIUM 2.2 01/31/2023 1350    MAGNESIUM 1.9 10/01/2022 0932    MAGNESIUM 1.9 09/30/2022 1957        THYROXINE (T4):   No results found for: \"FREEDIR\"     CBC:   Lab Results   Component Value Date/Time    WBC 5.7 05/13/2025 01:45 PM    RBC 2.86 (L) 05/13/2025 01:45 PM    HEMOGLOBIN 9.3 (L) 05/13/2025 01:45 PM    HEMATOCRIT 30.1 (L) 05/13/2025 01:45 PM    .2 (H) 05/13/2025 01:45 PM    MCH 32.5 05/13/2025 01:45 PM    MCHC 30.9 (L) 05/13/2025 01:45 PM    RDW 87.8 (H) 05/13/2025 01:45 PM    PLATELETCT 604 (H) 05/13/2025 01:45 PM    MPV 11.9 05/13/2025 01:45 PM    NEUTSPOLYS 61.40 05/13/2025 01:45 PM    LYMPHOCYTES 21.90 (L) 05/13/2025 01:45 PM    MONOCYTES 7.00 05/13/2025 01:45 PM    EOSINOPHILS 0.90 05/13/2025 01:45 PM    BASOPHILS 0.00 05/13/2025 01:45 PM    IMMGRAN 4.60 (H) 03/22/2022 02:45 PM    NRBC 2.10 (H) 05/13/2025 01:45 PM    NEUTS 3.50 05/13/2025 01:45 PM    LYMPHS 1.25 05/13/2025 01:45 PM    MONOS 0.40 05/13/2025 01:45 PM    EOS 0.05 05/13/2025 01:45 PM    BASO 0.00 05/13/2025 01:45 PM    IMMGRANAB 0.19 (H) 03/22/2022 02:45 PM    NRBCAB 0.12 05/13/2025 01:45 PM        CBC w/o DIFF  Lab Results   Component Value Date/Time    WBC 5.7 05/13/2025 01:45 PM    RBC 2.86 (L) 05/13/2025 01:45 PM    HEMOGLOBIN 9.3 (L) 05/13/2025 01:45 PM    .2 (H) 05/13/2025 01:45 PM    MCH 32.5 05/13/2025 01:45 PM    MCHC 30.9 (L) 05/13/2025 01:45 PM    RDW 87.8 (H) 05/13/2025 01:45 PM    MPV 11.9 05/13/2025 01:45 PM       LIVER:  Lab Results   Component Value Date/Time    ALKPHOSPHAT 81 05/13/2025 01:45 PM    ASTSGOT 21 05/13/2025 01:45 PM    ALTSGPT 12 05/13/2025 01:45 PM    TBILIRUBIN 1.3 05/13/2025 01:45 PM       BNP:  Lab Results   Component Value Date/Time    BNPBTYPENAT 52 01/30/2016 02:55 PM       PT/INR:  Lab Results   Component Value Date/Time    PROTHROMBTM 17.5 (H) 09/30/2022 07:57 PM    PROTHROMBTM 13.3 " 02/01/2016 12:24 PM    PROTHROMBTM 12.7 02/23/2015 02:46 PM    INR 1.47 (H) 09/30/2022 07:57 PM    INR 1.01 02/01/2016 12:24 PM    INR 0.96 02/23/2015 02:46 PM             Impression/Plan:  Chronic heart failure with preserved ejection fraction (HCC)   - last echo showed mild pulmonary hypertension and right sided dysfunction   - torsemide 20mg daily with 30mg on days she feels the swelling worsen   - JEAN PIERRE hose   - was referred to lymphedema clinic   - not breathing bad   - repeat echocardiogram due to worsening lower extremity edema   - has stat ultrasound tomorrow for DVT rule out           Waqas Morris AGAP-EP  Cardiac Electrophysiology

## 2025-05-15 NOTE — ASSESSMENT & PLAN NOTE
- last echo showed mild pulmonary hypertension and right sided dysfunction   - torsemide 20mg daily with 30mg on days she feels the swelling worsen   - JEAN PIERRE hose   - was referred to lymphedema clinic   - not breathing bad   - repeat echocardiogram due to worsening lower extremity edema   - has stat ultrasound tomorrow for DVT rule out

## 2025-05-16 ENCOUNTER — HOSPITAL ENCOUNTER (OUTPATIENT)
Dept: RADIOLOGY | Facility: MEDICAL CENTER | Age: 85
End: 2025-05-16
Attending: FAMILY MEDICINE
Payer: MEDICARE

## 2025-05-16 ENCOUNTER — RESULTS FOLLOW-UP (OUTPATIENT)
Dept: MEDICAL GROUP | Facility: MEDICAL CENTER | Age: 85
End: 2025-05-16
Payer: MEDICARE

## 2025-05-16 DIAGNOSIS — R60.0 LOWER EXTREMITY EDEMA: ICD-10-CM

## 2025-05-16 PROCEDURE — 93970 EXTREMITY STUDY: CPT

## 2025-05-20 ENCOUNTER — OUTPATIENT INFUSION SERVICES (OUTPATIENT)
Dept: ONCOLOGY | Facility: MEDICAL CENTER | Age: 85
End: 2025-05-20
Attending: INTERNAL MEDICINE
Payer: MEDICARE

## 2025-05-20 VITALS
SYSTOLIC BLOOD PRESSURE: 126 MMHG | OXYGEN SATURATION: 90 % | BODY MASS INDEX: 26.99 KG/M2 | RESPIRATION RATE: 18 BRPM | HEART RATE: 104 BPM | DIASTOLIC BLOOD PRESSURE: 71 MMHG | HEIGHT: 67 IN | WEIGHT: 171.96 LBS | TEMPERATURE: 97.4 F

## 2025-05-20 DIAGNOSIS — D75.81 MYELOFIBROSIS (HCC): Primary | ICD-10-CM

## 2025-05-20 DIAGNOSIS — D63.0 ANEMIA ASSOCIATED WITH MALIGNANT NEOPLASTIC DISEASE (HCC): ICD-10-CM

## 2025-05-20 DIAGNOSIS — C80.1 ANEMIA ASSOCIATED WITH MALIGNANT NEOPLASTIC DISEASE (HCC): ICD-10-CM

## 2025-05-20 DIAGNOSIS — D63.0 ANEMIA IN NEOPLASTIC DISEASE: ICD-10-CM

## 2025-05-20 LAB
HCT VFR BLD AUTO: 29.1 % (ref 37–47)
HGB BLD-MCNC: 9.4 G/DL (ref 12–16)

## 2025-05-20 PROCEDURE — 36415 COLL VENOUS BLD VENIPUNCTURE: CPT

## 2025-05-20 PROCEDURE — 85018 HEMOGLOBIN: CPT

## 2025-05-20 PROCEDURE — 700111 HCHG RX REV CODE 636 W/ 250 OVERRIDE (IP): Mod: JZ,TB | Performed by: INTERNAL MEDICINE

## 2025-05-20 PROCEDURE — 85014 HEMATOCRIT: CPT

## 2025-05-20 PROCEDURE — 96372 THER/PROPH/DIAG INJ SC/IM: CPT

## 2025-05-20 RX ADMIN — EPOETIN ALFA-EPBX 40000 UNITS: 40000 INJECTION, SOLUTION INTRAVENOUS; SUBCUTANEOUS at 15:19

## 2025-05-20 ASSESSMENT — FIBROSIS 4 INDEX: FIB4 SCORE: 0.82

## 2025-05-21 NOTE — Clinical Note
REFERRAL APPROVAL NOTICE         Sent on May 21, 2025                   Dedra Lobo  [address]  [place] [address]                   Dear Ms. Lobo,    After a careful review of the medical information and benefit coverage, [facility] has processed your referral. See below for additional details.    If applicable, you must be actively enrolled with your insurance for coverage of the authorized service. If you have any questions regarding your coverage, please contact your insurance directly.    REFERRAL INFORMATION   Referral #:  [id number]  Referred-To Department    Referred-By Provider:  Physical Therapy    Jay Tsang D.O.   [facility] [address]      [address] [facility]  [address]  [place] [address]  [phone] [address]  [address]  [place] [id number] [phone]    Referral Start Date:  05/15/2025  Referral End Date:   05/15/2026             SCHEDULING  If you do not already have an appointment, please call [phone] to make an appointment.     MORE INFORMATION  If you do not already have a [employer] account, sign up at: [email or web address]  You can access your medical information, make appointments, see lab results, billing information, and more.  If you have questions regarding this referral, please contact  the [facility] Referrals department at:             [phone]. Monday - Friday 8:00AM - 5:00PM.     Sincerely,    [facility]

## 2025-05-21 NOTE — PROGRESS NOTES
Pt arrived ambulatory for weekly Retacrit injection, denies complaints, states she has been healthy since last week.  Labs drawn via butterfly needle from LAC, gauze and coban applied. Results reviewed, meets parameters for Retacrit injection, which was given in left arm, bandaid applied.  Dc'd home without incident and will f/u as scheduled.

## 2025-05-27 ENCOUNTER — OUTPATIENT INFUSION SERVICES (OUTPATIENT)
Dept: ONCOLOGY | Facility: MEDICAL CENTER | Age: 85
End: 2025-05-27
Attending: INTERNAL MEDICINE
Payer: MEDICARE

## 2025-05-27 VITALS
TEMPERATURE: 97.5 F | SYSTOLIC BLOOD PRESSURE: 111 MMHG | OXYGEN SATURATION: 91 % | DIASTOLIC BLOOD PRESSURE: 67 MMHG | HEART RATE: 102 BPM | HEIGHT: 67 IN | WEIGHT: 176.81 LBS | RESPIRATION RATE: 18 BRPM | BODY MASS INDEX: 27.75 KG/M2

## 2025-05-27 DIAGNOSIS — C80.1 ANEMIA ASSOCIATED WITH MALIGNANT NEOPLASTIC DISEASE (HCC): ICD-10-CM

## 2025-05-27 DIAGNOSIS — D63.0 ANEMIA ASSOCIATED WITH MALIGNANT NEOPLASTIC DISEASE (HCC): ICD-10-CM

## 2025-05-27 DIAGNOSIS — D75.81 MYELOFIBROSIS (HCC): Primary | ICD-10-CM

## 2025-05-27 DIAGNOSIS — D63.0 ANEMIA IN NEOPLASTIC DISEASE: ICD-10-CM

## 2025-05-27 LAB
HCT VFR BLD AUTO: 27.4 % (ref 37–47)
HGB BLD-MCNC: 8.5 G/DL (ref 12–16)

## 2025-05-27 PROCEDURE — 700111 HCHG RX REV CODE 636 W/ 250 OVERRIDE (IP): Mod: JZ,TB | Performed by: INTERNAL MEDICINE

## 2025-05-27 PROCEDURE — 85018 HEMOGLOBIN: CPT

## 2025-05-27 PROCEDURE — 85014 HEMATOCRIT: CPT

## 2025-05-27 PROCEDURE — 96372 THER/PROPH/DIAG INJ SC/IM: CPT

## 2025-05-27 PROCEDURE — 36415 COLL VENOUS BLD VENIPUNCTURE: CPT

## 2025-05-27 RX ADMIN — EPOETIN ALFA-EPBX 40000 UNITS: 40000 INJECTION, SOLUTION INTRAVENOUS; SUBCUTANEOUS at 14:36

## 2025-05-27 ASSESSMENT — FIBROSIS 4 INDEX: FIB4 SCORE: 0.82

## 2025-05-27 NOTE — PROGRESS NOTES
Patient to [facility] for Retacrit injection. Lab drawn by venipuncture in right AC, gauze and coban applied as a dressing. HGB 8.5, patient meets parameters for Retacrit. Retacrit injected into left back of arm. Bandaid applied as a dressing. Patient has future appts. Patient to home.

## 2025-06-03 ENCOUNTER — OUTPATIENT INFUSION SERVICES (OUTPATIENT)
Dept: ONCOLOGY | Facility: MEDICAL CENTER | Age: 85
End: 2025-06-03
Attending: INTERNAL MEDICINE
Payer: MEDICARE

## 2025-06-03 VITALS
WEIGHT: 181.66 LBS | RESPIRATION RATE: 18 BRPM | DIASTOLIC BLOOD PRESSURE: 59 MMHG | HEIGHT: 67 IN | HEART RATE: 107 BPM | BODY MASS INDEX: 28.51 KG/M2 | SYSTOLIC BLOOD PRESSURE: 116 MMHG | OXYGEN SATURATION: 90 % | TEMPERATURE: 97.6 F

## 2025-06-03 DIAGNOSIS — D63.0 ANEMIA IN NEOPLASTIC DISEASE: ICD-10-CM

## 2025-06-03 DIAGNOSIS — D75.81 MYELOFIBROSIS (HCC): Primary | ICD-10-CM

## 2025-06-03 DIAGNOSIS — C80.1 ANEMIA ASSOCIATED WITH MALIGNANT NEOPLASTIC DISEASE (HCC): ICD-10-CM

## 2025-06-03 DIAGNOSIS — D63.0 ANEMIA ASSOCIATED WITH MALIGNANT NEOPLASTIC DISEASE (HCC): ICD-10-CM

## 2025-06-03 LAB
HCT VFR BLD AUTO: 28.9 % (ref 37–47)
HGB BLD-MCNC: 9.2 G/DL (ref 12–16)

## 2025-06-03 PROCEDURE — 85018 HEMOGLOBIN: CPT

## 2025-06-03 PROCEDURE — 36415 COLL VENOUS BLD VENIPUNCTURE: CPT

## 2025-06-03 PROCEDURE — 700111 HCHG RX REV CODE 636 W/ 250 OVERRIDE (IP): Mod: JZ,TB | Performed by: INTERNAL MEDICINE

## 2025-06-03 PROCEDURE — 96372 THER/PROPH/DIAG INJ SC/IM: CPT

## 2025-06-03 PROCEDURE — 85014 HEMATOCRIT: CPT

## 2025-06-03 RX ADMIN — EPOETIN ALFA-EPBX 40000 UNITS: 40000 INJECTION, SOLUTION INTRAVENOUS; SUBCUTANEOUS at 14:26

## 2025-06-03 ASSESSMENT — FIBROSIS 4 INDEX: FIB4 SCORE: 0.82

## 2025-06-03 NOTE — PROGRESS NOTES
Dedra into IS for weekly lab/poss retacrit injection.  Dedra voices no new complaints.  Venipuncture to right AC for labs. Hgb 9.2, pt meets parameters for Retacrit injection today.  Retacrit given sub-q to back of right arm.  Dedra tolerated treatment without any adverse events noted.  Verified next appointment and Dedra off unit in NAD.

## 2025-06-10 ENCOUNTER — OUTPATIENT INFUSION SERVICES (OUTPATIENT)
Dept: ONCOLOGY | Facility: MEDICAL CENTER | Age: 85
End: 2025-06-10
Attending: INTERNAL MEDICINE
Payer: MEDICARE

## 2025-06-10 VITALS
BODY MASS INDEX: 29.07 KG/M2 | HEIGHT: 67 IN | SYSTOLIC BLOOD PRESSURE: 97 MMHG | OXYGEN SATURATION: 92 % | TEMPERATURE: 98 F | DIASTOLIC BLOOD PRESSURE: 56 MMHG | RESPIRATION RATE: 20 BRPM | HEART RATE: 100 BPM | WEIGHT: 185.19 LBS

## 2025-06-10 DIAGNOSIS — D63.0 ANEMIA IN NEOPLASTIC DISEASE: ICD-10-CM

## 2025-06-10 DIAGNOSIS — C80.1 ANEMIA ASSOCIATED WITH MALIGNANT NEOPLASTIC DISEASE (HCC): ICD-10-CM

## 2025-06-10 DIAGNOSIS — D63.0 ANEMIA ASSOCIATED WITH MALIGNANT NEOPLASTIC DISEASE (HCC): ICD-10-CM

## 2025-06-10 DIAGNOSIS — D75.81 MYELOFIBROSIS (HCC): Primary | ICD-10-CM

## 2025-06-10 LAB
HCT VFR BLD AUTO: 27 % (ref 37–47)
HGB BLD-MCNC: 8.6 G/DL (ref 12–16)

## 2025-06-10 PROCEDURE — 700111 HCHG RX REV CODE 636 W/ 250 OVERRIDE (IP): Mod: JZ,TB | Performed by: INTERNAL MEDICINE

## 2025-06-10 PROCEDURE — 96372 THER/PROPH/DIAG INJ SC/IM: CPT

## 2025-06-10 PROCEDURE — 85018 HEMOGLOBIN: CPT

## 2025-06-10 PROCEDURE — 36415 COLL VENOUS BLD VENIPUNCTURE: CPT

## 2025-06-10 PROCEDURE — 85014 HEMATOCRIT: CPT

## 2025-06-10 RX ADMIN — EPOETIN ALFA-EPBX 40000 UNITS: 40000 INJECTION, SOLUTION INTRAVENOUS; SUBCUTANEOUS at 15:26

## 2025-06-10 ASSESSMENT — FIBROSIS 4 INDEX: FIB4 SCORE: 0.82

## 2025-06-11 NOTE — PROGRESS NOTES
Patient arrived to [facility] for Retacrit injection, Labs drawn from [facility]. Lab results hgb 8.6, results within parameters for injection. Retacrit injection given to right back of arm, well tolerated. Next appointment confirmed, patient left home in stable condition.

## 2025-06-17 ENCOUNTER — OUTPATIENT INFUSION SERVICES (OUTPATIENT)
Dept: ONCOLOGY | Facility: MEDICAL CENTER | Age: 85
End: 2025-06-17
Attending: INTERNAL MEDICINE
Payer: MEDICARE

## 2025-06-17 VITALS
HEIGHT: 67 IN | HEART RATE: 90 BPM | OXYGEN SATURATION: 100 % | BODY MASS INDEX: 29.41 KG/M2 | TEMPERATURE: 98 F | DIASTOLIC BLOOD PRESSURE: 55 MMHG | WEIGHT: 187.39 LBS | SYSTOLIC BLOOD PRESSURE: 125 MMHG | RESPIRATION RATE: 18 BRPM

## 2025-06-17 DIAGNOSIS — D63.0 ANEMIA IN NEOPLASTIC DISEASE: ICD-10-CM

## 2025-06-17 DIAGNOSIS — D75.81 MYELOFIBROSIS (HCC): Primary | ICD-10-CM

## 2025-06-17 DIAGNOSIS — D63.0 ANEMIA ASSOCIATED WITH MALIGNANT NEOPLASTIC DISEASE (HCC): ICD-10-CM

## 2025-06-17 DIAGNOSIS — C80.1 ANEMIA ASSOCIATED WITH MALIGNANT NEOPLASTIC DISEASE (HCC): ICD-10-CM

## 2025-06-17 LAB
HCT VFR BLD AUTO: 29.3 % (ref 37–47)
HGB BLD-MCNC: 9 G/DL (ref 12–16)

## 2025-06-17 PROCEDURE — 85014 HEMATOCRIT: CPT

## 2025-06-17 PROCEDURE — 96372 THER/PROPH/DIAG INJ SC/IM: CPT

## 2025-06-17 PROCEDURE — 85018 HEMOGLOBIN: CPT

## 2025-06-17 PROCEDURE — 700111 HCHG RX REV CODE 636 W/ 250 OVERRIDE (IP): Mod: JZ,TB | Performed by: INTERNAL MEDICINE

## 2025-06-17 PROCEDURE — 36415 COLL VENOUS BLD VENIPUNCTURE: CPT

## 2025-06-17 RX ADMIN — EPOETIN ALFA-EPBX 40000 UNITS: 40000 INJECTION, SOLUTION INTRAVENOUS; SUBCUTANEOUS at 15:07

## 2025-06-17 ASSESSMENT — FIBROSIS 4 INDEX: FIB4 SCORE: 0.82

## 2025-06-17 NOTE — PROGRESS NOTES
Pt arrives to [facility] for Retacrit injection.  Discussed plan of care with pt.  Labs drawn via 25G  butterfly needle to RAC.  Hemoglobin is 9 today.  Results meet MD parameters for Retacrit.  Retacrit given SC to left back arm . Confirmed next appt time with pt.  Pt dc home to self care.

## 2025-06-22 LAB — EKG IMPRESSION: NORMAL

## 2025-06-24 ENCOUNTER — OUTPATIENT INFUSION SERVICES (OUTPATIENT)
Dept: ONCOLOGY | Facility: MEDICAL CENTER | Age: 85
End: 2025-06-24
Attending: INTERNAL MEDICINE
Payer: MEDICARE

## 2025-06-24 VITALS
HEIGHT: 67 IN | WEIGHT: 196.21 LBS | BODY MASS INDEX: 30.8 KG/M2 | SYSTOLIC BLOOD PRESSURE: 105 MMHG | TEMPERATURE: 97.7 F | OXYGEN SATURATION: 98 % | HEART RATE: 104 BPM | RESPIRATION RATE: 18 BRPM | DIASTOLIC BLOOD PRESSURE: 55 MMHG

## 2025-06-24 DIAGNOSIS — C80.1 ANEMIA ASSOCIATED WITH MALIGNANT NEOPLASTIC DISEASE (HCC): ICD-10-CM

## 2025-06-24 DIAGNOSIS — D63.0 ANEMIA ASSOCIATED WITH MALIGNANT NEOPLASTIC DISEASE (HCC): ICD-10-CM

## 2025-06-24 DIAGNOSIS — D63.0 ANEMIA IN NEOPLASTIC DISEASE: ICD-10-CM

## 2025-06-24 DIAGNOSIS — D75.81 MYELOFIBROSIS (HCC): Primary | ICD-10-CM

## 2025-06-24 LAB
HCT VFR BLD AUTO: 28.2 % (ref 37–47)
HGB BLD-MCNC: 8.9 G/DL (ref 12–16)

## 2025-06-24 PROCEDURE — 700111 HCHG RX REV CODE 636 W/ 250 OVERRIDE (IP): Mod: JZ,TB | Performed by: INTERNAL MEDICINE

## 2025-06-24 PROCEDURE — 85018 HEMOGLOBIN: CPT

## 2025-06-24 PROCEDURE — 36415 COLL VENOUS BLD VENIPUNCTURE: CPT

## 2025-06-24 PROCEDURE — 85014 HEMATOCRIT: CPT

## 2025-06-24 PROCEDURE — 96372 THER/PROPH/DIAG INJ SC/IM: CPT

## 2025-06-24 RX ADMIN — EPOETIN ALFA-EPBX 40000 UNITS: 40000 INJECTION, SOLUTION INTRAVENOUS; SUBCUTANEOUS at 14:38

## 2025-06-24 ASSESSMENT — FIBROSIS 4 INDEX: FIB4 SCORE: 0.82

## 2025-06-25 NOTE — PROGRESS NOTES
Dedra presents to infusion for weekly labs and possible Retacrit injection. Reports fatigue and increased leg swelling today, VSS and no increased shortness of breath. Patient states she has appt for echocardiogram on Thursday and is meeting with MD to discuss swelling soon.     Labs drawn via venipuncture to CONNER SPENCER.    Labs reviewed by RN, Hgb: 8.9, within parameters for treatment today.    Retacrit given SQ in DARREN, patient tolerated well with no adverse effects.    Patient left in stable condition, knows when to return for next appt.

## 2025-06-26 ENCOUNTER — HOSPITAL ENCOUNTER (OUTPATIENT)
Dept: CARDIOLOGY | Facility: MEDICAL CENTER | Age: 85
End: 2025-06-26
Attending: NURSE PRACTITIONER
Payer: MEDICARE

## 2025-06-26 ENCOUNTER — RESULTS FOLLOW-UP (OUTPATIENT)
Dept: CARDIOLOGY | Facility: MEDICAL CENTER | Age: 85
End: 2025-06-26

## 2025-06-26 DIAGNOSIS — I50.32 CHRONIC HEART FAILURE WITH PRESERVED EJECTION FRACTION (HCC): ICD-10-CM

## 2025-06-26 LAB
LV EJECT FRACT MOD 2C 99903: 60.78
LV EJECT FRACT MOD 4C 99902: 68.65
LV EJECT FRACT MOD BP 99901: 63.04

## 2025-06-26 PROCEDURE — 93306 TTE W/DOPPLER COMPLETE: CPT

## 2025-06-26 PROCEDURE — 93306 TTE W/DOPPLER COMPLETE: CPT | Mod: 26 | Performed by: STUDENT IN AN ORGANIZED HEALTH CARE EDUCATION/TRAINING PROGRAM

## 2025-06-30 ENCOUNTER — TELEPHONE (OUTPATIENT)
Dept: CARDIOLOGY | Facility: MEDICAL CENTER | Age: 85
End: 2025-06-30
Payer: MEDICARE

## 2025-06-30 NOTE — TELEPHONE ENCOUNTER
Phone Number Called: [phone]    Call outcome: Spoke to patient regarding message below.    Message: Called to discuss any symptoms and echo findings.  Pt reports in the last 3 weeks, severe swelling in legs and waste. PT reports right leg is weeping.   Pt has been extremely fatigue, SOB (not new).  Pt reports stable vitals.   Drinks about 45-50oz of water daily.  PT reports limited sodium intake.  Taking torsemide daily, pt reports its not working.    ER precautions give for inc hr sustaining 140+, sob at rest, syncope, worsening/intolerable symptoms such as lightheadedness, dizziness, sob, weakness etc.     Discussed MT recommendations, pt verbalized understanding.

## 2025-06-30 NOTE — TELEPHONE ENCOUNTER
----- Message from Nurse Practitioner JOVANNI Luis sent at 6/30/2025  1:23 PM PDT -----  Regarding: FW: Abnormal echo findings  See how she is feeling. She would benefit from care from Dr. Wynn for right sided heart failure as they have more experience than I do  ----- Message -----  From: Sylvie Lira M.D.  Sent: 6/26/2025   2:47 PM PDT  To: JOVANNI George  Subject: Abnormal echo findings                           Yamil Arthur's echo from today shows more dilated RV and severe pHTN (both worse compared to prior) as well as a small pericardial effusion (new). She probably should follow up a lot sooner than scheduled.   -HK

## 2025-07-01 ENCOUNTER — OUTPATIENT INFUSION SERVICES (OUTPATIENT)
Dept: ONCOLOGY | Facility: MEDICAL CENTER | Age: 85
End: 2025-07-01
Attending: INTERNAL MEDICINE
Payer: MEDICARE

## 2025-07-01 ENCOUNTER — HOSPITAL ENCOUNTER (OUTPATIENT)
Dept: LAB | Facility: MEDICAL CENTER | Age: 85
DRG: 292 | End: 2025-07-01
Attending: STUDENT IN AN ORGANIZED HEALTH CARE EDUCATION/TRAINING PROGRAM
Payer: MEDICARE

## 2025-07-01 ENCOUNTER — OFFICE VISIT (OUTPATIENT)
Dept: MEDICAL GROUP | Facility: MEDICAL CENTER | Age: 85
End: 2025-07-01
Payer: MEDICARE

## 2025-07-01 ENCOUNTER — HOSPITAL ENCOUNTER (OUTPATIENT)
Dept: RADIOLOGY | Facility: MEDICAL CENTER | Age: 85
DRG: 292 | End: 2025-07-01
Attending: STUDENT IN AN ORGANIZED HEALTH CARE EDUCATION/TRAINING PROGRAM
Payer: MEDICARE

## 2025-07-01 VITALS
WEIGHT: 198.41 LBS | DIASTOLIC BLOOD PRESSURE: 57 MMHG | RESPIRATION RATE: 18 BRPM | TEMPERATURE: 97.8 F | HEART RATE: 104 BPM | SYSTOLIC BLOOD PRESSURE: 109 MMHG | BODY MASS INDEX: 31.14 KG/M2 | OXYGEN SATURATION: 90 % | HEIGHT: 67 IN

## 2025-07-01 VITALS
HEART RATE: 102 BPM | TEMPERATURE: 98.1 F | WEIGHT: 200.4 LBS | HEIGHT: 68 IN | OXYGEN SATURATION: 92 % | SYSTOLIC BLOOD PRESSURE: 108 MMHG | RESPIRATION RATE: 22 BRPM | BODY MASS INDEX: 30.37 KG/M2 | DIASTOLIC BLOOD PRESSURE: 58 MMHG

## 2025-07-01 DIAGNOSIS — C80.1 ANEMIA ASSOCIATED WITH MALIGNANT NEOPLASTIC DISEASE (HCC): ICD-10-CM

## 2025-07-01 DIAGNOSIS — D75.81 MYELOFIBROSIS (HCC): Primary | ICD-10-CM

## 2025-07-01 DIAGNOSIS — I50.32 CHRONIC HEART FAILURE WITH PRESERVED EJECTION FRACTION (HCC): Primary | ICD-10-CM

## 2025-07-01 DIAGNOSIS — D63.0 ANEMIA IN NEOPLASTIC DISEASE: ICD-10-CM

## 2025-07-01 DIAGNOSIS — D63.0 ANEMIA ASSOCIATED WITH MALIGNANT NEOPLASTIC DISEASE (HCC): ICD-10-CM

## 2025-07-01 DIAGNOSIS — R06.00 DYSPNEA, UNSPECIFIED TYPE: ICD-10-CM

## 2025-07-01 DIAGNOSIS — I50.32 CHRONIC HEART FAILURE WITH PRESERVED EJECTION FRACTION (HCC): ICD-10-CM

## 2025-07-01 LAB
ANION GAP SERPL CALC-SCNC: 14 MMOL/L (ref 7–16)
BUN SERPL-MCNC: 39 MG/DL (ref 8–22)
CALCIUM SERPL-MCNC: 9.6 MG/DL (ref 8.5–10.5)
CHLORIDE SERPL-SCNC: 105 MMOL/L (ref 96–112)
CO2 SERPL-SCNC: 24 MMOL/L (ref 20–33)
CREAT SERPL-MCNC: 1.61 MG/DL (ref 0.5–1.4)
GFR SERPLBLD CREATININE-BSD FMLA CKD-EPI: 32 ML/MIN/1.73 M 2
GLUCOSE SERPL-MCNC: 80 MG/DL (ref 65–99)
HCT VFR BLD AUTO: 28.8 % (ref 37–47)
HGB BLD-MCNC: 8.9 G/DL (ref 12–16)
POTASSIUM SERPL-SCNC: 4.2 MMOL/L (ref 3.6–5.5)
SODIUM SERPL-SCNC: 143 MMOL/L (ref 135–145)

## 2025-07-01 PROCEDURE — 3074F SYST BP LT 130 MM HG: CPT | Performed by: STUDENT IN AN ORGANIZED HEALTH CARE EDUCATION/TRAINING PROGRAM

## 2025-07-01 PROCEDURE — 85018 HEMOGLOBIN: CPT

## 2025-07-01 PROCEDURE — 36415 COLL VENOUS BLD VENIPUNCTURE: CPT

## 2025-07-01 PROCEDURE — 71046 X-RAY EXAM CHEST 2 VIEWS: CPT

## 2025-07-01 PROCEDURE — 80048 BASIC METABOLIC PNL TOTAL CA: CPT

## 2025-07-01 PROCEDURE — 99214 OFFICE O/P EST MOD 30 MIN: CPT | Performed by: STUDENT IN AN ORGANIZED HEALTH CARE EDUCATION/TRAINING PROGRAM

## 2025-07-01 PROCEDURE — 700111 HCHG RX REV CODE 636 W/ 250 OVERRIDE (IP): Mod: JZ,TB | Performed by: INTERNAL MEDICINE

## 2025-07-01 PROCEDURE — 85014 HEMATOCRIT: CPT

## 2025-07-01 PROCEDURE — 96372 THER/PROPH/DIAG INJ SC/IM: CPT

## 2025-07-01 PROCEDURE — 3078F DIAST BP <80 MM HG: CPT | Performed by: STUDENT IN AN ORGANIZED HEALTH CARE EDUCATION/TRAINING PROGRAM

## 2025-07-01 RX ORDER — TORSEMIDE 20 MG/1
20-40 TABLET ORAL DAILY
Qty: 90 TABLET | Refills: 1 | Status: ON HOLD | OUTPATIENT
Start: 2025-07-01 | End: 2025-07-07

## 2025-07-01 RX ORDER — SPIRONOLACTONE 25 MG/1
25 TABLET ORAL DAILY
Qty: 30 TABLET | Refills: 3 | Status: ON HOLD | OUTPATIENT
Start: 2025-07-01 | End: 2025-07-07

## 2025-07-01 RX ADMIN — EPOETIN ALFA-EPBX 40000 UNITS: 40000 INJECTION, SOLUTION INTRAVENOUS; SUBCUTANEOUS at 15:01

## 2025-07-01 ASSESSMENT — ENCOUNTER SYMPTOMS
SHORTNESS OF BREATH: 0
PND: 0
CLAUDICATION: 0
WEIGHT LOSS: 0
PALPITATIONS: 0
ORTHOPNEA: 0
VOMITING: 0
CHILLS: 0
WHEEZING: 0
NAUSEA: 0
DIZZINESS: 0
HEADACHES: 0
FEVER: 0

## 2025-07-01 ASSESSMENT — FIBROSIS 4 INDEX
FIB4 SCORE: 0.82
FIB4 SCORE: 0.82

## 2025-07-01 NOTE — PROGRESS NOTES
Subjective:     CC:     For about 3weeks,water leaking at some spots. Water pill not working. Weight from 168lb-198lb in 4months.       HPI:   Dedra presents today with    Pmh HFpEF, pulmonary hypertension, myelofibrosis, hx of bladder cancer    Report 3 week ago started retaining water. She continues to urinate however input does not match output.   Vitals stable, mild tachycardia which was also noted on prior clinic visit. Lungs clear to auscultation, HR stable.   - increased diuretic. Torsemide 20mg --> 40mg , spironolactone 25mg daily  - ed precaution given, discuss with patient if her symptoms worsen to go to ER    Verbal consent was acquired by the patient to use [employer] ambient listening note generation during this visit Yes   History of Present Illness  The patient presents for evaluation of leg swelling.    She has been experiencing leg swelling for the past 3 weeks. Despite taking torsemide, there has been no improvement. She maintains a daily water intake of approximately 40 ounces and spends most of her time lying down. Her urine output has decreased, and she has noticed a weight gain from 168 to 200 pounds over the last 4 months. She describes the swelling as painful, likening it to a knife being inserted into her leg. She also reports seeping fluid from her legs, which she fears may lead to infection. She has been receiving infusions for bone marrow cancer every Tuesday for the past 1.5 years. Her treatment with torsemide began 1.5 years ago, initially at a dose of 10 mg, which was later increased to 20 mg and then 30 mg. She has been under the care of Dr. Foley, a cardiologist, for some time.    She has been experiencing shortness of breath for several years, which has recently worsened, particularly in the last week. She finds it slightly difficult to catch her breath and feels more labored in her breathing today. She is able to walk around her house without difficulty but experiences mild  "shortness of breath when lying flat. She underwent an echocardiogram on 06/26/2025, which revealed a worsening of her condition. She is currently awaiting an appointment with Dr. Wynn, a cardiologist.            Health Maintenance:     ROS:  Review of Systems   Constitutional:  Negative for chills, fever and weight loss.   HENT:  Negative for hearing loss.    Respiratory:  Negative for shortness of breath and wheezing.         Mild sob   Cardiovascular:  Positive for leg swelling. Negative for chest pain, palpitations, orthopnea, claudication and PND.   Gastrointestinal:  Negative for nausea and vomiting.   Genitourinary:  Negative for frequency and urgency.   Skin:  Negative for rash.   Neurological:  Negative for dizziness and headaches.       Objective:     Exam:  /58 (BP Location: Left arm, Patient Position: Sitting, BP Cuff Size: Adult)   Pulse (!) 102   Temp 36.7 °C (98.1 °F) (Temporal)   Resp (!) 22   Ht 1.715 m (5' 7.5\") Comment: pt reported  Wt 90.9 kg (200 lb 6.4 oz)   SpO2 92%   BMI 30.92 kg/m²  Body mass index is 30.92 kg/m².    Physical Exam  Constitutional:       Appearance: Normal appearance.   Cardiovascular:      Rate and Rhythm: Normal rate and regular rhythm.      Heart sounds: No murmur heard.  Pulmonary:      Effort: Pulmonary effort is normal.      Breath sounds: Normal breath sounds. No wheezing.   Musculoskeletal:      Cervical back: Normal range of motion and neck supple.      Right lower leg: Edema present.      Left lower leg: Edema present.      Comments: Pitting edema up to knee. There is area of abrasion on right lower extremity, with fluid leaking. No evidence of infection    Lymphadenopathy:      Cervical: No cervical adenopathy.   Neurological:      Mental Status: She is alert.           Labs: Pmh     Assessment & Plan:     84 y.o. female with the following -     1. Chronic heart failure with preserved ejection fraction (HCC) (Primary)  2. Dyspnea, unspecified " type  Chronic, poorly controlled, exacerbation  History of HFpEF recent echocardiogram noted elevated RVSP and valvulopathy.  3 weeks ago patient began experience fluid retention despite taking her daily torsemide 20 mg daily.  She keeps track of her intake and output.  She does report she continues to urinate regularly however not enough to balance out her intake.  -Physical exam : Bilateral lungs are clear on all quadrant, heart is regular rate and rhythm, LE edema up to bilateral knee, toe is warm  Plan  -ER precaution given discussed patient's symptoms not improved in 24 to 48 hours they had to ER.  If symptom worsens at the ER consider IV diuresis  - Chest x-ray done today costophrenic angle appears sharp  -Pending BMP  -Pending repeat BMP prior to next visit  -Follow-up next Tuesday  -Patient is awaiting appointment with heart failure clinic/cardiology  - torsemide (DEMADEX) 20 MG Tab; Take 1-2 Tablets by mouth every day.  Dispense: 90 Tablet; Refill: 1  - spironolactone (ALDACTONE) 25 MG Tab; Take 1 Tablet by mouth every day.  Dispense: 30 Tablet; Refill: 3  - Basic Metabolic Panel; Standing  - DX-CHEST-2 VIEWS; Future        Return in about 1 week (around 7/8/2025).    Please note that this dictation was created using voice recognition software. I have made every reasonable attempt to correct obvious errors, but I expect that there are errors of grammar and possibly content that I did not discover before finalizing the note.

## 2025-07-01 NOTE — PROGRESS NOTES
Dedra to infusion for weekly labs and possible Retacrit injection. Dedra presents with 4+ BLE weeping edema up to groin. SpO2 90% on RA at rest, but dips to 70% with any activity.     Labs drawn via venipuncture to R AC. Labs reviewed by RN, Hgb: 8.9, within parameters for treatment today.     Retacrit given SQ in back of R upper arm, patient tolerated well with no adverse effects, site unremarkable.    Patient left accompanied by friend, Anna, via wheelchair to go to the 6th floor to her cardiologist's office to be seen immediately for worsening edema and SOB. Dedra and Anna verbalized understanding that if the cardiologist cannot see her she will need to go to the ER.

## 2025-07-02 ENCOUNTER — TELEPHONE (OUTPATIENT)
Dept: MEDICAL GROUP | Facility: MEDICAL CENTER | Age: 85
End: 2025-07-02
Payer: MEDICARE

## 2025-07-02 NOTE — TELEPHONE ENCOUNTER
I called patient today 2 PM    She reports she took torsemide 40 mg at 12 today  And she just took the spironolactone 25 mg prior to this phone call    She has not noted significant changes in urination at this time.    Chest x-ray showed sharp costophrenic angle  BMP noted for elevated creatinine and decreased GFR and elevated BUN concerning for cardiorenal    Discussed with patient we doubled her torsemide from 20 mg to 40 mg and gave her spironolactone 25 mg.  She should see increase in output.  If she does not see increase in output I would recommend heading to the ER for IV diuresis given she has gained 20 to 30 pounds since her last cardiology visit    ED precaution was given during her visit yesterday    Patient verbalized understanding    She will measure her weight on scale

## 2025-07-03 ENCOUNTER — HOSPITAL ENCOUNTER (INPATIENT)
Facility: MEDICAL CENTER | Age: 85
LOS: 4 days | DRG: 292 | End: 2025-07-07
Attending: EMERGENCY MEDICINE | Admitting: STUDENT IN AN ORGANIZED HEALTH CARE EDUCATION/TRAINING PROGRAM
Payer: MEDICARE

## 2025-07-03 ENCOUNTER — APPOINTMENT (OUTPATIENT)
Dept: RADIOLOGY | Facility: MEDICAL CENTER | Age: 85
DRG: 292 | End: 2025-07-03
Attending: EMERGENCY MEDICINE
Payer: MEDICARE

## 2025-07-03 DIAGNOSIS — I50.813 ACUTE ON CHRONIC RIGHT-SIDED HEART FAILURE (HCC): ICD-10-CM

## 2025-07-03 DIAGNOSIS — D75.81 MYELOFIBROSIS (HCC): ICD-10-CM

## 2025-07-03 DIAGNOSIS — I50.32 CHRONIC HEART FAILURE WITH PRESERVED EJECTION FRACTION (HCC): ICD-10-CM

## 2025-07-03 DIAGNOSIS — R06.09 DYSPNEA ON EXERTION: ICD-10-CM

## 2025-07-03 DIAGNOSIS — I50.9 ACUTE ON CHRONIC HEART FAILURE, UNSPECIFIED HEART FAILURE TYPE (HCC): ICD-10-CM

## 2025-07-03 DIAGNOSIS — R60.0 PERIPHERAL EDEMA: Primary | ICD-10-CM

## 2025-07-03 PROBLEM — N17.9 ACUTE KIDNEY INJURY (HCC): Status: ACTIVE | Noted: 2025-07-03

## 2025-07-03 LAB
ANION GAP SERPL CALC-SCNC: 15 MMOL/L (ref 7–16)
APPEARANCE UR: CLEAR
BILIRUB UR QL STRIP.AUTO: NEGATIVE
BUN SERPL-MCNC: 44 MG/DL (ref 8–22)
CALCIUM SERPL-MCNC: 9.1 MG/DL (ref 8.5–10.5)
CHLORIDE SERPL-SCNC: 105 MMOL/L (ref 96–112)
CO2 SERPL-SCNC: 21 MMOL/L (ref 20–33)
COLOR UR: YELLOW
CREAT SERPL-MCNC: 1.78 MG/DL (ref 0.5–1.4)
EKG IMPRESSION: NORMAL
GFR SERPLBLD CREATININE-BSD FMLA CKD-EPI: 28 ML/MIN/1.73 M 2
GLUCOSE SERPL-MCNC: 96 MG/DL (ref 65–99)
GLUCOSE UR STRIP.AUTO-MCNC: NEGATIVE MG/DL
KETONES UR STRIP.AUTO-MCNC: NEGATIVE MG/DL
LEUKOCYTE ESTERASE UR QL STRIP.AUTO: NEGATIVE
MAGNESIUM SERPL-MCNC: 2.4 MG/DL (ref 1.5–2.5)
MICRO URNS: NORMAL
NITRITE UR QL STRIP.AUTO: NEGATIVE
NT-PROBNP SERPL IA-MCNC: 8505 PG/ML (ref 0–125)
PH UR STRIP.AUTO: 5.5 [PH] (ref 5–8)
POTASSIUM SERPL-SCNC: 4.4 MMOL/L (ref 3.6–5.5)
PROT UR QL STRIP: NEGATIVE MG/DL
RBC UR QL AUTO: NEGATIVE
SODIUM SERPL-SCNC: 141 MMOL/L (ref 135–145)
SP GR UR STRIP.AUTO: 1.01
UROBILINOGEN UR STRIP.AUTO-MCNC: 0.2 EU/DL

## 2025-07-03 PROCEDURE — 700111 HCHG RX REV CODE 636 W/ 250 OVERRIDE (IP): Mod: JZ

## 2025-07-03 PROCEDURE — 99223 1ST HOSP IP/OBS HIGH 75: CPT | Mod: GC | Performed by: STUDENT IN AN ORGANIZED HEALTH CARE EDUCATION/TRAINING PROGRAM

## 2025-07-03 PROCEDURE — 80048 BASIC METABOLIC PNL TOTAL CA: CPT

## 2025-07-03 PROCEDURE — 81003 URINALYSIS AUTO W/O SCOPE: CPT

## 2025-07-03 PROCEDURE — 83735 ASSAY OF MAGNESIUM: CPT

## 2025-07-03 PROCEDURE — 770020 HCHG ROOM/CARE - TELE (206)

## 2025-07-03 PROCEDURE — 71045 X-RAY EXAM CHEST 1 VIEW: CPT

## 2025-07-03 PROCEDURE — 303105 HCHG CATHETER EXTRA

## 2025-07-03 PROCEDURE — 99285 EMERGENCY DEPT VISIT HI MDM: CPT

## 2025-07-03 PROCEDURE — 83880 ASSAY OF NATRIURETIC PEPTIDE: CPT

## 2025-07-03 PROCEDURE — 51702 INSERT TEMP BLADDER CATH: CPT

## 2025-07-03 PROCEDURE — 96374 THER/PROPH/DIAG INJ IV PUSH: CPT

## 2025-07-03 PROCEDURE — 93005 ELECTROCARDIOGRAM TRACING: CPT | Mod: TC | Performed by: EMERGENCY MEDICINE

## 2025-07-03 PROCEDURE — 36415 COLL VENOUS BLD VENIPUNCTURE: CPT

## 2025-07-03 RX ORDER — POLYETHYLENE GLYCOL 3350 17 G/17G
1 POWDER, FOR SOLUTION ORAL
Status: DISCONTINUED | OUTPATIENT
Start: 2025-07-03 | End: 2025-07-07 | Stop reason: HOSPADM

## 2025-07-03 RX ORDER — ACETAMINOPHEN 325 MG/1
650 TABLET ORAL EVERY 6 HOURS PRN
Status: DISCONTINUED | OUTPATIENT
Start: 2025-07-03 | End: 2025-07-07 | Stop reason: HOSPADM

## 2025-07-03 RX ORDER — NAPROXEN SODIUM 220 MG/1
440 TABLET, FILM COATED ORAL
Status: ON HOLD | COMMUNITY
End: 2025-07-07

## 2025-07-03 RX ORDER — FUROSEMIDE 10 MG/ML
40 INJECTION INTRAMUSCULAR; INTRAVENOUS 2 TIMES DAILY
Status: DISCONTINUED | OUTPATIENT
Start: 2025-07-04 | End: 2025-07-04

## 2025-07-03 RX ORDER — AMOXICILLIN 250 MG
2 CAPSULE ORAL EVERY EVENING
Status: DISCONTINUED | OUTPATIENT
Start: 2025-07-03 | End: 2025-07-07 | Stop reason: HOSPADM

## 2025-07-03 RX ORDER — ANAGRELIDE 0.5 MG/1
0.5 CAPSULE ORAL 2 TIMES DAILY
Status: DISCONTINUED | OUTPATIENT
Start: 2025-07-03 | End: 2025-07-03

## 2025-07-03 RX ORDER — HYDRALAZINE HYDROCHLORIDE 20 MG/ML
10 INJECTION INTRAMUSCULAR; INTRAVENOUS EVERY 4 HOURS PRN
Status: DISCONTINUED | OUTPATIENT
Start: 2025-07-03 | End: 2025-07-07 | Stop reason: HOSPADM

## 2025-07-03 RX ORDER — HEPARIN SODIUM 5000 [USP'U]/ML
5000 INJECTION, SOLUTION INTRAVENOUS; SUBCUTANEOUS EVERY 8 HOURS
Status: DISCONTINUED | OUTPATIENT
Start: 2025-07-03 | End: 2025-07-07 | Stop reason: HOSPADM

## 2025-07-03 RX ORDER — HEPARIN SODIUM 5000 [USP'U]/ML
5000 INJECTION, SOLUTION INTRAVENOUS; SUBCUTANEOUS EVERY 8 HOURS
Status: DISCONTINUED | OUTPATIENT
Start: 2025-07-03 | End: 2025-07-03

## 2025-07-03 RX ORDER — EPOETIN ALFA-EPBX 40000 [IU]/ML
40000 INJECTION, SOLUTION INTRAVENOUS; SUBCUTANEOUS
COMMUNITY

## 2025-07-03 RX ORDER — FUROSEMIDE 10 MG/ML
60 INJECTION INTRAMUSCULAR; INTRAVENOUS ONCE
Status: COMPLETED | OUTPATIENT
Start: 2025-07-03 | End: 2025-07-03

## 2025-07-03 RX ADMIN — FUROSEMIDE 60 MG: 10 INJECTION, SOLUTION INTRAVENOUS at 21:19

## 2025-07-03 ASSESSMENT — ENCOUNTER SYMPTOMS
DIZZINESS: 0
FEVER: 0
MYALGIAS: 0
VOMITING: 0
CONSTIPATION: 0
PND: 0
PALPITATIONS: 0
NAUSEA: 0
SPUTUM PRODUCTION: 0
CHILLS: 0
HEADACHES: 0
FLANK PAIN: 0
DIARRHEA: 0
DEPRESSION: 0
WHEEZING: 0
HEARTBURN: 0
BLURRED VISION: 0
COUGH: 0
ORTHOPNEA: 1
HEMOPTYSIS: 0
WEIGHT LOSS: 0
ABDOMINAL PAIN: 0
BLOOD IN STOOL: 0
BACK PAIN: 1
SHORTNESS OF BREATH: 1
DOUBLE VISION: 0

## 2025-07-03 ASSESSMENT — FIBROSIS 4 INDEX: FIB4 SCORE: 0.82

## 2025-07-04 ENCOUNTER — APPOINTMENT (OUTPATIENT)
Dept: RADIOLOGY | Facility: MEDICAL CENTER | Age: 85
DRG: 292 | End: 2025-07-04
Attending: HOSPITALIST
Payer: MEDICARE

## 2025-07-04 ENCOUNTER — APPOINTMENT (OUTPATIENT)
Dept: RADIOLOGY | Facility: MEDICAL CENTER | Age: 85
DRG: 292 | End: 2025-07-04
Payer: MEDICARE

## 2025-07-04 PROBLEM — I50.810 RIGHT-SIDED HEART FAILURE (HCC): Status: ACTIVE | Noted: 2022-11-03

## 2025-07-04 PROBLEM — I50.813 ACUTE ON CHRONIC RIGHT-SIDED CONGESTIVE HEART FAILURE (HCC): Status: ACTIVE | Noted: 2025-07-03

## 2025-07-04 PROBLEM — I50.810 RIGHT-SIDED HEART FAILURE (HCC): Status: RESOLVED | Noted: 2022-11-03 | Resolved: 2025-07-04

## 2025-07-04 PROBLEM — R33.8 ACUTE URINARY RETENTION: Status: ACTIVE | Noted: 2025-07-04

## 2025-07-04 LAB
ALBUMIN SERPL BCP-MCNC: 3.6 G/DL (ref 3.2–4.9)
ALBUMIN/GLOB SERPL: 1.9 G/DL
ALP SERPL-CCNC: 67 U/L (ref 30–99)
ALT SERPL-CCNC: 17 U/L (ref 2–50)
ANION GAP SERPL CALC-SCNC: 15 MMOL/L (ref 7–16)
ANISOCYTOSIS BLD QL SMEAR: ABNORMAL
AST SERPL-CCNC: 18 U/L (ref 12–45)
BASOPHILS # BLD AUTO: 3.4 % (ref 0–1.8)
BASOPHILS # BLD: 0.27 K/UL (ref 0–0.12)
BILIRUB SERPL-MCNC: 2.6 MG/DL (ref 0.1–1.5)
BUN SERPL-MCNC: 44 MG/DL (ref 8–22)
CALCIUM ALBUM COR SERPL-MCNC: 9.4 MG/DL (ref 8.5–10.5)
CALCIUM SERPL-MCNC: 9.1 MG/DL (ref 8.5–10.5)
CHLORIDE SERPL-SCNC: 104 MMOL/L (ref 96–112)
CO2 SERPL-SCNC: 23 MMOL/L (ref 20–33)
COMMENT NL1176: NORMAL
CREAT SERPL-MCNC: 1.75 MG/DL (ref 0.5–1.4)
DACRYOCYTES BLD QL SMEAR: NORMAL
EOSINOPHIL # BLD AUTO: 0 K/UL (ref 0–0.51)
EOSINOPHIL NFR BLD: 0 % (ref 0–6.9)
ERYTHROCYTE [DISTWIDTH] IN BLOOD BY AUTOMATED COUNT: 97 FL (ref 35.9–50)
GFR SERPLBLD CREATININE-BSD FMLA CKD-EPI: 29 ML/MIN/1.73 M 2
GLOBULIN SER CALC-MCNC: 1.9 G/DL (ref 1.9–3.5)
GLUCOSE SERPL-MCNC: 118 MG/DL (ref 65–99)
HCT VFR BLD AUTO: 29.1 % (ref 37–47)
HGB BLD-MCNC: 9.4 G/DL (ref 12–16)
HYPOCHROMIA BLD QL SMEAR: ABNORMAL
LG PLATELETS BLD QL SMEAR: NORMAL
LYMPHOCYTES # BLD AUTO: 0.73 K/UL (ref 1–4.8)
LYMPHOCYTES NFR BLD: 9.4 % (ref 22–41)
MACROCYTES BLD QL SMEAR: ABNORMAL
MAGNESIUM SERPL-MCNC: 2.4 MG/DL (ref 1.5–2.5)
MANUAL DIFF BLD: NORMAL
MCH RBC QN AUTO: 34.3 PG (ref 27–33)
MCHC RBC AUTO-ENTMCNC: 32.3 G/DL (ref 32.2–35.5)
MCV RBC AUTO: 106.2 FL (ref 81.4–97.8)
MICROCYTES BLD QL SMEAR: ABNORMAL
MONOCYTES # BLD AUTO: 0.9 K/UL (ref 0–0.85)
MONOCYTES NFR BLD AUTO: 12 % (ref 0–13.4)
MORPHOLOGY BLD-IMP: NORMAL
MYELOCYTES NFR BLD MANUAL: 0.9 %
NEUTROPHILS # BLD AUTO: 5.53 K/UL (ref 1.82–7.42)
NEUTROPHILS NFR BLD: 70.1 % (ref 44–72)
NEUTS BAND NFR BLD MANUAL: 0.8 % (ref 0–10)
NRBC # BLD AUTO: 0.73 K/UL
NRBC BLD-RTO: 9.4 /100 WBC (ref 0–0.2)
OVALOCYTES BLD QL SMEAR: NORMAL
PLATELET # BLD AUTO: 585 K/UL (ref 164–446)
PLATELET BLD QL SMEAR: NORMAL
PMV BLD AUTO: 13.2 FL (ref 9–12.9)
POIKILOCYTOSIS BLD QL SMEAR: NORMAL
POTASSIUM SERPL-SCNC: 4 MMOL/L (ref 3.6–5.5)
PROT SERPL-MCNC: 5.5 G/DL (ref 6–8.2)
RBC # BLD AUTO: 2.74 M/UL (ref 4.2–5.4)
RBC BLD AUTO: PRESENT
SCHISTOCYTES BLD QL SMEAR: NORMAL
SODIUM SERPL-SCNC: 142 MMOL/L (ref 135–145)
WBC # BLD AUTO: 7.8 K/UL (ref 4.8–10.8)
WBC OTHER NFR BLD MANUAL: 3.4 %

## 2025-07-04 PROCEDURE — 83735 ASSAY OF MAGNESIUM: CPT

## 2025-07-04 PROCEDURE — 85007 BL SMEAR W/DIFF WBC COUNT: CPT

## 2025-07-04 PROCEDURE — 36415 COLL VENOUS BLD VENIPUNCTURE: CPT

## 2025-07-04 PROCEDURE — 700111 HCHG RX REV CODE 636 W/ 250 OVERRIDE (IP): Mod: JZ

## 2025-07-04 PROCEDURE — 700102 HCHG RX REV CODE 250 W/ 637 OVERRIDE(OP)

## 2025-07-04 PROCEDURE — 85027 COMPLETE CBC AUTOMATED: CPT

## 2025-07-04 PROCEDURE — 76775 US EXAM ABDO BACK WALL LIM: CPT

## 2025-07-04 PROCEDURE — A9270 NON-COVERED ITEM OR SERVICE: HCPCS | Performed by: HOSPITALIST

## 2025-07-04 PROCEDURE — 770020 HCHG ROOM/CARE - TELE (206)

## 2025-07-04 PROCEDURE — A9270 NON-COVERED ITEM OR SERVICE: HCPCS

## 2025-07-04 PROCEDURE — 99233 SBSQ HOSP IP/OBS HIGH 50: CPT | Performed by: HOSPITALIST

## 2025-07-04 PROCEDURE — 700102 HCHG RX REV CODE 250 W/ 637 OVERRIDE(OP): Performed by: HOSPITALIST

## 2025-07-04 PROCEDURE — 80053 COMPREHEN METABOLIC PANEL: CPT

## 2025-07-04 RX ORDER — TRAZODONE HYDROCHLORIDE 50 MG/1
50 TABLET ORAL NIGHTLY PRN
Status: DISCONTINUED | OUTPATIENT
Start: 2025-07-04 | End: 2025-07-07 | Stop reason: HOSPADM

## 2025-07-04 RX ORDER — POTASSIUM CHLORIDE 1500 MG/1
20 TABLET, EXTENDED RELEASE ORAL DAILY
Status: DISCONTINUED | OUTPATIENT
Start: 2025-07-04 | End: 2025-07-07 | Stop reason: HOSPADM

## 2025-07-04 RX ORDER — FUROSEMIDE 10 MG/ML
60 INJECTION INTRAMUSCULAR; INTRAVENOUS 2 TIMES DAILY
Status: DISCONTINUED | OUTPATIENT
Start: 2025-07-04 | End: 2025-07-07 | Stop reason: HOSPADM

## 2025-07-04 RX ADMIN — FUROSEMIDE 60 MG: 10 INJECTION, SOLUTION INTRAVENOUS at 17:01

## 2025-07-04 RX ADMIN — ACETAMINOPHEN 650 MG: 325 TABLET ORAL at 12:42

## 2025-07-04 RX ADMIN — HEPARIN SODIUM 5000 UNITS: 5000 INJECTION, SOLUTION INTRAVENOUS; SUBCUTANEOUS at 06:02

## 2025-07-04 RX ADMIN — HEPARIN SODIUM 5000 UNITS: 5000 INJECTION, SOLUTION INTRAVENOUS; SUBCUTANEOUS at 22:23

## 2025-07-04 RX ADMIN — POTASSIUM CHLORIDE 20 MEQ: 1500 TABLET, EXTENDED RELEASE ORAL at 17:01

## 2025-07-04 RX ADMIN — FUROSEMIDE 60 MG: 10 INJECTION, SOLUTION INTRAVENOUS at 05:48

## 2025-07-04 RX ADMIN — HEPARIN SODIUM 5000 UNITS: 5000 INJECTION, SOLUTION INTRAVENOUS; SUBCUTANEOUS at 12:43

## 2025-07-04 RX ADMIN — TRAZODONE HYDROCHLORIDE 50 MG: 50 TABLET ORAL at 23:26

## 2025-07-04 RX ADMIN — DOCUSATE SODIUM 50 MG AND SENNOSIDES 8.6 MG 2 TABLET: 8.6; 5 TABLET, FILM COATED ORAL at 17:01

## 2025-07-04 SDOH — ECONOMIC STABILITY: TRANSPORTATION INSECURITY
IN THE PAST 12 MONTHS, HAS THE LACK OF TRANSPORTATION KEPT YOU FROM MEDICAL APPOINTMENTS OR FROM GETTING MEDICATIONS?: NO

## 2025-07-04 SDOH — ECONOMIC STABILITY: TRANSPORTATION INSECURITY
IN THE PAST 12 MONTHS, HAS LACK OF RELIABLE TRANSPORTATION KEPT YOU FROM MEDICAL APPOINTMENTS, MEETINGS, WORK OR FROM GETTING THINGS NEEDED FOR DAILY LIVING?: NO

## 2025-07-04 ASSESSMENT — SOCIAL DETERMINANTS OF HEALTH (SDOH)
WITHIN THE LAST YEAR, HAVE YOU BEEN KICKED, HIT, SLAPPED, OR OTHERWISE PHYSICALLY HURT BY YOUR PARTNER OR EX-PARTNER?: NO
WITHIN THE PAST 12 MONTHS, THE FOOD YOU BOUGHT JUST DIDN'T LAST AND YOU DIDN'T HAVE MONEY TO GET MORE: NEVER TRUE
WITHIN THE LAST YEAR, HAVE YOU BEEN AFRAID OF YOUR PARTNER OR EX-PARTNER?: NO
IN THE PAST 12 MONTHS, HAS THE ELECTRIC, GAS, OIL, OR WATER COMPANY THREATENED TO SHUT OFF SERVICE IN YOUR HOME?: NO
WITHIN THE PAST 12 MONTHS, YOU WORRIED THAT YOUR FOOD WOULD RUN OUT BEFORE YOU GOT THE MONEY TO BUY MORE: NEVER TRUE
WITHIN THE LAST YEAR, HAVE TO BEEN RAPED OR FORCED TO HAVE ANY KIND OF SEXUAL ACTIVITY BY YOUR PARTNER OR EX-PARTNER?: NO
WITHIN THE LAST YEAR, HAVE YOU BEEN HUMILIATED OR EMOTIONALLY ABUSED IN OTHER WAYS BY YOUR PARTNER OR EX-PARTNER?: NO

## 2025-07-04 ASSESSMENT — LIFESTYLE VARIABLES
HAVE PEOPLE ANNOYED YOU BY CRITICIZING YOUR DRINKING: NO
EVER HAD A DRINK FIRST THING IN THE MORNING TO STEADY YOUR NERVES TO GET RID OF A HANGOVER: NO
TOTAL SCORE: 0
HAVE YOU EVER FELT YOU SHOULD CUT DOWN ON YOUR DRINKING: NO
DOES PATIENT WANT TO STOP DRINKING: NO
CONSUMPTION TOTAL: NEGATIVE
AVERAGE NUMBER OF DAYS PER WEEK YOU HAVE A DRINK CONTAINING ALCOHOL: 0
ALCOHOL_USE: NO
EVER FELT BAD OR GUILTY ABOUT YOUR DRINKING: NO
TOTAL SCORE: 0
HOW MANY TIMES IN THE PAST YEAR HAVE YOU HAD 5 OR MORE DRINKS IN A DAY: 0
TOTAL SCORE: 0
ON A TYPICAL DAY WHEN YOU DRINK ALCOHOL HOW MANY DRINKS DO YOU HAVE: 0

## 2025-07-04 ASSESSMENT — FIBROSIS 4 INDEX
FIB4 SCORE: 0.61
FIB4 SCORE: 0.82

## 2025-07-04 ASSESSMENT — COGNITIVE AND FUNCTIONAL STATUS - GENERAL
SUGGESTED CMS G CODE MODIFIER DAILY ACTIVITY: CJ
WALKING IN HOSPITAL ROOM: A LITTLE
DRESSING REGULAR UPPER BODY CLOTHING: A LITTLE
MOVING FROM LYING ON BACK TO SITTING ON SIDE OF FLAT BED: A LOT
STANDING UP FROM CHAIR USING ARMS: A LITTLE
TOILETING: A LITTLE
MOBILITY SCORE: 17
MOVING TO AND FROM BED TO CHAIR: A LITTLE
DAILY ACTIVITIY SCORE: 20
HELP NEEDED FOR BATHING: A LITTLE
SUGGESTED CMS G CODE MODIFIER MOBILITY: CK
CLIMB 3 TO 5 STEPS WITH RAILING: A LITTLE
DRESSING REGULAR LOWER BODY CLOTHING: A LITTLE
TURNING FROM BACK TO SIDE WHILE IN FLAT BAD: A LITTLE

## 2025-07-04 ASSESSMENT — PATIENT HEALTH QUESTIONNAIRE - PHQ9
2. FEELING DOWN, DEPRESSED, IRRITABLE, OR HOPELESS: NOT AT ALL
1. LITTLE INTEREST OR PLEASURE IN DOING THINGS: NOT AT ALL
SUM OF ALL RESPONSES TO PHQ9 QUESTIONS 1 AND 2: 0

## 2025-07-04 ASSESSMENT — ENCOUNTER SYMPTOMS
ABDOMINAL PAIN: 0
FLANK PAIN: 0
SHORTNESS OF BREATH: 0
FEVER: 0

## 2025-07-04 ASSESSMENT — PAIN DESCRIPTION - PAIN TYPE
TYPE: ACUTE PAIN

## 2025-07-04 NOTE — H&P
[facility] History & Physical Note    Date of Service  7/4/2025    [facility] Team: [facility]   Attending: Roxy Simon M.d.  Senior Resident: Dr. Jones  Contact Number: [phone]    Primary Care Physician  Jay Tsang D.O.    Consultants  N/A      Code Status  Full Code    Chief Complaint  Chief Complaint   Patient presents with    Leg Swelling     Excessive peripheral edema to BL legs, pt states gained 30+ lbs since May 6th, taking diuretics with recent dosage change, decreased urination, hx bladder cancer       History of Presenting Illness (HPI):   Dedra Lobo is a 84 y.o. 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  States worsening lower extremity swelling over the past 3-4 weeks, also notes having decreased urine output since June 24.  Patient states she has gained about 35 pounds since May 6.  She recently saw her primary care provider who increased her torsemide from 20 to 40 and started spironolactone 25 mg daily.  States she started taking these medications 2 days ago.  Also as she does not pee as much and drank 45 ounces of water today with an output of only 7 ounces.  Also reports having orthopnea, dyspnea, back pain which is chronic.  Denies chest pain, dizziness, nausea, vomiting, diarrhea, constipation, fever, chills, PND, urinary symptoms except for oliguria.  Does not wear oxygen at baseline.  Echo from 06/2025 with LVEF 60-65%, moderate tricuspid regurgitation.    In the ED,  Afebrile, tachycardic 109, blood pressure 111/66, on room air.  Labs with creatinine 1.78 (baseline 0.7-0.8), GFR 28, BUN 44, BUN/Cr 24.7, BNP 8505.  EKG with sinus tachycardia.  CXR was unremarkable.    Patient received 60 mg IV Lasix in the ED.  Admitted to medicine floors for volume  overload and acute kidney injury.      I discussed the plan of care with patient.    Review of Systems  Review of Systems   Constitutional:  Negative for chills, fever, malaise/fatigue and weight loss.   Eyes:  Negative for blurred vision and double vision.   Respiratory:  Positive for shortness of breath. Negative for cough, hemoptysis, sputum production and wheezing.    Cardiovascular:  Positive for orthopnea and leg swelling. Negative for chest pain, palpitations and PND.   Gastrointestinal:  Negative for abdominal pain, blood in stool, constipation, diarrhea, heartburn, nausea and vomiting.   Genitourinary:  Negative for dysuria, flank pain, frequency, hematuria and urgency.        Oliguria   Musculoskeletal:  Positive for back pain. Negative for joint pain and myalgias.   Skin:  Negative for itching and rash.   Neurological:  Negative for dizziness and headaches.   Psychiatric/Behavioral:  Negative for depression.        Past Medical History   has a past medical history of Acute exacerbation of chronic heart failure (HCC) (7/3/2025), Adverse effect of anesthesia, Anemia, Anesthesia, Arrhythmia, Arthritis, Cancer (HCC) (2015), Cardiomegaly (10/06/2022), Cataract (03/21/2019), GERD (gastroesophageal reflux disease), Glaucoma (02/2015), Helicobacter pylori gastritis (09/25/2023), History of malignant neoplasm of bladder (05/17/2018), History of primary malignant neoplasm of urinary bladder (05/17/2018), Osteoporosis, unspecified, Pain, Pneumonia (10/2001), Systolic murmur (10/06/2022), Thrombocytosis (09/29/2010), Ulcer, Unspecified disorder of thyroid, Urinary bladder disorder (2015), Urinary incontinence (03/21/2019), Vascular insufficiency of limb (2005), and Vitamin d deficiency (09/29/2010).    Surgical History   has a past surgical history that includes hip replacement, total (2006); cholecystectomy (2003); lumpectomy (1982); arthroplasty; cystoscopy (3/3/2015); trans urethral resection bladder (3/3/2015);  cervical disk and fusion anterior (2/9/2016); pr total knee arthroplasty (Left, 4/3/2019); pr upper gi endoscopy,diagnosis (N/A, 10/1/2022); pr upper gi endoscopy,scler inject (N/A, 10/1/2022); and pr upper gi endoscopy,ctrl bleed (N/A, 10/1/2022).     Family History  Family History   Problem Relation Age of Onset    Cancer Mother         brain    Lung Disease Mother         smoker    Alcohol/Drug Father         etoh    Lung Disease Sister         COPD smokers    Alcohol/Drug Sister         etoh    Lung Disease Sister         smoker-copd    Alcohol/Drug Sister         etoh      Family history reviewed with patient.     Social History  Tobacco: No  Alcohol: No  Recreational drugs (illegal or prescription): No  Employment:-  Living Situation:-  Recent Travel:-  Primary Care Provider: Not Reviewed  Other (stressors, spirituality, exposures):-    Allergies  Allergies[1]    Medications  Prior to Admission Medications   Prescriptions Last Dose Informant Patient Reported? Taking?   anagrelide (AGRYLIN) 0.5 MG Cap 7/3/2025 Morning Patient Yes Yes   Sig: Take 1 Capsule by mouth 2 times a day.   epoetin (RETACRIT) 44062 UNIT/ML Solution 7/1/2025 Patient Yes Yes   Sig: Inject 40,000 Units under the skin every 7 days.   naproxen (ALEVE) 220 MG tablet 7/1/2025 Bedtime Patient Yes No   Sig: Take 440 mg by mouth 1 time a day as needed (pain). 2 tablets= 440mg   spironolactone (ALDACTONE) 25 MG Tab 7/2/2025 Evening Patient No Yes   Sig: Take 1 Tablet by mouth every day.   torsemide (DEMADEX) 20 MG Tab 7/3/2025 Morning Patient No Yes   Sig: Take 1-2 Tablets by mouth every day.   Patient taking differently: Take 40 mg by mouth every day. 2 tablets= 40mg      Facility-Administered Medications: None       Physical Exam  Temp:  [36.5 °C (97.7 °F)-36.6 °C (97.9 °F)] 36.5 °C (97.7 °F)  Pulse:  [] 99  Resp:  [16-19] 16  BP: ()/(51-66) 108/59  SpO2:  [91 %-96 %] 91 %  Blood Pressure : 92/55   Temperature: 36.6 °C (97.9 °F)    Pulse: (!) 104   Respiration: 19   Pulse Oximetry: 93 %       Physical Exam  Vitals reviewed.   Constitutional:       Appearance: Normal appearance. She is obese.   HENT:      Head: Normocephalic.      Nose: Nose normal.      Mouth/Throat:      Mouth: Mucous membranes are dry.   Eyes:      Extraocular Movements: Extraocular movements intact.   Cardiovascular:      Rate and Rhythm: Regular rhythm. Tachycardia present.      Heart sounds: Murmur heard.   Pulmonary:      Effort: Pulmonary effort is normal. No respiratory distress.      Breath sounds: No wheezing or rales.   Abdominal:      General: Abdomen is flat.      Palpations: Abdomen is soft.      Tenderness: There is no abdominal tenderness. There is no guarding or rebound.   Musculoskeletal:         General: Swelling present. Normal range of motion.      Cervical back: Normal range of motion.      Right lower leg: Edema (+3) present.      Left lower leg: Edema (+3) present.   Skin:     General: Skin is warm.   Neurological:      General: No focal deficit present.      Mental Status: She is alert and oriented to person, place, and time.   Psychiatric:         Mood and Affect: Mood normal.         Behavior: Behavior normal.         Laboratory:  Recent Labs     07/01/25 1415 07/04/25  0150   WBC  --  7.8   RBC  --  2.74*   HEMOGLOBIN 8.9* 9.4*   HEMATOCRIT 28.8* 29.1*   MCV  --  106.2*   MCH  --  34.3*   MCHC  --  32.3   RDW  --  97.0*   PLATELETCT  --  585*   MPV  --  13.2*     Recent Labs     07/01/25 1659 07/03/25 2105 07/04/25  0150   SODIUM 143 141 142   POTASSIUM 4.2 4.4 4.0   CHLORIDE 105 105 104   CO2 24 21 23   GLUCOSE 80 96 118*   BUN 39* 44* 44*   CREATININE 1.61* 1.78* 1.75*   CALCIUM 9.6 9.1 9.1     Recent Labs     07/01/25  1659 07/03/25 2105 07/04/25  0150   ALTSGPT  --   --  17   ASTSGOT  --   --  18   ALKPHOSPHAT  --   --  67   TBILIRUBIN  --   --  2.6*   GLUCOSE 80 96 118*         Recent Labs     07/03/25 2105   NTPROBNP 8505*         No  "results for input(s): \"TROPONINT\" in the last 72 hours.    Imaging:  DX-CHEST-PORTABLE (1 VIEW)   Final Result         1.  No acute cardiopulmonary disease.   2.  Cardiomegaly   3.  Atherosclerosis      CT-Cystogram    (Results Pending)       X-Ray:  I have personally reviewed the images and compared with prior images.  EKG:  I have personally reviewed the images and compared with prior images.    Assessment/Plan:  Problem Representation:   I anticipate this patient will require at least two midnights for appropriate medical management, necessitating inpatient admission because of volume overload    Patient will need a Telemetry bed on MEDICAL service .  The need is secondary to volume overload.    * Acute exacerbation of CHF (congestive heart failure) (HCC)- (present on admission)  Assessment & Plan  Patient with bilateral lower extremity edema +3  Has gained 35 pounds in past 2 months  Compliant with medications, last echo from 06/2025 with LVEF 60-65%, moderate tricuspid regurgitation.  Has right-sided heart failure with severe pulmonary hypertension.  Reports orthopnea and dyspnea.  Also has oliguria/decreased urine output over the past few weeks.  S/p 60 mg IV Lasix in the ED.  Likely secondary to acute urinary retention.  -Telemetry  -Strict I&O's, daily weights  -Q4H bladder scan, Briscoe's catheter if retaining  -IV Lasix 60 mg BID  -Low-salt diet    Acute kidney injury (HCC)  Assessment & Plan  Presents with acute kidney injury.  Reports oliguria since June 24.  creatinine 1.78 (baseline 0.7-0.8), GFR 28, BUN 44, BUN/Cr >20  Likely postrenal in the setting of decreased urine output and concern for acute urinary retention.  -Q4H bladder scan  -Briscoe's catheter if retaining  -Strict I&O's, daily weights  -Monitor BMP daily  - CT cystogram pending.  Ordered in the setting of history of bladder cancer s/p valrubicin.  Patient reports having 3 resections/surgeries in the past.  Concern for abdominal adhesions " causing retention of urine.    Acute urinary retention  Assessment & Plan  Notes having decreased urine output since June 24.  Likely postrenal, BUN/CR > 20  See acute kidney injury  - Briscoe catheter in place    Malignant neoplasm of urinary bladder (HCC)- (present on admission)  Assessment & Plan  History of bladder cancer.  S/p intravesical valrubicin X6.  Has yearly cystoscopies for surveillance.    Thrombocytosis  Assessment & Plan  History of thrombocytosis.  Receiving anagrelide 0.5 mg BID at home.  - Anagrelide held  -On heparin 5000 units every 8 hours SC        VTE prophylaxis: heparin ppx         [1] No Known Allergies

## 2025-07-04 NOTE — PROGRESS NOTES
Monitor Summary  Rhythm: SR-ST  Rate:   Ectopy: Rare PVC  Measurements: 0.18/0.06/0.38  ---12 hr Chart Review---

## 2025-07-04 NOTE — PROGRESS NOTES
Patient arrived with transport team from ED. Patient is A+O 4, tele box is on and monitoring, monitors is aware. Patient denies pain at this time. Education provided on call light use. VSS at this time, patient remains on RA. Will continue to monitor.

## 2025-07-04 NOTE — ASSESSMENT & PLAN NOTE
History of thrombocytosis and myelofibrosis.  Receiving anagrelide 0.5 mg BID at home.  Established with oncology follow-up as outpatient  Monitor CBC

## 2025-07-04 NOTE — PROGRESS NOTES
Hospital Medicine Daily Progress Note    Date of Service  7/4/2025    Chief Complaint  Dedra Lobo is a 84 y.o. female admitted 7/3/2025 with lower extremity edema    Hospital Course  Dedra Lobo is a 84 y.o. 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  States worsening lower extremity swelling over the past 3-4 weeks, also notes having decreased urine output since June 24.  Patient states she has gained about 35 pounds since May 6.  She recently saw her primary care provider who increased her torsemide from 20 to 40 and started spironolactone 25 mg daily.  States she started taking these medications 2 days ago.  Also as she does not pee as much and drank 45 ounces of water today with an output of only 7 ounces.  Also reports having orthopnea, dyspnea, back pain which is chronic.  Denies chest pain, dizziness, nausea, vomiting, diarrhea, constipation, fever, chills, PND, urinary symptoms except for oliguria.  Does not wear oxygen at baseline.  Echo from 06/2025 with LVEF 60-65%, moderate tricuspid regurgitation.     Interval Problem Update    Patient is afebrile on room air  Urine output 1135 mL  Still with significant lower extremity edema  I ordered renal ultrasound and reviewed it with no hydronephrosis  I reviewed CBC WBC 7.8 hemoglobin 9.4 platelets 585  I reviewed CMP electrolytes stable BUN 44 creatinine 1.75 magnesium 2.4  I reviewed urinalysis negative  BNP 8505  I reviewed echocardiogram from 6/26/2025 EF 60-65% with mild LVH dilated RV estimated RVSP 83 small pericardial effusion    Total time spent reviewing imaging and lab results examining patient discussing with nursing staff case management pharmacist and updating patient on plan of care 52 minutes    I have discussed  this patient's plan of care and discharge plan at IDT rounds today with Case Management, Nursing, Nursing leadership, and other members of the IDT team.    Consultants/Specialty       Code Status  Full Code    Disposition  The patient is not medically cleared for discharge to home or a post-acute facility.      I have placed the appropriate orders for post-discharge needs.    Review of Systems  Review of Systems   Constitutional:  Negative for fever.   Respiratory:  Negative for shortness of breath.    Cardiovascular:  Positive for leg swelling. Negative for chest pain.   Gastrointestinal:  Negative for abdominal pain.   Genitourinary:  Negative for flank pain and hematuria.        Physical Exam  Temp:  [36.5 °C (97.7 °F)-36.8 °C (98.2 °F)] 36.5 °C (97.7 °F)  Pulse:  [] 100  Resp:  [16-19] 17  BP: ()/(51-67) 105/62  SpO2:  [91 %-96 %] 92 %    Physical Exam  HENT:      Head: Normocephalic.   Eyes:      General:         Right eye: No discharge.         Left eye: No discharge.   Cardiovascular:      Rate and Rhythm: Normal rate and regular rhythm.      Heart sounds: Murmur heard.      No friction rub. No gallop.   Pulmonary:      Breath sounds: No rales.   Chest:      Chest wall: No tenderness.   Abdominal:      Palpations: Abdomen is soft.      Tenderness: There is no abdominal tenderness. There is no guarding or rebound.   Musculoskeletal:      Cervical back: Neck supple.      Right lower leg: Edema (3+) present.      Left lower leg: Edema (3+) present.   Neurological:      General: No focal deficit present.      Mental Status: She is alert.   Psychiatric:         Mood and Affect: Mood normal.         Fluids    Intake/Output Summary (Last 24 hours) at 7/4/2025 1229  Last data filed at 7/4/2025 0500  Gross per 24 hour   Intake 200 ml   Output 1135 ml   Net -935 ml        Laboratory  Recent Labs     07/01/25  1415 07/04/25  0150   WBC  --  7.8   RBC  --  2.74*   HEMOGLOBIN 8.9* 9.4*   HEMATOCRIT 28.8* 29.1*    MCV  --  106.2*   MCH  --  34.3*   MCHC  --  32.3   RDW  --  97.0*   PLATELETCT  --  585*   MPV  --  13.2*     Recent Labs     07/01/25  1659 07/03/25  2105 07/04/25  0150   SODIUM 143 141 142   POTASSIUM 4.2 4.4 4.0   CHLORIDE 105 105 104   CO2 24 21 23   GLUCOSE 80 96 118*   BUN 39* 44* 44*   CREATININE 1.61* 1.78* 1.75*   CALCIUM 9.6 9.1 9.1                   Imaging  US-RENAL   Final Result      1.  No definite stone or hydronephrosis.      DX-CHEST-PORTABLE (1 VIEW)   Final Result         1.  No acute cardiopulmonary disease.   2.  Cardiomegaly   3.  Atherosclerosis           Assessment/Plan  * Acute on chronic right-sided congestive heart failure (HCC)- (present on admission)  Assessment & Plan  Patient with bilateral lower extremity edema +3  Has gained 35 pounds in past 2 months  Compliant with medications, last echo from 06/2025 with LVEF 60-65%, moderate tricuspid regurgitation.  Has right-sided heart failure with severe pulmonary hypertension.    Continue aggressive diuresis with IV Lasix  Monitor intake and output renal function electrolytes  Briscoe catheter placed for urinary retention    Acute urinary retention  Assessment & Plan  Briscoe catheter placed    Outpatient follow-up with urology after discharge    Acute kidney injury (HCC)  Assessment & Plan  Presents with acute kidney injury.  Reports oliguria since June 24.  creatinine 1.78 (baseline 0.7-0.8), GFR 28, BUN 44, BUN/Cr >20  Likely postrenal in the setting of decreased urine output and concern for acute urinary retention.    Briscoe catheter placed  Monitor renal function with diuresis  No hydronephrosis on ultrasound          Pulmonary hypertension (HCC)- (present on admission)  Assessment & Plan  Continue IV diuresis worsening on recent echocardiogram  Previous VQ scan in 2022 low probability  Recent lower extremity duplex reviewed negative for DVT    Malignant neoplasm of urinary bladder (HCC)- (present on admission)  Assessment &  Plan  History of bladder cancer.  S/p intravesical valrubicin X6.  Has yearly cystoscopies for surveillance.  Was last seen in March with no evidence of recurrence per patient    Thrombocytosis- (present on admission)  Assessment & Plan  History of thrombocytosis and myelofibrosis.  Receiving anagrelide 0.5 mg BID at home.  Established with oncology follow-up as outpatient  Monitor CBC         VTE prophylaxis:    heparin ppx      I have performed a physical exam and reviewed and updated ROS and Plan today (7/4/2025). In review of yesterday's note (7/3/2025), there are no changes except as documented above.

## 2025-07-04 NOTE — CARE PLAN
The patient is Stable - Low risk of patient condition declining or worsening    Shift Goals  Clinical Goals: malikae, I/O  Patient Goals: pain management, updates  Family Goals: ANNIKA    Progress made toward(s) clinical / shift goals:    Problem: Medication  Goal: Compliance with prescribed medication will improve  Outcome: Progressing     Problem: Discharge Barriers/Planning  Goal: Patient's continuum of care needs will be met  Outcome: Progressing     Problem: Pain - Standard  Goal: Alleviation of pain or a reduction in pain to the patient’s comfort goal  Outcome: Progressing     Problem: Care Map:  Day 1 Optimal Outcome for the Heart Failure Patient  Goal: Day 1:  Optimal Care of the heart failure patient  Outcome: Progressing       Patient is not progressing towards the following goals:      Problem: Urinary Elimination:  Goal: Ability to reestablish a normal urinary elimination pattern will improve  Outcome: Not Progressing

## 2025-07-04 NOTE — ED TRIAGE NOTES
"Chief Complaint   Patient presents with    Leg Swelling     Excessive peripheral edema to BL legs, pt states gained 30+ lbs since May 6th, taking diuretics with recent dosage change, decreased urination, hx bladder cancer        wc to triage with family for above complaint.     Past Medical History[1]    Pt educated of triage process and possible wait times. Pt informed to contact staff if status changes or with any developing concerns, pt returned to lobby.     /66   Pulse (!) 109   Temp 36.6 °C (97.9 °F) (Temporal)   Resp 16   Ht 1.702 m (5' 7\")   Wt 91 kg (200 lb 9.9 oz)   SpO2 92%   BMI 31.42 kg/m²           [1]   Past Medical History:  Diagnosis Date    Adverse effect of anesthesia     core body temp drops per hx.    Anemia     Anesthesia     \"core temperature drops\"    Arrhythmia     Arthritis     oa, neck and hips    Cancer (HCC) 2015    bladder    Cardiomegaly 10/06/2022    Incidental on CXR. Also has lower extremity pitting edema. Will obtain TTE    Cataract 03/21/2019    bilateral no surgery    GERD (gastroesophageal reflux disease)     Glaucoma 02/2015    \"beginning\"    Helicobacter pylori gastritis 09/25/2023    History of malignant neoplasm of bladder 05/17/2018    Personal history of malignant neoplasm of bladder      History of primary malignant neoplasm of urinary bladder 05/17/2018    Personal history of malignant neoplasm of bladder    Osteoporosis, unspecified     Pain     neck    Pneumonia 10/2001    Systolic murmur 10/06/2022    Thrombocytosis 09/29/2010    Ulcer     gastric ulcers    Unspecified disorder of thyroid     LOW THYROID LEVELS- TOOK  MEDS    Urinary bladder disorder 2015    bladder cancer    Urinary incontinence 03/21/2019    Vascular insufficiency of limb 2005    right lower extrematy    Vitamin d deficiency 09/29/2010     "

## 2025-07-04 NOTE — ASSESSMENT & PLAN NOTE
Patient with bilateral lower extremity edema +3  Has gained 35 pounds in past 2 months  Compliant with medications, last echo from 06/2025 with LVEF 60-65%, moderate tricuspid regurgitation.  Has right-sided heart failure with severe pulmonary hypertension.    Continue aggressive diuresis with IV Lasix overall improved but still with significant volume overload  Monitor intake and output renal function electrolytes  Briscoe catheter placed for urinary retention

## 2025-07-04 NOTE — ED NOTES
Medication history reviewed with patient at bedside.   Med rec is complete  Allergies reviewed.     Patient has not had any outpatient anti-MICROBIAL in the last 30 days.   Anticoagulants: Anagrelide 0.5mg- Last dose 7/3/25 AM    Patient receives Retacrit injections every Tuesday.    Fredy Lopez PhT

## 2025-07-04 NOTE — CARE PLAN
The patient is Stable - Low risk of patient condition declining or worsening    Shift Goals  Clinical Goals: continue diuresing, I/O's, VSS  Patient Goals: rest, Diuresing  Family Goals: ANNIKA    Progress made toward(s) clinical / shift goals:    Problem: Knowledge Deficit - Standard  Goal: Patient and family/care givers will demonstrate understanding of plan of care, disease process/condition, diagnostic tests and medications  Outcome: Progressing     Problem: Fall Risk  Goal: Patient will remain free from falls  Outcome: Progressing     Problem: Communication  Goal: The ability to communicate needs accurately and effectively will improve  Outcome: Progressing     Problem: Psychosocial Needs:  Goal: Level of anxiety will decrease  Outcome: Progressing     Problem: Fluid Volume:  Goal: Will maintain balanced intake and output  Outcome: Progressing     Problem: Respiratory:  Goal: Respiratory status will improve  Outcome: Progressing     Problem: Skin Integrity  Goal: Risk for impaired skin integrity will decrease  Outcome: Progressing       Patient is not progressing towards the following goals:

## 2025-07-04 NOTE — PROGRESS NOTES
4 Eyes Skin Assessment Completed by Jose RN and MIK Ott.    Skin assessment is primarily focused on high risk bony prominences. Pay special attention to skin beneath and around medical devices, high risk bony prominences, skin to skin areas and areas where the patient lacks sensation to feel pain and areas where the patient previously had breakdown.     Head (Occipital):  WDL   Ears (Under Medical Devices): WDL   Nose (Under Medical Devices): WDL   Mouth:  WDL   Neck: WDL   Breast/Chest:  WDL   Shoulder Blades:  WDL   Spine:   Scab, red and blanching   (R) Arm/Elbow/Hand: Red and Blanching   (L) Arm/Elbow/Hand: Red and Blanching   Abdomen: WDL   Pannus/Groin:  WDL   Sacrum/Coccyx:   Red and Blanching   (R) Ischial Tuberosity (Sit Bones):  WDL   (L) Ischial Tuberosity (Sit Bones):  WDL   (R) Leg:  Scab and Edema   (L) Leg:  Edema and Scar   (R) Heel:  Red, Blanching, and Edema   (R) Foot/Toe: Edema, red and blanching   (L) Heel: Red, Blanching, and Edema   (L) Foot/Toe:  Red and Blanching       DEVICES IN USE:   Respiratory Devices:  NA, patient on room air  Feeding Devices:  N/A   Lines & BP Monitoring Devices:  Peripheral IV, BP cuff, and Pulse ox    Orthopedic Devices:  N/A  Miscellaneous Devices:  Telemetry monitor and Briscoe    PROTOCOL INTERVENTIONS:   Standard/Trauma Bed:  Already in place  Offloading Dressing - Heel:  Already in place  Briscoe:  Already in place    WOUND PHOTOS:   N/A no wounds identified    WOUND CONSULT:   N/A, no advanced wound care needs identified

## 2025-07-04 NOTE — ASSESSMENT & PLAN NOTE
Presents with acute kidney injury.  Reports oliguria since June 24.  creatinine 1.78 (baseline 0.7-0.8), GFR 28, BUN 44, BUN/Cr >20  Likely postrenal in the setting of decreased urine output and concern for acute urinary retention.    Briscoe catheter placed  Monitor renal function with diuresis  No hydronephrosis on ultrasound

## 2025-07-04 NOTE — ASSESSMENT & PLAN NOTE
Continue IV diuresis worsening on recent echocardiogram  Previous VQ scan in 2022 low probability  Recent lower extremity duplex reviewed negative for DVT

## 2025-07-04 NOTE — ED NOTES
Briscoe catheter placed per MD order, patient tolerated well. Urine returned. Resting comfortably awaiting transfer upstairs.

## 2025-07-04 NOTE — ASSESSMENT & PLAN NOTE
History of bladder cancer.  S/p intravesical valrubicin X6.  Has yearly cystoscopies for surveillance.  Was last seen in March with no evidence of recurrence per patient

## 2025-07-04 NOTE — ED PROVIDER NOTES
ED Provider Note    CHIEF COMPLAINT  Chief Complaint   Patient presents with    Leg Swelling     Excessive peripheral edema to BL legs, pt states gained 30+ lbs since May 6th, taking diuretics with recent dosage change, decreased urination, hx bladder cancer       EXTERNAL RECORDS REVIEWED  Outpatient Notes outpatient office visit 7/1/2025 reviewed  Echocardiogram 6/26/2025: CONCLUSIONS  Preserved left ventricular systolic function. The ejection fraction is   measured to be 60-65% by Tobias's biplane.  Mild concentric left ventricular hypertrophy.   The right ventricle is dilated. Reduced right ventricular systolic   function.  Moderate to tricuspid regurgitation. Estimated right ventricular   systolic pressure is 83 mmHg (severe pulmonary hypertension).  Small pericardial effusion without evidence of hemodynamic compromise  Compared to the prior study on 05/17/2023, RV appears more dilated and   pulmonary hypertension is now severe. There is also now a small   pericardial effusion.     HPI/ROS  LIMITATION TO HISTORY   Select: : None  OUTSIDE HISTORIAN(S):  None     Dedra Lobo is a 84 y.o. female who presents to the Emergency Department with worsening lower extremity swelling.  Past medical history as document below.  Recent echocardiogram.  Has been on torsemide for roughly 5 months.  Recently increased from 20-40.  Additionally this week had spironolactone added but continues to have limited urine output.  Patient does monitor ins and outs.  She has had roughly 25 ounces of fluid intake but only 7 ounces of output.  Also now having dyspnea on exertion.  As per instructions from primary care staff she now presents here to the ER for more acute diuresis    PAST MEDICAL HISTORY   has a past medical history of Acute exacerbation of chronic heart failure (HCC) (7/3/2025), Adverse effect of anesthesia, Anemia, Anesthesia, Arrhythmia, Arthritis, Cancer (HCC) (2015), Cardiomegaly (10/06/2022), Cataract  (03/21/2019), GERD (gastroesophageal reflux disease), Glaucoma (02/2015), Helicobacter pylori gastritis (09/25/2023), History of malignant neoplasm of bladder (05/17/2018), History of primary malignant neoplasm of urinary bladder (05/17/2018), Osteoporosis, unspecified, Pain, Pneumonia (10/2001), Systolic murmur (10/06/2022), Thrombocytosis (09/29/2010), Ulcer, Unspecified disorder of thyroid, Urinary bladder disorder (2015), Urinary incontinence (03/21/2019), Vascular insufficiency of limb (2005), and Vitamin d deficiency (09/29/2010).    SURGICAL HISTORY   has a past surgical history that includes hip replacement, total (2006); cholecystectomy (2003); lumpectomy (1982); arthroplasty; cystoscopy (3/3/2015); trans urethral resection bladder (3/3/2015); cervical disk and fusion anterior (2/9/2016); total knee arthroplasty (Left, 4/3/2019); upper gi endoscopy,diagnosis (N/A, 10/1/2022); upper gi endoscopy,scler inject (N/A, 10/1/2022); and upper gi endoscopy,ctrl bleed (N/A, 10/1/2022).    FAMILY HISTORY  Family History   Problem Relation Age of Onset    Cancer Mother         brain    Lung Disease Mother         smoker    Alcohol/Drug Father         etoh    Lung Disease Sister         COPD smokers    Alcohol/Drug Sister         etoh    Lung Disease Sister         smoker-copd    Alcohol/Drug Sister         etoh       SOCIAL HISTORY  Social History     Tobacco Use    Smoking status: Never    Smokeless tobacco: Never   Vaping Use    Vaping status: Never Used   Substance and Sexual Activity    Alcohol use: No     Alcohol/week: 0.0 oz    Drug use: No    Sexual activity: Never       CURRENT MEDICATIONS  Home Medications       Reviewed by Katerine Altamirano R.N. (Registered Nurse) on 07/03/25 at 1915  Med List Status: Partial     Medication Last Dose Status   acetaminophen (TYLENOL) 500 MG Tab  Active   anagrelide (AGRYLIN) 0.5 MG Cap  Active   benzonatate (TESSALON) 100 MG Cap  Active   Carboxymethylcellulose Sod PF 0.5 %  "Solution  Active   cetirizine (ZYRTEC) 10 MG Tab  Active   epoetin (EPOGEN,PROCRIT) 00465 Unit/mL Solution  Active   spironolactone (ALDACTONE) 25 MG Tab  Active   torsemide (DEMADEX) 20 MG Tab  Active   torsemide (DEMADEX) 20 MG Tab  Active                  Audit from Redirected Encounters    **Home medications have not yet been reviewed for this encounter**         ALLERGIES  Allergies[1]    PHYSICAL EXAM  VITAL SIGNS: /62   Pulse (!) 102   Temp 36.6 °C (97.9 °F) (Temporal)   Resp 19   Ht 1.702 m (5' 7\")   Wt 91 kg (200 lb 9.9 oz)   SpO2 95%   BMI 31.42 kg/m²      Pulse ox interpretation: I interpret this pulse ox as normal.  Constitutional: Alert in no apparent distress.  HENT: No signs of trauma, Bilateral external ears normal, Nose normal.   Eyes: Pupils are equal and reactive  Neck: Normal range of motion, No tenderness, Supple  Cardiovascular: Regular rate and rhythm, no murmurs.   Thorax & Lungs: Normal breath sounds, No respiratory distress  Abdomen: Bowel sounds normal, Soft, No tenderness  Skin: Warm, Dry, No erythema, No rash.   Musculoskeletal: Good range of motion in all major joints. No tenderness to palpation or major deformities noted.  3+ pitting edema bilateral lower extremities  Neurologic: Alert , Normal motor function, Normal sensory function, No focal deficits noted.   Psychiatric: Affect normal, Judgment normal, Mood normal.         EKG/LABS  Results for orders placed or performed during the hospital encounter of 07/03/25   BASIC METABOLIC PANEL    Collection Time: 07/03/25  9:05 PM   Result Value Ref Range    Sodium 141 135 - 145 mmol/L    Potassium 4.4 3.6 - 5.5 mmol/L    Chloride 105 96 - 112 mmol/L    Co2 21 20 - 33 mmol/L    Glucose 96 65 - 99 mg/dL    Bun 44 (H) 8 - 22 mg/dL    Creatinine 1.78 (H) 0.50 - 1.40 mg/dL    Calcium 9.1 8.5 - 10.5 mg/dL    Anion Gap 15.0 7.0 - 16.0   MAGNESIUM    Collection Time: 07/03/25  9:05 PM   Result Value Ref Range    Magnesium 2.4 1.5 - " 2.5 mg/dL   proBrain Natriuretic Peptide, NT    Collection Time: 25  9:05 PM   Result Value Ref Range    NT-proBNP 8505 (H) 0 - 125 pg/mL   ESTIMATED GFR    Collection Time: 25  9:05 PM   Result Value Ref Range    GFR (CKD-EPI) 28 (A) >60 mL/min/1.73 m 2   EKG (NOW)    Collection Time: 25 10:12 PM   Result Value Ref Range    Report       [facility]    Test Date:  2025  Pt Name:    TREVER CONWAY                Department: ER  MRN:        [id number]                      Room:       [facility]  Gender:     Female                       Technician: [id number]  :        [id number]                   Requested By:ROBERT JUAREZ  Order #:    [id number]                    Reading MD: Robert Juarez    Measurements  Intervals                                Axis  Rate:       103                          P:          50  PA:         172                          QRS:        102  QRSD:       75                           T:          -25  QT:         386  QTc:        506    Interpretive Statements  Sinus tachycardia  Left atrial enlargement  Right axis deviation  Low voltage, precordial leads  Borderline T abnormalities, diffuse leads  Prolonged QT interval  Compared to ECG 05/15/2025 13:09:08  Right-axis deviation now present  Low QRS voltage now present  Prolonged QT interval now present  T-wave abnormalit y still present  Electronically Signed On 2025 22:12:09 PDT by Robert Juarez         I have independently interpreted this EKG    RADIOLOGY/PROCEDURES   I have independently interpreted the diagnostic imaging associated with this visit and am waiting the final reading from the radiologist.   My preliminary interpretation is as follows: Cardiomegaly    Radiologist interpretation:  DX-CHEST-PORTABLE (1 VIEW)   Final Result         1.  No acute cardiopulmonary disease.   2.  Cardiomegaly   3.  Atherosclerosis          COURSE & MEDICAL DECISION MAKING    ASSESSMENT, COURSE AND  PLAN  Care Narrative: 84-year-old presenting with lower extremity swelling.  Will complete labs, x-ray imaging, EKG and initiate IV diuresis    DISPOSITION AND DISCUSSIONS  I have discussed management of the patient with the following physicians and RADHA's: Discussed with [facility] team at 2210.  Will admit.    84-year-old female presenting to the [facility] part with above presentation.  Labs, EKG and chest x-ray as above.  [facility] team willing to [facility] admission and will continue inpatient care for more aggressive diuresis.    FINAL DIAGNOSIS  1. Peripheral edema    2. Acute on chronic heart failure, unspecified heart failure type (HCC)    3. Dyspnea on exertion         Electronically signed by: Robert Ovalles M.D., 7/3/2025 8:52 PM           [1] No Known Allergies

## 2025-07-04 NOTE — HOSPITAL COURSE
Dedra Lobo is a 84 y.o. 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  States worsening lower extremity swelling over the past 3-4 weeks, also notes having decreased urine output since June 24.  Patient states she has gained about 35 pounds since May 6.  She recently saw her primary care provider who increased her torsemide from 20 to 40 and started spironolactone 25 mg daily.  States she started taking these medications 2 days ago.  Also as she does not pee as much and drank 45 ounces of water today with an output of only 7 ounces.  Also reports having orthopnea, dyspnea, back pain which is chronic.  Denies chest pain, dizziness, nausea, vomiting, diarrhea, constipation, fever, chills, PND, urinary symptoms except for oliguria.  Does not wear oxygen at baseline.  Echo from 06/2025 with LVEF 60-65%, moderate tricuspid regurgitation.

## 2025-07-05 LAB
ANION GAP SERPL CALC-SCNC: 13 MMOL/L (ref 7–16)
BUN SERPL-MCNC: 43 MG/DL (ref 8–22)
CALCIUM SERPL-MCNC: 8.8 MG/DL (ref 8.5–10.5)
CHLORIDE SERPL-SCNC: 104 MMOL/L (ref 96–112)
CO2 SERPL-SCNC: 23 MMOL/L (ref 20–33)
CREAT SERPL-MCNC: 1.59 MG/DL (ref 0.5–1.4)
ERYTHROCYTE [DISTWIDTH] IN BLOOD BY AUTOMATED COUNT: 97.1 FL (ref 35.9–50)
GFR SERPLBLD CREATININE-BSD FMLA CKD-EPI: 32 ML/MIN/1.73 M 2
GLUCOSE SERPL-MCNC: 88 MG/DL (ref 65–99)
HCT VFR BLD AUTO: 29.1 % (ref 37–47)
HGB BLD-MCNC: 9.2 G/DL (ref 12–16)
MAGNESIUM SERPL-MCNC: 2.4 MG/DL (ref 1.5–2.5)
MCH RBC QN AUTO: 33.9 PG (ref 27–33)
MCHC RBC AUTO-ENTMCNC: 31.6 G/DL (ref 32.2–35.5)
MCV RBC AUTO: 107.4 FL (ref 81.4–97.8)
NT-PROBNP SERPL IA-MCNC: 6939 PG/ML (ref 0–125)
PHOSPHATE SERPL-MCNC: 4 MG/DL (ref 2.5–4.5)
PLATELET # BLD AUTO: 507 K/UL (ref 164–446)
PMV BLD AUTO: 13.3 FL (ref 9–12.9)
POTASSIUM SERPL-SCNC: 4.1 MMOL/L (ref 3.6–5.5)
RBC # BLD AUTO: 2.71 M/UL (ref 4.2–5.4)
SODIUM SERPL-SCNC: 140 MMOL/L (ref 135–145)
WBC # BLD AUTO: 7.6 K/UL (ref 4.8–10.8)

## 2025-07-05 PROCEDURE — 770020 HCHG ROOM/CARE - TELE (206)

## 2025-07-05 PROCEDURE — 85027 COMPLETE CBC AUTOMATED: CPT

## 2025-07-05 PROCEDURE — A9270 NON-COVERED ITEM OR SERVICE: HCPCS

## 2025-07-05 PROCEDURE — 36415 COLL VENOUS BLD VENIPUNCTURE: CPT

## 2025-07-05 PROCEDURE — A9270 NON-COVERED ITEM OR SERVICE: HCPCS | Performed by: HOSPITALIST

## 2025-07-05 PROCEDURE — 700111 HCHG RX REV CODE 636 W/ 250 OVERRIDE (IP)

## 2025-07-05 PROCEDURE — 700102 HCHG RX REV CODE 250 W/ 637 OVERRIDE(OP): Performed by: HOSPITALIST

## 2025-07-05 PROCEDURE — 80048 BASIC METABOLIC PNL TOTAL CA: CPT

## 2025-07-05 PROCEDURE — 83880 ASSAY OF NATRIURETIC PEPTIDE: CPT

## 2025-07-05 PROCEDURE — 83735 ASSAY OF MAGNESIUM: CPT

## 2025-07-05 PROCEDURE — 84100 ASSAY OF PHOSPHORUS: CPT

## 2025-07-05 PROCEDURE — 700102 HCHG RX REV CODE 250 W/ 637 OVERRIDE(OP)

## 2025-07-05 RX ADMIN — HEPARIN SODIUM 5000 UNITS: 5000 INJECTION, SOLUTION INTRAVENOUS; SUBCUTANEOUS at 13:59

## 2025-07-05 RX ADMIN — TRAZODONE HYDROCHLORIDE 50 MG: 50 TABLET ORAL at 22:06

## 2025-07-05 RX ADMIN — FUROSEMIDE 60 MG: 10 INJECTION, SOLUTION INTRAVENOUS at 05:29

## 2025-07-05 RX ADMIN — HEPARIN SODIUM 5000 UNITS: 5000 INJECTION, SOLUTION INTRAVENOUS; SUBCUTANEOUS at 22:06

## 2025-07-05 RX ADMIN — FUROSEMIDE 60 MG: 10 INJECTION, SOLUTION INTRAVENOUS at 17:30

## 2025-07-05 RX ADMIN — POTASSIUM CHLORIDE 20 MEQ: 1500 TABLET, EXTENDED RELEASE ORAL at 05:29

## 2025-07-05 RX ADMIN — HEPARIN SODIUM 5000 UNITS: 5000 INJECTION, SOLUTION INTRAVENOUS; SUBCUTANEOUS at 05:29

## 2025-07-05 RX ADMIN — DOCUSATE SODIUM 50 MG AND SENNOSIDES 8.6 MG 2 TABLET: 8.6; 5 TABLET, FILM COATED ORAL at 17:30

## 2025-07-05 RX ADMIN — ACETAMINOPHEN 650 MG: 325 TABLET ORAL at 04:13

## 2025-07-05 ASSESSMENT — ENCOUNTER SYMPTOMS
NAUSEA: 0
ABDOMINAL PAIN: 0
FEVER: 0
VOMITING: 0
ORTHOPNEA: 1

## 2025-07-05 ASSESSMENT — PAIN DESCRIPTION - PAIN TYPE
TYPE: ACUTE PAIN

## 2025-07-05 ASSESSMENT — FIBROSIS 4 INDEX: FIB4 SCORE: 0.61

## 2025-07-05 NOTE — PROGRESS NOTES
Monitor Summary  Rhythm: SR  Rate: 88-95  Ectopy: (R)pvc, (R) pac  Measurements: .17/.09/.33  ---12 hr Chart Review---

## 2025-07-05 NOTE — CARE PLAN
The patient is Stable - Low risk of patient condition declining or worsening    Shift Goals  Clinical Goals: Diuresis, monitor I/O 's, VSS,  Patient Goals: sleep  Family Goals: ANNIKA    Progress made toward(s) clinical / shift goals:    Problem: Knowledge Deficit - Standard  Goal: Patient and family/care givers will demonstrate understanding of plan of care, disease process/condition, diagnostic tests and medications  Description: Target End Date:  1-3 days or as soon as patient condition allows    Document in Patient Education    1.  Patient and family/caregiver oriented to unit, equipment, visitation policy and means for communicating concern  2.  Complete/review Learning Assessment  3.  Assess knowledge level of disease process/condition, treatment plan, diagnostic tests and medications  4.  Explain disease process/condition, treatment plan, diagnostic tests and medications  Outcome: Progressing     Problem: Fall Risk  Goal: Patient will remain free from falls  Description: Target End Date:  Prior to discharge or change in level of care    Document interventions on the [facility] Fall Risk Assessment    1.  Assess for fall risk factors  2.  Implement fall precautions  Outcome: Progressing     Problem: Communication  Goal: The ability to communicate needs accurately and effectively will improve  Outcome: Progressing     Problem: Safety  Goal: Will remain free from injury  Outcome: Progressing  Goal: Will remain free from falls  Outcome: Progressing     Problem: Fluid Volume:  Goal: Will maintain balanced intake and output  Outcome: Progressing     Problem: Skin Integrity  Goal: Risk for impaired skin integrity will decrease  Outcome: Progressing     Problem: Mobility  Goal: Risk for activity intolerance will decrease  Outcome: Progressing

## 2025-07-05 NOTE — PROGRESS NOTES
Hospital Medicine Daily Progress Note    Date of Service  7/5/2025    Chief Complaint  Dedra Lobo is a 84 y.o. female admitted 7/3/2025 with lower extremity edema    Hospital Course  Dedra Lobo is a 84 y.o. 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  States worsening lower extremity swelling over the past 3-4 weeks, also notes having decreased urine output since June 24.  Patient states she has gained about 35 pounds since May 6.  She recently saw her primary care provider who increased her torsemide from 20 to 40 and started spironolactone 25 mg daily.  States she started taking these medications 2 days ago.  Also as she does not pee as much and drank 45 ounces of water today with an output of only 7 ounces.  Also reports having orthopnea, dyspnea, back pain which is chronic.  Denies chest pain, dizziness, nausea, vomiting, diarrhea, constipation, fever, chills, PND, urinary symptoms except for oliguria.  Does not wear oxygen at baseline.  Echo from 06/2025 with LVEF 60-65%, moderate tricuspid regurgitation.     Interval Problem Update    Patient is afebrile on room air  -2.6 L since admit  Lower extremity edema is improving  I reviewed CBC hemoglobin 9.2 platelets 507  Reviewed chemistry panel electrolytes stable BUN 43 creatinine 1.59      I have discussed this patient's plan of care and discharge plan at IDT rounds today with Case Management, Nursing, Nursing leadership, and other members of the IDT team.    Consultants/Specialty       Code Status  Full Code    Disposition  The patient is not medically cleared for discharge to home or a post-acute facility.      I have placed the appropriate orders for post-discharge needs.    Review of Systems  Review of Systems    Constitutional:  Negative for fever.   Cardiovascular:  Positive for orthopnea and leg swelling.   Gastrointestinal:  Negative for abdominal pain, nausea and vomiting.        Physical Exam  Temp:  [36.2 °C (97.2 °F)-37.2 °C (98.9 °F)] 36.4 °C (97.5 °F)  Pulse:  [88-94] 88  Resp:  [16-17] 16  BP: ()/(54-68) 101/54  SpO2:  [90 %-97 %] 92 %    Physical Exam  Cardiovascular:      Heart sounds: Murmur heard.   Pulmonary:      Breath sounds: No rales.   Abdominal:      Palpations: Abdomen is soft.      Tenderness: There is no abdominal tenderness.   Musculoskeletal:         General: Swelling present.      Right lower leg: Edema present.      Left lower leg: Edema present.   Neurological:      General: No focal deficit present.      Mental Status: She is alert.         Fluids    Intake/Output Summary (Last 24 hours) at 7/5/2025 1520  Last data filed at 7/5/2025 1239  Gross per 24 hour   Intake 1080 ml   Output 2750 ml   Net -1670 ml        Laboratory  Recent Labs     07/04/25  0150 07/05/25  0322   WBC 7.8 7.6   RBC 2.74* 2.71*   HEMOGLOBIN 9.4* 9.2*   HEMATOCRIT 29.1* 29.1*   .2* 107.4*   MCH 34.3* 33.9*   MCHC 32.3 31.6*   RDW 97.0* 97.1*   PLATELETCT 585* 507*   MPV 13.2* 13.3*     Recent Labs     07/03/25  2105 07/04/25  0150 07/05/25  0322   SODIUM 141 142 140   POTASSIUM 4.4 4.0 4.1   CHLORIDE 105 104 104   CO2 21 23 23   GLUCOSE 96 118* 88   BUN 44* 44* 43*   CREATININE 1.78* 1.75* 1.59*   CALCIUM 9.1 9.1 8.8                   Imaging  US-RENAL   Final Result      1.  No definite stone or hydronephrosis.      DX-CHEST-PORTABLE (1 VIEW)   Final Result         1.  No acute cardiopulmonary disease.   2.  Cardiomegaly   3.  Atherosclerosis           Assessment/Plan  * Acute on chronic right-sided congestive heart failure (HCC)- (present on admission)  Assessment & Plan  Patient with bilateral lower extremity edema +3  Has gained 35 pounds in past 2 months  Compliant with medications, last echo from  06/2025 with LVEF 60-65%, moderate tricuspid regurgitation.  Has right-sided heart failure with severe pulmonary hypertension.    Continue aggressive diuresis with IV Lasix  Monitor intake and output renal function electrolytes  Briscoe catheter placed for urinary retention    Acute urinary retention  Assessment & Plan  Briscoe catheter placed    Outpatient follow-up with urology after discharge    Acute kidney injury (HCC)  Assessment & Plan  Presents with acute kidney injury.  Reports oliguria since June 24.  creatinine 1.78 (baseline 0.7-0.8), GFR 28, BUN 44, BUN/Cr >20  Likely postrenal in the setting of decreased urine output and concern for acute urinary retention.    Briscoe catheter placed  Monitor renal function with diuresis  No hydronephrosis on ultrasound          Pulmonary hypertension (HCC)- (present on admission)  Assessment & Plan  Continue IV diuresis worsening on recent echocardiogram  Previous VQ scan in 2022 low probability  Recent lower extremity duplex reviewed negative for DVT    Malignant neoplasm of urinary bladder (HCC)- (present on admission)  Assessment & Plan  History of bladder cancer.  S/p intravesical valrubicin X6.  Has yearly cystoscopies for surveillance.  Was last seen in March with no evidence of recurrence per patient    Thrombocytosis- (present on admission)  Assessment & Plan  History of thrombocytosis and myelofibrosis.  Receiving anagrelide 0.5 mg BID at home.  Established with oncology follow-up as outpatient  Monitor CBC         VTE prophylaxis:    heparin ppx      I have performed a physical exam and reviewed and updated ROS and Plan today (7/5/2025). In review of yesterday's note (7/4/2025), there are no changes except as documented above.

## 2025-07-05 NOTE — CARE PLAN
The patient is Stable - Low risk of patient condition declining or worsening    Shift Goals  Clinical Goals: diurese, ambulate  Patient Goals: sleep  Family Goals: ANNIKA    Progress made toward(s) clinical / shift goals:      Problem: Knowledge Deficit - Standard  Goal: Patient and family/care givers will demonstrate understanding of plan of care, disease process/condition, diagnostic tests and medications  Outcome: Progressing  Note: Plan of care d/w patient.     Problem: Fall Risk  Goal: Patient will remain free from falls  Outcome: Progressing  Note: Bed alarm in place. Bed in low and locked position. Call light w/in reach. Staff present w/ mobilization.       Patient is not progressing towards the following goals:

## 2025-07-05 NOTE — PROGRESS NOTES
Monitor summary:        Rhythm: SR ST  Rate:   Ectopy: PVC  Measurements: .17/.06/.39        12hr chart check

## 2025-07-06 LAB
ANION GAP SERPL CALC-SCNC: 12 MMOL/L (ref 7–16)
BUN SERPL-MCNC: 41 MG/DL (ref 8–22)
CALCIUM SERPL-MCNC: 8.7 MG/DL (ref 8.5–10.5)
CHLORIDE SERPL-SCNC: 103 MMOL/L (ref 96–112)
CO2 SERPL-SCNC: 26 MMOL/L (ref 20–33)
CREAT SERPL-MCNC: 1.47 MG/DL (ref 0.5–1.4)
ERYTHROCYTE [DISTWIDTH] IN BLOOD BY AUTOMATED COUNT: 103 FL (ref 35.9–50)
GFR SERPLBLD CREATININE-BSD FMLA CKD-EPI: 35 ML/MIN/1.73 M 2
GLUCOSE SERPL-MCNC: 83 MG/DL (ref 65–99)
HCT VFR BLD AUTO: 30.9 % (ref 37–47)
HGB BLD-MCNC: 9.3 G/DL (ref 12–16)
MAGNESIUM SERPL-MCNC: 2.4 MG/DL (ref 1.5–2.5)
MCH RBC QN AUTO: 32.6 PG (ref 27–33)
MCHC RBC AUTO-ENTMCNC: 30.1 G/DL (ref 32.2–35.5)
MCV RBC AUTO: 108.4 FL (ref 81.4–97.8)
PLATELET # BLD AUTO: 508 K/UL (ref 164–446)
PMV BLD AUTO: 13.1 FL (ref 9–12.9)
POTASSIUM SERPL-SCNC: 4.3 MMOL/L (ref 3.6–5.5)
RBC # BLD AUTO: 2.85 M/UL (ref 4.2–5.4)
SODIUM SERPL-SCNC: 141 MMOL/L (ref 135–145)
WBC # BLD AUTO: 8 K/UL (ref 4.8–10.8)

## 2025-07-06 PROCEDURE — 700102 HCHG RX REV CODE 250 W/ 637 OVERRIDE(OP): Performed by: HOSPITALIST

## 2025-07-06 PROCEDURE — 80048 BASIC METABOLIC PNL TOTAL CA: CPT

## 2025-07-06 PROCEDURE — 700111 HCHG RX REV CODE 636 W/ 250 OVERRIDE (IP): Mod: JZ

## 2025-07-06 PROCEDURE — 83735 ASSAY OF MAGNESIUM: CPT

## 2025-07-06 PROCEDURE — 700102 HCHG RX REV CODE 250 W/ 637 OVERRIDE(OP)

## 2025-07-06 PROCEDURE — 85027 COMPLETE CBC AUTOMATED: CPT

## 2025-07-06 PROCEDURE — 770020 HCHG ROOM/CARE - TELE (206)

## 2025-07-06 PROCEDURE — 99232 SBSQ HOSP IP/OBS MODERATE 35: CPT | Performed by: HOSPITALIST

## 2025-07-06 PROCEDURE — A9270 NON-COVERED ITEM OR SERVICE: HCPCS | Performed by: HOSPITALIST

## 2025-07-06 PROCEDURE — A9270 NON-COVERED ITEM OR SERVICE: HCPCS

## 2025-07-06 RX ADMIN — HEPARIN SODIUM 5000 UNITS: 5000 INJECTION, SOLUTION INTRAVENOUS; SUBCUTANEOUS at 20:54

## 2025-07-06 RX ADMIN — HEPARIN SODIUM 5000 UNITS: 5000 INJECTION, SOLUTION INTRAVENOUS; SUBCUTANEOUS at 05:57

## 2025-07-06 RX ADMIN — HEPARIN SODIUM 5000 UNITS: 5000 INJECTION, SOLUTION INTRAVENOUS; SUBCUTANEOUS at 14:09

## 2025-07-06 RX ADMIN — POTASSIUM CHLORIDE 20 MEQ: 1500 TABLET, EXTENDED RELEASE ORAL at 05:58

## 2025-07-06 RX ADMIN — FUROSEMIDE 60 MG: 10 INJECTION, SOLUTION INTRAVENOUS at 16:30

## 2025-07-06 RX ADMIN — DOCUSATE SODIUM 50 MG AND SENNOSIDES 8.6 MG 2 TABLET: 8.6; 5 TABLET, FILM COATED ORAL at 16:30

## 2025-07-06 RX ADMIN — FUROSEMIDE 60 MG: 10 INJECTION, SOLUTION INTRAVENOUS at 05:56

## 2025-07-06 RX ADMIN — ACETAMINOPHEN 650 MG: 325 TABLET ORAL at 10:45

## 2025-07-06 ASSESSMENT — PAIN DESCRIPTION - PAIN TYPE
TYPE: ACUTE PAIN
TYPE: ACUTE PAIN

## 2025-07-06 ASSESSMENT — ENCOUNTER SYMPTOMS
ORTHOPNEA: 0
SHORTNESS OF BREATH: 0
FEVER: 0

## 2025-07-06 ASSESSMENT — FIBROSIS 4 INDEX: FIB4 SCORE: 0.7

## 2025-07-06 NOTE — PROGRESS NOTES
Telemetry Shift Summary    Rhythm SR  HR Range 86-99  Ectopy rare pvcs  Measurements 0.15/0.06/0.31        Normal Values  Rhythm SR  HR Range    Measurements 0.12-0.20 / 0.06-0.10  / 0.30-0.52

## 2025-07-06 NOTE — PROGRESS NOTES
Hospital Medicine Daily Progress Note    Date of Service  7/6/2025    Chief Complaint  Dedra Lobo is a 84 y.o. female admitted 7/3/2025 with lower extremity edema    Hospital Course  Dedra Lobo is a 84 y.o. 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  States worsening lower extremity swelling over the past 3-4 weeks, also notes having decreased urine output since June 24.  Patient states she has gained about 35 pounds since May 6.  She recently saw her primary care provider who increased her torsemide from 20 to 40 and started spironolactone 25 mg daily.  States she started taking these medications 2 days ago.  Also as she does not pee as much and drank 45 ounces of water today with an output of only 7 ounces.  Also reports having orthopnea, dyspnea, back pain which is chronic.  Denies chest pain, dizziness, nausea, vomiting, diarrhea, constipation, fever, chills, PND, urinary symptoms except for oliguria.  Does not wear oxygen at baseline.  Echo from 06/2025 with LVEF 60-65%, moderate tricuspid regurgitation.     Interval Problem Update    Patient is afebrile on 2 L nasal cannula  Diuresing well -2.6 L over past 24 hours -4.7 L since admit  Reviewed chemistry panel electrolytes stable BUN 41 creatinine 1.4  Reviewed CBC WBC 8 hemoglobin 9.3 platelets 508        I have discussed this patient's plan of care and discharge plan at IDT rounds today with Case Management, Nursing, Nursing leadership, and other members of the IDT team.    Consultants/Specialty       Code Status  Full Code    Disposition  Medically Cleared  I have placed the appropriate orders for post-discharge needs.    Review of Systems  Review of Systems   Constitutional:  Negative for fever.   Respiratory:  Negative for  shortness of breath.    Cardiovascular:  Positive for leg swelling. Negative for chest pain and orthopnea.        Physical Exam  Temp:  [36.2 °C (97.1 °F)-36.5 °C (97.7 °F)] 36.2 °C (97.1 °F)  Pulse:  [] 87  Resp:  [15-18] 16  BP: ()/(46-67) 91/46  SpO2:  [90 %-94 %] 92 %    Physical Exam  Cardiovascular:      Rate and Rhythm: Normal rate and regular rhythm.      Heart sounds: Murmur heard.   Pulmonary:      Breath sounds: No rales.   Chest:      Chest wall: No tenderness.   Musculoskeletal:         General: Swelling present.      Right lower leg: Edema present.      Left lower leg: Edema present.      Comments: 2+ lower extremity edema   Neurological:      General: No focal deficit present.      Mental Status: She is alert.   Psychiatric:         Mood and Affect: Mood normal.         Behavior: Behavior normal.         Fluids    Intake/Output Summary (Last 24 hours) at 7/6/2025 1349  Last data filed at 7/6/2025 0600  Gross per 24 hour   Intake --   Output 2100 ml   Net -2100 ml        Laboratory  Recent Labs     07/04/25  0150 07/05/25  0322 07/06/25  0108   WBC 7.8 7.6 8.0   RBC 2.74* 2.71* 2.85*   HEMOGLOBIN 9.4* 9.2* 9.3*   HEMATOCRIT 29.1* 29.1* 30.9*   .2* 107.4* 108.4*   MCH 34.3* 33.9* 32.6   MCHC 32.3 31.6* 30.1*   RDW 97.0* 97.1* 103.0*   PLATELETCT 585* 507* 508*   MPV 13.2* 13.3* 13.1*     Recent Labs     07/04/25  0150 07/05/25  0322 07/06/25  0108   SODIUM 142 140 141   POTASSIUM 4.0 4.1 4.3   CHLORIDE 104 104 103   CO2 23 23 26   GLUCOSE 118* 88 83   BUN 44* 43* 41*   CREATININE 1.75* 1.59* 1.47*   CALCIUM 9.1 8.8 8.7                   Imaging  US-RENAL   Final Result      1.  No definite stone or hydronephrosis.      DX-CHEST-PORTABLE (1 VIEW)   Final Result         1.  No acute cardiopulmonary disease.   2.  Cardiomegaly   3.  Atherosclerosis           Assessment/Plan  * Acute on chronic right-sided congestive heart failure (HCC)- (present on admission)  Assessment & Plan  Patient  with bilateral lower extremity edema +3  Has gained 35 pounds in past 2 months  Compliant with medications, last echo from 06/2025 with LVEF 60-65%, moderate tricuspid regurgitation.  Has right-sided heart failure with severe pulmonary hypertension.    Continue aggressive diuresis with IV Lasix overall improved but still with significant volume overload  Monitor intake and output renal function electrolytes  Briscoe catheter placed for urinary retention    Acute urinary retention  Assessment & Plan  Briscoe catheter placed    Outpatient follow-up with urology after discharge    Acute kidney injury (HCC)  Assessment & Plan  Presents with acute kidney injury.  Reports oliguria since June 24.  creatinine 1.78 (baseline 0.7-0.8), GFR 28, BUN 44, BUN/Cr >20  Likely postrenal in the setting of decreased urine output and concern for acute urinary retention.    Briscoe catheter placed  Monitor renal function with diuresis  No hydronephrosis on ultrasound          Pulmonary hypertension (HCC)- (present on admission)  Assessment & Plan  Continue IV diuresis worsening on recent echocardiogram  Previous VQ scan in 2022 low probability  Recent lower extremity duplex reviewed negative for DVT    Malignant neoplasm of urinary bladder (HCC)- (present on admission)  Assessment & Plan  History of bladder cancer.  S/p intravesical valrubicin X6.  Has yearly cystoscopies for surveillance.  Was last seen in March with no evidence of recurrence per patient    Thrombocytosis- (present on admission)  Assessment & Plan  History of thrombocytosis and myelofibrosis.  Receiving anagrelide 0.5 mg BID at home.  Established with oncology follow-up as outpatient  Monitor CBC         VTE prophylaxis:    enoxaparin ppx      I have performed a physical exam and reviewed and updated ROS and Plan today (7/6/2025). In review of yesterday's note (7/5/2025), there are no changes except as documented above.

## 2025-07-06 NOTE — CARE PLAN
The patient is Stable - Low risk of patient condition declining or worsening    Shift Goals  Clinical Goals: Diuresis , ambulation, safety  Patient Goals: sleep  Family Goals: ANNIKA    Progress made toward(s) clinical / shift goals:    Problem: Knowledge Deficit - Standard  Goal: Patient and family/care givers will demonstrate understanding of plan of care, disease process/condition, diagnostic tests and medications  Description: Target End Date:  1-3 days or as soon as patient condition allows    Document in Patient Education    1.  Patient and family/caregiver oriented to unit, equipment, visitation policy and means for communicating concern  2.  Complete/review Learning Assessment  3.  Assess knowledge level of disease process/condition, treatment plan, diagnostic tests and medications  4.  Explain disease process/condition, treatment plan, diagnostic tests and medications  Outcome: Progressing     Problem: Fall Risk  Goal: Patient will remain free from falls  Description: Target End Date:  Prior to discharge or change in level of care    Document interventions on the [facility] Fall Risk Assessment    1.  Assess for fall risk factors  2.  Implement fall precautions  Outcome: Progressing     Problem: Communication  Goal: The ability to communicate needs accurately and effectively will improve  Outcome: Progressing     Problem: Safety  Goal: Will remain free from injury  Outcome: Progressing  Goal: Will remain free from falls  Outcome: Progressing     Problem: Psychosocial Needs:  Goal: Level of anxiety will decrease  Outcome: Progressing     Problem: Fluid Volume:  Goal: Will maintain balanced intake and output  Outcome: Progressing     Problem: Skin Integrity  Goal: Risk for impaired skin integrity will decrease  Outcome: Progressing     Problem: Mobility  Goal: Risk for activity intolerance will decrease  Outcome: Progressing

## 2025-07-06 NOTE — CARE PLAN
The patient is Stable - Low risk of patient condition declining or worsening    Shift Goals  Clinical Goals: diurese, ambulate  Patient Goals: sleep  Family Goals: ANNIKA    Progress made toward(s) clinical / shift goals:      Problem: Knowledge Deficit  Goal: Knowledge of disease process/condition, treatment plan, diagnostic tests, and medications will improve  Outcome: Progressing  Note: Plan of care d/w patient.     Problem: Pain - Standard  Goal: Alleviation of pain or a reduction in pain to the patient’s comfort goal  Outcome: Progressing  Note: No s/s pain.       Patient is not progressing towards the following goals:

## 2025-07-07 VITALS
SYSTOLIC BLOOD PRESSURE: 99 MMHG | WEIGHT: 194 LBS | DIASTOLIC BLOOD PRESSURE: 53 MMHG | BODY MASS INDEX: 30.45 KG/M2 | TEMPERATURE: 98 F | HEART RATE: 94 BPM | RESPIRATION RATE: 17 BRPM | OXYGEN SATURATION: 90 % | HEIGHT: 67 IN

## 2025-07-07 LAB
ANION GAP SERPL CALC-SCNC: 14 MMOL/L (ref 7–16)
BUN SERPL-MCNC: 38 MG/DL (ref 8–22)
CALCIUM SERPL-MCNC: 9 MG/DL (ref 8.5–10.5)
CHLORIDE SERPL-SCNC: 104 MMOL/L (ref 96–112)
CO2 SERPL-SCNC: 25 MMOL/L (ref 20–33)
CREAT SERPL-MCNC: 1.32 MG/DL (ref 0.5–1.4)
GFR SERPLBLD CREATININE-BSD FMLA CKD-EPI: 40 ML/MIN/1.73 M 2
GLUCOSE SERPL-MCNC: 86 MG/DL (ref 65–99)
MAGNESIUM SERPL-MCNC: 2.4 MG/DL (ref 1.5–2.5)
POTASSIUM SERPL-SCNC: 4.4 MMOL/L (ref 3.6–5.5)
SODIUM SERPL-SCNC: 143 MMOL/L (ref 135–145)

## 2025-07-07 PROCEDURE — A9270 NON-COVERED ITEM OR SERVICE: HCPCS | Performed by: HOSPITALIST

## 2025-07-07 PROCEDURE — 700102 HCHG RX REV CODE 250 W/ 637 OVERRIDE(OP): Performed by: HOSPITALIST

## 2025-07-07 PROCEDURE — 80048 BASIC METABOLIC PNL TOTAL CA: CPT

## 2025-07-07 PROCEDURE — 83735 ASSAY OF MAGNESIUM: CPT

## 2025-07-07 PROCEDURE — 700111 HCHG RX REV CODE 636 W/ 250 OVERRIDE (IP)

## 2025-07-07 PROCEDURE — A9270 NON-COVERED ITEM OR SERVICE: HCPCS

## 2025-07-07 PROCEDURE — 700102 HCHG RX REV CODE 250 W/ 637 OVERRIDE(OP)

## 2025-07-07 RX ORDER — POTASSIUM CHLORIDE 1500 MG/1
20 TABLET, EXTENDED RELEASE ORAL DAILY
Qty: 30 TABLET | Refills: 0 | Status: SHIPPED | OUTPATIENT
Start: 2025-07-08 | End: 2025-07-23 | Stop reason: SDUPTHER

## 2025-07-07 RX ORDER — ACETAMINOPHEN 325 MG/1
650 TABLET ORAL EVERY 6 HOURS PRN
COMMUNITY
Start: 2025-07-07

## 2025-07-07 RX ADMIN — POTASSIUM CHLORIDE 20 MEQ: 1500 TABLET, EXTENDED RELEASE ORAL at 04:38

## 2025-07-07 RX ADMIN — FUROSEMIDE 60 MG: 10 INJECTION, SOLUTION INTRAVENOUS at 04:38

## 2025-07-07 RX ADMIN — HEPARIN SODIUM 5000 UNITS: 5000 INJECTION, SOLUTION INTRAVENOUS; SUBCUTANEOUS at 04:38

## 2025-07-07 RX ADMIN — ACETAMINOPHEN 650 MG: 325 TABLET ORAL at 04:12

## 2025-07-07 ASSESSMENT — PAIN DESCRIPTION - PAIN TYPE: TYPE: ACUTE PAIN

## 2025-07-07 ASSESSMENT — FIBROSIS 4 INDEX: FIB4 SCORE: 0.7

## 2025-07-07 NOTE — DISCHARGE PLANNING
Care Transition Team Assessment    Patient is a 84 year-old female admitted for Acute on chronic right-sided congestive heart failure. Please see patient's H&P for prior medical history. LMSW met with patient at bedside to complete assessment. Patient is A&Ox4 and able to verify the information on the face sheet. Patient lives with a house mate in a 2 story house with 2 steps to enter, house mate Anna Rachel (p) [phone];  emergency contact. Advance Directive on file. Prior to admission patient is independent with ADL's and IADL’s. Patient does not use any DME at baseline. Patient reported that her house mate is good support for when medically clear to discharge home. Patient receives monthly SSI deposits of $3000. The patient's PCP is Dr. Jay Tsang. Patient's preferred pharmacy is [facility] in [place] [facility] for small things and mail order for bigger things. Patient denies a history of SNF/IPR nor HHC use in the past. Patient denies any SA or MH concerns. Patient's confirmed medical coverage via Medicare with secondary [place]. Patient has means of transportation when medically cleared and Anna Rachel will provide transport once stable for discharge.     Addendum @ 1128 to add cardiologist follow up has been scheduled after discharge.     Information Source  Orientation Level: Oriented X4  Information Given By: Patient  Who is responsible for making decisions for patient? : Patient    Readmission Evaluation  Is this a readmission?: No    Elopement Risk  Legal Hold: No  Ambulatory or Self Mobile in Wheelchair: Yes  Disoriented: No  Psychiatric Symptoms: None  History of Wandering: No  Elopement this Admit: No  Vocalizing Wanting to Leave: No  Displays Behaviors, Body Language Wanting to Leave: No-Not at Risk for Elopement  Elopement Risk: Not at Risk for Elopement    Interdisciplinary Discharge Planning  Lives with - Patient's Self Care Capacity: Friends  Patient or legal guardian wants to designate a  caregiver: No  Support Systems: Friends / Neighbors, Children  Housing / Facility: 2 Story House    Discharge Preparedness  What is your plan after discharge?: Home with help  What are your discharge supports?: Other (comment) (Patient reports that she has a housemate that can help with support)  Prior Functional Level: Ambulatory  Difficulity with ADLs: None  Difficulity with IADLs: None    Functional Assesment  Prior Functional Level: Ambulatory    Finances  Financial Barriers to Discharge: No  Prescription Coverage: Yes    Vision / Hearing Impairment  Vision Impairment : No  Hearing Impairment : No    Advance Directive  Advance Directive?: POLST    Domestic Abuse  Have you ever been the victim of abuse or violence?: No  Possible Abuse/Neglect Reported to:: Not Applicable    Psychological Assessment  History of Substance Abuse: None  History of Psychiatric Problems: No  Non-compliant with Treatment: No  Newly Diagnosed Illness: No    Discharge Risks or Barriers  Patient risk factors: Complex medical needs, Lack of outside supports, Vulnerable adult    Anticipated Discharge Information  Discharge Disposition: Discharged to home/self care (01)

## 2025-07-07 NOTE — CARE PLAN
The patient is Watcher - Medium risk of patient condition declining or worsening    Shift Goals  Clinical Goals: dieuresis  Patient Goals: sleep  Family Goals: ANNIKA    Progress made toward(s) clinical / shift goals:    Problem: Fall Risk  Goal: Patient will remain free from falls  Outcome: Progressing     Problem: Venous Thromboembolism (VTW)/Deep Vein Thrombosis (DVT) Prevention:  Goal: Patient will participate in Venous Thrombosis (VTE)/Deep Vein Thrombosis (DVT)Prevention Measures  Outcome: Progressing     Problem: Fluid Volume:  Goal: Will maintain balanced intake and output  Outcome: Progressing

## 2025-07-07 NOTE — PROGRESS NOTES
Telemetry Shift Summary    Rhythm SR  HR Range 80-92  Ectopy rare pvcs  Measurements 0.17/0.07/0.39        Normal Values  Rhythm SR  HR Range    Measurements 0.12-0.20 / 0.06-0.10  / 0.30-0.52

## 2025-07-07 NOTE — PROGRESS NOTES
Discharge orders received.  Patient arrived to the discharge lounge.  PIV removed by bedside RN prior to arrival. AVS instructions given, medications reviewed and general discharge education provided to patient.  Follow up appointments discussed.  Patient verbalized understanding of dc instructions and prescriptions.  Patient signed discharge instructions.  Patient verbalized understanding and had all belongings with her.  Patient left with ride.  Wished patient a speedy recovery.

## 2025-07-07 NOTE — PROGRESS NOTES
Bedside shift report received from derrick ayon. Pt alert and oriented on room air, visitor at bedside, call bell within reach, bed alarm set. No other complaints at this time.

## 2025-07-07 NOTE — DISCHARGE SUMMARY
Discharge Summary    CHIEF COMPLAINT ON ADMISSION  Chief Complaint   Patient presents with    Leg Swelling     Excessive peripheral edema to BL legs, pt states gained 30+ lbs since May 6th, taking diuretics with recent dosage change, decreased urination, hx bladder cancer       Reason for Admission  Bilateral Swelling     Admission Date  7/3/2025    CODE STATUS  Full Code    HPI & HOSPITAL COURSE    Dedra Lobo is a 84 y.o. 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  States worsening lower extremity swelling over the past 3-4 weeks, also notes having decreased urine output since June 24.  Patient states she has gained about 35 pounds since May 6.  She recently saw her primary care provider who increased her torsemide from 20 to 40 and started spironolactone 25 mg daily.  States she started taking these medications 2 days ago.  Also as she does not pee as much and drank 45 ounces of water today with an output of only 7 ounces.  Also reports having orthopnea, dyspnea, back pain which is chronic.  Denies chest pain, dizziness, nausea, vomiting, diarrhea, constipation, fever, chills, PND, urinary symptoms except for oliguria.  Does not wear oxygen at baseline.  Echo from 06/2025 with LVEF 60-65%, moderate tricuspid regurgitation.  RVSP 83    Patient was admitted she had Briscoe catheter placed for urinary retention and started on IV Lasix with good diuresis and improvement in her renal function and edema.  She is -6.7 L since admission and is clinically improved.  She is stable to transition to p.o. Demadex and discharged with close outpatient follow-up with her PCP and cardiology.  I reviewed with the patient that she will need repeat chemistry panel at follow-up and daily monitoring of her  weights and abstaining from NSAIDs and restricting sodium in her diet.        Therefore, she is discharged in good and stable condition to home with close outpatient follow-up.    The patient met 2-midnight criteria for an inpatient stay at the time of discharge.    Discharge Date  7/7/2025    FOLLOW UP ITEMS POST DISCHARGE  Follow-up with PCP and cardiology  Repeat chemistry panel at follow-up and adjust diuretic therapy accordingly  Follow-up with [facility] [place] Dr. Calvillo and consider voiding trial at follow-up    DISCHARGE DIAGNOSES  Principal Problem:    Acute on chronic right-sided congestive heart failure (HCC) (POA: Yes)  Active Problems:    Thrombocytosis (POA: Yes)      Overview: follows with Dr. Foley (heme-onc), previously on hydroxyurea, now on       anagrelide    Malignant neoplasm of urinary bladder (HCC) (POA: Yes)      Overview: in 2015 s/p intravesical Valrubicin x6. followed by Dr. Calvillo with       [facility] [place], has at least yearly cystoscopies for surveillance            Malignant neoplasm of bladder, unspecified    Pulmonary hypertension (HCC) (POA: Yes)      Overview: TTE 11/2/2022 shows:       - hyperdynamic LV systolic function with EF >75%       - moderately dilated RV with normal systolic function       - mild-moderate mitral stenosis       - moderate-severe tricuspid regurgitation       - eRVSP 95 mmHg, consistent with severe pulmonary hypertension             V/Q 11/2022: low probability for PE    Acute kidney injury (HCC) (POA: Unknown)    Acute urinary retention (POA: Unknown)  Resolved Problems:    Right-sided heart failure (HCC) (POA: Yes)      Overview: TTE 11/2/2022 shows:       - hyperdynamic LV systolic function with EF >75%       - moderately dilated RV with normal systolic function       - mild-moderate mitral stenosis       - moderate-severe tricuspid regurgitation       - eRVSP 95 mmHg, consistent with severe pulmonary hypertension            Follows with cardiology.  Currently on Torsemide for diuresis      FOLLOW UP  Future Appointments   Date Time Provider Department Center   7/8/2025 10:40 AM JOVANNI Mena [address] [facility]   7/8/2025  1:30 [facility]   7/10/2025 12:30 PM JOVANNI Monson R[facility] None   7/14/2025  2:00 PM JOVANNI Mena [address] [facility]   7/15/2025  1:30 PM [facility]   7/22/2025  2:00 PM [facility]   7/23/2025  2:00 PM JOVANNI Hernandez CARCSOF[facility]A None   7/29/2025  2:00 PM RENOWN IQ INFUSION [facility]   8/5/2025  1:15 PM JOVANNI Hernandez [facility]CSOF[facility]A None   8/5/2025  2:00 PM INFUSION QUICK INJECT [facility]   8/6/2025 11:15 AM Isela Ochoa, PT [address] [place] [address]   8/12/2025  2:00 PM INFUSION QUICK INJECT [facility]   8/13/2025  2:15 PM Isela Ochoa, PT [address] [place] [address]   8/19/2025  2:00 PM INFUSION QUICK INJECT [facility]   8/21/2025 10:40 AM Jay Tsang D.O. [address] [facility]   8/26/2025  2:00 PM INFUSION QUICK INJECT [facility]   8/28/2025 11:15 AM Iselavamsi Ochoa, PT [address] [place] [address]   9/2/2025 11:15 AM Iselavamsi Ochoa, PT [address] [place] [address]   9/9/2025 11:15 AM Iselavamsi Ochoa, PT [address] [place] [address]   10/9/2025 10:30 AM Iselavamsi Ochoa, PT [address] [place] [address]   10/15/2025 11:15 AM Iselavamsi Ochoa, PT [address] [place] [address]   10/22/2025 11:15 AM Isela Ochoa, PT [address] [place] [address]   10/29/2025 11:15 AM Isela Ochoa, PT [address] [place] [address]   11/5/2025 11:15 AM Isela Ochoa, PT [address] [place] [address]   11/13/2025 12:45 PM JOVANNI George CARCB None     Jay Tsang D.O.  [address] [facility] [address]  [facility] [id number] [phone]    Follow up      Óscar Calvillo M.D.  [address]  [facility] [address]  [phone]    Follow up        MEDICATIONS ON DISCHARGE     Medication List        START taking these medications        Instructions   acetaminophen 325 MG  Tabs  Commonly known as: Tylenol   Take 2 Tablets by mouth every 6 hours as needed for Mild Pain or Moderate Pain.  Dose: 650 mg     potassium chloride SA 20 MEQ Tbcr  Start taking on: July 8, 2025  Commonly known as: Kdur   Take 1 Tablet by mouth every day.  Dose: 20 mEq            CHANGE how you take these medications        Instructions   Torsemide 40 MG Tabs  What changed:   medication strength  how much to take   Take 40 mg by mouth every day.  Dose: 40 mg            CONTINUE taking these medications        Instructions   anagrelide 0.5 MG Caps  Commonly known as: Agrylin   Take 1 Capsule by mouth 2 times a day.  Dose: 0.5 mg     Retacrit 86261 UNIT/ML Soln  Generic drug: epoetin   Inject 40,000 Units under the skin every 7 days.  Dose: 40,000 Units            STOP taking these medications      Aleve 220 MG tablet  Generic drug: naproxen     spironolactone 25 MG Tabs  Commonly known as: Aldactone              Allergies  Allergies[1]    DIET  Orders Placed This Encounter   Procedures    Diet Order Diet: Cardiac     Standing Status:   Standing     Number of Occurrences:   1     Diet::   Cardiac [6]       ACTIVITY  As tolerated.  Weight bearing as tolerated         LABORATORY  Lab Results   Component Value Date    SODIUM 143 07/07/2025    POTASSIUM 4.4 07/07/2025    CHLORIDE 104 07/07/2025    CO2 25 07/07/2025    GLUCOSE 86 07/07/2025    BUN 38 (H) 07/07/2025    CREATININE 1.32 07/07/2025    CREATININE 0.90 10/08/2010    GLOMRATE >59 10/08/2010        Lab Results   Component Value Date    WBC 8.0 07/06/2025    HEMOGLOBIN 9.3 (L) 07/06/2025    HEMATOCRIT 30.9 (L) 07/06/2025    PLATELETCT 508 (H) 07/06/2025        Total time of the discharge process exceeds 35 minutes.       [1] No Known Allergies

## 2025-07-08 ENCOUNTER — OFFICE VISIT (OUTPATIENT)
Dept: MEDICAL GROUP | Facility: MEDICAL CENTER | Age: 85
End: 2025-07-08
Payer: MEDICARE

## 2025-07-08 ENCOUNTER — OUTPATIENT INFUSION SERVICES (OUTPATIENT)
Dept: ONCOLOGY | Facility: MEDICAL CENTER | Age: 85
End: 2025-07-08
Attending: INTERNAL MEDICINE
Payer: MEDICARE

## 2025-07-08 VITALS
RESPIRATION RATE: 15 BRPM | HEART RATE: 93 BPM | OXYGEN SATURATION: 98 % | HEIGHT: 67 IN | TEMPERATURE: 97.6 F | BODY MASS INDEX: 31.11 KG/M2 | WEIGHT: 198.2 LBS | SYSTOLIC BLOOD PRESSURE: 106 MMHG | DIASTOLIC BLOOD PRESSURE: 52 MMHG

## 2025-07-08 VITALS
BODY MASS INDEX: 30.93 KG/M2 | HEIGHT: 67 IN | WEIGHT: 197.09 LBS | HEART RATE: 100 BPM | OXYGEN SATURATION: 92 % | DIASTOLIC BLOOD PRESSURE: 61 MMHG | TEMPERATURE: 97.6 F | SYSTOLIC BLOOD PRESSURE: 107 MMHG | RESPIRATION RATE: 16 BRPM

## 2025-07-08 DIAGNOSIS — I50.32 CHRONIC HEART FAILURE WITH PRESERVED EJECTION FRACTION (HCC): ICD-10-CM

## 2025-07-08 DIAGNOSIS — D63.0 ANEMIA IN NEOPLASTIC DISEASE: Primary | ICD-10-CM

## 2025-07-08 DIAGNOSIS — I50.813 ACUTE ON CHRONIC RIGHT-SIDED CONGESTIVE HEART FAILURE (HCC): ICD-10-CM

## 2025-07-08 DIAGNOSIS — D75.81 MYELOFIBROSIS (HCC): ICD-10-CM

## 2025-07-08 DIAGNOSIS — R60.0 LOWER EXTREMITY EDEMA: ICD-10-CM

## 2025-07-08 DIAGNOSIS — R06.00 DYSPNEA, UNSPECIFIED TYPE: ICD-10-CM

## 2025-07-08 DIAGNOSIS — I36.1 NONRHEUMATIC TRICUSPID VALVE REGURGITATION: ICD-10-CM

## 2025-07-08 DIAGNOSIS — Z09 HOSPITAL DISCHARGE FOLLOW-UP: Primary | ICD-10-CM

## 2025-07-08 PROCEDURE — 700111 HCHG RX REV CODE 636 W/ 250 OVERRIDE (IP): Mod: JZ,TB | Performed by: INTERNAL MEDICINE

## 2025-07-08 PROCEDURE — 96372 THER/PROPH/DIAG INJ SC/IM: CPT

## 2025-07-08 RX ADMIN — EPOETIN ALFA-EPBX 40000 UNITS: 40000 INJECTION, SOLUTION INTRAVENOUS; SUBCUTANEOUS at 14:08

## 2025-07-08 ASSESSMENT — FIBROSIS 4 INDEX
FIB4 SCORE: 0.7
FIB4 SCORE: 0.7

## 2025-07-08 NOTE — PROGRESS NOTES
Dedra presents to [facility] for labs/ possible Retacrit injection.  Dedra reports recent discharge from hospital for bilateral leg swelling, reports improvement.  Telephone order received from Dr Negrita ANTON to use Labs collected 7/6/2023      Hgb 9.3, parameters met for Retacrit.  Retacrit injection given to back right arm, adhesive dressing applied. Dedra tolerated well.  Discharged in NAD, next appointment confirmed.

## 2025-07-08 NOTE — PROGRESS NOTES
Subjective:     Dedra Lobo is a 84 y.o. female who presents for Hospital Follow-up.    Transitional Care Management      No TCM follow up call required, seen within 2 days of discharge.     History of Present Illness  The patient presents for a follow-up after a recent hospitalization from July 3, 2025, to July 7, 2025, due to lower extremity edema.    The patient reported that the edema had progressively worsened over the past 3 to 4 weeks, accompanied by oliguria since June 24, 2025. She was evaluated in the office by a colleague on July 1, 2025, who increased her torsemide dosage from 20 mg daily to 40 mg daily and initiated spironolactone at 25 mg daily. She commenced the medications two days prior to her hospital admission. Her daily fluid intake is approximately 45 ounces. Additionally, she reports orthopnea, dyspnea, and chronic dorsalgia. She denies experiencing angina, vertigo, nausea, emesis, diarrhea, constipation, pyrexia, or chills. She does not require supplemental oxygen at baseline. During her hospitalization, a Briscoe catheter was placed due to urinary retention, and she was administered intravenous furosemide, resulting in effective diuresis, improved renal function, and reduced edema. She experienced a fluid loss of 6.7 liters during her hospital stay. She was transitioned to oral torsemide and discharged with instructions for close outpatient follow-up. She has an upcoming appointment July 23, 2025 with cardiology. The patient experiences dyspnea when supine but not when seated or during physical exertion.    The patient reports symptomatic improvement but notes ecchymosis at the IV sites. She receives infusions every Tuesday and has observed a weight gain of 2 to 4 pounds post-infusion. She consulted Dr. Orr last Tuesday regarding her weight gain and was prescribed spironolactone. She was advised to seek emergency care if she did not void by the following day. She was admitted to the  [facility] on July 3, 2025, around midnight, and was administered furosemide and a Briscoe catheter. She experienced a weight loss of 9 pounds during her hospital stay. She did not take her torsemide today due to her appointments and frequent micturition. She underwent urinalysis at Dr. Oseguera's office this morning and has not voided independently yet. She will return after her infusion today for another urinalysis. Her renal function is improving, and she was informed that her bladder might be distended enough to necessitate catheterization again.    She finds compression stockings painful but uses diabetic socks for comfort and support. She also utilizes a pneumatic compression device for her legs. She has been attempting to increase her physical activity and is mindful of her sodium intake. She monitors her blood pressure at home using a cuff. Her current medications include torsemide 40 mg daily and spironolactone 25 mg daily.    The patient has a history of bladder carcinoma, status post valrubicin x6.    She has myelofibrosis and receives Retacrit injections every Tuesday if her hematologic parameters are favorable.    She has pulmonary hypertension.    She has heart failure with preserved ejection fraction (HFpEF).    She has tricuspid regurgitation.      Current medicines (including reconciliation performed today)  Current Outpatient Medications   Medication Sig Dispense Refill    torsemide 40 MG Tab Take 40 mg by mouth every day. 30 Tablet 0    acetaminophen (TYLENOL) 325 MG Tab Take 2 Tablets by mouth every 6 hours as needed for Mild Pain or Moderate Pain.      potassium chloride SA (KDUR) 20 MEQ Tab CR Take 1 Tablet by mouth every day. 30 Tablet 0    epoetin (RETACRIT) 30516 UNIT/ML Solution Inject 40,000 Units under the skin every 7 days.      anagrelide (AGRYLIN) 0.5 MG Cap Take 1 Capsule by mouth 2 times a day.       No current facility-administered medications for this visit.       Allergies:   Patient  "has no known allergies.    Social History     Tobacco Use    Smoking status: Never    Smokeless tobacco: Never   Vaping Use    Vaping status: Never Used   Substance Use Topics    Alcohol use: No     Alcohol/week: 0.0 oz    Drug use: No       ROS:  See HPI    Objective:     Vitals:    07/08/25 1009   BP: 106/52   Pulse: 93   Resp: 15   Temp: 36.4 °C (97.6 °F)   TempSrc: Temporal   SpO2: 98%   Weight: 89.9 kg (198 lb 3.2 oz)   Height: 1.702 m (5' 7\")     Body mass index is 31.04 kg/m².    Physical Exam:  Physical Exam  Constitutional:       Appearance: Normal appearance.   Eyes:      Pupils: Pupils are equal, round, and reactive to light.   Cardiovascular:      Rate and Rhythm: Normal rate and regular rhythm.      Pulses: Normal pulses.      Heart sounds: Normal heart sounds.   Pulmonary:      Effort: Pulmonary effort is normal.      Breath sounds: Normal breath sounds.   Abdominal:      General: Bowel sounds are normal.      Palpations: Abdomen is soft.   Neurological:      Mental Status: She is alert and oriented to person, place, and time.   Psychiatric:         Mood and Affect: Mood normal.         Behavior: Behavior normal.           Assessment & Plan     1. Hospital discharge follow-up  2. Acute on chronic right-sided congestive heart failure (HCC)  3. Chronic heart failure with preserved ejection fraction (HCC)  4. Dyspnea, unspecified type  5. Lower extremity edema  6. Nonrheumatic tricuspid valve regurgitation  Chronic, stable- improving  Hospitalized from 07/03/2025 through 07/07/2025 for lower extremity swelling and significant weight gain of approximately 30 lbs.  Treatment plan: Started on IV Lasix during hospital stay, transitioned to oral Demadex, which has helped. Current medications include torsemide 40 mg daily and spironolactone 25 mg daily. Reports improvement but still experiences shortness of breath when lying down. Advised to monitor weight daily, maintain a log for upcoming appointment with " ERNESTO Pickett on 07/23/2025, monitor blood pressure a few times a week, use leg compression machine, reduce salt intake, and use salt-free spices like Mrs. Evans. Seek immediate medical attention if weight gain of 2 to 3 pounds in a day or 5 pounds in a week, or if shortness of breath worsens.    7. Myelofibrosis (HCC)  Chronic, stable  History of myelofibrosis and under palliative care.  Treatment plan: Appointment with palliative care on 07/10/2025.      - Chart and discharge summary were reviewed.   - Hospitalization and results reviewed with patient.   - Medications reviewed including instructions regarding high risk medications, dosing and side effects.  - Recommended Services: No services needed at this time  - Advance directive/POLST on file?  Yes    Follow-up:Return if symptoms worsen or fail to improve.    Face-to-face transitional care management services with HIGH (today's visit is within days post discharge & LACE+ score 59+) medical decision complexity were provided.

## 2025-07-15 ENCOUNTER — OUTPATIENT INFUSION SERVICES (OUTPATIENT)
Dept: ONCOLOGY | Facility: MEDICAL CENTER | Age: 85
End: 2025-07-15
Attending: INTERNAL MEDICINE
Payer: MEDICARE

## 2025-07-15 VITALS
DIASTOLIC BLOOD PRESSURE: 59 MMHG | HEART RATE: 99 BPM | WEIGHT: 197.31 LBS | TEMPERATURE: 98.1 F | HEIGHT: 67 IN | BODY MASS INDEX: 30.97 KG/M2 | SYSTOLIC BLOOD PRESSURE: 108 MMHG | RESPIRATION RATE: 16 BRPM | OXYGEN SATURATION: 89 %

## 2025-07-15 DIAGNOSIS — D63.0 ANEMIA IN NEOPLASTIC DISEASE: ICD-10-CM

## 2025-07-15 DIAGNOSIS — D63.0 ANEMIA ASSOCIATED WITH MALIGNANT NEOPLASTIC DISEASE (HCC): ICD-10-CM

## 2025-07-15 DIAGNOSIS — D75.81 MYELOFIBROSIS (HCC): Primary | ICD-10-CM

## 2025-07-15 DIAGNOSIS — C80.1 ANEMIA ASSOCIATED WITH MALIGNANT NEOPLASTIC DISEASE (HCC): ICD-10-CM

## 2025-07-15 LAB
HCT VFR BLD AUTO: 28.6 % (ref 37–47)
HGB BLD-MCNC: 8.8 G/DL (ref 12–16)

## 2025-07-15 PROCEDURE — 700111 HCHG RX REV CODE 636 W/ 250 OVERRIDE (IP): Mod: JZ,TB | Performed by: INTERNAL MEDICINE

## 2025-07-15 PROCEDURE — 85014 HEMATOCRIT: CPT

## 2025-07-15 PROCEDURE — 96372 THER/PROPH/DIAG INJ SC/IM: CPT

## 2025-07-15 PROCEDURE — 36415 COLL VENOUS BLD VENIPUNCTURE: CPT

## 2025-07-15 PROCEDURE — 85018 HEMOGLOBIN: CPT

## 2025-07-15 RX ADMIN — EPOETIN ALFA-EPBX 40000 UNITS: 40000 INJECTION, SOLUTION INTRAVENOUS; SUBCUTANEOUS at 14:16

## 2025-07-15 ASSESSMENT — FIBROSIS 4 INDEX: FIB4 SCORE: 0.71

## 2025-07-15 NOTE — PROGRESS NOTES
Dedra to infusion for weekly labs and possible Retacrit injection. Reports feeling well today; BLE edema 3-4+ present up to thighs, markedly improved and pt is keeping log of her daily weights for cardiologist.     Labs drawn via venipuncture to CONNER SPENCER.    Labs reviewed by RN, Hgb: 8.8, within parameters for treatment today.     Retacrit given SQ in back of L upper arm, patient tolerated well with no adverse effects.    Patient left in stable condition, knows when to return for next appt.    Setting call date for future fills.    Last filled: 12/13/19    Next Call Date: 01/13/20    Zuly Sampson CPhT  [facility]  [phone]

## 2025-07-22 ENCOUNTER — OUTPATIENT INFUSION SERVICES (OUTPATIENT)
Dept: ONCOLOGY | Facility: MEDICAL CENTER | Age: 85
End: 2025-07-22
Attending: INTERNAL MEDICINE
Payer: MEDICARE

## 2025-07-22 VITALS
HEART RATE: 104 BPM | HEIGHT: 67 IN | DIASTOLIC BLOOD PRESSURE: 66 MMHG | OXYGEN SATURATION: 89 % | WEIGHT: 199.52 LBS | RESPIRATION RATE: 18 BRPM | SYSTOLIC BLOOD PRESSURE: 117 MMHG | BODY MASS INDEX: 31.31 KG/M2 | TEMPERATURE: 97.2 F

## 2025-07-22 DIAGNOSIS — D75.81 MYELOFIBROSIS (HCC): Primary | ICD-10-CM

## 2025-07-22 DIAGNOSIS — D63.0 ANEMIA ASSOCIATED WITH MALIGNANT NEOPLASTIC DISEASE (HCC): ICD-10-CM

## 2025-07-22 DIAGNOSIS — D63.0 ANEMIA IN NEOPLASTIC DISEASE: ICD-10-CM

## 2025-07-22 DIAGNOSIS — C80.1 ANEMIA ASSOCIATED WITH MALIGNANT NEOPLASTIC DISEASE (HCC): ICD-10-CM

## 2025-07-22 LAB
HCT VFR BLD AUTO: 29.5 % (ref 37–47)
HGB BLD-MCNC: 9.2 G/DL (ref 12–16)

## 2025-07-22 PROCEDURE — 96372 THER/PROPH/DIAG INJ SC/IM: CPT

## 2025-07-22 PROCEDURE — 85018 HEMOGLOBIN: CPT

## 2025-07-22 PROCEDURE — 85014 HEMATOCRIT: CPT

## 2025-07-22 PROCEDURE — 36415 COLL VENOUS BLD VENIPUNCTURE: CPT

## 2025-07-22 PROCEDURE — 700111 HCHG RX REV CODE 636 W/ 250 OVERRIDE (IP): Mod: JZ,TB | Performed by: INTERNAL MEDICINE

## 2025-07-22 RX ADMIN — EPOETIN ALFA-EPBX 40000 UNITS: 40000 INJECTION, SOLUTION INTRAVENOUS; SUBCUTANEOUS at 14:58

## 2025-07-22 ASSESSMENT — FIBROSIS 4 INDEX: FIB4 SCORE: 0.71

## 2025-07-22 NOTE — PROGRESS NOTES
Pt arrives to IS for Retacrit injection.  Pt reports chronic fatigue and weakness to BLE.  Labs drawn via 23g butterfly needle to R-AC.  Hemoglobin is 9.2 today.  Results meet MD parameters for Retacrit.  Retacrit given SC to back of [facility].  Confirmed next appt time on 7/29/2025 with pt.  Pt dc home to self care.

## 2025-07-23 ENCOUNTER — OFFICE VISIT (OUTPATIENT)
Dept: CARDIOLOGY | Facility: MEDICAL CENTER | Age: 85
End: 2025-07-23
Attending: NURSE PRACTITIONER
Payer: MEDICARE

## 2025-07-23 VITALS
SYSTOLIC BLOOD PRESSURE: 102 MMHG | RESPIRATION RATE: 18 BRPM | HEIGHT: 67 IN | WEIGHT: 201 LBS | BODY MASS INDEX: 31.55 KG/M2 | OXYGEN SATURATION: 95 % | DIASTOLIC BLOOD PRESSURE: 58 MMHG | HEART RATE: 97 BPM

## 2025-07-23 DIAGNOSIS — I50.32 CHRONIC HEART FAILURE WITH PRESERVED EJECTION FRACTION (HCC): Primary | ICD-10-CM

## 2025-07-23 DIAGNOSIS — I50.9 HEART FAILURE, NYHA CLASS 3 (HCC): ICD-10-CM

## 2025-07-23 DIAGNOSIS — R06.02 SOB (SHORTNESS OF BREATH): ICD-10-CM

## 2025-07-23 PROCEDURE — 99214 OFFICE O/P EST MOD 30 MIN: CPT | Performed by: NURSE PRACTITIONER

## 2025-07-23 PROCEDURE — 93005 ELECTROCARDIOGRAM TRACING: CPT | Mod: TC | Performed by: NURSE PRACTITIONER

## 2025-07-23 RX ORDER — POTASSIUM CHLORIDE 1500 MG/1
20 TABLET, EXTENDED RELEASE ORAL 2 TIMES DAILY
Qty: 180 TABLET | Refills: 3 | Status: SHIPPED | OUTPATIENT
Start: 2025-07-23

## 2025-07-23 RX ORDER — TORSEMIDE 20 MG/1
40 TABLET ORAL 2 TIMES DAILY
Qty: 360 TABLET | Refills: 3 | Status: SHIPPED | OUTPATIENT
Start: 2025-07-23

## 2025-07-23 ASSESSMENT — MINNESOTA LIVING WITH HEART FAILURE QUESTIONNAIRE (MLHF)
DIFFICULTY WORKING TO EARN A LIVING: 0
FEELING LIKE A BURDEN TO FAMILY AND FRIENDS: 3
DIFFICULTY TO CONCENTRATE OR REMEMBERING THINGS: 0
DIFFICULTY SOCIALIZING WITH FAMILY OR FRIENDS: 4
EATING LESS FOODS YOU LIKE: 4
DIFFICULTY SLEEPING WELL AT NIGHT: 4
GIVING YOU SIDE EFFECTS FROM TREATMENTS: 0
MAKING YOU FEEL DEPRESSED: 0
COSTING YOU MONEY FOR MEDICAL CARE: 0
SWELLING IN ANKLES OR LEGS: 5
WALKING ABOUT OR CLIMBING STAIRS DIFFICULT: 5
MAKING YOU WORRY: 3
HAVING TO SIT OR LIE DOWN DURING THE DAY: 5
MAKING YOU SHORT OF BREATH: 5
MAKING YOU STAY IN A HOSPITAL: 5
LOSS OF SELF CONTROL IN YOUR LIFE: 3
WORKING AROUND THE HOUSE OR YARD DIFFICULT: 4
DIFFICULTY WITH SEXUAL ACTIVITIES: 0
TIRED, FATIGUED OR LOW ON ENERGY: 5
DIFFICULTY GOING AWAY FROM HOME: 4
DIFFICULTY WITH RECREATIONAL PASTIMES, SPORTS, HOBBIES: 3
TOTAL_SCORE: 62

## 2025-07-23 ASSESSMENT — ENCOUNTER SYMPTOMS
PALPITATIONS: 1
SHORTNESS OF BREATH: 1
ABDOMINAL PAIN: 0
FEVER: 0
DIZZINESS: 0
CLAUDICATION: 0
ORTHOPNEA: 0
PND: 1
MYALGIAS: 0
COUGH: 0

## 2025-07-23 ASSESSMENT — 6 MINUTE WALK TEST (6MWT): TOTAL DISTANCE WALKED (METERS): 365

## 2025-07-23 ASSESSMENT — FIBROSIS 4 INDEX: FIB4 SCORE: 0.71

## 2025-07-23 NOTE — PATIENT INSTRUCTIONS
Increase torsemide to 40 mg twice a day     Increase potassium to 20 mEq twice a day     Labs in 1 week

## 2025-07-23 NOTE — PROGRESS NOTES
Chief Complaint   Patient presents with    Other     NP establish care/ DX:Chronic heart failure with preserved ejection fraction       Subjective     Dedra Lobo is a 85 y.o. female who presents today for follow-up on her heart failure with her friend, Anna.    Patient of ERNESTO Luis.  She was last seen in clinic on 5/15/2025 with Waqas.  Since that visit, patient presented to the hospital on 7/4/2025 through 7/7/2025 with lower extremity swelling.  She had an echocardiogram which showed EF of 60 to 65% with moderate tricuspid regurg elevated RVSP at 83 mmHg.  She had a Briscoe catheter placed and was started on IV diuretics with good improvement of edema and renal function.  She was transition to Demadex at discharge with follow-up lab testing.    Since being home, she reports she continues to have lower extremity edema.  She also reports an episode of palpitations, some PND and shortness of breath with ADLs and exertion.    She denies chest pain, orthopnea or dizziness/lightheadedness.    Her weight at discharge was at 194 pounds, and she has been ranging from 194 to 200 pounds.    She is trying to maintain a low-sodium diet.    She reports compliance with her medications.    Additionally, patient has the following medical problems:    -History of bladder cancer, s/p valrubicin X6    -Bone marrow cancer/myelofibrosis, receiving Retacrit injections    -Pulmonary hypertension    Past Medical History[1]  Past Surgical History[2]  Family History   Problem Relation Age of Onset    Cancer Mother         brain    Lung Disease Mother         smoker    Alcohol/Drug Father         etoh    Lung Disease Sister         COPD smokers    Alcohol/Drug Sister         etoh    Lung Disease Sister         smoker-copd    Alcohol/Drug Sister         etoh     Social History     Socioeconomic History    Marital status: Single     Spouse name: Not on file    Number of children: Not on file    Years of education: Not on  file    Highest education level: Not on file   Occupational History    Not on file   Tobacco Use    Smoking status: Never    Smokeless tobacco: Never   Vaping Use    Vaping status: Never Used   Substance and Sexual Activity    Alcohol use: No     Alcohol/week: 0.0 oz    Drug use: No    Sexual activity: Never   Other Topics Concern    Not on file   Social History Narrative    Not on file     Social Drivers of Health     Financial Resource Strain: Not on file   Food Insecurity: No Food Insecurity (7/4/2025)    Hunger Vital Sign     Worried About Running Out of Food in the Last Year: Never true     Ran Out of Food in the Last Year: Never true   Transportation Needs: No Transportation Needs (7/4/2025)    PRAPARE - Transportation     Lack of Transportation (Medical): No     Lack of Transportation (Non-Medical): No   Physical Activity: Not on file   Stress: Not on file   Social Connections: Not on file   Intimate Partner Violence: Not At Risk (7/4/2025)    Humiliation, Afraid, Rape, and Kick questionnaire     Fear of Current or Ex-Partner: No     Emotionally Abused: No     Physically Abused: No     Sexually Abused: No   Housing Stability: Low Risk  (7/4/2025)    Housing Stability Vital Sign     Unable to Pay for Housing in the Last Year: No     Number of Times Moved in the Last Year: 0     Homeless in the Last Year: No     Allergies[3]  Encounter Medications[4]  Review of Systems   Constitutional:  Negative for fever and malaise/fatigue.   Respiratory:  Positive for shortness of breath. Negative for cough.    Cardiovascular:  Positive for palpitations (1 episode), leg swelling and PND (occ). Negative for chest pain, orthopnea and claudication.   Gastrointestinal:  Negative for abdominal pain.   Musculoskeletal:  Negative for myalgias.        Leg pain    Neurological:  Negative for dizziness.   All other systems reviewed and are negative.             Objective     /58 (BP Location: Left arm, Patient Position:  "Sitting, BP Cuff Size: Adult)   Pulse 97   Resp 18   Ht 1.71 m (5' 7.32\")   Wt 91.2 kg (201 lb)   SpO2 95%   BMI 31.18 kg/m²     Physical Exam  Vitals reviewed.   Constitutional:       Appearance: She is well-developed.   HENT:      Head: Normocephalic and atraumatic.   Eyes:      Pupils: Pupils are equal, round, and reactive to light.   Neck:      Vascular: No JVD.   Cardiovascular:      Rate and Rhythm: Normal rate and regular rhythm.      Heart sounds: Normal heart sounds.   Pulmonary:      Effort: Pulmonary effort is normal. No respiratory distress.      Breath sounds: Normal breath sounds. No wheezing or rales.   Abdominal:      General: Bowel sounds are normal.      Palpations: Abdomen is soft.   Musculoskeletal:         General: Normal range of motion.      Cervical back: Normal range of motion and neck supple.      Right lower leg: 3+ Edema present.      Left lower leg: 3+ Edema present.   Skin:     General: Skin is warm and dry.   Neurological:      General: No focal deficit present.      Mental Status: She is alert and oriented to person, place, and time.   Psychiatric:         Behavior: Behavior normal.       Lab Results   Component Value Date/Time    CHOLSTRLTOT 94 (L) 11/26/2024 01:41 PM    LDL 37 11/26/2024 01:41 PM    HDL 36 (A) 11/26/2024 01:41 PM    TRIGLYCERIDE 105 11/26/2024 01:41 PM       Lab Results   Component Value Date/Time    SODIUM 143 07/07/2025 02:05 AM    POTASSIUM 4.4 07/07/2025 02:05 AM    CHLORIDE 104 07/07/2025 02:05 AM    CO2 25 07/07/2025 02:05 AM    GLUCOSE 86 07/07/2025 02:05 AM    BUN 38 (H) 07/07/2025 02:05 AM    CREATININE 1.32 07/07/2025 02:05 AM    CREATININE 0.90 10/08/2010 11:32 AM    BUNCREATRAT 18 10/08/2010 11:32 AM    GLOMRATE >59 10/08/2010 11:32 AM     Lab Results   Component Value Date/Time    ALKPHOSPHAT 67 07/04/2025 01:50 AM    ASTSGOT 18 07/04/2025 01:50 AM    ALTSGPT 17 07/04/2025 01:50 AM    TBILIRUBIN 2.6 (H) 07/04/2025 01:50 AM      NT-proBNP   Date " Value Ref Range Status   07/05/2025 6939 (H) 0 - 125 pg/mL Final   07/03/2025 8505 (H) 0 - 125 pg/mL Final   10/10/2022 1366 (H) 0 - 125 pg/mL Final       Transthoracic Echo Report 11/2/2022  No prior study is available for comparison.   Hyperdynamic left ventricular systolic function.   The left ventricular ejection fraction is visually estimated to be   greater than 75%.   Moderately dilated right ventricle with normal systolic function.  Mild-moderate mitral stenosis. Mean transvalvular gradient is 6.3 mmHg   at a heart rate of  100 BPM, may be overestimated due to hyperdynamic   heart.  Moderate to severe tricuspid regurgitation.   Right ventricular systolic pressure is estimated to be 95  mmHg. Right   heart pressures are consistent with severe pulmonary hypertension.   Small pericardial effusion without evidence of hemodynamic compromise.    Transthoracic Echo Report 5/17/2023  Compared to the prior study on 11/02/2022, pulmonary pressure and TR   have improved.  Normal left ventricular systolic function.  Moderate tricuspid regurgitation.  Right ventricular systolic pressure estimated to be 44 mmHg, consistent   with mild pulmonary hypertension.    Transthoracic Echo Report 6/26/2025  Preserved left ventricular systolic function. The ejection fraction is   measured to be 60-65% by Tobias's biplane.  Mild concentric left ventricular hypertrophy.   The right ventricle is dilated. Reduced right ventricular systolic   function.  Moderate to tricuspid regurgitation. Estimated right ventricular   systolic pressure is 83 mmHg (severe pulmonary hypertension).  Small pericardial effusion without evidence of hemodynamic compromise  Compared to the prior study on 05/17/2023, RV appears more dilated and   pulmonary hypertension is now severe. There is also now a small   pericardial effusion.   Ordering provider is notified of findings.    Date 6MWT ML[facility]   7/23/2025 Not done 64                     Assessment & Plan  "    1. Chronic heart failure with preserved ejection fraction (HCC)  EKG    torsemide (DEMADEX) 20 MG Tab    potassium chloride SA (KDUR) 20 MEQ Tab CR    Basic Metabolic Panel    proBrain Natriuretic Peptide, NT      2. Heart failure, NYHA class 3 (HCC)  torsemide (DEMADEX) 20 MG Tab    potassium chloride SA (KDUR) 20 MEQ Tab CR    Basic Metabolic Panel    proBrain Natriuretic Peptide, NT      3. SOB (shortness of breath)  proBrain Natriuretic Peptide, NT          Medical Decision Making: Today's Assessment/Status/Plan:        HFpEF, Stage C, Class 3, LVEF 60 to 65%, moderate tricuspid regurgitation and pulmonary hypertension with RVSP at 83 mmHg: Patient does exhibit volume overload  - Increase torsemide to 40 mg twice a day  - Increase potassium to 20 mEq twice a day  - BMP, NT proBNP in 1 week  - Reviewed low-sodium diet  - Reinforced s/sx of worsening heart failure with patient and weight monitoring. Pt verbalizes understanding. Pt to call office or RTC if present.    - EKG today shows sinus tach 102    Bone marrow cancer: Defer    FU in clinic in 2 weeks with labs. Sooner if needed.    Patient verbalizes understanding and agrees with the plan of care.     PLEASE NOTE: This Note was created using voice recognition Software. I have made every reasonable attempt to correct obvious errors, but I expect that there are errors of grammar and possibly content that I did not discover before finalizing the note                       [1]   Past Medical History:  Diagnosis Date    Acute exacerbation of chronic heart failure (HCC) 7/3/2025    Adverse effect of anesthesia     core body temp drops per hx.    Anemia     Anesthesia     \"core temperature drops\"    Arrhythmia     Arthritis     oa, neck and hips    Cancer (HCC) 2015    bladder    Cardiomegaly 10/06/2022    Incidental on CXR. Also has lower extremity pitting edema. Will obtain TTE    Cataract 03/21/2019    bilateral no surgery    GERD (gastroesophageal reflux " "disease)     Glaucoma 02/2015    \"beginning\"    Helicobacter pylori gastritis 09/25/2023    History of malignant neoplasm of bladder 05/17/2018    Personal history of malignant neoplasm of bladder      History of primary malignant neoplasm of urinary bladder 05/17/2018    Personal history of malignant neoplasm of bladder    Osteoporosis, unspecified     Pain     neck    Pneumonia 10/2001    Systolic murmur 10/06/2022    Thrombocytosis 09/29/2010    Ulcer     gastric ulcers    Unspecified disorder of thyroid     LOW THYROID LEVELS- TOOK  MEDS    Urinary bladder disorder 2015    bladder cancer    Urinary incontinence 03/21/2019    Vascular insufficiency of limb 2005    right lower extrematy    Vitamin d deficiency 09/29/2010   [2]   Past Surgical History:  Procedure Laterality Date    MI UPPER GI ENDOSCOPY,DIAGNOSIS N/A 10/1/2022    Procedure: GASTROSCOPY;  Surgeon: Ana Gaston D.O.;  Location: [facility];  Service: Gastroenterology    MI UPPER GI ENDOSCOPY,SCLER INJECT N/A 10/1/2022    Procedure: GASTROSCOPY, WITH SCLEROTHERAPY;  Surgeon: Ana Gaston D.O.;  Location: [facility];  Service: Gastroenterology    MI UPPER GI ENDOSCOPY,CTRL BLEED N/A 10/1/2022    Procedure: EGD, WITH CLIP PLACEMENT;  Surgeon: Ana Gaston D.O.;  Location: SUR[facility]ERY [facility];  Service: Gastroenterology    PB TOTAL KNEE ARTHROPLASTY Left 4/3/2019    Procedure: KNEE ARTHROPLASTY TOTAL;  Surgeon: Robert Vazquez M.D.;  Location: [facility];  Service: Orthopedics    CERVICAL DISK AND FUSION ANTERIOR  2/9/2016    Procedure: CERVICAL DISK AND FUSION ANTERIOR  C3-7;  Surgeon: Dusty Rao M.D.;  Location: [facility];  Service:     CYSTOSCOPY  3/3/2015    Performed by Walt Olson M.D. at SURGERY [facility]    TRANS URETHRAL RESECTION BLADDER  3/3/2015    Performed by Walt Olson M.D. at [facility]    HIP REPLACEMENT, TOTAL  2006    Dr. Vazquez    CHOLECYSTECTOMY  2003    " LUMPECTOMY  1982    cyst    ARTHROPLASTY      bi lateral-Dr. Vazquez   [3] No Known Allergies  [4]   Outpatient Encounter Medications as of 7/23/2025   Medication Sig Dispense Refill    torsemide (DEMADEX) 20 MG Tab Take 2 Tablets by mouth 2 times a day. 360 Tablet 3    potassium chloride SA (KDUR) 20 MEQ Tab CR Take 1 Tablet by mouth 2 times a day. 180 Tablet 3    acetaminophen (TYLENOL) 325 MG Tab Take 2 Tablets by mouth every 6 hours as needed for Mild Pain or Moderate Pain.      epoetin (RETACRIT) 94628 UNIT/ML Solution Inject 40,000 Units under the skin every 7 days.      anagrelide (AGRYLIN) 0.5 MG Cap Take 1 Capsule by mouth 2 times a day.      [DISCONTINUED] torsemide 40 MG Tab Take 40 mg by mouth every day. 30 Tablet 0    [DISCONTINUED] potassium chloride SA (KDUR) 20 MEQ Tab CR Take 1 Tablet by mouth every day. 30 Tablet 0     No facility-administered encounter medications on file as of 7/23/2025.

## 2025-07-24 LAB — EKG IMPRESSION: NORMAL

## 2025-07-24 PROCEDURE — 93010 ELECTROCARDIOGRAM REPORT: CPT | Performed by: INTERNAL MEDICINE

## 2025-07-29 ENCOUNTER — OUTPATIENT INFUSION SERVICES (OUTPATIENT)
Dept: ONCOLOGY | Facility: MEDICAL CENTER | Age: 85
End: 2025-07-29
Attending: INTERNAL MEDICINE
Payer: MEDICARE

## 2025-07-29 VITALS
BODY MASS INDEX: 29.93 KG/M2 | DIASTOLIC BLOOD PRESSURE: 58 MMHG | HEIGHT: 67 IN | SYSTOLIC BLOOD PRESSURE: 110 MMHG | WEIGHT: 190.7 LBS | TEMPERATURE: 98.6 F | RESPIRATION RATE: 16 BRPM | HEART RATE: 102 BPM | OXYGEN SATURATION: 89 %

## 2025-07-29 DIAGNOSIS — C80.1 ANEMIA ASSOCIATED WITH MALIGNANT NEOPLASTIC DISEASE (HCC): ICD-10-CM

## 2025-07-29 DIAGNOSIS — I50.32 CHRONIC HEART FAILURE WITH PRESERVED EJECTION FRACTION (HCC): ICD-10-CM

## 2025-07-29 DIAGNOSIS — D75.81 MYELOFIBROSIS (HCC): Primary | ICD-10-CM

## 2025-07-29 DIAGNOSIS — I50.9 HEART FAILURE, NYHA CLASS 3 (HCC): ICD-10-CM

## 2025-07-29 DIAGNOSIS — R06.02 SOB (SHORTNESS OF BREATH): ICD-10-CM

## 2025-07-29 DIAGNOSIS — R06.00 DYSPNEA, UNSPECIFIED TYPE: ICD-10-CM

## 2025-07-29 DIAGNOSIS — D63.0 ANEMIA ASSOCIATED WITH MALIGNANT NEOPLASTIC DISEASE (HCC): ICD-10-CM

## 2025-07-29 DIAGNOSIS — D63.0 ANEMIA IN NEOPLASTIC DISEASE: ICD-10-CM

## 2025-07-29 LAB
ANION GAP SERPL CALC-SCNC: 13 MMOL/L (ref 7–16)
BUN SERPL-MCNC: 37 MG/DL (ref 8–22)
CALCIUM SERPL-MCNC: 9.3 MG/DL (ref 8.5–10.5)
CHLORIDE SERPL-SCNC: 104 MMOL/L (ref 96–112)
CO2 SERPL-SCNC: 29 MMOL/L (ref 20–33)
CREAT SERPL-MCNC: 1.37 MG/DL (ref 0.5–1.4)
GFR SERPLBLD CREATININE-BSD FMLA CKD-EPI: 38 ML/MIN/1.73 M 2
GLUCOSE SERPL-MCNC: 132 MG/DL (ref 65–99)
HCT VFR BLD AUTO: 29 % (ref 37–47)
HGB BLD-MCNC: 9.1 G/DL (ref 12–16)
NT-PROBNP SERPL IA-MCNC: 5631 PG/ML (ref 0–125)
POTASSIUM SERPL-SCNC: 4 MMOL/L (ref 3.6–5.5)
SODIUM SERPL-SCNC: 146 MMOL/L (ref 135–145)

## 2025-07-29 PROCEDURE — 80048 BASIC METABOLIC PNL TOTAL CA: CPT

## 2025-07-29 PROCEDURE — 36415 COLL VENOUS BLD VENIPUNCTURE: CPT

## 2025-07-29 PROCEDURE — 83880 ASSAY OF NATRIURETIC PEPTIDE: CPT

## 2025-07-29 PROCEDURE — 700111 HCHG RX REV CODE 636 W/ 250 OVERRIDE (IP): Mod: JZ,TB | Performed by: INTERNAL MEDICINE

## 2025-07-29 PROCEDURE — 85014 HEMATOCRIT: CPT

## 2025-07-29 PROCEDURE — 96372 THER/PROPH/DIAG INJ SC/IM: CPT

## 2025-07-29 PROCEDURE — 85018 HEMOGLOBIN: CPT

## 2025-07-29 RX ADMIN — EPOETIN ALFA-EPBX 40000 UNITS: 40000 INJECTION, SOLUTION INTRAVENOUS; SUBCUTANEOUS at 15:07

## 2025-07-29 ASSESSMENT — FIBROSIS 4 INDEX: FIB4 SCORE: 0.71

## 2025-07-29 NOTE — PROGRESS NOTES
Dedra into IS for weekly Retacrit injection.  Dedra reports bilat LE swelling is improving.  Venipuncture to left AC for lab draw.  Labs reviewed, pt meets parameters for Retacrit.  Retacrit given sub-q to back of left arm, bandaid applied.  Verified next appointment and Dedra off unit on foot in NAD.

## 2025-08-05 ENCOUNTER — OFFICE VISIT (OUTPATIENT)
Dept: CARDIOLOGY | Facility: MEDICAL CENTER | Age: 85
End: 2025-08-05
Attending: NURSE PRACTITIONER
Payer: MEDICARE

## 2025-08-05 ENCOUNTER — OUTPATIENT INFUSION SERVICES (OUTPATIENT)
Dept: ONCOLOGY | Facility: MEDICAL CENTER | Age: 85
End: 2025-08-05
Attending: INTERNAL MEDICINE
Payer: MEDICARE

## 2025-08-05 VITALS
OXYGEN SATURATION: 91 % | HEART RATE: 98 BPM | SYSTOLIC BLOOD PRESSURE: 113 MMHG | TEMPERATURE: 97.4 F | DIASTOLIC BLOOD PRESSURE: 63 MMHG | RESPIRATION RATE: 17 BRPM | HEIGHT: 67 IN | BODY MASS INDEX: 27.65 KG/M2 | WEIGHT: 176.15 LBS

## 2025-08-05 VITALS
OXYGEN SATURATION: 95 % | HEIGHT: 67 IN | BODY MASS INDEX: 27.62 KG/M2 | DIASTOLIC BLOOD PRESSURE: 64 MMHG | RESPIRATION RATE: 14 BRPM | HEART RATE: 96 BPM | WEIGHT: 176 LBS | SYSTOLIC BLOOD PRESSURE: 110 MMHG

## 2025-08-05 DIAGNOSIS — I50.9 HEART FAILURE, NYHA CLASS 3 (HCC): ICD-10-CM

## 2025-08-05 DIAGNOSIS — Z79.899 HIGH RISK MEDICATION USE: ICD-10-CM

## 2025-08-05 DIAGNOSIS — D75.81 MYELOFIBROSIS (HCC): Primary | ICD-10-CM

## 2025-08-05 DIAGNOSIS — I50.32 CHRONIC HEART FAILURE WITH PRESERVED EJECTION FRACTION (HCC): Primary | ICD-10-CM

## 2025-08-05 DIAGNOSIS — D63.0 ANEMIA ASSOCIATED WITH MALIGNANT NEOPLASTIC DISEASE (HCC): ICD-10-CM

## 2025-08-05 DIAGNOSIS — C80.1 ANEMIA ASSOCIATED WITH MALIGNANT NEOPLASTIC DISEASE (HCC): ICD-10-CM

## 2025-08-05 DIAGNOSIS — D63.0 ANEMIA IN NEOPLASTIC DISEASE: ICD-10-CM

## 2025-08-05 LAB
HCT VFR BLD AUTO: 30.4 % (ref 37–47)
HGB BLD-MCNC: 9.5 G/DL (ref 12–16)

## 2025-08-05 PROCEDURE — 99214 OFFICE O/P EST MOD 30 MIN: CPT | Performed by: NURSE PRACTITIONER

## 2025-08-05 PROCEDURE — 99212 OFFICE O/P EST SF 10 MIN: CPT | Performed by: NURSE PRACTITIONER

## 2025-08-05 PROCEDURE — 36415 COLL VENOUS BLD VENIPUNCTURE: CPT

## 2025-08-05 PROCEDURE — 85018 HEMOGLOBIN: CPT

## 2025-08-05 PROCEDURE — 3078F DIAST BP <80 MM HG: CPT | Performed by: NURSE PRACTITIONER

## 2025-08-05 PROCEDURE — 3074F SYST BP LT 130 MM HG: CPT | Performed by: NURSE PRACTITIONER

## 2025-08-05 PROCEDURE — 96372 THER/PROPH/DIAG INJ SC/IM: CPT

## 2025-08-05 PROCEDURE — 85014 HEMATOCRIT: CPT

## 2025-08-05 PROCEDURE — 700111 HCHG RX REV CODE 636 W/ 250 OVERRIDE (IP): Mod: JZ,TB | Performed by: INTERNAL MEDICINE

## 2025-08-05 PROCEDURE — 99213 OFFICE O/P EST LOW 20 MIN: CPT | Performed by: NURSE PRACTITIONER

## 2025-08-05 RX ORDER — HEPARIN SODIUM (PORCINE) LOCK FLUSH IV SOLN 100 UNIT/ML 100 UNIT/ML
500 SOLUTION INTRAVENOUS PRN
Status: CANCELLED | OUTPATIENT
Start: 2025-08-12

## 2025-08-05 RX ORDER — 0.9 % SODIUM CHLORIDE 0.9 %
10 VIAL (ML) INJECTION PRN
Status: CANCELLED | OUTPATIENT
Start: 2025-08-05

## 2025-08-05 RX ORDER — 0.9 % SODIUM CHLORIDE 0.9 %
10 VIAL (ML) INJECTION PRN
Status: CANCELLED | OUTPATIENT
Start: 2025-08-12

## 2025-08-05 RX ORDER — HEPARIN SODIUM (PORCINE) LOCK FLUSH IV SOLN 100 UNIT/ML 100 UNIT/ML
500 SOLUTION INTRAVENOUS PRN
Status: CANCELLED | OUTPATIENT
Start: 2025-08-19

## 2025-08-05 RX ORDER — 0.9 % SODIUM CHLORIDE 0.9 %
3 VIAL (ML) INJECTION PRN
Status: CANCELLED | OUTPATIENT
Start: 2025-08-05

## 2025-08-05 RX ORDER — 0.9 % SODIUM CHLORIDE 0.9 %
VIAL (ML) INJECTION PRN
Status: CANCELLED | OUTPATIENT
Start: 2025-08-19

## 2025-08-05 RX ORDER — 0.9 % SODIUM CHLORIDE 0.9 %
3 VIAL (ML) INJECTION PRN
Status: CANCELLED | OUTPATIENT
Start: 2025-08-19

## 2025-08-05 RX ORDER — 0.9 % SODIUM CHLORIDE 0.9 %
VIAL (ML) INJECTION PRN
Status: CANCELLED | OUTPATIENT
Start: 2025-08-12

## 2025-08-05 RX ORDER — 0.9 % SODIUM CHLORIDE 0.9 %
10 VIAL (ML) INJECTION PRN
Status: CANCELLED | OUTPATIENT
Start: 2025-08-19

## 2025-08-05 RX ORDER — HEPARIN SODIUM (PORCINE) LOCK FLUSH IV SOLN 100 UNIT/ML 100 UNIT/ML
500 SOLUTION INTRAVENOUS PRN
Status: CANCELLED | OUTPATIENT
Start: 2025-08-05

## 2025-08-05 RX ORDER — 0.9 % SODIUM CHLORIDE 0.9 %
3 VIAL (ML) INJECTION PRN
Status: CANCELLED | OUTPATIENT
Start: 2025-08-12

## 2025-08-05 RX ORDER — 0.9 % SODIUM CHLORIDE 0.9 %
VIAL (ML) INJECTION PRN
Status: CANCELLED | OUTPATIENT
Start: 2025-08-05

## 2025-08-05 RX ADMIN — EPOETIN ALFA-EPBX 40000 UNITS: 40000 INJECTION, SOLUTION INTRAVENOUS; SUBCUTANEOUS at 16:09

## 2025-08-05 ASSESSMENT — ENCOUNTER SYMPTOMS
CLAUDICATION: 0
PALPITATIONS: 0
COUGH: 0
PND: 1
ORTHOPNEA: 0
DIZZINESS: 0
ABDOMINAL PAIN: 0
MYALGIAS: 0
FEVER: 0
SHORTNESS OF BREATH: 1

## 2025-08-05 ASSESSMENT — FIBROSIS 4 INDEX
FIB4 SCORE: 0.71
FIB4 SCORE: 0.71

## 2025-08-06 ENCOUNTER — PHYSICAL THERAPY (OUTPATIENT)
Dept: PHYSICAL THERAPY | Facility: REHABILITATION | Age: 85
End: 2025-08-06
Attending: FAMILY MEDICINE
Payer: MEDICARE

## 2025-08-06 DIAGNOSIS — R60.0 LOWER EXTREMITY EDEMA: Primary | ICD-10-CM

## 2025-08-06 DIAGNOSIS — R60.9 LIPEDEMA: ICD-10-CM

## 2025-08-06 PROCEDURE — 97163 PT EVAL HIGH COMPLEX 45 MIN: CPT

## 2025-08-12 ENCOUNTER — OUTPATIENT INFUSION SERVICES (OUTPATIENT)
Dept: ONCOLOGY | Facility: MEDICAL CENTER | Age: 85
End: 2025-08-12
Attending: INTERNAL MEDICINE
Payer: MEDICARE

## 2025-08-12 VITALS
HEART RATE: 100 BPM | HEIGHT: 67 IN | RESPIRATION RATE: 18 BRPM | SYSTOLIC BLOOD PRESSURE: 110 MMHG | OXYGEN SATURATION: 91 % | DIASTOLIC BLOOD PRESSURE: 64 MMHG | BODY MASS INDEX: 26.71 KG/M2 | TEMPERATURE: 98.1 F | WEIGHT: 170.19 LBS

## 2025-08-12 DIAGNOSIS — D75.81 MYELOFIBROSIS (HCC): Primary | ICD-10-CM

## 2025-08-12 DIAGNOSIS — D63.0 ANEMIA ASSOCIATED WITH MALIGNANT NEOPLASTIC DISEASE (HCC): ICD-10-CM

## 2025-08-12 DIAGNOSIS — C80.1 ANEMIA ASSOCIATED WITH MALIGNANT NEOPLASTIC DISEASE (HCC): ICD-10-CM

## 2025-08-12 DIAGNOSIS — D63.0 ANEMIA IN NEOPLASTIC DISEASE: ICD-10-CM

## 2025-08-12 LAB
HCT VFR BLD AUTO: 29.4 % (ref 37–47)
HGB BLD-MCNC: 9 G/DL (ref 12–16)

## 2025-08-12 PROCEDURE — 96372 THER/PROPH/DIAG INJ SC/IM: CPT

## 2025-08-12 PROCEDURE — 85018 HEMOGLOBIN: CPT

## 2025-08-12 PROCEDURE — 700111 HCHG RX REV CODE 636 W/ 250 OVERRIDE (IP): Mod: JZ,TB | Performed by: INTERNAL MEDICINE

## 2025-08-12 PROCEDURE — 36415 COLL VENOUS BLD VENIPUNCTURE: CPT

## 2025-08-12 PROCEDURE — 85014 HEMATOCRIT: CPT

## 2025-08-12 RX ADMIN — EPOETIN ALFA-EPBX 40000 UNITS: 40000 INJECTION, SOLUTION INTRAVENOUS; SUBCUTANEOUS at 14:50

## 2025-08-12 ASSESSMENT — FIBROSIS 4 INDEX: FIB4 SCORE: 0.71

## 2025-08-19 ENCOUNTER — OUTPATIENT INFUSION SERVICES (OUTPATIENT)
Dept: ONCOLOGY | Facility: MEDICAL CENTER | Age: 85
End: 2025-08-19
Attending: INTERNAL MEDICINE
Payer: MEDICARE

## 2025-08-19 VITALS
RESPIRATION RATE: 18 BRPM | OXYGEN SATURATION: 92 % | DIASTOLIC BLOOD PRESSURE: 60 MMHG | BODY MASS INDEX: 26.12 KG/M2 | HEIGHT: 67 IN | TEMPERATURE: 98.3 F | WEIGHT: 166.45 LBS | SYSTOLIC BLOOD PRESSURE: 110 MMHG | HEART RATE: 98 BPM

## 2025-08-19 DIAGNOSIS — D63.0 ANEMIA ASSOCIATED WITH MALIGNANT NEOPLASTIC DISEASE (HCC): ICD-10-CM

## 2025-08-19 DIAGNOSIS — D75.81 MYELOFIBROSIS (HCC): Primary | ICD-10-CM

## 2025-08-19 DIAGNOSIS — D63.0 ANEMIA IN NEOPLASTIC DISEASE: ICD-10-CM

## 2025-08-19 DIAGNOSIS — C80.1 ANEMIA ASSOCIATED WITH MALIGNANT NEOPLASTIC DISEASE (HCC): ICD-10-CM

## 2025-08-19 LAB
HCT VFR BLD AUTO: 29.4 % (ref 37–47)
HGB BLD-MCNC: 9.1 G/DL (ref 12–16)

## 2025-08-19 PROCEDURE — 96372 THER/PROPH/DIAG INJ SC/IM: CPT

## 2025-08-19 PROCEDURE — 85018 HEMOGLOBIN: CPT

## 2025-08-19 PROCEDURE — 700111 HCHG RX REV CODE 636 W/ 250 OVERRIDE (IP): Mod: JZ,TB | Performed by: INTERNAL MEDICINE

## 2025-08-19 PROCEDURE — 36415 COLL VENOUS BLD VENIPUNCTURE: CPT

## 2025-08-19 PROCEDURE — 85014 HEMATOCRIT: CPT

## 2025-08-19 RX ADMIN — EPOETIN ALFA-EPBX 40000 UNITS: 40000 INJECTION, SOLUTION INTRAVENOUS; SUBCUTANEOUS at 14:36

## 2025-08-19 ASSESSMENT — FIBROSIS 4 INDEX: FIB4 SCORE: 0.71

## 2025-08-21 ENCOUNTER — OFFICE VISIT (OUTPATIENT)
Dept: MEDICAL GROUP | Facility: MEDICAL CENTER | Age: 85
End: 2025-08-21
Payer: MEDICARE

## 2025-08-21 VITALS
WEIGHT: 162 LBS | BODY MASS INDEX: 25.43 KG/M2 | HEART RATE: 103 BPM | SYSTOLIC BLOOD PRESSURE: 114 MMHG | TEMPERATURE: 97.6 F | RESPIRATION RATE: 16 BRPM | DIASTOLIC BLOOD PRESSURE: 50 MMHG | HEIGHT: 67 IN | OXYGEN SATURATION: 94 %

## 2025-08-21 DIAGNOSIS — D63.0 ANEMIA IN NEOPLASTIC DISEASE: ICD-10-CM

## 2025-08-21 DIAGNOSIS — M79.3 LIPODERMATOSCLEROSIS OF BOTH LOWER EXTREMITIES: ICD-10-CM

## 2025-08-21 DIAGNOSIS — I50.813 ACUTE ON CHRONIC RIGHT-SIDED CONGESTIVE HEART FAILURE (HCC): Primary | ICD-10-CM

## 2025-08-21 DIAGNOSIS — D75.81 MYELOFIBROSIS (HCC): ICD-10-CM

## 2025-08-21 DIAGNOSIS — I27.20 PULMONARY HYPERTENSION (HCC): ICD-10-CM

## 2025-08-21 PROBLEM — R33.8 ACUTE URINARY RETENTION: Status: RESOLVED | Noted: 2025-07-04 | Resolved: 2025-08-21

## 2025-08-21 PROBLEM — N17.9 ACUTE KIDNEY INJURY (HCC): Status: RESOLVED | Noted: 2025-07-03 | Resolved: 2025-08-21

## 2025-08-21 PROBLEM — N32.89 MASS OF URINARY BLADDER: Status: RESOLVED | Noted: 2023-09-19 | Resolved: 2025-08-21

## 2025-08-21 PROCEDURE — 3078F DIAST BP <80 MM HG: CPT | Performed by: FAMILY MEDICINE

## 2025-08-21 PROCEDURE — 3074F SYST BP LT 130 MM HG: CPT | Performed by: FAMILY MEDICINE

## 2025-08-21 PROCEDURE — 99214 OFFICE O/P EST MOD 30 MIN: CPT | Performed by: FAMILY MEDICINE

## 2025-08-21 RX ORDER — SPIRONOLACTONE 25 MG/1
25 TABLET ORAL
COMMUNITY

## 2025-08-21 RX ORDER — CETIRIZINE HYDROCHLORIDE 10 MG/1
10 TABLET ORAL
COMMUNITY
End: 2025-08-26

## 2025-08-21 ASSESSMENT — FIBROSIS 4 INDEX: FIB4 SCORE: 0.71

## 2025-08-26 ENCOUNTER — OUTPATIENT INFUSION SERVICES (OUTPATIENT)
Dept: ONCOLOGY | Facility: MEDICAL CENTER | Age: 85
End: 2025-08-26
Attending: INTERNAL MEDICINE
Payer: MEDICARE

## 2025-08-26 ENCOUNTER — PHYSICAL THERAPY (OUTPATIENT)
Dept: PHYSICAL THERAPY | Facility: REHABILITATION | Age: 85
End: 2025-08-26
Attending: FAMILY MEDICINE
Payer: MEDICARE

## 2025-08-26 VITALS
SYSTOLIC BLOOD PRESSURE: 108 MMHG | BODY MASS INDEX: 25.29 KG/M2 | HEART RATE: 110 BPM | RESPIRATION RATE: 18 BRPM | OXYGEN SATURATION: 90 % | HEIGHT: 67 IN | DIASTOLIC BLOOD PRESSURE: 61 MMHG | TEMPERATURE: 98.3 F | WEIGHT: 161.16 LBS

## 2025-08-26 DIAGNOSIS — C80.1 ANEMIA ASSOCIATED WITH MALIGNANT NEOPLASTIC DISEASE (HCC): ICD-10-CM

## 2025-08-26 DIAGNOSIS — D63.0 ANEMIA IN NEOPLASTIC DISEASE: ICD-10-CM

## 2025-08-26 DIAGNOSIS — D63.0 ANEMIA ASSOCIATED WITH MALIGNANT NEOPLASTIC DISEASE (HCC): ICD-10-CM

## 2025-08-26 DIAGNOSIS — R60.9 LIPEDEMA: ICD-10-CM

## 2025-08-26 DIAGNOSIS — R60.0 LOWER EXTREMITY EDEMA: Primary | ICD-10-CM

## 2025-08-26 DIAGNOSIS — D75.81 MYELOFIBROSIS (HCC): Primary | ICD-10-CM

## 2025-08-26 LAB
HCT VFR BLD AUTO: 29.4 % (ref 37–47)
HGB BLD-MCNC: 9.3 G/DL (ref 12–16)

## 2025-08-26 PROCEDURE — 97535 SELF CARE MNGMENT TRAINING: CPT

## 2025-08-26 PROCEDURE — 85018 HEMOGLOBIN: CPT

## 2025-08-26 PROCEDURE — 97140 MANUAL THERAPY 1/> REGIONS: CPT

## 2025-08-26 PROCEDURE — 36415 COLL VENOUS BLD VENIPUNCTURE: CPT

## 2025-08-26 PROCEDURE — 85014 HEMATOCRIT: CPT

## 2025-08-26 PROCEDURE — 700111 HCHG RX REV CODE 636 W/ 250 OVERRIDE (IP): Mod: JZ,TB | Performed by: INTERNAL MEDICINE

## 2025-08-26 PROCEDURE — 96372 THER/PROPH/DIAG INJ SC/IM: CPT

## 2025-08-26 RX ORDER — 0.9 % SODIUM CHLORIDE 0.9 %
3 VIAL (ML) INJECTION PRN
Status: CANCELLED | OUTPATIENT
Start: 2025-08-26

## 2025-08-26 RX ORDER — 0.9 % SODIUM CHLORIDE 0.9 %
10 VIAL (ML) INJECTION PRN
Status: CANCELLED | OUTPATIENT
Start: 2025-08-26

## 2025-08-26 RX ORDER — HEPARIN SODIUM (PORCINE) LOCK FLUSH IV SOLN 100 UNIT/ML 100 UNIT/ML
500 SOLUTION INTRAVENOUS PRN
Status: CANCELLED | OUTPATIENT
Start: 2025-08-26

## 2025-08-26 RX ORDER — 0.9 % SODIUM CHLORIDE 0.9 %
VIAL (ML) INJECTION PRN
Status: CANCELLED | OUTPATIENT
Start: 2025-08-26

## 2025-08-26 RX ADMIN — EPOETIN ALFA-EPBX 40000 UNITS: 40000 INJECTION, SOLUTION INTRAVENOUS; SUBCUTANEOUS at 14:43

## 2025-08-26 ASSESSMENT — FIBROSIS 4 INDEX: FIB4 SCORE: 0.71

## 2025-08-28 ENCOUNTER — TELEPHONE (OUTPATIENT)
Dept: CARDIOLOGY | Facility: MEDICAL CENTER | Age: 85
End: 2025-08-28

## 2025-08-28 ENCOUNTER — APPOINTMENT (OUTPATIENT)
Dept: PALLIATIVE MEDICINE | Facility: HOSPICE | Age: 85
End: 2025-08-28
Payer: MEDICARE

## (undated) DEVICE — ELECTRODE 850 FOAM ADHESIVE - HYDROGEL RADIOTRNSPRNT (50/PK)

## (undated) DEVICE — GOWN WARMING STANDARD FLEX - (30/CA)

## (undated) DEVICE — KIT ANESTHESIA W/CIRCUIT & 3/LT BAG W/FILTER (20EA/CA)

## (undated) DEVICE — STOCKINET TUBULAR 6IN STERILE - 6 X 48YDS (25/CA)

## (undated) DEVICE — SUTURE GENERAL

## (undated) DEVICE — WATER IRRIGATION STERILE 1000ML (12EA/CA)

## (undated) DEVICE — PAD LAP STERILE 18 X 18 - (5/PK 40PK/CA)

## (undated) DEVICE — GLOVE SZ 7 BIOGEL PI MICRO - PF LF (50PR/BX 4BX/CA)

## (undated) DEVICE — CHLORAPREP 26 ML APPLICATOR - ORANGE TINT(25/CA)

## (undated) DEVICE — GLOVE BIOGEL SZ 6 PF LATEX - (50EA/BX 4BX/CA)

## (undated) DEVICE — CANISTER SUCTION RIGID RED 1500CC (40EA/CA)

## (undated) DEVICE — PAD UNIVERSAL MULTI USE (1/EA)

## (undated) DEVICE — NEPTUNE 4 PORT MANIFOLD - (20/PK)

## (undated) DEVICE — SODIUM CHL. IRRIGATION 0.9% 3000ML (4EA/CA 65CA/PF)

## (undated) DEVICE — SET LEADWIRE 5 LEAD BEDSIDE DISPOSABLE ECG (1SET OF 5/EA)

## (undated) DEVICE — FILM CASSETTE ENDO

## (undated) DEVICE — KIT ROOM DECONTAMINATION

## (undated) DEVICE — GLOVE BIOGEL INDICATOR SZ 8.5 SURGICAL PF LTX - (50/BX 4BX/CA)

## (undated) DEVICE — GLOVE BIOGEL ECLIPSE  PF LATEX SIZE 6.5 (50PR/BX)

## (undated) DEVICE — SLEEVE, VASO, THIGH, MED

## (undated) DEVICE — DRESSING POST OP BORDER 4 X 10 (5EA/BX)

## (undated) DEVICE — CATHERTER CLEAR SINGLE USE INJECTION THERAPY NEEDLE 25GA X 4MM  2.3MM X 240CM (5EA/BX)

## (undated) DEVICE — BLOCK

## (undated) DEVICE — BLADE SAW 90X25X1.37MM SAGITTAL DUAL CUT

## (undated) DEVICE — HANDPIECE 10FT INTPLS SCT PLS IRRIGATION HAND CONTROL SET (6/PK)

## (undated) DEVICE — MASK PANORAMIC OXYGEN PRO2 (30EA/CA)

## (undated) DEVICE — GLOVE BIOGEL ECLIPSE PF LATEX SIZE 8.0  (50PR/BX)

## (undated) DEVICE — MIXER BONE CEMENT REVOLUTION - W/FEMORAL PRESSURIZER (6/CA)

## (undated) DEVICE — SODIUM CHL IRRIGATION 0.9% 1000ML (12EA/CA)

## (undated) DEVICE — LENS/HOOD FOR SPACESUIT - (32/PK) PEEL AWAY FACE

## (undated) DEVICE — ELECTRODE DUAL RETURN W/ CORD - (50/PK)

## (undated) DEVICE — SYRINGE DISP. 50CC LS - (40/BX)

## (undated) DEVICE — PACK TOTAL KNEE  (1/CA)

## (undated) DEVICE — PROTECTOR ULNA NERVE - (36PR/CA)

## (undated) DEVICE — BLADE SAGITTAL SYSTEM 18MM

## (undated) DEVICE — Device

## (undated) DEVICE — TRAY SPINAL ANESTHESIA NON-SAFETY (10/CA)

## (undated) DEVICE — SET EXTENSION WITH 2 PORTS (48EA/CA) ***PART #2C8610 IS A SUBSTITUTE*****

## (undated) DEVICE — HEAD HOLDER JUNIOR/ADULT

## (undated) DEVICE — SUTURE 3-0 MONOCRYL PLUS PS-1 - 27 INCH (36/BX)

## (undated) DEVICE — MASK ANESTHESIA ADULT  - (100/CA)

## (undated) DEVICE — SUCTION INSTRUMENT YANKAUER BULBOUS TIP W/O VENT (50EA/CA)

## (undated) DEVICE — TOURNIQUET CUFF 34 X 4 ONE PORT DISP - STERILE (10/BX)

## (undated) DEVICE — GLOVE BIOGEL PI INDICATOR SZ 6.5 SURGICAL PF LF - (50/BX 4BX/CA)

## (undated) DEVICE — CANISTER SUCTION 3000ML MECHANICAL FILTER AUTO SHUTOFF MEDI-VAC NONSTERILE LF DISP  (40EA/CA)

## (undated) DEVICE — DEVICE HEMOSTATIC CLIPPING RESOLUTION 360 DEGREES (20EA/BX)

## (undated) DEVICE — LACTATED RINGERS INJ 1000 ML - (14EA/CA 60CA/PF)

## (undated) DEVICE — GLOVE BIOGEL INDICATOR SZ 8 SURGICAL PF LTX - (50/BX 4BX/CA)

## (undated) DEVICE — GOWN SURGEONS X-LARGE - DISP. (30/CA)

## (undated) DEVICE — TIP INTPLS HFLO ML ORFC BTRY - (12/CS)  FOR SURGILAV

## (undated) DEVICE — SUTURE 2-0 VICRYL PLUS CT-1 - 8 X 18 INCH(12/BX)

## (undated) DEVICE — SENSOR SPO2 NEO LNCS ADHESIVE (20/BX) SEE USER NOTES

## (undated) DEVICE — TUBING CLEARLINK DUO-VENT - C-FLO (48EA/CA)

## (undated) DEVICE — TUBE CONNECTING SUCTION - CLEAR PLASTIC STERILE 72 IN (50EA/CA)

## (undated) DEVICE — TUBE SUCTION YANKAUER  1/4 X 6FT (20EA/CA)"